# Patient Record
Sex: MALE | Race: WHITE | NOT HISPANIC OR LATINO | Employment: OTHER | URBAN - NONMETROPOLITAN AREA
[De-identification: names, ages, dates, MRNs, and addresses within clinical notes are randomized per-mention and may not be internally consistent; named-entity substitution may affect disease eponyms.]

---

## 2017-01-14 ENCOUNTER — COMMUNICATION - GICH (OUTPATIENT)
Dept: FAMILY MEDICINE | Facility: OTHER | Age: 52
End: 2017-01-14

## 2017-01-14 DIAGNOSIS — G89.29 OTHER CHRONIC PAIN: ICD-10-CM

## 2017-01-14 DIAGNOSIS — F41.1 GENERALIZED ANXIETY DISORDER: ICD-10-CM

## 2017-01-14 DIAGNOSIS — M54.50 LOW BACK PAIN: ICD-10-CM

## 2017-01-17 ENCOUNTER — COMMUNICATION - GICH (OUTPATIENT)
Dept: FAMILY MEDICINE | Facility: OTHER | Age: 52
End: 2017-01-17

## 2017-01-17 DIAGNOSIS — E11.9 TYPE 2 DIABETES MELLITUS WITHOUT COMPLICATIONS (H): ICD-10-CM

## 2017-01-18 ENCOUNTER — OFFICE VISIT - GICH (OUTPATIENT)
Dept: FAMILY MEDICINE | Facility: OTHER | Age: 52
End: 2017-01-18

## 2017-01-18 ENCOUNTER — HISTORY (OUTPATIENT)
Dept: FAMILY MEDICINE | Facility: OTHER | Age: 52
End: 2017-01-18

## 2017-01-18 DIAGNOSIS — S46.911A STRAIN OF UNSPECIFIED MUSCLE, FASCIA AND TENDON AT SHOULDER AND UPPER ARM LEVEL, RIGHT ARM, INITIAL ENCOUNTER: ICD-10-CM

## 2017-01-18 DIAGNOSIS — G89.29 OTHER CHRONIC PAIN: ICD-10-CM

## 2017-01-18 DIAGNOSIS — H10.9 CONJUNCTIVITIS: ICD-10-CM

## 2017-01-18 DIAGNOSIS — M54.50 LOW BACK PAIN: ICD-10-CM

## 2017-01-18 DIAGNOSIS — E11.9 TYPE 2 DIABETES MELLITUS WITHOUT COMPLICATIONS (H): ICD-10-CM

## 2017-01-18 LAB
ANION GAP - HISTORICAL: 11 (ref 5–18)
BUN SERPL-MCNC: 21 MG/DL (ref 7–25)
BUN/CREAT RATIO - HISTORICAL: 17
CALCIUM SERPL-MCNC: 9.5 MG/DL (ref 8.6–10.3)
CHLORIDE SERPLBLD-SCNC: 96 MMOL/L (ref 98–107)
CO2 SERPL-SCNC: 29 MMOL/L (ref 21–31)
CREAT SERPL-MCNC: 1.24 MG/DL (ref 0.7–1.3)
ESTIMATED AVERAGE GLUCOSE: 183 MG/DL
GFR IF NOT AFRICAN AMERICAN - HISTORICAL: >60 ML/MIN/1.73M2
GLUCOSE SERPL-MCNC: 225 MG/DL (ref 70–105)
HEMOGLOBIN A1C MONITORING (POCT) - HISTORICAL: 8 % (ref 4–6.2)
POTASSIUM SERPL-SCNC: 4.5 MMOL/L (ref 3.5–5.1)
SODIUM SERPL-SCNC: 136 MMOL/L (ref 133–143)

## 2017-01-30 ENCOUNTER — COMMUNICATION - GICH (OUTPATIENT)
Dept: FAMILY MEDICINE | Facility: OTHER | Age: 52
End: 2017-01-30

## 2017-01-30 DIAGNOSIS — E11.9 TYPE 2 DIABETES MELLITUS WITHOUT COMPLICATIONS (H): ICD-10-CM

## 2017-01-31 ENCOUNTER — AMBULATORY - GICH (OUTPATIENT)
Dept: SCHEDULING | Facility: OTHER | Age: 52
End: 2017-01-31

## 2017-02-03 ENCOUNTER — AMBULATORY - GICH (OUTPATIENT)
Dept: SCHEDULING | Facility: OTHER | Age: 52
End: 2017-02-03

## 2017-02-06 ENCOUNTER — COMMUNICATION - GICH (OUTPATIENT)
Dept: FAMILY MEDICINE | Facility: OTHER | Age: 52
End: 2017-02-06

## 2017-02-07 ENCOUNTER — AMBULATORY - GICH (OUTPATIENT)
Dept: EDUCATION SERVICES | Facility: OTHER | Age: 52
End: 2017-02-07

## 2017-02-07 ENCOUNTER — COMMUNICATION - GICH (OUTPATIENT)
Dept: FAMILY MEDICINE | Facility: OTHER | Age: 52
End: 2017-02-07

## 2017-02-07 DIAGNOSIS — E11.9 TYPE 2 DIABETES MELLITUS WITHOUT COMPLICATIONS (H): ICD-10-CM

## 2017-02-07 DIAGNOSIS — E11.8 TYPE 2 DIABETES MELLITUS WITH COMPLICATIONS (H): ICD-10-CM

## 2017-02-07 DIAGNOSIS — Z79.4 LONG TERM CURRENT USE OF INSULIN (H): ICD-10-CM

## 2017-02-23 ENCOUNTER — COMMUNICATION - GICH (OUTPATIENT)
Dept: EDUCATION SERVICES | Facility: OTHER | Age: 52
End: 2017-02-23

## 2017-02-23 DIAGNOSIS — E11.9 TYPE 2 DIABETES MELLITUS WITHOUT COMPLICATIONS (H): ICD-10-CM

## 2017-02-28 ENCOUNTER — OFFICE VISIT - GICH (OUTPATIENT)
Dept: FAMILY MEDICINE | Facility: OTHER | Age: 52
End: 2017-02-28

## 2017-02-28 ENCOUNTER — HISTORY (OUTPATIENT)
Dept: FAMILY MEDICINE | Facility: OTHER | Age: 52
End: 2017-02-28

## 2017-02-28 ENCOUNTER — HOSPITAL ENCOUNTER (OUTPATIENT)
Dept: RADIOLOGY | Facility: OTHER | Age: 52
End: 2017-02-28
Attending: FAMILY MEDICINE

## 2017-02-28 DIAGNOSIS — R07.89 OTHER CHEST PAIN: ICD-10-CM

## 2017-03-08 ENCOUNTER — AMBULATORY - GICH (OUTPATIENT)
Dept: SCHEDULING | Facility: OTHER | Age: 52
End: 2017-03-08

## 2017-03-10 ENCOUNTER — AMBULATORY - GICH (OUTPATIENT)
Dept: SCHEDULING | Facility: OTHER | Age: 52
End: 2017-03-10

## 2017-03-20 ENCOUNTER — COMMUNICATION - GICH (OUTPATIENT)
Dept: FAMILY MEDICINE | Facility: OTHER | Age: 52
End: 2017-03-20

## 2017-03-20 DIAGNOSIS — Z86.39 PERSONAL HISTORY OF OTHER ENDOCRINE, NUTRITIONAL AND METABOLIC DISEASE: ICD-10-CM

## 2017-05-08 ENCOUNTER — COMMUNICATION - GICH (OUTPATIENT)
Dept: FAMILY MEDICINE | Facility: OTHER | Age: 52
End: 2017-05-08

## 2017-05-08 DIAGNOSIS — M54.9 DORSALGIA: ICD-10-CM

## 2017-05-08 DIAGNOSIS — E11.65 TYPE 2 DIABETES MELLITUS WITH HYPERGLYCEMIA (H): ICD-10-CM

## 2017-05-08 DIAGNOSIS — E11.51 TYPE 2 DIABETES MELLITUS WITH DIABETIC PERIPHERAL ANGIOPATHY WITHOUT GANGRENE (H): ICD-10-CM

## 2017-05-12 ENCOUNTER — COMMUNICATION - GICH (OUTPATIENT)
Dept: FAMILY MEDICINE | Facility: OTHER | Age: 52
End: 2017-05-12

## 2017-05-12 DIAGNOSIS — M54.9 DORSALGIA: ICD-10-CM

## 2017-06-05 ENCOUNTER — COMMUNICATION - GICH (OUTPATIENT)
Dept: FAMILY MEDICINE | Facility: OTHER | Age: 52
End: 2017-06-05

## 2017-06-05 DIAGNOSIS — M54.9 DORSALGIA: ICD-10-CM

## 2017-06-15 ENCOUNTER — COMMUNICATION - GICH (OUTPATIENT)
Dept: FAMILY MEDICINE | Facility: OTHER | Age: 52
End: 2017-06-15

## 2017-06-21 ENCOUNTER — OFFICE VISIT - GICH (OUTPATIENT)
Dept: FAMILY MEDICINE | Facility: OTHER | Age: 52
End: 2017-06-21

## 2017-06-21 ENCOUNTER — HISTORY (OUTPATIENT)
Dept: FAMILY MEDICINE | Facility: OTHER | Age: 52
End: 2017-06-21

## 2017-06-21 DIAGNOSIS — E11.42 TYPE 2 DIABETES MELLITUS WITH DIABETIC POLYNEUROPATHY (H): ICD-10-CM

## 2017-06-21 DIAGNOSIS — E11.65 TYPE 2 DIABETES MELLITUS WITH HYPERGLYCEMIA (H): ICD-10-CM

## 2017-06-21 DIAGNOSIS — M62.830 MUSCLE SPASM OF BACK: ICD-10-CM

## 2017-06-21 DIAGNOSIS — N18.30 CHRONIC KIDNEY DISEASE, STAGE III (MODERATE) (H): ICD-10-CM

## 2017-06-21 DIAGNOSIS — Z79.4 LONG TERM CURRENT USE OF INSULIN (H): ICD-10-CM

## 2017-06-21 DIAGNOSIS — F51.04 PSYCHOPHYSIOLOGIC INSOMNIA: ICD-10-CM

## 2017-06-21 LAB
A/G RATIO - HISTORICAL: 1.5 (ref 1–2)
ALBUMIN SERPL-MCNC: 4.2 G/DL (ref 3.5–5.7)
ALP SERPL-CCNC: 77 IU/L (ref 34–104)
ALT (SGPT) - HISTORICAL: 50 IU/L (ref 7–52)
ANION GAP - HISTORICAL: 13 (ref 5–18)
AST SERPL-CCNC: 44 IU/L (ref 13–39)
BILIRUB SERPL-MCNC: 0.3 MG/DL (ref 0.3–1)
BUN SERPL-MCNC: 23 MG/DL (ref 7–25)
BUN/CREAT RATIO - HISTORICAL: 18
CALCIUM SERPL-MCNC: 9.6 MG/DL (ref 8.6–10.3)
CHLORIDE SERPLBLD-SCNC: 92 MMOL/L (ref 98–107)
CHOL/HDL RATIO - HISTORICAL: 5.53
CHOLESTEROL TOTAL: 199 MG/DL
CO2 SERPL-SCNC: 29 MMOL/L (ref 21–31)
CREAT SERPL-MCNC: 1.29 MG/DL (ref 0.7–1.3)
ESTIMATED AVERAGE GLUCOSE: 220 MG/DL
GFR IF NOT AFRICAN AMERICAN - HISTORICAL: 58 ML/MIN/1.73M2
GLOBULIN - HISTORICAL: 2.8 G/DL (ref 2–3.7)
GLUCOSE SERPL-MCNC: 298 MG/DL (ref 70–105)
HDLC SERPL-MCNC: 36 MG/DL (ref 23–92)
HEMOGLOBIN A1C MONITORING (POCT) - HISTORICAL: 9.3 % (ref 4–6.2)
LDL COMMENT - HISTORICAL: ABNORMAL
NON-HDL CHOLESTEROL - HISTORICAL: 163 MG/DL
PATIENT STATUS - HISTORICAL: ABNORMAL
POTASSIUM SERPL-SCNC: 5.1 MMOL/L (ref 3.5–5.1)
PROT SERPL-MCNC: 7 G/DL (ref 6.4–8.9)
SODIUM SERPL-SCNC: 134 MMOL/L (ref 133–143)
TRIGL SERPL-MCNC: 610 MG/DL

## 2017-06-21 ASSESSMENT — PATIENT HEALTH QUESTIONNAIRE - PHQ9: SUM OF ALL RESPONSES TO PHQ QUESTIONS 1-9: 10

## 2017-06-30 ENCOUNTER — COMMUNICATION - GICH (OUTPATIENT)
Dept: FAMILY MEDICINE | Facility: OTHER | Age: 52
End: 2017-06-30

## 2017-07-11 ENCOUNTER — AMBULATORY - GICH (OUTPATIENT)
Dept: SCHEDULING | Facility: OTHER | Age: 52
End: 2017-07-11

## 2017-07-23 ENCOUNTER — COMMUNICATION - GICH (OUTPATIENT)
Dept: FAMILY MEDICINE | Facility: OTHER | Age: 52
End: 2017-07-23

## 2017-07-23 DIAGNOSIS — F41.1 GENERALIZED ANXIETY DISORDER: ICD-10-CM

## 2017-08-22 ENCOUNTER — COMMUNICATION - GICH (OUTPATIENT)
Dept: FAMILY MEDICINE | Facility: OTHER | Age: 52
End: 2017-08-22

## 2017-08-22 DIAGNOSIS — M54.50 LOW BACK PAIN: ICD-10-CM

## 2017-08-22 DIAGNOSIS — G89.29 OTHER CHRONIC PAIN: ICD-10-CM

## 2017-09-04 ENCOUNTER — COMMUNICATION - GICH (OUTPATIENT)
Dept: FAMILY MEDICINE | Facility: OTHER | Age: 52
End: 2017-09-04

## 2017-09-04 DIAGNOSIS — F41.1 GENERALIZED ANXIETY DISORDER: ICD-10-CM

## 2017-09-26 ENCOUNTER — COMMUNICATION - GICH (OUTPATIENT)
Dept: FAMILY MEDICINE | Facility: OTHER | Age: 52
End: 2017-09-26

## 2017-09-26 DIAGNOSIS — E11.65 TYPE 2 DIABETES MELLITUS WITH HYPERGLYCEMIA (H): ICD-10-CM

## 2017-09-26 DIAGNOSIS — E11.51 TYPE 2 DIABETES MELLITUS WITH DIABETIC PERIPHERAL ANGIOPATHY WITHOUT GANGRENE (H): ICD-10-CM

## 2017-09-30 ENCOUNTER — COMMUNICATION - GICH (OUTPATIENT)
Dept: FAMILY MEDICINE | Facility: OTHER | Age: 52
End: 2017-09-30

## 2017-09-30 DIAGNOSIS — K21.9 GASTRO-ESOPHAGEAL REFLUX DISEASE WITHOUT ESOPHAGITIS: ICD-10-CM

## 2017-10-02 ENCOUNTER — COMMUNICATION - GICH (OUTPATIENT)
Dept: FAMILY MEDICINE | Facility: OTHER | Age: 52
End: 2017-10-02

## 2017-10-02 DIAGNOSIS — R60.9 EDEMA: ICD-10-CM

## 2017-10-03 ENCOUNTER — COMMUNICATION - GICH (OUTPATIENT)
Dept: FAMILY MEDICINE | Facility: OTHER | Age: 52
End: 2017-10-03

## 2017-10-03 DIAGNOSIS — F32.9 MAJOR DEPRESSIVE DISORDER, SINGLE EPISODE: ICD-10-CM

## 2017-10-12 ENCOUNTER — HISTORY (OUTPATIENT)
Dept: FAMILY MEDICINE | Facility: OTHER | Age: 52
End: 2017-10-12

## 2017-10-12 ENCOUNTER — OFFICE VISIT - GICH (OUTPATIENT)
Dept: FAMILY MEDICINE | Facility: OTHER | Age: 52
End: 2017-10-12

## 2017-10-12 DIAGNOSIS — M51.369 OTHER INTERVERTEBRAL DISC DEGENERATION, LUMBAR REGION: ICD-10-CM

## 2017-10-12 DIAGNOSIS — R80.9 PROTEINURIA: ICD-10-CM

## 2017-10-12 DIAGNOSIS — R81 GLYCOSURIA: ICD-10-CM

## 2017-10-12 DIAGNOSIS — R30.0 DYSURIA: ICD-10-CM

## 2017-10-12 LAB
BACTERIA URINE: NORMAL BACTERIA/HPF
BILIRUB UR QL: NEGATIVE
CLARITY, URINE: CLEAR CLARITY
COLOR UR: YELLOW COLOR
EPITHELIAL CELLS: NORMAL EPI/HPF
GLUCOSE URINE: >=1000 MG/DL
HYALINE CASTS: NORMAL /LPF
KETONES UR QL: ABNORMAL MG/DL
LEUKOCYTE ESTERASE URINE: NEGATIVE
NITRITE UR QL STRIP: NEGATIVE
OCCULT BLOOD,URINE - HISTORICAL: NEGATIVE
OTHER: NORMAL
PH UR: 6 [PH]
PROTEIN QUALITATIVE,URINE - HISTORICAL: 100 MG/DL
RBC - HISTORICAL: NORMAL /HPF
SP GR UR STRIP: 1.02
UROBILINOGEN,QUALITATIVE - HISTORICAL: NORMAL EU/DL
WBC - HISTORICAL: NORMAL /HPF

## 2017-10-13 ENCOUNTER — AMBULATORY - GICH (OUTPATIENT)
Dept: SCHEDULING | Facility: OTHER | Age: 52
End: 2017-10-13

## 2017-10-14 LAB — CULTURE - HISTORICAL: NORMAL

## 2017-10-16 ENCOUNTER — COMMUNICATION - GICH (OUTPATIENT)
Dept: FAMILY MEDICINE | Facility: OTHER | Age: 52
End: 2017-10-16

## 2017-10-18 ENCOUNTER — COMMUNICATION - GICH (OUTPATIENT)
Dept: EDUCATION SERVICES | Facility: OTHER | Age: 52
End: 2017-10-18

## 2017-10-18 DIAGNOSIS — E11.42 TYPE 2 DIABETES MELLITUS WITH DIABETIC POLYNEUROPATHY (H): ICD-10-CM

## 2017-10-18 DIAGNOSIS — Z79.4 LONG TERM CURRENT USE OF INSULIN (H): ICD-10-CM

## 2017-10-18 DIAGNOSIS — E11.65 TYPE 2 DIABETES MELLITUS WITH HYPERGLYCEMIA (H): ICD-10-CM

## 2017-10-19 ENCOUNTER — AMBULATORY - GICH (OUTPATIENT)
Dept: SCHEDULING | Facility: OTHER | Age: 52
End: 2017-10-19

## 2017-11-24 ENCOUNTER — COMMUNICATION - GICH (OUTPATIENT)
Dept: FAMILY MEDICINE | Facility: OTHER | Age: 52
End: 2017-11-24

## 2017-11-24 DIAGNOSIS — I10 ESSENTIAL (PRIMARY) HYPERTENSION: ICD-10-CM

## 2017-11-24 DIAGNOSIS — G62.9 POLYNEUROPATHY: ICD-10-CM

## 2017-11-24 DIAGNOSIS — E11.319 TYPE 2 DIABETES MELLITUS WITH RETINOPATHY WITHOUT MACULAR EDEMA (H): ICD-10-CM

## 2017-11-24 DIAGNOSIS — E78.2 MIXED HYPERLIPIDEMIA: ICD-10-CM

## 2017-11-30 ENCOUNTER — AMBULATORY - GICH (OUTPATIENT)
Dept: SCHEDULING | Facility: OTHER | Age: 52
End: 2017-11-30

## 2017-11-30 ENCOUNTER — HOSPITAL ENCOUNTER (OUTPATIENT)
Dept: RADIOLOGY | Facility: OTHER | Age: 52
End: 2017-11-30
Attending: FAMILY MEDICINE

## 2017-11-30 ENCOUNTER — OFFICE VISIT - GICH (OUTPATIENT)
Dept: FAMILY MEDICINE | Facility: OTHER | Age: 52
End: 2017-11-30

## 2017-11-30 ENCOUNTER — HISTORY (OUTPATIENT)
Dept: FAMILY MEDICINE | Facility: OTHER | Age: 52
End: 2017-11-30

## 2017-11-30 DIAGNOSIS — R09.02 HYPOXEMIA: ICD-10-CM

## 2017-11-30 DIAGNOSIS — R91.1 SOLITARY PULMONARY NODULE: ICD-10-CM

## 2017-11-30 LAB
ABSOLUTE BASOPHILS - HISTORICAL: 0.1 THOU/CU MM
ABSOLUTE EOSINOPHILS - HISTORICAL: 0.2 THOU/CU MM
ABSOLUTE IMMATURE GRANULOCYTES(METAS,MYELOS,PROS) - HISTORICAL: 0.1 THOU/CU MM
ABSOLUTE LYMPHOCYTES - HISTORICAL: 1.2 THOU/CU MM (ref 0.9–2.9)
ABSOLUTE MONOCYTES - HISTORICAL: 0.7 THOU/CU MM
ABSOLUTE NEUTROPHILS - HISTORICAL: 5.9 THOU/CU MM (ref 1.7–7)
ALLEN'S TEST - HISTORICAL: ABNORMAL
ANION GAP - HISTORICAL: 10 (ref 5–18)
BASOPHILS # BLD AUTO: 0.9 %
BUN SERPL-MCNC: 27 MG/DL (ref 7–25)
BUN/CREAT RATIO - HISTORICAL: 18
CALCIUM SERPL-MCNC: 9.2 MG/DL (ref 8.6–10.3)
CHLORIDE SERPLBLD-SCNC: 97 MMOL/L (ref 98–107)
CO2 BLDA-SCNC: 26 MMOL/L (ref 22–28)
CO2 SERPL-SCNC: 27 MMOL/L (ref 21–31)
CREAT SERPL-MCNC: 1.51 MG/DL (ref 0.7–1.3)
D-DIMER, QUANTITATIVE NG/ML - HISTORICAL: <200 NG/ML
EOSINOPHIL NFR BLD AUTO: 2.4 %
ERYTHROCYTE [DISTWIDTH] IN BLOOD BY AUTOMATED COUNT: 12.7 % (ref 11.5–15.5)
GFR IF NOT AFRICAN AMERICAN - HISTORICAL: 49 ML/MIN/1.73M2
GLUCOSE SERPL-MCNC: 318 MG/DL (ref 70–105)
HCT VFR BLD AUTO: 48.6 % (ref 37–53)
HEMOGLOBIN: 16.3 G/DL (ref 13.5–17.5)
IMMATURE GRANULOCYTES(METAS,MYELOS,PROS) - HISTORICAL: 0.6 %
INSPIRED O2 - HISTORICAL: ABNORMAL
LYMPHOCYTES NFR BLD AUTO: 14.9 % (ref 20–44)
MAGNESIUM SERPL-MCNC: 1.9 MG/DL (ref 1.9–2.7)
MCH RBC QN AUTO: 30.3 PG (ref 26–34)
MCHC RBC AUTO-ENTMCNC: 33.5 G/DL (ref 32–36)
MCV RBC AUTO: 90 FL (ref 80–100)
MONOCYTES NFR BLD AUTO: 8.3 %
NEUTROPHILS NFR BLD AUTO: 72.9 % (ref 42–72)
O2 SATURATION - HISTORICAL: 92 %
PCO2,ARTERIAL - HISTORICAL: 46 MMHG (ref 35–45)
PH BLDA: 7.38 [PH] (ref 7.35–7.45)
PLATELET # BLD AUTO: 242 THOU/CU MM (ref 140–440)
PMV BLD: 9.7 FL (ref 6.5–11)
PO2,ARTERIAL - HISTORICAL: 62 MMHG (ref 83–108)
POTASSIUM SERPL-SCNC: 5.1 MMOL/L (ref 3.5–5.1)
PUNCTURE LOCATION - HISTORICAL: ABNORMAL
RED BLOOD COUNT - HISTORICAL: 5.38 MIL/CU MM (ref 4.3–5.9)
SODIUM SERPL-SCNC: 134 MMOL/L (ref 133–143)
TROPONIN I - HISTORICAL: <0.03 NG/ML
WHITE BLOOD COUNT - HISTORICAL: 8.1 THOU/CU MM (ref 4.5–11)

## 2017-12-01 ENCOUNTER — OFFICE VISIT - GICH (OUTPATIENT)
Dept: FAMILY MEDICINE | Facility: OTHER | Age: 52
End: 2017-12-01

## 2017-12-01 ENCOUNTER — HISTORY (OUTPATIENT)
Dept: FAMILY MEDICINE | Facility: OTHER | Age: 52
End: 2017-12-01

## 2017-12-01 DIAGNOSIS — R09.02 HYPOXEMIA: ICD-10-CM

## 2017-12-01 LAB
ALLEN'S TEST - HISTORICAL: ABNORMAL
CO2 BLDA-SCNC: 28 MMOL/L (ref 22–28)
INSPIRED O2 - HISTORICAL: ABNORMAL
O2 SATURATION - HISTORICAL: 91 %
PCO2,ARTERIAL - HISTORICAL: 44 MMHG (ref 35–45)
PH BLDA: 7.42 [PH] (ref 7.35–7.45)
PO2,ARTERIAL - HISTORICAL: 57 MMHG (ref 83–108)
PUNCTURE LOCATION - HISTORICAL: ABNORMAL

## 2017-12-01 ASSESSMENT — PATIENT HEALTH QUESTIONNAIRE - PHQ9: SUM OF ALL RESPONSES TO PHQ QUESTIONS 1-9: 15

## 2017-12-08 ENCOUNTER — COMMUNICATION - GICH (OUTPATIENT)
Dept: FAMILY MEDICINE | Facility: OTHER | Age: 52
End: 2017-12-08

## 2017-12-12 ENCOUNTER — AMBULATORY - GICH (OUTPATIENT)
Dept: SCHEDULING | Facility: OTHER | Age: 52
End: 2017-12-12

## 2017-12-18 ENCOUNTER — HOSPITAL ENCOUNTER (OUTPATIENT)
Dept: RESPIRATORY THERAPY | Facility: OTHER | Age: 52
End: 2017-12-18
Attending: FAMILY MEDICINE

## 2017-12-18 DIAGNOSIS — R09.02 HYPOXEMIA: ICD-10-CM

## 2017-12-20 ENCOUNTER — COMMUNICATION - GICH (OUTPATIENT)
Dept: FAMILY MEDICINE | Facility: OTHER | Age: 52
End: 2017-12-20

## 2017-12-24 ENCOUNTER — COMMUNICATION - GICH (OUTPATIENT)
Dept: FAMILY MEDICINE | Facility: OTHER | Age: 52
End: 2017-12-24

## 2017-12-24 DIAGNOSIS — E11.9 TYPE 2 DIABETES MELLITUS WITHOUT COMPLICATIONS (H): ICD-10-CM

## 2017-12-26 ENCOUNTER — COMMUNICATION - GICH (OUTPATIENT)
Dept: FAMILY MEDICINE | Facility: OTHER | Age: 52
End: 2017-12-26

## 2017-12-26 DIAGNOSIS — K21.9 GASTRO-ESOPHAGEAL REFLUX DISEASE WITHOUT ESOPHAGITIS: ICD-10-CM

## 2017-12-27 NOTE — PROGRESS NOTES
Patient Information     Patient Name MRN Sex Jake Marinelli [id number] Male 1965      Progress Notes by Kira Grider DO at 10/12/2017 11:30 AM     Author:  Kira Grider DO Service:  (none) Author Type:  PHYS- Osteopathic     Filed:  10/14/2017 10:38 AM Encounter Date:  10/12/2017 Status:  Signed     :  Kira Grider DO (PHYS- Osteopathic)            SUBJECTIVE:  Jake Bermudez is a 52 y.o. male who presents for dysuria.    HPI  Jake has had dysuria off and on for a few months.  He states it is a slight burning at the end of urination.  He has never mentioned it to his wife or me previously because he didn't want it to be a big deal.  It usually goes away after a day.  Happened yesterday, and when his wife found out  - she made him this appointment.  He has difficult to control diabetes, and more recently he has had blood sugar readings in the 200s-even up to 300.   He denies hematuria, increased frequency. He has not tried any over-the-counter medications for his symptoms.    Allergies      Allergen   Reactions     Penicillins  Diaphoresis     Vancomycin  Diaphoresis, Fever and Other - Describe In Comment Field     Fever while on zosyn, vanc, no rash or WBC elevation      Zosyn [Piperacillin-Tazobactam]  Diaphoresis, Fever and Other - Describe In Comment Field     Fever while on zosyn, vanc. No rash, no wbc elevation    ,   Current Outpatient Prescriptions on File Prior to Visit       Medication  Sig Dispense Refill     ACCU-CHEK DONAVON PLUS TEST STRP strip CHECK GLUCOSE FOUR TIMES DAILY 400 Strip 1     ACCU-CHEK MULTICLIX LANCET USE STRIP TO CHECK BLOOD GLUCOSE 4 TIMES DAILY. 360 Each 3     acetaminophen (ACETAMINOPHEN EXTRA STRENGTH) 500 mg tablet TAKE ONE TABLET BY MOUTH EVERY 6 HOURS AS NEEDED .  MAX  ACETAMINOPHEN  DOSE  4000MG  IN  24HRS. 270 tablet 3     albuterol HFA (PRO-AIR,VENTOLIN,PROVENTIL) 90 mcg/actuation inhaler Inhale 2 Puffs by mouth every 4 hours if needed for  "Shortness Of Breath, Wheezing or Other (Specify) (coughing). 1 Inhaler 0     aspirin 81 mg tablet Take 81 mg by mouth once daily with a meal.       Cholecalciferol, Vitamin D3, 2,000 unit tablet TAKE ONE TABLET BY MOUTH ONCE DAILY 90 tablet 2     clonazePAM (KLONOPIN) 0.5 mg tablet TAKE ONE TABLET BY MOUTH AT BEDTIME 30 tablet 5     DULoxetine (CYMBALTA) 60 mg Delayed-release capsule Take 1 capsule by mouth once daily. 90 capsule 4     FLUoxetine (PROZAC) 20 mg capsule Take 1 capsule by mouth every morning. 90 capsule 1     gabapentin (NEURONTIN) 300 mg capsule Take 3 capsules by mouth 3 times daily. 900 mg TID, 1200 mg at bedtime  0     hydroCHLOROthiazide (HCTZ) 25 mg tablet TAKE ONE TABLET BY MOUTH ONCE DAILY 90 tablet 2     insulin aspart (NOVOLOG FLEXPEN) 100 unit/mL solution for injection INJECT per ICR 7.5units/8grams carb and ISF 1:8.  MAX DAILY DOSE 245 UNITS. 30 pen 7     insulin glargine (LANTUS SOLOSTAR) 100 unit/mL (3 mL) pen Inject 140 Units subcutaneous before bedtime. 45 mL 10     lisinopril (PRINIVIL; ZESTRIL) 10 mg tablet Take 1 tablet by mouth once daily. 90 tablet 4     LYRICA 100 mg capsule        Melatonin 5 mg tab Take 1-2 tablets by mouth at bedtime. 60 tablet 5     meloxicam 15 mg tablet TAKE ONE TABLET BY MOUTH ONCE DAILY WITH FOOD 90 tablet 2     methocarbamol (ROBAXIN) 750 mg tablet Take 1 tablet by mouth 3 times daily. 90 tablet 5     ANN PEN NEEDLE 32 gauge x 5/32\" USE AS DIRECTED FOR ADMINISTERING INSULIN AT HOME 4 TIMES DAILY 400 Each 2     omeprazole (PRILOSEC OTC) 20 mg tablet TAKE ONE TABLET BY MOUTH EVERY DAY BEFORE  A  MEAL 90 tablet 0     simvastatin (ZOCOR) 20 mg tablet Take 1 tablet by mouth at bedtime. 90 tablet 4     traMADol (ULTRAM) 50 mg tablet Take 1 tablet by mouth 3 times daily if needed. 90 tablet 2     No current facility-administered medications on file prior to visit.     and   Past Medical History:     Diagnosis  Date     Diabetic, retinopathy, proliferative (HC) " 7/1/2014    Mild proliferative changes on left side--sees Dr Ortiz exam 6/2014      Lateral epicondylitis of left elbow 10/3/2013    Seen by Dr Tayla Jung 8/2013        REVIEW OF SYSTEMS:  Review of Systems   Musculoskeletal: Positive for back pain (Worsening).   All other systems reviewed and are negative.      OBJECTIVE:  /92  Pulse 88  Temp 97.9  F (36.6  C) (Tympanic)  Wt 134.9 kg (297 lb 6.4 oz)  BMI 42.58 kg/m2    EXAM:   Physical Exam    ASSESSMENT/PLAN:    ICD-10-CM    1. Glucosuria R81 URINE CULTURE      URINE CULTURE   2. Dysuria R30.0 URINALYSIS W REFLEX MICROSCOPIC IF POSITIVE      URINALYSIS W REFLEX MICROSCOPIC IF POSITIVE      URINALYSIS MICROSCOPIC      URINALYSIS MICROSCOPIC   3. Proteinuria, unspecified type R80.9 URINE CULTURE      URINE CULTURE   4. DDD (degenerative disc disease), lumbar M51.36 AMB CONSULT TO PHYSICAL THERAPY        Plan:   His urine today shows glucosuria, proteinuria. I discussed with him this is the likely cause of his end of urination burning. This also was able to reiterate importance of blood sugar control. I will send his urine for culture to verify there is no acute infection.  I encouraged him to increase his fluid intake by 2 large glasses of water per day.    Back pain, chronic, worsening. We'll refer him to physical therapy at this current time. We had a good conversation about weight loss and the effects of improved back pain, improved diabetic control, improved blood pressure control. It was briefly discussed for surgical intervention, however patient does not feel ready for that at this time.

## 2017-12-27 NOTE — PROGRESS NOTES
Patient Information     Patient Name MRN Sex Jake Marinelli [id number] Male 1965      Progress Notes by Azucena Justin at 2017 11:24 AM     Author:  Azucena Justin  Service:  (none) Author Type:  Other Clinical Staff     Filed:  2017 11:25 AM  Date of Service:  2017 11:24 AM Status:  Addendum     :  Azucena Justin (Other Clinical Staff)        Related Notes: Original Note by Azucena Justin (Other Clinical Staff) filed at 2017 11:24 AM            1.  Has the patient had a previous reaction to IV contrast? No    2.  Does the patient have kidney disease?YES ,CKD STAGE 3 OK FOR VISI    3.  Is the patient on dialysis? No    If YES to any of these questions, exam will be reviewed with a Radiologist before administering contrast.

## 2017-12-28 NOTE — TELEPHONE ENCOUNTER
Patient Information     Patient Name MRN Sex Jake Marinelli [id number] Male 1965      Telephone Encounter by Kira Rodney DO at 2017  3:19 PM     Author:  Kira Rodney DO Service:  (none) Author Type:  PHYS- Osteopathic     Filed:  2017  3:21 PM Encounter Date:  2017 Status:  Signed     :  Kira Rodney DO (PHYS- Osteopathic)            Need improved blood sugars prior to an elective surgery/hernia repair.  KIRA RODNEY DO

## 2017-12-28 NOTE — TELEPHONE ENCOUNTER
Patient Information     Patient Name MRN Sex Jake Marinelli [id number] Male 1965      Telephone Encounter by Sanjay Yeung LPN at 10/3/2017  1:09 PM     Author:  Sanjay Yeung LPN Service:  (none) Author Type:  NURS- Licensed Practical Nurse     Filed:  10/3/2017  1:10 PM Encounter Date:  10/3/2017 Status:  Signed     :  Sanjay Yeung LPN (NURS- Licensed Practical Nurse)            Left message to call back  ...................Sanjay Yeung LPN....10/3/2017 1:09 PM

## 2017-12-28 NOTE — TELEPHONE ENCOUNTER
Patient Information     Patient Name MRN Sex Jake Marinelli [id number] Male 1965      Telephone Encounter by Dilma Butler RN at 2017  2:58 PM     Author:  Dilma Butler RN Service:  (none) Author Type:  NURS- Registered Nurse     Filed:  2017  3:05 PM Encounter Date:  2017 Status:  Signed     :  Dilma Butler RN (NURS- Registered Nurse)            This is a Refill request from: [facility]  Name of Medication:ClonazePAM (KLONOPIN) 0.5 mg tablet  Quantity requested: 30 x 5   Last fill date: 17  Due for refill: 17  Last visit with PCP:  JOSE ELIAS RODNEY DO was on:2017  Controlled Substance Agreement:2013  Diagnosis r/t this medication request: MIRANDA    Unable to complete prescription refill per RN Medication Refill Policy.................... Dilma Butler RN ....................  2017   2:59 PM

## 2017-12-28 NOTE — TELEPHONE ENCOUNTER
"Patient Information     Patient Name MRN Jake Oliveira [id number] Male 1965      Telephone Encounter by Dilma Butler RN at 2017 11:38 AM     Author:  Dilma Butler RN Service:  (none) Author Type:  NURS- Registered Nurse     Filed:  2017 11:50 AM Encounter Date:  2017 Status:  Signed     :  Dilma Butler RN (NURS- Registered Nurse)            Wife calls today inquiring about Clonazepam refill request. Wife reports they,  \"requested this refill for about a week now'. Wife states he has been out a few days now and he is getting meaner everyday he goes without'. PCP is out. Wife on behalf of patient request physician consideration     This is a Refill request from: Wife/[facility]  Name of Medication: ClonazePAM (KLONOPIN) 0.5 mg tablet  Quantity requested: 30  Last fill date: 17  Due for refill: yes  Last visit with JOSE ELIAS RODNEY was on: 2017   Controlled Substance Agreement:13  Diagnosis r/t this medication request: MIRANDA     Unable to complete prescription refill per RN Medication Refill Policy.................... Dilma Butler RN ....................  2017   11:38 AM          "

## 2017-12-28 NOTE — TELEPHONE ENCOUNTER
Patient Information     Patient Name MRN Jake Oliveira [id number] Male 1965      Telephone Encounter by Dorothy Michel at 2017 11:28 AM     Author:  Dorothy Michel Service:  (none) Author Type:  (none)     Filed:  2017 11:28 AM Encounter Date:  2017 Status:  Signed     :  Dorothy Michel            Prescription faxed to [facility] L[facility]............................... 2017 11:28 AM

## 2017-12-28 NOTE — TELEPHONE ENCOUNTER
Patient Information     Patient Name MRN Sex Jake Marinelli [id number] Male 1965      Telephone Encounter by Sanjay Yeung LPN at 2017  3:38 PM     Author:  Sanjay Yeung LPN Service:  (none) Author Type:  NURS- Licensed Practical Nurse     Filed:  2017  3:38 PM Encounter Date:  2017 Status:  Signed     :  Sanjay Yeung LPN (NURS- Licensed Practical Nurse)            Rx faxed to [facility].  Sanjay Yeung LPN ..............2017 3:38 PM

## 2017-12-28 NOTE — TELEPHONE ENCOUNTER
Patient Information     Patient Name MRN Sex Jake Marinelli [id number] Male 1965      Telephone Encounter by Dilma Butler RN at 10/3/2017 12:00 PM     Author:  Dilma Butler RN Service:  (none) Author Type:  NURS- Registered Nurse     Filed:  10/3/2017 12:04 PM Encounter Date:  10/2/2017 Status:  Signed     :  Dilma Butler RN (NURS- Registered Nurse)            Diuretics (may be prescribed for edema)  Office visit in the past 12 months or per provider note.  Last visit with JOSE ELIAS RODNEY was on: 2017 in [facility]  Next visit with JOSE ELIAS RODNEY is on: No future appointment listed with this provider  Next visit with Family Practice is on: No future appointment listed in this department  Lab testing requirements:  Creatinine and Potassium annually, if ordering lab, order BMP.  CREATININE (mg/dL)    Date Value   2017 1.29     POTASSIUM (mmol/L)    Date Value   2017 5.1     BP Readings from Last 4 Encounters:    17 142/82   17 110/80   17 132/78   16 124/78   Review last provider visit note.  If BP reviewed and plan is noted, can refill.  Max refill for 12 months from last office visit or per provider note.  Prescription refilled per RN Medication Refill Policy.................... Dilma Butler RN ....................  10/3/2017   12:01 PM

## 2017-12-28 NOTE — TELEPHONE ENCOUNTER
Patient Information     Patient Name MRN Sex Jake Marinelli [id number] Male 1965      Telephone Encounter by Sanjay Yeung LPN at 10/16/2017  9:03 AM     Author:  Sanjay Yeung LPN Service:  (none) Author Type:  NURS- Licensed Practical Nurse     Filed:  10/16/2017  9:03 AM Encounter Date:  10/16/2017 Status:  Signed     :  Sanjay Yeung LPN (NURS- Licensed Practical Nurse)            Patient called back and was notified of results.  Sanjay Yeung LPN ..............10/16/2017 9:03 AM

## 2017-12-28 NOTE — TELEPHONE ENCOUNTER
Patient Information     Patient Name MRN Sex Jake Marinelli [id number] Male 1965      Telephone Encounter by Sanjay Yeung LPN at 10/3/2017  1:31 PM     Author:  Sanjay Yeung LPN  Service:  (none) Author Type:  NURS- Licensed Practical Nurse     Filed:  10/3/2017  1:33 PM  Encounter Date:  10/3/2017 Status:  Addendum     :  Sanjay Yeung LPN (NURS- Licensed Practical Nurse)        Related Notes: Original Note by Sanjay Yeung LPN (NURS- Licensed Practical Nurse) filed at 10/3/2017  1:32 PM            Talked to patient's wife and she stated patient felt he did not see a difference while taking Fluxotine. Wife states his attitude has been horrible lately and would like another Rx sent for him to start taking it again. Please send to [facility]. Patient states it is ok to leave message.  Sanjay Yeung LPN ..............10/3/2017 1:32 PM

## 2017-12-28 NOTE — TELEPHONE ENCOUNTER
Patient Information     Patient Name MRN Sex Jake Marinelli [id number] Male 1965      Telephone Encounter by Dilma Butler RN at 10/2/2017  3:11 PM     Author:  Dilma Butler RN Service:  (none) Author Type:  NURS- Registered Nurse     Filed:  10/2/2017  3:15 PM Encounter Date:  2017 Status:  Signed     :  Dilma Butler RN (NURS- Registered Nurse)            Proton Pump Inhibitors  Office visit in the past 12 months or per provider note.  Last visit with JOSE ELIAS RODNEY was on: 2017 in [facility]  Next visit with JOSE ELIAS RODNEY is on: No future appointment listed with this provider  Next visit with Family Practice is on: No future appointment listed in this department  Max refill for 12 months from last office visit or per provider note.  Prescription refilled per RN Medication Refill Policy.................... Dilma Butler RN ....................  10/2/2017   3:13 PM

## 2017-12-28 NOTE — TELEPHONE ENCOUNTER
Patient Information     Patient Name MRN Sex Jake Marinelli [id number] Male 1965      Telephone Encounter by Dilma Butler RN at 2017  2:57 PM     Author:  Dilma Butler RN Service:  (none) Author Type:  NURS- Registered Nurse     Filed:  2017  3:02 PM Encounter Date:  2017 Status:  Signed     :  Dilma Butler RN (NURS- Registered Nurse)            Statins    Office visit in the past 12 months.    Last visit with JOSE ELIAS RODNEY was on: 10/12/2017 in [facility]  Next visit with JOSE ELIAS RODNEY is on: No future appointment listed with this provider  Next visit with Family Practice is on: No future appointment listed in this department    Lab testing requirements:  Lipids annually.  Repeat lipids 6-8 weeks after dosage or drug change.    Last Lipids:  Chol: 199    2017  T    2017  HDL:   36    2017  LDL:  54    2015  LDL DIRECT:  No results found in past 5 years    .  Concommitant use of fibrates and statins-If it is an addition to the medication list, review note and/or discuss with provider.  If already on medication list, refill.  Max refills 12 months from last office visit.    Prescription refilled per RN Medication Refill Policy.................... Dilma Butler RN ....................  2017   3:00 PM        Ace Inhibitors  Office visit in the past 12 months or per provider note.  Last visit with JOSE ELIAS RODNEY was on: 10/12/2017 in [facility]  Next visit with JOSE ELIAS RODNEY is on: No future appointment listed with this provider  Next visit with Family Practice is on: No future appointment listed in this department  Lab test requirements:  Creatinine and Potassium annually, if ordering lab, order BMP.  CREATININE (mg/dL)    Date Value   2017 1.29     POTASSIUM (mmol/L)    Date Value   2017 5.1   Max refill for 12 months from last office visit or per provider note  Prescription refilled per RN  Medication Refill Policy.................... Dilma Btuler RN ....................  11/24/2017   2:59 PM  Prescription refilled per RN Medication Refill Policy.................... Dilma Butler RN ....................  11/24/2017   3:00 PM      Vitamin D Over the Counter only  Office visit in the past 12 months or per provider note.  Last visit with JOSE ELIAS RODNEY was on: 10/12/2017 in [facility]  Next visit with JOSE ELIAS RODNEY is on: No future appointment listed with this provider  Next visit with Family Practice is on: No future appointment listed in this department  Max refill for 12 months from last office visit or per provider note.  Prescription refilled per RN Medication Refill Policy.................... Dilma Butler RN ....................  11/24/2017   3:01 PM

## 2017-12-28 NOTE — TELEPHONE ENCOUNTER
Patient Information     Patient Name MRN Sex Jake Marinelli [id number] Male 1965      Telephone Encounter by Kira Rodney DO at 2017  3:13 PM     Author:  Kira Rodney DO Service:  (none) Author Type:  PHYS- Osteopathic     Filed:  2017  3:14 PM Encounter Date:  2017 Status:  Signed     :  Kira Rodney DO (PHYS- Osteopathic)            Rx in outbox  KIRA RODNEY DO

## 2017-12-28 NOTE — TELEPHONE ENCOUNTER
Patient Information     Patient Name MRN Jake Oliveira [id number] Male 1965      Telephone Encounter by Dorothy Michel at 2017  3:33 PM     Author:  Dorothy Michel Service:  (none) Author Type:  (none)     Filed:  2017  3:33 PM Encounter Date:  2017 Status:  Signed     :  Dorothy Michel            Informed wife Summer of DO nolan Warren.  Dorothy Michel LPN............................... 2017 3:33 PM

## 2017-12-28 NOTE — PROGRESS NOTES
Patient Information     Patient Name MRN Sex Jake Marinelli [id number] Male 1965      Progress Notes by Kira Grider DO at 2017  9:15 AM     Author:  Kira Grider DO Service:  (none) Author Type:  PHYS- Osteopathic     Filed:  2017  2:08 AM Encounter Date:  2017 Status:  Signed     :  Kira Grider DO (PHYS- Osteopathic)            SUBJECTIVE:  Jake Bermudez is a 52 y.o. male who presents for troubled breathing.    HPI  Last couple months - noticing more labored breathing, especially with sleep/lying flat.  Has known ARMANDO, but does not utilize CPAP due to severe claustrophobia.  Is also morbidly obese, has T2DM, history of severe pneumonia/empyema/respiratory failure requiring intubation/trach placement in 2011.  Can get dizzy during the day; more frequently in the last few days.  Doesn't feel short of breath for the most part; but will if he walks up and down steps.  However wife describes more shallow, labored breathing.  No coughing.  Does admit to feeling some fevers/chills.    No recent medication changes besides decreasing gabapentin and increasing Lyrica by his Neurologist - which he is taking for severe diabetic neuropathy.  No new herbal or OTC medications. No sick contacts.  Denies chest pain with events.    Allergies      Allergen   Reactions     Penicillins  Diaphoresis     Vancomycin  Diaphoresis, Fever and Other - Describe In Comment Field     Fever while on zosyn, vanc, no rash or WBC elevation      Zosyn [Piperacillin-Tazobactam]  Diaphoresis, Fever and Other - Describe In Comment Field     Fever while on zosyn, vanc. No rash, no wbc elevation    ,   Current Outpatient Prescriptions on File Prior to Visit       Medication  Sig Dispense Refill     ACCU-CHEK DONAVON PLUS TEST STRP strip CHECK GLUCOSE FOUR TIMES DAILY 400 Strip 1     ACCU-CHEK MULTICLIX LANCET USE STRIP TO CHECK BLOOD GLUCOSE 4 TIMES DAILY. 360 Each 3     acetaminophen (ACETAMINOPHEN  "EXTRA STRENGTH) 500 mg tablet TAKE ONE TABLET BY MOUTH EVERY 6 HOURS AS NEEDED .  MAX  ACETAMINOPHEN  DOSE  4000MG  IN  24HRS. 270 tablet 3     aspirin 81 mg tablet Take 81 mg by mouth once daily with a meal.       BASAGLAR KWIKPEN 100 unit/mL (3 mL) pen Inject 142 Units subcutaneous before bedtime. Product desired:BASAGLAR,  IDDM type 2, E11.65 165 mL 3     Cholecalciferol, Vitamin D3, 2,000 unit tablet TAKE ONE TABLET BY MOUTH ONCE DAILY 100 tablet 1     clonazePAM (KLONOPIN) 0.5 mg tablet TAKE ONE TABLET BY MOUTH AT BEDTIME 30 tablet 5     DULoxetine (CYMBALTA) 60 mg Delayed-release capsule Take 1 capsule by mouth once daily. 90 capsule 4     FLUoxetine (PROZAC) 20 mg capsule Take 1 capsule by mouth every morning. 90 capsule 1     gabapentin (NEURONTIN) 300 mg capsule Take 3 capsules by mouth 3 times daily. 900 mg TID, 1200 mg at bedtime  0     hydroCHLOROthiazide (HCTZ) 25 mg tablet TAKE ONE TABLET BY MOUTH ONCE DAILY 90 tablet 2     insulin aspart (NOVOLOG FLEXPEN) 100 unit/mL solution for injection INJECT per ICR 7.5units/8grams carb and ISF 1:8.  MAX DAILY DOSE 245 UNITS. 30 pen 7     lisinopril (PRINIVIL; ZESTRIL) 10 mg tablet TAKE ONE TABLET BY MOUTH ONCE DAILY 90 tablet 1     LYRICA 100 mg capsule        Melatonin 5 mg tab Take 1-2 tablets by mouth at bedtime. 60 tablet 5     meloxicam 15 mg tablet TAKE ONE TABLET BY MOUTH ONCE DAILY WITH FOOD 90 tablet 2     methocarbamol (ROBAXIN) 750 mg tablet Take 1 tablet by mouth 3 times daily. 90 tablet 5     ANN PEN NEEDLE 32 gauge x 5/32\" USE AS DIRECTED FOR ADMINISTERING INSULIN AT HOME 4 TIMES DAILY 400 Each 2     omeprazole (PRILOSEC OTC) 20 mg tablet TAKE ONE TABLET BY MOUTH EVERY DAY BEFORE  A  MEAL 90 tablet 0     simvastatin (ZOCOR) 20 mg tablet TAKE ONE TABLET BY MOUTH AT BEDTIME 90 tablet 1     traMADol (ULTRAM) 50 mg tablet Take 1 tablet by mouth 3 times daily if needed. 90 tablet 2     No current facility-administered medications on file prior to " "visit.     and   Past Medical History:     Diagnosis  Date     Diabetic, retinopathy, proliferative (HC) 7/1/2014    Mild proliferative changes on left side--sees Dr Ortiz exam 6/2014      Lateral epicondylitis of left elbow 10/3/2013    Seen by Dr Bourne [place] 8/2013        REVIEW OF SYSTEMS:  Review of Systems   Constitutional: Positive for malaise/fatigue. Negative for diaphoresis and weight loss.   HENT: Negative for hearing loss.    Eyes: Negative for blurred vision and double vision.   Respiratory: Negative for cough and hemoptysis.    Cardiovascular: Positive for orthopnea. Negative for chest pain, claudication and leg swelling.   Gastrointestinal: Negative for nausea and vomiting.   Skin: Negative for itching and rash.   Neurological: Negative for weakness.       OBJECTIVE:  /88  Pulse (!) 105  Ht 1.803 m (5' 11\")  Wt 134.3 kg (296 lb)  SpO2 (!) 85%  BMI 41.28 kg/m2    EXAM:   Physical Exam   Constitutional: He appears distressed (mild distress).   HENT:   Head: Normocephalic and atraumatic.   Right Ear: External ear normal.   Left Ear: External ear normal.   Cardiovascular: Normal rate and normal heart sounds.    Pulmonary/Chest: Respiratory distress: mild. He has no wheezes. He has no rales.   Psychiatric: Mood and affect normal.   Nursing note and vitals reviewed.    EKG: NSR.  Probably septal infarct, age undetermined.  88bpm.  No old ones to compare to.    Labs:  Results for orders placed or performed in visit on 11/30/17      BASIC METABOLIC PANEL      Result  Value Ref Range    SODIUM 134 133 - 143 mmol/L    POTASSIUM 5.1 3.5 - 5.1 mmol/L    CHLORIDE 97 (L) 98 - 107 mmol/L    CO2,TOTAL 27 21 - 31 mmol/L    ANION GAP 10 5 - 18                    GLUCOSE 318 (H) 70 - 105 mg/dL    CALCIUM 9.2 8.6 - 10.3 mg/dL    BUN 27 (H) 7 - 25 mg/dL    CREATININE 1.51 (H) 0.70 - 1.30 mg/dL    BUN/CREAT RATIO           18                    GFR if African American 59 (L) >60 ml/min/1.73m2    GFR if " not  49 (L) >60 ml/min/1.73m2   MAGNESIUM      Result  Value Ref Range    MAGNESIUM 1.9 1.9 - 2.7 mg/dL   TROPONIN I      Result  Value Ref Range    TROPONIN I <0.030 <0.034 ng/mL   D-DIMER      Result  Value Ref Range    D-DIMER, QUANTITATIVE  <200 >199 - 230 ng/mL   CBC WITH AUTO DIFFERENTIAL      Result  Value Ref Range    WHITE BLOOD COUNT         8.1 4.5 - 11.0 thou/cu mm    RED BLOOD COUNT           5.38 4.30 - 5.90 mil/cu mm    HEMOGLOBIN                16.3 13.5 - 17.5 g/dL    HEMATOCRIT                48.6 37.0 - 53.0 %    MCV                       90 80 - 100 fL    MCH                       30.3 26.0 - 34.0 pg    MCHC                      33.5 32.0 - 36.0 g/dL    RDW                       12.7 11.5 - 15.5 %    PLATELET COUNT            242 140 - 440 thou/cu mm    MPV                       9.7 6.5 - 11.0 fL    NEUTROPHILS               72.9 (H) 42.0 - 72.0 %    LYMPHOCYTES               14.9 (L) 20.0 - 44.0 %    MONOCYTES                 8.3 <12.0 %    EOSINOPHILS               2.4 <8.0 %    BASOPHILS                 0.9 <3.0 %    IMMATURE GRANULOCYTES(METAS,MYELOS,PROS) 0.6 %    ABSOLUTE NEUTROPHILS      5.9 1.7 - 7.0 thou/cu mm    ABSOLUTE LYMPHOCYTES      1.2 0.9 - 2.9 thou/cu mm    ABSOLUTE MONOCYTES        0.7 <0.9 thou/cu mm    ABSOLUTE EOSINOPHILS      0.2 <0.5 thou/cu mm    ABSOLUTE BASOPHILS        0.1 <0.3 thou/cu mm    ABSOLUTE IMMATURE GRANULOCYTES(METAS,MYELOS,PROS) 0.1 <=0.3 thou/cu mm   ARTERIAL BLOOD GAS      Result  Value Ref Range    PH,ARTERIAL               7.38 7.35 - 7.45                    PCO2,ARTERIAL             46 (H) 35 - 45 mmHg    PO2,ARTERIAL 62 (L) 83 - 108 mmHg    HCO3,ARTERIAL             26 22 - 28 mmol/L    O2 SATURATION             92 (L) >95 %    INSPIRED O2               RA                    RENITA'S TEST Not applicable                    PUNCTURE LOCATION Right Radial      CT chest: no acute PE or pneumonia.  Radiology read:  Nothing acute; micronodule  and hepatic steatosis seen.  I personally reviewed images with patient and spouse in the office.    ASSESSMENT/PLAN:    ICD-10-CM    1. Hypoxia R09.02 CT CHEST PE STUDY      CBC WITH DIFFERENTIAL      BASIC METABOLIC PANEL      MAGNESIUM      TROPONIN I      D-DIMER      EKG 12 LEAD UNIT PERFORMED      CBC WITH DIFFERENTIAL      BASIC METABOLIC PANEL      MAGNESIUM      TROPONIN I      D-DIMER      CBC WITH AUTO DIFFERENTIAL      MO ELECTROCARDIOGRAM TRACING      ARTERIAL BLOOD GAS      ARTERIAL BLOOD GAS      Oxygen-Air Delivery Systems (HOME OXYGEN)      albuterol HFA 90 mcg/actuation inhaler      COMPLETE PFT SPIROMETRY LUNG VOL DIFFUSION      beclomethasone, 80 mcg each actuation, (QVAR) 80 mcg/actuation inhaler      ARTERIAL BLOOD GAS   2. Lung nodule < 6cm on CT R91.1         Plan:  Discussed patient's care with Dr. Rangel, hospitalist - recommended addition of ABGs which were obtained and set up for Home O2 - as we continue work up/evaluation.  Multiple differential diagnoses - including: COPD, acute URI or lower respiratory infection, chronic untreated ARMANDO (which is a known diagnosis for him).  He will be started on Home O2 by [facility].  They are unable to get to the clinic to supply his O2 - and I discussed as this was a chronic issue for him, there is little risk to sending him home.  However, he could desaturate further, develop respiratory failure and be complicated by death - he understands his risks and agrees to go home to have them meet with him in ~1 hour.  He will also return tomorrow to repeat his ABG.  I have arranged for this to be done with Audrey Lee in the clinic in my absence.  Would like to see CO2 levels stable and not increase, especially above 75-80.   This would necessitate further work up in the ER and monitoring of respiratory status.    If remains stable - he will continue with O2, and start Qvar, Albuterol.  Plan for PFTs in ~2-3 weeks.  Recheck with me in one month.  If no  improvement: consideration for pulmonology referral to further discuss options for ARMANDO treatment and further work up of his lung status.    Patient aware to return to ER if significant worsening of symptoms.

## 2017-12-28 NOTE — PROGRESS NOTES
Patient Information     Patient Name MRN Sex Jake Marinelli [id number] Male 1965      Progress Notes by Kira Grider DO at 2017  8:45 AM     Author:  Kira Grider DO Service:  (none) Author Type:  PHYS- Osteopathic     Filed:  2017  6:45 AM Encounter Date:  2017 Status:  Signed     :  Kira Grider DO (PHYS- Osteopathic)            SUBJECTIVE:  Jake Bermudez is a 52 y.o. male who presents for diabetes check.    CHRISTOPHER Joseph is here alone today for checkup of his diabetes. His last hemoglobin A1c's have been 7.2, 6.9, 8.0. He states that his blood sugars have been running much higher lately. Many in the over 200 range, however this morning's was 137. He does not bring in his meter or his blood sugar log to review blood sugars today. He uses an Concepcion plus glucometer and checks his blood sugars most of the time around 3 times a day. His medications include Lantus 140 units at bedtime and 2 separate injections, and NovoLog via insulin to carb ratio of 7.5 units per 8 g carb.  He has not made any adjustment to his medication at this time.  He continues to have neuropathy, which is treated with Lyrica, nortriptyline, Ultram, Cymbalta. He states the nortriptyline was not effective therefore he has stopped it on his own.  His eye doctor is Dr. Jacobs and he is up-to-date on yearly eye exams. He does take an aspirin per day and is also prescribed a statin.    Allergies      Allergen   Reactions     Penicillins  Diaphoresis     Vancomycin  Diaphoresis, Fever and Other - Describe In Comment Field     Fever while on zosyn, vanc, no rash or WBC elevation      Zosyn [Piperacillin-Tazobactam]  Diaphoresis, Fever and Other - Describe In Comment Field     Fever while on zosyn, vanc. No rash, no wbc elevation    ,   Current Outpatient Prescriptions on File Prior to Visit       Medication  Sig Dispense Refill     ACCU-CHEK CONCEPCION PLUS TEST STRP strip CHECK GLUCOSE FOUR TIMES DAILY 400  "Strip 0     ACCU-CHEK MULTICLIX LANCET USE STRIP TO CHECK BLOOD GLUCOSE 4 TIMES DAILY. 360 Each 3     acetaminophen (ACETAMINOPHEN EXTRA STRENGTH) 500 mg tablet TAKE ONE TABLET BY MOUTH EVERY 6 HOURS AS NEEDED .  MAX  ACETAMINOPHEN  DOSE  4000MG  IN  24HRS. 270 tablet 3     albuterol HFA (PRO-AIR,VENTOLIN,PROVENTIL) 90 mcg/actuation inhaler Inhale 2 Puffs by mouth every 4 hours if needed for Shortness Of Breath, Wheezing or Other (Specify) (coughing). 1 Inhaler 0     aspirin 81 mg tablet Take 81 mg by mouth once daily with a meal.       Cholecalciferol, Vitamin D3, 2,000 unit tablet TAKE ONE TABLET BY MOUTH ONCE DAILY 90 tablet 2     clonazePAM (KLONOPIN) 0.5 mg tablet TAKE ONE TABLET BY MOUTH AT BEDTIME 30 tablet 5     DULoxetine (CYMBALTA) 60 mg Delayed-release capsule Take 1 capsule by mouth once daily. 90 capsule 4     gabapentin (NEURONTIN) 300 mg capsule Take 3 capsules by mouth 3 times daily. 900 mg TID, 1200 mg at bedtime  0     hydrochlorothiazide (HCTZ) 25 mg tablet Take 1 tablet by mouth once daily. 90 tablet 4     insulin aspart (NOVOLOG FLEXPEN) 100 unit/mL solution for injection INJECT per ICR 7.5units/8grams carb and ISF 1:8.  MAX DAILY DOSE 245 UNITS. 30 pen 7     insulin glargine (LANTUS SOLOSTAR) 100 unit/mL (3 mL) pen Inject 140 Units subcutaneous before bedtime. 45 mL 10     lisinopril (PRINIVIL; ZESTRIL) 10 mg tablet Take 1 tablet by mouth once daily. 90 tablet 4     LYRICA 100 mg capsule        meloxicam 15 mg tablet TAKE ONE TABLET BY MOUTH ONCE DAILY WITH FOOD 90 tablet 2     ANN PEN NEEDLE 32 gauge x 5/32\" USE AS DIRECTED FOR ADMINISTERING INSULIN AT HOME 4 TIMES DAILY 400 Each 2     omeprazole (PRILOSEC OTC) 20 mg tablet TAKE ONE TABLET BY MOUTH EVERY DAY BEFORE  A  MEAL 90 tablet 4     simvastatin (ZOCOR) 20 mg tablet Take 1 tablet by mouth at bedtime. 90 tablet 4     traMADol (ULTRAM) 50 mg tablet TAKE ONE TABLET BY MOUTH THREE TIMES DAILY AS NEEDED 90 tablet 5     No current " facility-administered medications on file prior to visit.     and   Past Medical History:     Diagnosis  Date     Diabetic, retinopathy, proliferative (HC) 7/1/2014    Mild proliferative changes on left side--sees Dr Ortiz exam 6/2014      Lateral epicondylitis of left elbow 10/3/2013    Seen by Dr Tayla Jung 8/2013        REVIEW OF SYSTEMS:  Review of Systems   Respiratory: Negative for shortness of breath.    Cardiovascular: Negative for chest pain.   Musculoskeletal: Positive for back pain (trap muscle spasms; not relieved by tizanidine) and joint pain.   Psychiatric/Behavioral: Positive for depression (worsened after going to graduation and seeing his older daughter graduate (no longer has custody of).).       OBJECTIVE:  /82  Pulse 92  Wt 130.4 kg (287 lb 6.4 oz)  BMI 41.14 kg/m2    EXAM:   Physical Exam   Constitutional: He is well-developed, well-nourished, and in no distress.   obese   HENT:   Head: Normocephalic and atraumatic.   Right Ear: External ear normal.   Left Ear: External ear normal.   Eyes: Pupils are equal, round, and reactive to light. Right eye exhibits no discharge. Left eye exhibits discharge.   L eye ptosis and lid swelling - follows with Dr. Jacobs for.   Neck: Normal range of motion. No thyromegaly present.   Cardiovascular: Normal rate and normal heart sounds.    Pulmonary/Chest: Effort normal and breath sounds normal.   Musculoskeletal:   Decreased sensation (2/10) in b/l feet.   Neurological: He displays normal reflexes. No cranial nerve deficit. Coordination normal.   Skin: No rash noted.   Psychiatric: Affect normal.   Vitals reviewed.    Results for orders placed or performed in visit on 06/21/17      Hgb A1c      Result  Value Ref Range    HEMOGLOBIN A1C MONITORING (POCT) 9.3 (H) 4.0 - 6.2 %    ESTIMATED AVERAGE GLUCOSE  220 mg/dL   COMPLETE METABOLIC PANEL      Result  Value Ref Range    SODIUM 134 133 - 143 mmol/L    POTASSIUM 5.1 3.5 - 5.1 mmol/L    CHLORIDE  92 (L) 98 - 107 mmol/L    CO2,TOTAL 29 21 - 31 mmol/L    ANION GAP 13 5 - 18                    GLUCOSE 298 (H) 70 - 105 mg/dL    CALCIUM 9.6 8.6 - 10.3 mg/dL    BUN 23 7 - 25 mg/dL    CREATININE 1.29 0.70 - 1.30 mg/dL    BUN/CREAT RATIO           18                    GFR if African American >60 >60 ml/min/1.73m2    GFR if not African American 58 (L) >60 ml/min/1.73m2    ALBUMIN 4.2 3.5 - 5.7 g/dL    PROTEIN,TOTAL 7.0 6.4 - 8.9 g/dL    GLOBULIN                  2.8 2.0 - 3.7 g/dL    A/G RATIO 1.5 1.0 - 2.0                    BILIRUBIN,TOTAL 0.3 0.3 - 1.0 mg/dL    ALK PHOSPHATASE 77 34 - 104 IU/L    ALT (SGPT) 50 7 - 52 IU/L    AST (SGOT) 44 (H) 13 - 39 IU/L   LIPID PANEL      Result  Value Ref Range    CHOLESTEROL,TOTAL 199 <200 mg/dL    TRIGLYCERIDES 610 (H) <150 mg/dL    HDL CHOLESTEROL 36 23 - 92 mg/dL    NON-HDL CHOLESTEROL 163 (H) <145 mg/dl    CHOL/HDL RATIO            5.53 (H) <4.50                    LDL CHOLESTEROL      PATIENT STATUS            NOT GIVEN                       ASSESSMENT/PLAN:    ICD-10-CM    1. Uncontrolled type 2 diabetes mellitus with diabetic polyneuropathy, with long-term current use of insulin (HC) E11.42 Hgb A1c     Z79.4 COMPLETE METABOLIC PANEL     E11.65 LIPID PANEL      Hgb A1c      COMPLETE METABOLIC PANEL      LIPID PANEL   2. CKD (chronic kidney disease) stage 3, GFR 30-59 ml/min N18.3 COMPLETE METABOLIC PANEL      COMPLETE METABOLIC PANEL   3. Muscle spasm of back M62.830 methocarbamol (ROBAXIN) 750 mg tablet   4. Psychophysiological insomnia F51.04 Melatonin 5 mg tab        Plan:   1. DM2, chronic, worsened control:  Discussed likely worsening in control related to increased stress, worsened trapezius/left shoulder discomfort and less movement. Monitoring labs obtained as ordered above. We will get him in to see Estrella Maza within the next month.  For now, continue medications as Rx - with more attention to his carb intake.  2. CKD, chronic, stable:  CMP today.  3. Muscle  spasm of back/trapezius: will change tizanidine to methocarbamol for trial.  Rx to pharmacy.  4. Insomnia, chronic: trial of Melatonin 5-10mg nightly.  Likely from years of working 2nd shift and being up late into the night.    Follow up with me in 4-6 months; sooner if he is unable to get in to see Estrella.

## 2017-12-28 NOTE — TELEPHONE ENCOUNTER
Patient Information     Patient Name MRN Sex Jake Marinelli [id number] Male 1965      Telephone Encounter by Dilma Butler RN at 2017 11:36 AM     Author:  Dilma Butler RN Service:  (none) Author Type:  NURS- Registered Nurse     Filed:  2017 11:39 AM Encounter Date:  2017 Status:  Signed     :  Dilma Butler RN (NURS- Registered Nurse)            Office visit in the past 12 months or per provider note.  Last visit with JOSE ELIAS RODNEY was on: 2017 in [facility]  Next visit with JOSE ELIAS RODNEY is on: 2017 in [facility]  Max refill for 12 months from last office visit or per provider note.  Prescription refilled per RN Medication Refill Policy.................... Dilma Butler RN ....................  2017   11:38 AM

## 2017-12-28 NOTE — TELEPHONE ENCOUNTER
Patient Information     Patient Name MRN Sex Jake Marinelli [id number] Male 1965      Telephone Encounter by Kavita Mcclure at 2017  3:54 PM     Author:  Kavita Mcclure Service:  (none) Author Type:  (none)     Filed:  2017  3:56 PM Encounter Date:  2017 Status:  Signed     :  Kavita Mcclure            Last appointment 17. No future appointment with Dr Grider. Has Diabetic Ed appointment on 17.  Kavita Mcclure [facility])  ..................2017   3:55 PM

## 2017-12-28 NOTE — TELEPHONE ENCOUNTER
Patient Information     Patient Name MRN Sex Jake Marinelli [id number] Male 1965      Telephone Encounter by Kira Rodney DO at 10/3/2017  1:43 PM     Author:  Kira Rodney DO Service:  (none) Author Type:  PHYS- Osteopathic     Filed:  10/3/2017  1:44 PM Encounter Date:  10/3/2017 Status:  Signed     :  Kira Rodney DO (PHYS- Osteopathic)            Fluoxetine sent to pharmacy; restart at initial 20mg dose.  KIRA RODNEY DO

## 2017-12-28 NOTE — TELEPHONE ENCOUNTER
Patient Information     Patient Name MRN Sex Jake Marinelli [id number] Male 1965      Telephone Encounter by Kira Rodney DO at 2017 10:33 AM     Author:  Kira Rodney DO Service:  (none) Author Type:  PHYS- Osteopathic     Filed:  2017 10:33 AM Encounter Date:  2017 Status:  Signed     :  Kira Rodney DO (PHYS- Osteopathic)            Rx approved; in outbox  KIRA RODNEY DO

## 2017-12-28 NOTE — TELEPHONE ENCOUNTER
Patient Information     Patient Name MRN Sex Jake Marinelli [id number] Male 1965      Telephone Encounter by Sanjay Yeung LPN at 10/3/2017  1:48 PM     Author:  Sanjay Yeung LPN Service:  (none) Author Type:  NURS- Licensed Practical Nurse     Filed:  10/3/2017  1:48 PM Encounter Date:  10/3/2017 Status:  Signed     :  Sanjay Yeung LPN (NURS- Licensed Practical Nurse)            Called patient's wife with results after giving last name and date of birth.  Sanjay Yeung LPN ..............10/3/2017 1:48 PM

## 2017-12-28 NOTE — TELEPHONE ENCOUNTER
Patient Information     Patient Name MRN Sex Jake Marinelli [id number] Male 1965      Telephone Encounter by Ilan Brooke RN at 2017 10:25 AM     Author:  Ilan Brooke RN Service:  (none) Author Type:  NURS- Registered Nurse     Filed:  2017 10:31 AM Encounter Date:  2017 Status:  Signed     :  Ilan Brooke RN (NURS- Registered Nurse)            This is a [facility]ill request [facility]  Name of Medication: Tramadol  Quantity requested: 90 tabs with 2 refills  Last fill date: 6/15/17 as per rx request  Due for refill: Yes, as per chart review and per rx request  Last visit with JOSE ELIAS RODNEY was on: 2017 in [facility]  PCP:  JOSE ELIAS RODNEY DO  Controlled Substance Agreement: Not on file   Diagnosis r/t this medication request: Chronic low back pain    Chart review shows that patient was most recently seen by PCP on 17. Use of ultram was reviewed in office visit notes on that date. Office visit notes on that date make no changes in relation to patient's ultram. PCP states she would like patient to follow up in 4-6 months. Writer will route rx request to PCP for a 3 month supply at this time.     Unable to complete prescription refill per RN Medication Refill Policy.................... Ilan Brooke RN ....................  2017   10:26 AM

## 2017-12-28 NOTE — TELEPHONE ENCOUNTER
Patient Information     Patient Name MRN Sex Jake Marinelli [id number] Male 1965      Telephone Encounter by Chula Razo at 2017 12:28 PM     Author:  Chula Razo Service:  (none) Author Type:  (none)     Filed:  2017 12:28 PM Encounter Date:  2017 Status:  Signed     :  Chula Razo            prescription faxed  Chula Razo LPN ..........2017 12:28 PM

## 2017-12-28 NOTE — TELEPHONE ENCOUNTER
Patient Information     Patient Name MRN Sex Jake Marinelli [id number] Male 1965      Telephone Encounter by Glenny York MD at 2017 12:17 PM     Author:  Glenny York MD Service:  (none) Author Type:  Physician     Filed:  2017 12:18 PM Encounter Date:  2017 Status:  Signed     :  Glenny York MD (Physician)            Refill in outbox.  Glenny York MD ....................  2017   12:17 PM

## 2017-12-28 NOTE — TELEPHONE ENCOUNTER
Patient Information     Patient Name MRN Sex Jake Marinelli [id number] Male 1965      Telephone Encounter by Sanjay Yeung LPN at 10/16/2017  9:03 AM     Author:  Sanjay Yeung LPN Service:  (none) Author Type:  NURS- Licensed Practical Nurse     Filed:  10/16/2017  9:03 AM Encounter Date:  10/16/2017 Status:  Signed     :  Sanjay Yeung LPN (NURS- Licensed Practical Nurse)            ----- Message from Kira Rodney DO sent at 10/14/2017  5:49 PM CDT -----  Call and notify of normal urine culture.  KIRA RODNEY DO

## 2017-12-28 NOTE — TELEPHONE ENCOUNTER
Patient Information     Patient Name MRN Jkae Oliveira [id number] Male 1965      Telephone Encounter by Dorothy Michel at 2017  4:17 PM     Author:  Dorothy Michel Service:  (none) Author Type:  (none)     Filed:  2017  4:17 PM Encounter Date:  2017 Status:  Signed     :  Dorothy Michel            Tried calling Jake to see if he needed this medication refilled, phone rang and rang.  Dorothy Michel LPN............................... 2017 4:17 PM

## 2017-12-28 NOTE — TELEPHONE ENCOUNTER
Patient Information     Patient Name MRN Sex Jake Marinelli [id number] Male 1965      Telephone Encounter by Fina Hale RN at 10/18/2017  1:35 PM     Author:  Fina Hale RN Service:  (none) Author Type:  NURS- Registered Nurse     Filed:  10/18/2017  1:54 PM Encounter Date:  10/18/2017 Status:  Signed     :  Fina Hale RN (NURS- Registered Nurse)            Patient's wife shares patient is almost out of Lantus and [facility] has switched Lantus to Basaglar.  Answered her questions regarding switching to Basaglar.  Patient currently taking Lantus 142 units qHS.  Patient to continue current dose with Basaglar.    Basaglar rx ready for approval.  If accepted as written, please sign pending order.    Thank you,    Fina Hale RN, BSN, CDE  10/18/2017 1:38 PM

## 2017-12-28 NOTE — TELEPHONE ENCOUNTER
Patient Information     Patient Name MRN Sex Jake Marinelli [id number] Male 1965      Telephone Encounter by Dilma Butler RN at 2017  4:08 PM     Author:  Dilma Butler RN Service:  (none) Author Type:  NURS- Registered Nurse     Filed:  2017  4:11 PM Encounter Date:  2017 Status:  Signed     :  Dilma Butler RN (NURS- Registered Nurse)            Diabetic Supplies  Office visit in the past 12 months.  Last visit with JOSE ELIAS RODNEY was on: 2017 in [facility]-due for a follow in December   Next visit with JOSE ELIAS RODNEY is on: No future appointment listed with this provider  Next visit with Family Practice is on: No future appointment listed in this department  Max refill for 12 months from last office visit.  Always add ICD-9 code.  Needs not be listed on Med List to fill.  Need new RX every 12 months or if exceeds the limitations set by Medicare, then every 6 months.  Also when testing frequency is changed is there a need to obtain a new order.  A refill request does not need to be approved by the ordering physician-a beneficiary or their caregiver may request refills.  Physicians are not required to fill out additional forms such as home testing results for suppliers or provide additional documentation unless the supplier is audited and the  is requesting such documentation.    Prescription refilled per RN Medication Refill Policy.................... Dilma Butler RN ....................  2017   4:09 PM

## 2017-12-28 NOTE — PROGRESS NOTES
Patient Information     Patient Name MRN Sex Jake Marinelli [id number] Male 1965      Progress Notes by Azucena Justin at 2017 11:22 AM     Author:  Azucena Justin Service:  (none) Author Type:  Other Clinical Staff     Filed:  2017 11:22 AM Date of Service:  2017 11:22 AM Status:  Signed     :  Azucena Justin (Other Clinical Staff)            IV Contrast- Discharge Instructions After Your CT Scan      The IV contrast you received today will be filtered from your bloodstream by your kidneys during the next 24 hours and pass from the body in urine.  You will not be aware of this process and your urine will not change in color.  To help this process you should drink at least 4 additional glasses of water or juice today.  This reduces stress on your kidneys.    Most contrast reactions are immediate.  Should you develop symptoms of concern after discharge, contact the department at the number below.  After hours you should contact your personal physician.  If you develop breathing distress or wheezing, call 911.

## 2017-12-30 ENCOUNTER — COMMUNICATION - GICH (OUTPATIENT)
Dept: FAMILY MEDICINE | Facility: OTHER | Age: 52
End: 2017-12-30

## 2017-12-30 DIAGNOSIS — G89.29 OTHER CHRONIC PAIN: ICD-10-CM

## 2017-12-30 DIAGNOSIS — M54.50 LOW BACK PAIN: ICD-10-CM

## 2017-12-30 NOTE — NURSING NOTE
Patient Information     Patient Name MRN Jake Oliveira [id number] Male 1965      Nursing Note by Dorothy Michel at 10/12/2017 11:30 AM     Author:  Dorothy Michel Service:  (none) Author Type:  (none)     Filed:  10/12/2017 11:32 AM Encounter Date:  10/12/2017 Status:  Signed     :  Dorothy Michel            Rajan states that every once in a while he has some burning when he pees.  Dorothy Michel LPN............................... 10/12/2017 11:25 AM

## 2017-12-30 NOTE — NURSING NOTE
Patient Information     Patient Name MRN Jake Oliveira [id number] Male 1965      Nursing Note by Dorothy Michel at 2017  9:15 AM     Author:  Dorothy Michel Service:  (none) Author Type:  (none)     Filed:  2017 10:02 AM Encounter Date:  2017 Status:  Signed     :  Dorothy Michel            Patient's wife states that Rajan has a hard time breathing with excursion and gets very SOB at times.  Dorothy Michel LPN............................... 2017 9:51 AM

## 2017-12-30 NOTE — NURSING NOTE
Patient Information     Patient Name MRN Sex Jake Marinelli [id number] Male 1965      Nursing Note by Dorothy Michel at 2017  9:15 AM     Author:  Dorothy Michel Service:  (none) Author Type:  (none)     Filed:  2017 10:37 AM Encounter Date:  2017 Status:  Signed     :  Dorothy Michel            Previous A1C is not at goal of <8  HEMOGLOBIN A1C MONITORING (POCT)    Date Value   2017 9.3 % (H)   2014 9.9 % Total Hgb (H)     Urine microalbumin:creatine: 31.4  Foot exam unknown  Eye exam unknown    Patient is not a current smoker  Patient is on a daily aspirin  Patient is on a Statin.  Blood pressure today of 150/88 is not at the goal of <139/89 for diabetics.    Dorotyh Michel LPN..............2017 10:35 AM

## 2018-01-03 NOTE — TELEPHONE ENCOUNTER
Patient Information     Patient Name MRN Sex Jake Marinelli [id number] Male 1965      Telephone Encounter by Dorothy Michel at 2017  9:53 AM     Author:  Dorothy Michel Service:  (none) Author Type:  (none)     Filed:  2017  9:58 AM Encounter Date:  2017 Status:  Signed     :  Dorothy Michel            Per wife Summer, worker at the [facility] did not receive the questions that Kira Grider DO completed, re-faxed the forms today.   Dorothy Michel LPN............................... 2017 9:57 AM

## 2018-01-03 NOTE — NURSING NOTE
"Patient Information     Patient Name MRN Sex Jake Marinelli [id number] Male 1965      Nursing Note by Alley Parker at 2017 11:00 AM     Author:  Alley Parker Service:  (none) Author Type:  (none)     Filed:  2017 11:17 AM Encounter Date:  2017 Status:  Signed     :  Alley Parker            Jake Bermudez is a 52 y.o. Male here with right sided rib pain, states he coughed or sneezed over the weekend and something \"popped.\"  Maddy Parker LPN ...... 2017 11:06 AM            "

## 2018-01-03 NOTE — TELEPHONE ENCOUNTER
"Patient Information     Patient Name MRN Jake Oliveira [id number] Male 1965      Telephone Encounter by Kavita Mcclure at 2017  3:02 PM     Author:  Kavita Mcclure Service:  (none) Author Type:  (none)     Filed:  2017  3:03 PM Encounter Date:  2017 Status:  Signed     :  Kavita Mcclure            The forms that were being faxed over to the [facility] building last week to Valorie Garza, weren't sent completely. She only received the first page (signature page) and none of the following pages (apparently there were 4 pages) with the \"questions\" on them.   Would you please refax them? I called our HIM department to see if they knew anything about this. They don't recall anything like that but will look around. I told them I would have Dr Grider' nurse check on it also.  Kavita Mcclure [facility])  ..................2017   2:56 PM         "

## 2018-01-03 NOTE — TELEPHONE ENCOUNTER
Patient Information     Patient Name MRN Sex Jake Marinelli [id number] Male 1965      Telephone Encounter by Tamie Christianson RN at 2017  8:51 AM     Author:  Tamie Christianson RN Service:  (none) Author Type:  NURS- Registered Nurse     Filed:  2017  8:55 AM Encounter Date:  2017 Status:  Signed     :  Tamie Christianson RN (NURS- Registered Nurse)            This is a Refill request from: [facility]  Name of Medication: traMADol (ULTRAM) 50 mg tablet  TAKE ONE TABLET BY MOUTH THREE TIMES DAILY AS NEEDED  Quantity requested: 90  Last fill date: 16  Due for refill: 17  Last visit with JOSE ELIAS RODNEY was on: 2016 in [facility]  PCP:  JOSE ELIAS RODNEY DO  Controlled Substance Agreement:  13   Diagnosis r/t this medication request: low back pain    This is a Refill request from: [facility]  Name of Medication: klonopin  Quantity requested: 30  Last fill date: 11/3/16  Due for refill: 12/3/16  Last visit with JOSE ELIAS RODNEY was on: 2016 in [place] [facility]  PCP:  JOSE ELIAS RODNEY DO  Controlled Substance Agreement:  13   Diagnosis r/t this medication request: MIRANDA    Unable to complete prescription refill per RN Medication Refill Policy.................... TAMIE CHRISTIANSON RN ....................  2017   8:52 AM

## 2018-01-03 NOTE — TELEPHONE ENCOUNTER
Patient Information     Patient Name MRN Sex Jake Marinelli [id number] Male 1965      Telephone Encounter by Kira Rodney DO at 2017  6:44 AM     Author:  Kira Rodney DO Service:  (none) Author Type:  PHYS- Osteopathic     Filed:  2017  6:44 AM Encounter Date:  2017 Status:  Signed     :  Kira Rodney DO (PHYS- Osteopathic)            Rx approved; in outbox.  [facility]

## 2018-01-03 NOTE — PROGRESS NOTES
Patient Information     Patient Name MRN Jake Oliveira [id number] Male 1965      Progress Notes by Estrella Maza NP at 2017 10:30 AM     Author:  Estrella Maza NP Service:  (none) Author Type:  PHYS- Nurse Practitioner     Filed:  2017  1:54 PM Encounter Date:  2017 Status:  Signed     :  Estrella Maza NP (PHYS- Nurse Practitioner)            Subjective:  Pt and wife present to clinic today for diabetes type 2, education and medication management. Currently using Expert Meter with insulin to carb ratio varying thru day, insulin sensitivity factor 1:8, Targets  during the day  at night. He feels he counts his carbs accurately. Last A1c 5 months ago was 6.9 and recently in  was up to 8.0.  Pt concerned and wants to get blood sugars under better control again. Lantus dose 135 units daily.     Checking blood sugars 2-4x/day. Mostly 3 times.   Per download:  61% high (over 180)  39% in target ()  No hypoglycemia    Average blood sugar: 208 (42 blood sugar tests in 2 weeks)  TDD of Novolog bolus: 139 units  Lantus 135 units  Pretty close to 50/50 split.     The only change has been his activity level from summer to winter has decreased significantly. Had a fall recently when he tried to go out for a walk. Does have a stationary bike in the home that he has used inconsistently.     Fastin-295  PreLunch: 159-284, one 122  PreSupper: 108-241  Bedtime: 147-271     A lot of variation in blood sugars but it seems most blood sugars are high 100's to mid 200's.     Past Medical History      Diagnosis   Date     Diabetic, retinopathy, proliferative (HC)  2014     Mild proliferative changes on left side--sees Dr Ortiz exam 2014      Lateral epicondylitis of left elbow  10/3/2013     Seen by Dr Tayla Jung 2013        Past Surgical History       Procedure   Laterality Date     Shoulder surgery  Bilateral      Chest tube insertion         Lung surgery        pneumonia       Hchg trach pr1        history trach with pneumonia         Penicillins; Vancomycin; and Zosyn [piperacillin-tazobactam]    Current Outpatient Prescriptions on File Prior to Visit       Medication  Sig Dispense Refill     ACCU-CHEK DONAVON PLUS TEST STRP strip CHECK GLUCOSE FOUR TIMES DAILY 400 Strip 1     ACCU-CHEK MULTICLIX LANCET USE STRIP TO CHECK BLOOD GLUCOSE 4 TIMES DAILY. 360 Each 3     ACETAMINOPHEN EXTRA STRENGTH 500 mg tablet TAKE ONE TABLET BY MOUTH EVERY 6 HOURS AS NEEDED.  ACETAMINOPHEN SHOULD NOT EXCEED 4000MG PER 24 HOURS. 270 tablet 0     albuterol HFA (PRO-AIR,VENTOLIN,PROVENTIL) 90 mcg/actuation inhaler Inhale 2 Puffs by mouth every 4 hours if needed for Shortness Of Breath, Wheezing or Other (Specify) (coughing). 1 Inhaler 0     aspirin 81 mg tablet Take 81 mg by mouth once daily with a meal.       Blood-Glucose Meter (ACCU-CHEK DONAVON) As directed. Dispense glucose meter, test strips and lancets covered by the patient insurance. Test 3 times per day.       Cholecalciferol, Vitamin D3, 2,000 unit tablet TAKE ONE TABLET BY MOUTH ONCE DAILY 90 tablet 2     clonazePAM (KLONOPIN) 0.5 mg tablet TAKE ONE TABLET BY MOUTH AT BEDTIME 30 tablet 5     DULoxetine (CYMBALTA) 60 mg Delayed-release capsule Take 1 capsule by mouth once daily. 90 capsule 4     FLUoxetine (PROZAC) 40 mg capsule Take 1 capsule by mouth every morning. 90 capsule 4     gabapentin (NEURONTIN) 300 mg capsule Take 3 capsules by mouth 3 times daily. 900 mg TID, 1200 mg at bedtime  0     hydrochlorothiazide (HCTZ) 25 mg tablet Take 1 tablet by mouth once daily. 90 tablet 4     insulin aspart (NOVOLOG FLEXPEN) 100 unit/mL solution for injection INJECT THIRTY-TWO UNITS SUBCUTANEOUSLY THREE TIMES DAILY BEFORE MEALS, PLUS SLIDING SCALE, AS NEEDED.  MAX DAILY DOSE 150 UNITS. 15 pen 6     insulin glargine (LANTUS SOLOSTAR) 100 unit/mL (3 mL) solution for injection Inject 139 Units subcutaneous before bedtime.  "45 mL 10     lisinopril (PRINIVIL; ZESTRIL) 10 mg tablet Take 1 tablet by mouth once daily. 90 tablet 4     LYRICA 100 mg capsule        meloxicam 15 mg tablet TAKE ONE TABLET BY MOUTH ONCE DAILY WITH FOOD 90 tablet 0     ANN PEN NEEDLE 32 gauge x 5/32\" USE AS DIRECTED FOR ADMINISTERING INSULIN AT HOME 4 TIMES DAILY 400 Each 2     nortriptyline 50 mg capsule Take 2 capsules by mouth at bedtime. 180 capsule 4     omeprazole (PRILOSEC OTC) 20 mg tablet TAKE ONE TABLET BY MOUTH EVERY DAY BEFORE  A  MEAL 90 tablet 4     oxyCODONE-acetaminophen, 5-325 mg, (PERCOCET) 5-325 mg per tablet Take 1 tablet by mouth every 6 hours if needed  for Pain Max acetaminophen dose: 4000mg in 24 hrs. 30 tablet 0     simvastatin (ZOCOR) 20 mg tablet Take 1 tablet by mouth at bedtime. 90 tablet 4     tiZANidine (ZANAFLEX) 4 mg tablet TAKE ONE TABLET BY MOUTH UP TO THREE TIMES DAILY. 90 tablet 5     traMADol (ULTRAM) 50 mg tablet TAKE ONE TABLET BY MOUTH THREE TIMES DAILY AS NEEDED 90 tablet 5     No current facility-administered medications on file prior to visit.        Family History       Problem   Relation Age of Onset     Heart Disease  Other      Family History Coronary Heart Disease male        Diabetes  Other      Cancer  Father      stomach ca         Social History     Social History        Marital status:       Spouse name: N/A     Number of children:  N/A     Years of education:  N/A     Social History Main Topics        Smoking status:  Former Smoker     Packs/day: 0.50     Years: 28.00     Types: Cigarettes     Quit date: 12/21/2011     Smokeless tobacco:  Former User     Types: Chew     Quit date: 9/5/1988     Alcohol use  No     Drug use:  No     Sexual activity:  Yes     Partners: Female     Other Topics  Concern     Not on file      Social History Narrative     Alcohol Use - no      2 children  unemployed 10 1/2 % disability for back injury    Patient was a former smoker.  1/2 PPD       Review of " Systems:  General:  Denies weight changes   HEENT: Denies blurred vision  Respiratory: Denies difficulty breathing, SOB, cough  Cardiac: Denies chest pain, chest pressure  Gastrointestinal:  Denies abdominal pain, constipation, diarrhea  Genitourinary:  Denies dysuria  Neurologic: + parasthesias---feet feel numb and sometimes very painful and keep him up at night  Psychiatric: Denies depression and anxiety  Endocrine:  Denies polydipsia, polyphagia, polyuria       Objective:  HEMOGLOBIN A1C MONITORING (POCT)    Date Value   01/18/2017 8.0 % (H)   03/28/2014 9.9 % Total Hgb (H)     HEMOGLOBIN A1C GIH (%)    Date Value   07/16/2012 7.1 (H)     SODIUM      Date Value Ref Range Status   01/18/2017 136 133 - 143 mmol/L Final     POTASSIUM      Date Value Ref Range Status   01/18/2017 4.5 3.5 - 5.1 mmol/L Final     CHLORIDE      Date Value Ref Range Status   01/18/2017 96 (L) 98 - 107 mmol/L Final     CO2,TOTAL      Date Value Ref Range Status   01/18/2017 29 21 - 31 mmol/L Final     ANION GAP      Date Value Ref Range Status   01/18/2017 11 5 - 18                 Final     GLUCOSE      Date Value Ref Range Status   01/18/2017 225 (H) 70 - 105 mg/dL Final     BUN      Date Value Ref Range Status   01/18/2017 21 7 - 25 mg/dL Final     CREATININE      Date Value Ref Range Status   01/18/2017 1.24 0.70 - 1.30 mg/dL Final     BUN/CREAT RATIO                Date Value Ref Range Status   01/18/2017 17                 Final     CALCIUM      Date Value Ref Range Status   01/18/2017 9.5 8.6 - 10.3 mg/dL Final     LDL CHOLESTEROL (mg/dL)    Date Value   01/26/2015 54     TRIGLYCERIDES (mg/dL)    Date Value   01/26/2015 282 (H)        Pleasant and alert without distress. Affect normal.   Skin color pink, intact. No open sores or any potential sores on feet.   Extremities without edema. DP intact 3+, PT faint, and capillary refill intact. Cool toes/feet--almost appear red/blue.   Gait is stable.   Lower extremities with diminished  sensation  Monofilament testing abnormal.        Assessment/Plan:  1. Diabetes type 2, uncontrolled with neuropathy   A. Increase lantus to 140 units daily (70 units x2 injections)   B. Increase Novolog per Expert meter  Current---- 12am-5:30am: Targets ; 7u:10grams carb; 1 unit drop 8 mg/dl sensitivity factor    5:30am-11am: Same target and sensitivity. ICR 7u:8grams carb    11am-5pm:Same target and senstivity. ICR 7u:8grams carb    5pm-9:30pm: Same as midnight to 5:30am    9:30pm-12am: Targets ; ICR 7u:12grams carb, ISF 1:8    New ordered today:  ISF and targets stayed the same    12am-5:30am: Same as above    5:30am-11am: Target ; 7.5u insulin per 8 grams carb; ISF 1 unit drops blood sugar 8mg/dl sensitivity factor    11am-5pm: same as 5:30am-11am    5pm-9:30pm: Changed ICR 7u:9grams carb    9:30pm-12am: Changed ICR 7u:11grams carb    This change will increase Novolog by 3-4 units with each meal depending on his carb intake.       C. Encouraged activity on stationary bike during winter months   D. Follow up in 2-3 weeks if still running high or other concerns.       A total of 35 minutes was spent with this patient, of which more than 50% was spent in counseling and/or coordination of care regarding diabetes type 2, reviewed chart and labs, Expert meter download reviewed, reviewed and encouraged accurate carb counting and activity, treatment plan.

## 2018-01-03 NOTE — TELEPHONE ENCOUNTER
Patient Information     Patient Name MRN Sex Jake Marinelli [id number] Male 1965      Telephone Encounter by Jose Elias Rodney DO at 2017  7:10 AM     Author:  Jose Elias Rodney DO Service:  (none) Author Type:  PHYS- Osteopathic     Filed:  2017  7:10 AM Encounter Date:  2017 Status:  Signed     :  Jose Elias Rodney DO (PHYS- Osteopathic)            Dorothy:   I only received the one page for both Summer and Jake.  JOSE ELIAS RODNEY DO

## 2018-01-03 NOTE — PROGRESS NOTES
"Patient Information     Patient Name MRN Sex Jake Marinelli [id number] Male 1965      Progress Notes by Kira Grider DO at 2017  9:00 AM     Author:  Kira Grider DO Service:  (none) Author Type:  PHYS- Osteopathic     Filed:  2017  9:24 PM Encounter Date:  2017 Status:  Signed     :  Kira Grider DO (PHYS- Osteopathic)            SUBJECTIVE:  Jake Bermudez is a 51 y.o. male who presents for R shoulder pain, increasing chronic pain and due for DM labs.    HPI  Jake had a hard sneeze a week or so ago, and felt an immediate sharp stabbing pull type pain in his anterior shoulder/upper chest.  It has been bothering him off and on (mostly on) since that time.  He has had difficulty sleeping on that side, however denies the pain from waking him up in the morning.  He has continued to cough and sneeze, as he is recovering from a cold, and feels pain with every episode.    He also reports an increase in his chronic pain.  Worse in his low back.  He was working on replacing parts in his van (with a friend doing the majority of the work) however he helped hold the viera open, which caused a significant strain on his back.  Most difficulty bending forward, but twisting motions are also bothersome.  Sedentary with minimal resources for activity possible.    Lastly his DM labs are due.  He states his A1c will be \"a lot worse than last time.\" Last Hgb A1c was in 2016 and was 6.9%.  He states most of his readings have been closer to 200 with the holidays and less working around the house/yard.  Continues to have neuropathy.    Allergies      Allergen   Reactions     Penicillins  Diaphoresis     Vancomycin  Diaphoresis, Fever and Other - Describe In Comment Field     Fever while on zosyn, vanc, no rash or WBC elevation      Zosyn [Piperacillin-Tazobactam]  Diaphoresis, Fever and Other - Describe In Comment Field     Fever while on zosyn, vanc. No rash, no wbc elevation    , "   Current Outpatient Prescriptions on File Prior to Visit       Medication  Sig Dispense Refill     ACCU-CHEK DONAVON PLUS TEST STRP strip CHECK GLUCOSE FOUR TIMES DAILY 400 Strip 1     ACETAMINOPHEN EXTRA STRENGTH 500 mg tablet TAKE ONE TABLET BY MOUTH EVERY 6 HOURS AS NEEDED.  ACETAMINOPHEN SHOULD NOT EXCEED 4000MG PER 24 HOURS. 270 tablet 0     albuterol HFA (PRO-AIR,VENTOLIN,PROVENTIL) 90 mcg/actuation inhaler Inhale 2 Puffs by mouth every 4 hours if needed for Shortness Of Breath, Wheezing or Other (Specify) (coughing). 1 Inhaler 0     aspirin 81 mg tablet Take 81 mg by mouth once daily with a meal.       Blood-Glucose Meter (ACCU-CHEK DONAVON) As directed. Dispense glucose meter, test strips and lancets covered by the patient insurance. Test 3 times per day.       Cholecalciferol, Vitamin D3, 2,000 unit tablet TAKE ONE TABLET BY MOUTH ONCE DAILY 90 tablet 2     clonazePAM (KLONOPIN) 0.5 mg tablet TAKE ONE TABLET BY MOUTH AT BEDTIME 30 tablet 5     FLUoxetine (PROZAC) 40 mg capsule Take 1 capsule by mouth every morning. 90 capsule 4     gabapentin (NEURONTIN) 300 mg capsule Take 3 capsules by mouth 3 times daily. 900 mg TID, 1200 mg at bedtime  0     hydrochlorothiazide (HCTZ) 25 mg tablet Take 1 tablet by mouth once daily. 90 tablet 4     insulin aspart (NOVOLOG FLEXPEN) 100 unit/mL solution for injection INJECT THIRTY-TWO UNITS SUBCUTANEOUSLY THREE TIMES DAILY BEFORE MEALS, PLUS SLIDING SCALE, AS NEEDED.  MAX DAILY DOSE 150 UNITS. 15 pen 6     insulin glargine (LANTUS SOLOSTAR) 100 unit/mL (3 mL) solution for injection Inject 139 Units subcutaneous before bedtime. 45 mL 10     lancets (ACCU-CHEK MULTICLIX LANCET) Use to check glucose 4 times day.  ICD-10 code E11.9 600 Each 0     lisinopril (PRINIVIL; ZESTRIL) 10 mg tablet Take 1 tablet by mouth once daily. 90 tablet 4     LYRICA 100 mg capsule        meloxicam 15 mg tablet TAKE ONE TABLET BY MOUTH ONCE DAILY WITH FOOD 90 tablet 0     ANN PEN NEEDLE 32 gauge x  "5/32\" USE AS DIRECTED FOR ADMINISTERING INSULIN AT HOME 4 TIMES DAILY 400 Each 2     nortriptyline 50 mg capsule Take 2 capsules by mouth at bedtime. 180 capsule 4     omeprazole (PRILOSEC OTC) 20 mg tablet TAKE ONE TABLET BY MOUTH EVERY DAY BEFORE  A  MEAL 90 tablet 4     simvastatin (ZOCOR) 20 mg tablet Take 1 tablet by mouth at bedtime. 90 tablet 4     tiZANidine (ZANAFLEX) 4 mg tablet TAKE ONE TABLET BY MOUTH UP TO THREE TIMES DAILY. 90 tablet 5     traMADol (ULTRAM) 50 mg tablet TAKE ONE TABLET BY MOUTH THREE TIMES DAILY AS NEEDED 90 tablet 5     No current facility-administered medications on file prior to visit.     and   Past Medical History      Diagnosis   Date     Diabetic, retinopathy, proliferative (HC)  7/1/2014     Mild proliferative changes on left side--sees Dr Ortiz exam 6/2014      Lateral epicondylitis of left elbow  10/3/2013     Seen by Dr Tayla Reyes [facility] 8/2013        REVIEW OF SYSTEMS:  Review of Systems   Constitutional: Negative for chills and fever.   HENT: Negative for nosebleeds.    Eyes: Positive for blurred vision, discharge and redness.   Skin: Negative for rash.   Neurological: Negative for headaches.       OBJECTIVE:  /78  Pulse 84  Wt 125.6 kg (276 lb 12.8 oz)  BMI 39.72 kg/m2    EXAM:   Physical Exam   Constitutional: He is well-developed, well-nourished, and in no distress.   HENT:   Head: Normocephalic and atraumatic.   Eyes: Pupils are equal, round, and reactive to light. Left eye exhibits discharge. No scleral icterus.   Neck: Normal range of motion. Neck supple. No thyromegaly present.   Cardiovascular: Normal rate and normal heart sounds.    Pulmonary/Chest: Effort normal.   Musculoskeletal:        Right shoulder: He exhibits tenderness (muscle of upper right chest; and trapezius). He exhibits normal range of motion, no bony tenderness, no swelling, normal pulse and normal strength.   Lymphadenopathy:     He has no cervical adenopathy.   Skin: Skin is warm and " dry.   Psychiatric: Mood and affect normal.   Nursing note and vitals reviewed.      ASSESSMENT/PLAN:    ICD-10-CM    1. Strain of shoulder, right, initial encounter S46.911A oxyCODONE-acetaminophen, 5-325 mg, (PERCOCET) 5-325 mg per tablet   2. Conjunctivitis of left eye, unspecified conjunctivitis type H10.9 trimethoprim-sulfamethoxazole, 160-800 mg, (BACTRIM DS, SEPTRA DS) tablet   3. Other chronic pain G89.29 DULoxetine (CYMBALTA) 60 mg Delayed-release capsule   4. Chronic low back pain without sciatica, unspecified back pain laterality M54.5      G89.29    5. DIABETES MELLITUS, TYPE II E11.9 BASIC METABOLIC PANEL      HEMOGLOBIN A1C MONITORING (POCT)      BASIC METABOLIC PANEL      HEMOGLOBIN A1C MONITORING (POCT)        Plan:   1. Strain of R pec vs trap vs both.  Already on NSAID, although admits to not taking it regularly.  Encouraged daily use of meloxicam.  Will Rx a small amount of Oxycodone to help with sleep, to be used for breakthrough pain only.  2. L eye, conjunctivitis, reported to be chronic: Patient planning to call for complete eye exam with dilation and pressure readings.  For now, an Rx for bactrim will be sent to pharmacy.  He has had two rounds of gtts in the past that did not clear the infection.  3. Low back pain, chronic: increase Cymbalta to 60mg daily from 40mg.  Regular use of meloxicam and encouraged on activity and weight loss; however motivation and resources are not in Jake' favor.  4. See above.  5. DM2, chronic, controlled: will obtain BMP and Hgb A1c.  COntinue current regimen.  Will notify of results when able; and adjust medication if necessary.    Follow up pending above; otherwise every 3-4 months.

## 2018-01-03 NOTE — TELEPHONE ENCOUNTER
Patient Information     Patient Name MRN Sex Jake Marinelli [id number] Male 1965      Telephone Encounter by Dilma Butler RN at 2017 10:34 AM     Author:  Dilma Butler RN Service:  (none) Author Type:  NURS- Registered Nurse     Filed:  2017 10:49 AM Encounter Date:  2017 Status:  Signed     :  Dilma Butler RN (NURS- Registered Nurse)            Diabetic Supplies    Office visit in the past 12 months.    Last visit with JOSE ELIAS RODNEY was on: 2017 in [facility]  Next visit with RONELJOSE ELIAS CONROY is on: 2017 in [facility]  Next visit with Family Practice is on: 2017 in [facility]    Max refill for 12 months from last office visit.  Always add ICD-9 code.    Needs not be listed on Med List to fill.  Need new RX every 12 months or if exceeds the limitations set by Medicare, then every 6 months.  Also when testing frequency is changed is there a need to obtain a new order.  A refill request does not need to be approved by the ordering physician-a beneficiary or their caregiver may request refills.  Physicians are not required to fill out additional forms such as home testing results for suppliers or provide additional documentation unless the supplier is audited and the  is requesting such documentation.    Prescription refilled per RN Medication Refill Policy.................... Dilma Butler RN ....................  2017   10:41 AM

## 2018-01-03 NOTE — TELEPHONE ENCOUNTER
Patient Information     Patient Name MRN Sex Jake Marinelli [id number] Male 1965      Telephone Encounter by Fina Hale RN at 2017 12:52 PM     Author:  Fina Hale RN Service:  (none) Author Type:  NURS- Registered Nurse     Filed:  2017  1:03 PM Encounter Date:  2017 Status:  Signed     :  Fina Hale RN (NURS- Registered Nurse)            [facility] message states new rx needed to reflect current Novolog Max Daily Dose.  Reviewed last T2DM visit.  Patient using 7.5 units per 8 grams of CHO.  Patient using 1 unit per 8 mg/dl for correction.    Patient using 180 - 200 units for CHO daily, plus high dose correction (up to 15 units TID) as needed.  Max daily dose 245 units.    Novolog rx updated to reflect daily Novolog usage.    If accepted as written, please sign pending order.    Thank you,    Fina Hale RN, BSN, CDE  2017 12:58 PM

## 2018-01-03 NOTE — PROGRESS NOTES
"Patient Information     Patient Name MRN Sex Jake Marinelli [id number] Male 1965      Progress Notes by Nitesh Huerta MD at 2017 11:00 AM     Author:  Nitesh Huerta MD Service:  (none) Author Type:  Physician     Filed:  2017 12:19 PM Encounter Date:  2017 Status:  Signed     :  Nitesh Huerta MD (Physician)            Nursing Notes:   Alley Parker  2017 11:17 AM  Signed  Jake Bermudez is a 52 y.o. Male here with right sided rib pain, states he coughed or sneezed over the weekend and something \"popped.\"  Maddy Parker LPN ...... 2017 11:06 AM      Jake Bermudez is a 52 y.o. male who presents for   Chief Complaint     Patient presents with       Pain      ribs     HPI: Mr. Bermudez has R costal margin pain that started 2-3 days ago when he coughed or sneezed; he felt a pop and it has been sore since then. He does feel that he is getting enough air but it hurts in bed with laying. He has had a good appetite and no lightheadedness.  Past Medical History      Diagnosis   Date     Diabetic, retinopathy, proliferative (HC)  2014     Mild proliferative changes on left side--sees Dr Ortiz exam 2014      Lateral epicondylitis of left elbow  10/3/2013     Seen by Dr Tayla Reyes [facility] 2013      Past Surgical History       Procedure   Laterality Date     Shoulder surgery  Bilateral      Chest tube insertion        Lung surgery        pneumonia       [facility]ch[facility] trach pr1        history trach with pneumonia       Family History       Problem   Relation Age of Onset     Heart Disease  Other      Family History Coronary Heart Disease male        Diabetes  Other      Cancer  Father      stomach ca       Current Outpatient Prescriptions       Medication  Sig Dispense Refill     ACCU-CHEK DONAVON PLUS TEST STRP strip CHECK GLUCOSE FOUR TIMES DAILY 400 Strip 1     ACCU-CHEK MULTICLIX LANCET USE STRIP TO CHECK BLOOD GLUCOSE 4 TIMES DAILY. 360 Each 3     " "ACETAMINOPHEN EXTRA STRENGTH 500 mg tablet TAKE ONE TABLET BY MOUTH EVERY 6 HOURS AS NEEDED.  ACETAMINOPHEN SHOULD NOT EXCEED 4000MG PER 24 HOURS. 270 tablet 0     albuterol HFA (PRO-AIR,VENTOLIN,PROVENTIL) 90 mcg/actuation inhaler Inhale 2 Puffs by mouth every 4 hours if needed for Shortness Of Breath, Wheezing or Other (Specify) (coughing). 1 Inhaler 0     aspirin 81 mg tablet Take 81 mg by mouth once daily with a meal.       Blood-Glucose Meter (ACCU-CHEK DONAVON) As directed. Dispense glucose meter, test strips and lancets covered by the patient insurance. Test 3 times per day.       Cholecalciferol, Vitamin D3, 2,000 unit tablet TAKE ONE TABLET BY MOUTH ONCE DAILY 90 tablet 2     clonazePAM (KLONOPIN) 0.5 mg tablet TAKE ONE TABLET BY MOUTH AT BEDTIME 30 tablet 5     DULoxetine (CYMBALTA) 60 mg Delayed-release capsule Take 1 capsule by mouth once daily. 90 capsule 4     FLUoxetine (PROZAC) 40 mg capsule Take 1 capsule by mouth every morning. 90 capsule 4     gabapentin (NEURONTIN) 300 mg capsule Take 3 capsules by mouth 3 times daily. 900 mg TID, 1200 mg at bedtime  0     hydrochlorothiazide (HCTZ) 25 mg tablet Take 1 tablet by mouth once daily. 90 tablet 4     insulin aspart (NOVOLOG FLEXPEN) 100 unit/mL solution for injection INJECT per ICR 7.5units/8grams carb and ISF 1:8.  MAX DAILY DOSE 245 UNITS. 30 pen 7     insulin glargine (LANTUS SOLOSTAR) 100 unit/mL (3 mL) pen Inject 140 Units subcutaneous before bedtime. 45 mL 10     lisinopril (PRINIVIL; ZESTRIL) 10 mg tablet Take 1 tablet by mouth once daily. 90 tablet 4     LYRICA 100 mg capsule        meloxicam 15 mg tablet TAKE ONE TABLET BY MOUTH ONCE DAILY WITH FOOD 90 tablet 0     ANN PEN NEEDLE 32 gauge x 5/32\" USE AS DIRECTED FOR ADMINISTERING INSULIN AT HOME 4 TIMES DAILY 400 Each 2     nortriptyline 50 mg capsule Take 2 capsules by mouth at bedtime. 180 capsule 4     omeprazole (PRILOSEC OTC) 20 mg tablet TAKE ONE TABLET BY MOUTH EVERY DAY BEFORE  A  MEAL " "90 tablet 4     simvastatin (ZOCOR) 20 mg tablet Take 1 tablet by mouth at bedtime. 90 tablet 4     tiZANidine (ZANAFLEX) 4 mg tablet TAKE ONE TABLET BY MOUTH UP TO THREE TIMES DAILY. 90 tablet 5     traMADol (ULTRAM) 50 mg tablet TAKE ONE TABLET BY MOUTH THREE TIMES DAILY AS NEEDED 90 tablet 5     No current facility-administered medications for this visit.      Medications have been reviewed by me and are current to the best of my knowledge and ability.    Allergies      Allergen   Reactions     Penicillins  Diaphoresis     Vancomycin  Diaphoresis, Fever and Other - Describe In Comment Field     Fever while on zosyn, vanc, no rash or WBC elevation      Zosyn [Piperacillin-Tazobactam]  Diaphoresis, Fever and Other - Describe In Comment Field     Fever while on zosyn, vanc. No rash, no wbc elevation      REVIEW OF SYSTEMS:  Respiratory: Negative  Gastrointestinal: Negative     EXAM:   Vitals:     02/28/17 1107   BP: 110/80   Pulse: 88   Weight: 126.6 kg (279 lb)   Height: 1.78 m (5' 10.08\")     General Appearance: Pleasant, alert, appropriate appearance for age. No acute distress  Chest/Respiratory Exam: Normal chest wall and respirations. Clear to auscultation.some tenderness without crepitation anterior costyal margin  Cardiovascular Exam: Regular rate and rhythm. S1, S2, no murmur, click, gallop, or rubs.  Gastrointestinal Exam: Soft, nontender, no abnormal masses or organomegaly.  ASSESSMENT AND PLAN:  1. Chest wall pain  Chest radiograph clear-ice / he has pain medications presently at home- tramadol + tylenol                 "

## 2018-01-03 NOTE — TELEPHONE ENCOUNTER
Patient Information     Patient Name MRN Jake Oliveira [id number] Male 1965      Telephone Encounter by Dorothy Michel at 2017  9:38 AM     Author:  Dorothy Michel Service:  (none) Author Type:  (none)     Filed:  2017  9:38 AM Encounter Date:  2017 Status:  Signed     :  Dorothy Michel            Prescription faxed to pharmacy.  Dorothy Michel LPN............................... 2017 9:38 AM

## 2018-01-03 NOTE — TELEPHONE ENCOUNTER
Patient Information     Patient Name MRN Sex Jake Marinelli [id number] Male 1965      Telephone Encounter by Dilma Butler RN at 3/21/2017  9:08 AM     Author:  Dilma Butler RN Service:  (none) Author Type:  NURS- Registered Nurse     Filed:  3/21/2017  9:13 AM Encounter Date:  3/20/2017 Status:  Signed     :  Dilma Butler RN (NURS- Registered Nurse)            Nsaids  Office visit in the past 12 months or per provider note.  Last visit with JOSE ELIAS RODNEY was on: 2017 in [facility]  Next visit with JOSE ELIAS RODNEY is on: 2017 in [facility]  Next visit with Family Practice is on: 2017 in [facility]  Max refill for 12 months from last office visit or per provider note.  Prescription refilled per RN Medication Refill Policy.................... Dilma Butler RN ....................  3/21/2017   9:10 AM

## 2018-01-03 NOTE — PATIENT INSTRUCTIONS
Patient Information     Patient Name MRN Sex Jake Marinelli [id number] Male 1965      Patient Instructions by Estrella Maza NP at 2017 10:30 AM     Author:  Estrella Maza NP Service:  (none) Author Type:  PHYS- Nurse Practitioner     Filed:  2017 11:32 AM Encounter Date:  2017 Status:  Signed     :  Estrella Maza NP (PHYS- Nurse Practitioner)            Increase Lantus 140 units at bedtime (70 units x 2 injections)  Increase Novolog per Insulin to carb ratio---settings changed in meter.     Will see changes taking effect in the next 3-5 days. Follow up in 2-3 weeks if not getting back to goal blood sugars.     Try to add biking to your day.     Diabetes is a progressive disease and takes a lot of work and dedication on a daily basis to keep in good control. I am here to help support you during this process.       Check blood sugar 4 times a day  Before breakfast, before lunch, before supper, and bedtime    Record in log book and bring glucometer and log book to every clinic visit    Watch carb intake/portions 2-3 carb choices (30-45 gm) at each meal three times a day and 0-1 carb choices (0-15 gm) for snacks between meals. If desire to lose weight, try to stay closer to lower end of carb choices at mealtimes and no snacks.    Activity recommendations: 150 min/week      Recommendations    To best take care of your self with diabetes, I recommend the followin. Diabetes can affect your eyes/vision: please schedule annual dilated eye exams with your eye doctor    2. Diabetes can increase your risk of cavities, gum disease and tooth/bone loss: please schedule routine dental checks and regular flossing/brushing    3. Diabetes can affect the nerves in your body, especially the long nerve down to your feet. Please routinely check feet to ensure skin intact, no persistant reddened areas, and no sores that are not healing or may be infected. If you see a sore or  "reddened area that is concerning to you, please see your provider right away.     4. I also recommend good blood pressure (less than 140/90), \"bad\" cholesterol LDL less than 100, A1c less than 7, take a daily aspirin if your provider recommends, and no tobacco use.     5. On a routine basis, your provider will check labs to make sure kidneys are working properly, check cholesterol levels, and average blood sugar (A1c).    6. Diabetes increases your risk for stroke and heart attack. I recommend trying to keep blood sugar in good control by reaching the goals stated below.     Goals  Fasting and premeal:   2 hours after the start of a meal: less than 180  Bedtime: 100-140  A1c: less than 7          "

## 2018-01-03 NOTE — TELEPHONE ENCOUNTER
Patient Information     Patient Name MRN Jake Oliveira [id number] Male 1965      Telephone Encounter by Tamie Christianson RN at 2017 10:58 AM     Author:  Tamie Christianson RN Service:  (none) Author Type:  NURS- Registered Nurse     Filed:  2017 10:59 AM Encounter Date:  2017 Status:  Signed     :  Tamie Christianson RN (NURS- Registered Nurse)            Diabetic Supplies    Office visit in the past 12 months.    Last visit with JOSE ELIAS RODNEY was on: 2016 in [facility]  Next visit with JOSE ELIAS RODNEY is on: 2017 in [facility]  Next visit with Family Practice is on: 2017 in [facility]    Max refill for 12 months from last office visit.  Always add ICD-9 code.    Needs not be listed on Med List to fill.  Need new RX every 12 months or if exceeds the limitations set by Medicare, then every 6 months.  Also when testing frequency is changed is there a need to obtain a new order.  A refill request does not need to be approved by the ordering physician-a beneficiary or their caregiver may request refills.  Physicians are not required to fill out additional forms such as home testing results for suppliers or provide additional documentation unless the supplier is audited and the  is requesting such documentation.      Prescription refilled per RN Medication Refill Policy.................... TAMIE CHRISTIANSON RN ....................  2017   10:58 AM

## 2018-01-04 NOTE — TELEPHONE ENCOUNTER
Patient Information     Patient Name MRN Sex Jake Marinelli [id number] Male 1965      Telephone Encounter by Dilma Butler RN at 2017 12:03 PM     Author:  Dilma Butler RN Service:  (none) Author Type:  NURS- Registered Nurse     Filed:  2017 12:06 PM Encounter Date:  2017 Status:  Signed     :  Dilma Butler RN (NURS- Registered Nurse)            Diabetic Supplies  Office visit in the past 12 months.  Last visit with JOSE ELIAS RODNEY was on: 2017 in [facility]  Next visit with JOSE ELIAS RODNEY is on: 2017 in [facility]  Max refill for 12 months from last office visit.  Always add ICD-9 code.  Needs not be listed on Med List to fill.  Need new RX every 12 months or if exceeds the limitations set by Medicare, then every 6 months.  Also when testing frequency is changed is there a need to obtain a new order.  A refill request does not need to be approved by the ordering physician-a beneficiary or their caregiver may request refills.  Physicians are not required to fill out additional forms such as home testing results for suppliers or provide additional documentation unless the supplier is audited and the  is requesting such documentation.    Prescription refilled per RN Medication Refill Policy.................... Dilma Butler RN ....................  2017   12:05 PM        Office visit in the past 12 months or per provider note.  Last visit with JOSE ELIAS RODNEY was on: 2017 in [facility]  Next visit with JOSE ELIAS RODNEY is on: 2017 in [facility]  Next visit with Family Practice is on: 2017 in [facility]  Max refill for 12 months from last office visit or per provider note.  Prescription refilled per RN Medication Refill Policy.................... Dilma Butler RN ....................  2017   12:06 PM

## 2018-01-05 NOTE — TELEPHONE ENCOUNTER
Patient Information     Patient Name MRN Sex Jake Marinelli [id number] Male 1965      Telephone Encounter by Dilma Chadwick RN at 2017  2:39 PM     Author:  Dilma Chadwick RN Service:  (none) Author Type:  NURS- Registered Nurse     Filed:  2017  2:45 PM Encounter Date:  2017 Status:  Signed     :  Dilma Chadwick RN (NURS- Registered Nurse)            Refill 17 270 tabs. Pharmacy alerted. Unable to complete prescription refill per RN Medication Refill Policy.................... Dilma Chadwick RN ....................  2017   2:40 PM    Prescribing Provider: Jose Elias Rodney DO                 Order Date: 2017  Ordered by: DILMA CHADWICK  Medication:ACETAMINOPHEN EXTRA STRENGTH 500 mg tablet  Prescription #:[id number]    Qty:270 tablet  Ref:0  Start:2017    End:              Route:Oral                  MIGUELINA:No   Class:eRx    Sig:TAKE ONE TABLET BY MOUTH EVERY 6 HOURS AS NEEDED .  MAX  ACETAMINOPHEN          DOSE  4000MG  IN  24HRS.    Pharmacy:[facility] - [address] accepted by JOSE ELIAS RODNEY[[id number]] on 2017 12:13 PM

## 2018-01-17 ENCOUNTER — OFFICE VISIT - GICH (OUTPATIENT)
Dept: FAMILY MEDICINE | Facility: OTHER | Age: 53
End: 2018-01-17

## 2018-01-17 ENCOUNTER — HISTORY (OUTPATIENT)
Dept: FAMILY MEDICINE | Facility: OTHER | Age: 53
End: 2018-01-17

## 2018-01-17 DIAGNOSIS — E11.42 TYPE 2 DIABETES MELLITUS WITH DIABETIC POLYNEUROPATHY (H): ICD-10-CM

## 2018-01-17 DIAGNOSIS — J44.9 CHRONIC OBSTRUCTIVE PULMONARY DISEASE (H): ICD-10-CM

## 2018-01-17 DIAGNOSIS — Z79.4 LONG TERM CURRENT USE OF INSULIN (H): ICD-10-CM

## 2018-01-17 DIAGNOSIS — E11.65 TYPE 2 DIABETES MELLITUS WITH HYPERGLYCEMIA (H): ICD-10-CM

## 2018-01-17 DIAGNOSIS — G47.33 OBSTRUCTIVE SLEEP APNEA: ICD-10-CM

## 2018-01-17 LAB
ANION GAP - HISTORICAL: 11 (ref 5–18)
BUN SERPL-MCNC: 23 MG/DL (ref 7–25)
BUN/CREAT RATIO - HISTORICAL: 20
CALCIUM SERPL-MCNC: 9 MG/DL (ref 8.6–10.3)
CHLORIDE SERPLBLD-SCNC: 92 MMOL/L (ref 98–107)
CO2 SERPL-SCNC: 31 MMOL/L (ref 21–31)
CREAT SERPL-MCNC: 1.13 MG/DL (ref 0.7–1.3)
ESTIMATED AVERAGE GLUCOSE: 266 MG/DL
GFR IF NOT AFRICAN AMERICAN - HISTORICAL: >60 ML/MIN/1.73M2
GLUCOSE SERPL-MCNC: 328 MG/DL (ref 70–105)
HEMOGLOBIN A1C MONITORING (POCT) - HISTORICAL: 10.9 % (ref 4–6.2)
POTASSIUM SERPL-SCNC: 4.7 MMOL/L (ref 3.5–5.1)
SODIUM SERPL-SCNC: 134 MMOL/L (ref 133–143)

## 2018-01-17 ASSESSMENT — PATIENT HEALTH QUESTIONNAIRE - PHQ9: SUM OF ALL RESPONSES TO PHQ QUESTIONS 1-9: 17

## 2018-01-17 ASSESSMENT — ANXIETY QUESTIONNAIRES
3. WORRYING TOO MUCH ABOUT DIFFERENT THINGS: SEVERAL DAYS
4. TROUBLE RELAXING: SEVERAL DAYS
7. FEELING AFRAID AS IF SOMETHING AWFUL MIGHT HAPPEN: NOT AT ALL
GAD7 TOTAL SCORE: 6
5. BEING SO RESTLESS THAT IT IS HARD TO SIT STILL: SEVERAL DAYS
2. NOT BEING ABLE TO STOP OR CONTROL WORRYING: NOT AT ALL
6. BECOMING EASILY ANNOYED OR IRRITABLE: MORE THAN HALF THE DAYS
1. FEELING NERVOUS, ANXIOUS, OR ON EDGE: SEVERAL DAYS

## 2018-01-18 ENCOUNTER — COMMUNICATION - GICH (OUTPATIENT)
Dept: FAMILY MEDICINE | Facility: OTHER | Age: 53
End: 2018-01-18

## 2018-01-18 DIAGNOSIS — J44.9 CHRONIC OBSTRUCTIVE PULMONARY DISEASE (H): ICD-10-CM

## 2018-01-25 ENCOUNTER — MEDICAL CORRESPONDENCE (OUTPATIENT)
Facility: CLINIC | Age: 53
End: 2018-01-25
Payer: COMMERCIAL

## 2018-01-25 ENCOUNTER — TRANSFERRED RECORDS (OUTPATIENT)
Dept: HEALTH INFORMATION MANAGEMENT | Facility: CLINIC | Age: 53
End: 2018-01-25

## 2018-01-25 ENCOUNTER — HOSPITAL ENCOUNTER (OUTPATIENT)
Dept: RADIOLOGY | Facility: OTHER | Age: 53
End: 2018-01-25
Attending: FAMILY MEDICINE

## 2018-01-25 DIAGNOSIS — J44.9 CHRONIC OBSTRUCTIVE PULMONARY DISEASE (H): ICD-10-CM

## 2018-01-25 PROCEDURE — 93306 TTE W/DOPPLER COMPLETE: CPT | Mod: 26 | Performed by: INTERNAL MEDICINE

## 2018-01-27 VITALS
BODY MASS INDEX: 41.44 KG/M2 | HEIGHT: 71 IN | SYSTOLIC BLOOD PRESSURE: 150 MMHG | DIASTOLIC BLOOD PRESSURE: 88 MMHG | WEIGHT: 296 LBS | OXYGEN SATURATION: 85 % | HEART RATE: 105 BPM

## 2018-01-27 VITALS
DIASTOLIC BLOOD PRESSURE: 92 MMHG | TEMPERATURE: 97.9 F | WEIGHT: 297.4 LBS | BODY MASS INDEX: 42.58 KG/M2 | HEART RATE: 88 BPM | SYSTOLIC BLOOD PRESSURE: 154 MMHG

## 2018-01-27 VITALS
WEIGHT: 279 LBS | DIASTOLIC BLOOD PRESSURE: 80 MMHG | SYSTOLIC BLOOD PRESSURE: 110 MMHG | BODY MASS INDEX: 39.94 KG/M2 | HEART RATE: 88 BPM | HEIGHT: 70 IN

## 2018-01-27 VITALS
WEIGHT: 287.4 LBS | BODY MASS INDEX: 40.08 KG/M2 | DIASTOLIC BLOOD PRESSURE: 82 MMHG | HEART RATE: 92 BPM | SYSTOLIC BLOOD PRESSURE: 142 MMHG

## 2018-01-27 VITALS
HEART RATE: 84 BPM | SYSTOLIC BLOOD PRESSURE: 132 MMHG | DIASTOLIC BLOOD PRESSURE: 78 MMHG | WEIGHT: 276.8 LBS | BODY MASS INDEX: 38.61 KG/M2

## 2018-01-29 ENCOUNTER — COMMUNICATION - GICH (OUTPATIENT)
Dept: EDUCATION SERVICES | Facility: OTHER | Age: 53
End: 2018-01-29

## 2018-01-29 ENCOUNTER — AMBULATORY - GICH (OUTPATIENT)
Dept: EDUCATION SERVICES | Facility: OTHER | Age: 53
End: 2018-01-29

## 2018-01-29 DIAGNOSIS — E11.65 TYPE 2 DIABETES MELLITUS WITH HYPERGLYCEMIA (H): ICD-10-CM

## 2018-01-29 DIAGNOSIS — E11.42 TYPE 2 DIABETES MELLITUS WITH DIABETIC POLYNEUROPATHY (H): ICD-10-CM

## 2018-01-29 DIAGNOSIS — Z79.4 LONG TERM CURRENT USE OF INSULIN (H): ICD-10-CM

## 2018-01-31 ASSESSMENT — PATIENT HEALTH QUESTIONNAIRE - PHQ9: SUM OF ALL RESPONSES TO PHQ QUESTIONS 1-9: 10

## 2018-02-01 ENCOUNTER — DOCUMENTATION ONLY (OUTPATIENT)
Dept: FAMILY MEDICINE | Facility: OTHER | Age: 53
End: 2018-02-01

## 2018-02-01 PROBLEM — M77.50 ENTHESOPATHY OF ANKLE AND TARSUS: Status: ACTIVE | Noted: 2018-02-01

## 2018-02-01 PROBLEM — L85.1 ACQUIRED KERATODERMA: Status: ACTIVE | Noted: 2018-02-01

## 2018-02-01 PROBLEM — Z82.49 FAMILY HISTORY OF ISCHEMIC HEART DISEASE: Status: ACTIVE | Noted: 2018-02-01

## 2018-02-01 PROBLEM — Z83.3 FAMILY HISTORY OF DIABETES MELLITUS: Status: ACTIVE | Noted: 2018-02-01

## 2018-02-01 RX ORDER — TRAMADOL HYDROCHLORIDE 50 MG/1
50 TABLET ORAL 3 TIMES DAILY PRN
COMMUNITY
Start: 2018-01-03 | End: 2018-07-23

## 2018-02-01 RX ORDER — ACETAMINOPHEN 500 MG
TABLET ORAL
COMMUNITY
Start: 2017-06-05 | End: 2018-07-23

## 2018-02-01 RX ORDER — ALBUTEROL SULFATE 90 UG/1
2 AEROSOL, METERED RESPIRATORY (INHALATION) EVERY 4 HOURS PRN
COMMUNITY
Start: 2017-11-30 | End: 2018-09-26

## 2018-02-01 RX ORDER — SIMVASTATIN 20 MG
20 TABLET ORAL AT BEDTIME
COMMUNITY
Start: 2017-11-24 | End: 2018-09-26

## 2018-02-01 RX ORDER — DULOXETIN HYDROCHLORIDE 60 MG/1
60 CAPSULE, DELAYED RELEASE ORAL DAILY
COMMUNITY
Start: 2017-01-18 | End: 2018-02-25

## 2018-02-01 RX ORDER — GABAPENTIN 300 MG/1
900 CAPSULE ORAL 3 TIMES DAILY
COMMUNITY
Start: 2016-04-05 | End: 2021-05-01

## 2018-02-01 RX ORDER — METHOCARBAMOL 750 MG/1
750 TABLET, FILM COATED ORAL 3 TIMES DAILY
COMMUNITY
Start: 2017-06-21 | End: 2018-08-05

## 2018-02-01 RX ORDER — OMEPRAZOLE 20 MG/1
TABLET, DELAYED RELEASE ORAL
COMMUNITY
Start: 2017-12-26 | End: 2018-09-26

## 2018-02-01 RX ORDER — HYDROCHLOROTHIAZIDE 25 MG/1
25 TABLET ORAL DAILY
COMMUNITY
Start: 2017-10-03 | End: 2018-09-26

## 2018-02-01 RX ORDER — LISINOPRIL 10 MG/1
10 TABLET ORAL DAILY
COMMUNITY
Start: 2017-11-24 | End: 2018-06-27

## 2018-02-01 RX ORDER — PREGABALIN 100 MG/1
CAPSULE ORAL
COMMUNITY
Start: 2016-02-22 | End: 2018-06-27

## 2018-02-01 RX ORDER — CLONAZEPAM 0.5 MG/1
0.5 TABLET ORAL AT BEDTIME
COMMUNITY
Start: 2017-09-06 | End: 2018-03-22

## 2018-02-01 RX ORDER — INSULIN GLARGINE 100 [IU]/ML
INJECTION, SOLUTION SUBCUTANEOUS
COMMUNITY
Start: 2017-10-18 | End: 2018-03-01

## 2018-02-01 RX ORDER — CHOLECALCIFEROL (VITAMIN D3) 50 MCG
1 TABLET ORAL DAILY
COMMUNITY
Start: 2017-11-24 | End: 2018-09-13

## 2018-02-01 RX ORDER — MELOXICAM 15 MG/1
15 TABLET ORAL
COMMUNITY
Start: 2017-03-21 | End: 2018-08-24

## 2018-02-09 ENCOUNTER — COMMUNICATION - GICH (OUTPATIENT)
Dept: FAMILY MEDICINE | Facility: OTHER | Age: 53
End: 2018-02-09

## 2018-02-09 VITALS
SYSTOLIC BLOOD PRESSURE: 144 MMHG | WEIGHT: 293 LBS | HEART RATE: 92 BPM | OXYGEN SATURATION: 90 % | BODY MASS INDEX: 40.87 KG/M2 | DIASTOLIC BLOOD PRESSURE: 72 MMHG

## 2018-02-09 VITALS
SYSTOLIC BLOOD PRESSURE: 144 MMHG | WEIGHT: 294.6 LBS | OXYGEN SATURATION: 89 % | DIASTOLIC BLOOD PRESSURE: 76 MMHG | BODY MASS INDEX: 41.09 KG/M2 | HEART RATE: 72 BPM | TEMPERATURE: 98.3 F

## 2018-02-09 DIAGNOSIS — E11.9 TYPE 2 DIABETES MELLITUS WITHOUT COMPLICATIONS (H): ICD-10-CM

## 2018-02-10 ASSESSMENT — PATIENT HEALTH QUESTIONNAIRE - PHQ9
SUM OF ALL RESPONSES TO PHQ QUESTIONS 1-9: 15
SUM OF ALL RESPONSES TO PHQ QUESTIONS 1-9: 17

## 2018-02-10 ASSESSMENT — ANXIETY QUESTIONNAIRES: GAD7 TOTAL SCORE: 6

## 2018-02-12 NOTE — TELEPHONE ENCOUNTER
Patient Information     Patient Name MRN Jake Oliveira [id number] Male 1965      Telephone Encounter by Dorothy Michel at 2017 10:00 AM     Author:  Dorothy Michel Service:  (none) Author Type:  (none)     Filed:  2017 10:01 AM Encounter Date:  2017 Status:  Signed     :  Dorothy Michel is wondering what stage 3 means.. She states this is what makes her nervous is that it is 3 and not 1.  Dorothy Michel LPN............................... 2017 10:01 AM

## 2018-02-12 NOTE — NURSING NOTE
Patient Information     Patient Name MRN Jake Oliveira [id number] Male 1965      Nursing Note by Keesha Duke at 2017  2:15 PM     Author:  Keesha Duke Service:  (none) Author Type:  (none)     Filed:  2017  1:33 PM Encounter Date:  2017 Status:  Signed     :  Keesha Duke            Patient presents to the clinic for follow up hypoxia.  Keesha Duke LPN........................2017  1:14 PM

## 2018-02-12 NOTE — TELEPHONE ENCOUNTER
Patient Information     Patient Name MRN Sex Jake Marinelli [id number] Male 1965      Telephone Encounter by Sara Gore at 2017  3:21 PM     Author:  Sara Gore Service:  (none) Author Type:  (none)     Filed:  2017  3:28 PM Encounter Date:  2017 Status:  Signed     :  Sara Gore            Spoke with pt's wife Summer. She states they noticed he has stage 3 kidney disease, and a lung nodule on his problem list (seen on AVS). She states that he was never told about the kidney disease, and asks what this can mean? She also questions the lung nodule, she states that Jake told her about it but, that he didn't go into detail about what it could be/mean, and if any follow up is needed. Wife is concerned, please advise.     We do have signed consent, stating it's okay to speak with Summer.     Sara Gore ....................  2017   3:23 PM

## 2018-02-12 NOTE — TELEPHONE ENCOUNTER
Patient Information     Patient Name MRN Jake Oliveira [id number] Male 1965      Telephone Encounter by Sara Gore at 2017 10:21 AM     Author:  Sara Gore Service:  (none) Author Type:  (none)     Filed:  2017 10:21 AM Encounter Date:  2017 Status:  Signed     :  Sara Gore            Left message to call back.    Sara Gore LPN.................. 2017 10:21 AM

## 2018-02-12 NOTE — TELEPHONE ENCOUNTER
Patient Information     Patient Name MRN Sex Jake Marinelli [id number] Male 1965      Telephone Encounter by Dilma Butler RN at 1/3/2018  8:20 AM     Author:  Dilma Butler RN Service:  (none) Author Type:  NURS- Registered Nurse     Filed:  1/3/2018  8:25 AM Encounter Date:  2017 Status:  Signed     :  Dilma Butler RN (NURS- Registered Nurse)            This is a Refill request from: [facility]  Name of Medication: TraMADol (ULTRAM) 50 mg tablet  Quantity requested: 90 x 2   Last fill date: 17  Due for refill: yes  Last visit with JOSE ELIAS RODNEY was on: 2017   Controlled Substance Agreement: 2013  Diagnosis r/t this medication request: Chronic low back pain     Unable to complete prescription refill per RN Medication Refill Policy.................... Dilma Butler RN ....................  1/3/2018   8:21 AM

## 2018-02-12 NOTE — TELEPHONE ENCOUNTER
Patient Information     Patient Name MRN Sex Jake Marinelli [id number] Male 1965      Telephone Encounter by Kira Rodney DO at 2017 10:37 AM     Author:  Kira Rodney DO Service:  (none) Author Type:  PHYS- Osteopathic     Filed:  2017 10:39 AM Encounter Date:  2017 Status:  Signed     :  Kira Rodney DO (PHYS- Osteopathic)            There are 5 stages; 1 and 2 are normal or mild and never followed closely.  He is stage 3.    Nothing is needed at this time.  Can discuss further at his next diabetic check.    KIRA RODNEY DO

## 2018-02-12 NOTE — TELEPHONE ENCOUNTER
Patient Information     Patient Name MRN Sex Jake Marinelli [id number] Male 1965      Telephone Encounter by Kira Rodney DO at 2017  9:58 AM     Author:  Kira Rodney DO Service:  (none) Author Type:  PHYS- Osteopathic     Filed:  2017 10:01 AM Encounter Date:  2017 Status:  Signed     :  Kira Rodney DO (PHYS- Osteopathic)            Not uncommon for someone with uncontrolled diabetes to have some effects on the kidney.  There has been no drastic change in the time I have cared for him - nothing further needed at this time.  We will continue routine blood work to monitor and intervene if it becomes necessary.    The lung nodule was seen recently on his CT scan of his chest.  There is recommendation to repeat imaging in one year to monitor for any change.    KIRA RODNEY DO

## 2018-02-12 NOTE — TELEPHONE ENCOUNTER
Patient Information     Patient Name MRN Sex Jake Marinelli [id number] Male 1965      Telephone Encounter by Mercedes Terry RN at 2017 12:16 PM     Author:  Mercedes Terry RN Service:  (none) Author Type:  NURS- Registered Nurse     Filed:  2017 12:23 PM Encounter Date:  2017 Status:  Signed     :  Mercedes Terry RN (NURS- Registered Nurse)            Proton Pump Inhibitors    Office visit in the past 12 months or per provider note.    Last visit with JOSE ELIAS RODNEY was on: 2017 in [facility]  Next visit with JOSE ELIAS RODNEY is on: 2017 in [facility]  Next visit with Family Practice is on: 2017 in [facility]    Max refill for 12 months from last office visit or per provider note.    Mercedes Terry RN  ....................  2017   12:17 PM

## 2018-02-12 NOTE — NURSING NOTE
Patient Information     Patient Name MRN Sex Jake Marinelli [id number] Male 1965      Nursing Note by Keesha Duke at 2017  2:15 PM     Author:  Keesha Duke Service:  (none) Author Type:  (none)     Filed:  2017  1:33 PM Encounter Date:  2017 Status:  Signed     :  Keesha Duke            Last A1C: 17  Last Eye exam:10/13/17

## 2018-02-12 NOTE — PROGRESS NOTES
Patient Information     Patient Name MRN Sex Jake Marinelli [id number] Male 1965      Progress Notes by Valorie Lee PA-C at 2017  2:15 PM     Author:  Valorie Lee PA-C Service:  (none) Author Type:  PHYS- Physician Assistant     Filed:  2017  4:04 PM Encounter Date:  2017 Status:  Signed     :  Valorie Lee PA-C (PHYS- Physician Assistant)            Nursing Notes:   Keesha Duke  2017  1:33 PM  Signed  Patient presents to the clinic for follow up hypoxia.  Keesha Duke LPN........................2017  1:14 PM      Keesha Duke  2017  1:33 PM  Signed  Last A1C: 17  Last Eye exam:10/13/17    HPI:    Jake Bermudez is a 52 y.o. male who presents for follow up hypoxia. Patient is tolerating the oxygen well. He is currently on 1.5 L oxygen. Last night he states he increased it to 2 L of oxygen and was comfortable. He slept well last night. Previously he had a hard time sleeping as he would have a hard time breathing laying down. No recent chest pain, palpitations, fevers, chills, GI symptoms, urinary symptoms. No wheezing or rattling. Eating and drinking normally.    Past Medical History:     Diagnosis  Date     Diabetic, retinopathy, proliferative (HC) 2014    Mild proliferative changes on left side--sees Dr Ortiz exam 2014      Lateral epicondylitis of left elbow 10/3/2013    Seen by Dr Bourne [place] 2013        Past Surgical History:      Procedure  Laterality Date     CHEST TUBE INSERTION       HCHG TRACH PR1      history trach with pneumonia       LUNG SURGERY      pneumonia       SHOULDER SURGERY Bilateral        Social History      Substance Use Topics        Smoking status:  Former Smoker     Packs/day: 0.50     Years: 28.00     Types: Cigarettes     Quit date: 2011     Smokeless tobacco:  Former User     Types: Chew     Quit date: 1988     Alcohol use  No       Current Outpatient Prescriptions        Medication  Sig Dispense Refill     ACCU-CHEK DONAVON PLUS TEST STRP strip CHECK GLUCOSE FOUR TIMES DAILY 400 Strip 1     ACCU-CHEK MULTICLIX LANCET USE STRIP TO CHECK BLOOD GLUCOSE 4 TIMES DAILY. 360 Each 3     acetaminophen (ACETAMINOPHEN EXTRA STRENGTH) 500 mg tablet TAKE ONE TABLET BY MOUTH EVERY 6 HOURS AS NEEDED .  MAX  ACETAMINOPHEN  DOSE  4000MG  IN  24HRS. 270 tablet 3     albuterol HFA 90 mcg/actuation inhaler Inhale 2 Puffs by mouth every 4 hours if needed (coughing). 1 Inhaler 5     aspirin 81 mg tablet Take 81 mg by mouth once daily with a meal.       BASAGLAR KWIKPEN 100 unit/mL (3 mL) pen Inject 142 Units subcutaneous before bedtime. Product desired:BASAGLAR,  IDDM type 2, E11.65 165 mL 3     beclomethasone, 80 mcg each actuation, (QVAR) 80 mcg/actuation inhaler Inhale 1 Puff by mouth 2 times daily. 1 Inhaler 5     Cholecalciferol, Vitamin D3, 2,000 unit tablet TAKE ONE TABLET BY MOUTH ONCE DAILY 100 tablet 1     clonazePAM (KLONOPIN) 0.5 mg tablet TAKE ONE TABLET BY MOUTH AT BEDTIME 30 tablet 5     DULoxetine (CYMBALTA) 60 mg Delayed-release capsule Take 1 capsule by mouth once daily. 90 capsule 4     FLUoxetine (PROZAC) 20 mg capsule Take 1 capsule by mouth every morning. 90 capsule 1     gabapentin (NEURONTIN) 300 mg capsule Take 3 capsules by mouth 3 times daily. 900 mg TID, 1200 mg at bedtime  0     hydroCHLOROthiazide (HCTZ) 25 mg tablet TAKE ONE TABLET BY MOUTH ONCE DAILY 90 tablet 2     insulin aspart (NOVOLOG FLEXPEN) 100 unit/mL solution for injection INJECT per ICR 7.5units/8grams carb and ISF 1:8.  MAX DAILY DOSE 245 UNITS. 30 pen 7     lisinopril (PRINIVIL; ZESTRIL) 10 mg tablet TAKE ONE TABLET BY MOUTH ONCE DAILY 90 tablet 1     LYRICA 100 mg capsule        Melatonin 5 mg tab Take 1-2 tablets by mouth at bedtime. 60 tablet 5     meloxicam 15 mg tablet TAKE ONE TABLET BY MOUTH ONCE DAILY WITH FOOD 90 tablet 2     methocarbamol (ROBAXIN) 750 mg tablet Take 1 tablet by mouth 3 times daily.  "90 tablet 5     ANN PEN NEEDLE 32 gauge x 5/32\" USE AS DIRECTED FOR ADMINISTERING INSULIN AT HOME 4 TIMES DAILY 400 Each 2     omeprazole (PRILOSEC OTC) 20 mg tablet TAKE ONE TABLET BY MOUTH EVERY DAY BEFORE  A  MEAL 90 tablet 0     Oxygen-Air Delivery Systems (HOME OXYGEN) Oxygen for home use. LPM: 1/min via nasal cannula. Frequency of use: Continuous with portability; Length of need: 12 mos. 1 Device 0     simvastatin (ZOCOR) 20 mg tablet TAKE ONE TABLET BY MOUTH AT BEDTIME 90 tablet 1     traMADol (ULTRAM) 50 mg tablet Take 1 tablet by mouth 3 times daily if needed. 90 tablet 2     No current facility-administered medications for this visit.      Medications have been reviewed by me and are current to the best of my knowledge and ability.      Allergies      Allergen   Reactions     Penicillins  Diaphoresis     Vancomycin  Diaphoresis, Fever and Other - Describe In Comment Field     Fever while on zosyn, vanc, no rash or WBC elevation      Zosyn [Piperacillin-Tazobactam]  Diaphoresis, Fever and Other - Describe In Comment Field     Fever while on zosyn, vanc. No rash, no wbc elevation        REVIEW OF SYSTEMS:  Refer to HPI.    EXAM:   Vitals:    /76  Pulse 72  Temp 98.3  F (36.8  C) (Tympanic)  Wt 133.6 kg (294 lb 9.6 oz)  SpO2 (!) 89%  BMI 41.09 kg/m2  General Appearance: Pleasant, alert, appropriate appearance for age. No acute distress  Chest/Respiratory Exam: Normal chest wall and respirations. Clear to auscultation.  Cardiovascular Exam: Regular rate and rhythm. S1, S2, no murmur, click, gallop, or rubs.  Skin: no rash or abnormalities  Psychiatric Exam: Alert and oriented - appropriate affect.    PHQ Depression Screen  Date of PHQ exam: 12/01/17  Over the last 2 weeks, how often have you been bothered by any of the following problems?  1. Little interest or pleasure in doing things: 3 - Nearly every day  2. Feeling down, depressed, or hopeless: 3 - Nearly every day  3. Trouble falling or " staying asleep, or sleeping too much: 3 - Nearly every day  4. Feeling tired or having little energy: 3 - Nearly every day  5. Poor appetite or overeatin - Not at all  6. Feeling bad about yourself - or that you are a failure or have let yourself or your family down: 3 - Nearly every day  7. Trouble concentrating on things, such as reading the newspaper or watching television: 0 - Not at all  8. Moving or speaking so slowly that other people could have noticed. Or the opposite - being so fidgety or restless that you have been moving around a lot more than usual: 0 - Not at all  9. Thoughts that you would be better off dead, or of hurting yourself in some way: 0 - Not at all    PHQ-9 TOTAL SCORE: (!) 15  Depression Severity Level: moderately severe  If any answers were positive, how difficult have these problems made it for you to do your work, take care of things at home, or get along with other people: somewhat difficult      Results for orders placed or performed in visit on 17      ARTERIAL BLOOD GAS      Result  Value Ref Range    PH,ARTERIAL               7.42 7.35 - 7.45                    PCO2,ARTERIAL             44 35 - 45 mmHg    PO2,ARTERIAL 57 (L) 83 - 108 mmHg    HCO3,ARTERIAL             28 22 - 28 mmol/L    O2 SATURATION             91 (L) >95 %    INSPIRED O2               1.5L NC                    RENITA'S TEST Not Given                    PUNCTURE LOCATION Not Given        ASSESSMENT AND PLAN:      ICD-10-CM    1. Hypoxia R09.02 ARTERIAL BLOOD GAS       Patient's arterial blood gas is stable. No acute concerns at this time.  Assisted patient was scheduling pulmonary function testing in approximately 2 weeks along with having a recheck with his primary care provider Dr. Grider in 4 weeks.  Continue to use oxygen 1.5-2 L throughout the daytime and night.  Gave warning signs and symptoms. Return to clinic or emergency room as needed if symptoms are changing or worsening.    Valorie Lee,  PA-KADEN.................12/1/2017 1:37 PM

## 2018-02-12 NOTE — TELEPHONE ENCOUNTER
Patient Information     Patient Name MRN Sex Jake Marinelli [id number] Male 1965      Telephone Encounter by Roberth Ramon at 2017 10:48 AM     Author:  Roberth Ramon Service:  (none) Author Type:  (none)     Filed:  2017 10:49 AM Encounter Date:  2017 Status:  Signed     :  Roberth Ramon            After birth date was verified, spoke with Rajan and let him know the below information from Kira Grider DO. No further questions or concerns.    Roberth Ramon ....................  2017   10:49 AM

## 2018-02-12 NOTE — TELEPHONE ENCOUNTER
Patient Information     Patient Name MRN Jake Oliveira [id number] Male 1965      Telephone Encounter by Dorothy Michel at 1/3/2018  1:38 PM     Author:  Dorothy Michel Service:  (none) Author Type:  (none)     Filed:  1/3/2018  1:38 PM Encounter Date:  2017 Status:  Signed     :  Dorothy Michel            Prescription faxed to Lev Michel LPN............................... 1/3/2018 1:38 PM

## 2018-02-12 NOTE — TELEPHONE ENCOUNTER
Patient Information     Patient Name MRN Sex Jake Marinelli [id number] Male 1965      Telephone Encounter by Dilma Butler RN at 2017 11:36 AM     Author:  Dilma Butler RN Service:  (none) Author Type:  NURS- Registered Nurse     Filed:  2017 11:48 AM Encounter Date:  2017 Status:  Signed     :  Dilma Butler RN (NURS- Registered Nurse)            Diabetic Supplies  Office visit in the past 12 months.  Last visit with JOSE ELIAS RODNEY was on: 2017 in [facility]  Next visit with JOSE ELIAS RODNEY is on: 2017 in [facility]  Max refill for 12 months from last office visit.  Always add ICD-9 code.  Needs not be listed on Med List to fill.  Need new RX every 12 months or if exceeds the limitations set by Medicare, then every 6 months.  Also when testing frequency is changed is there a need to obtain a new order.  A refill request does not need to be approved by the ordering physician-a beneficiary or their caregiver may request refills.  Physicians are not required to fill out additional forms such as home testing results for suppliers or provide additional documentation unless the supplier is audited and the  is requesting such documentation.    Due for follow exam.  Limited refill per protocol. Upcoming OV scheduled for 17. Dilma Butler RN ........   2017    11:44 AM

## 2018-02-13 NOTE — PATIENT INSTRUCTIONS
Patient Information     Patient Name MRN Sex Jake Marinelli [id number] Male 1965      Patient Instructions by Fina Hale RN at 2018  9:00 AM     Author:  Fina Hale RN  Service:  (none) Author Type:  NURS- Registered Nurse     Filed:  2018  1:16 PM  Encounter Date:  2018 Status:  Addendum     :  Fina Hale RN (NURS- Registered Nurse)        Related Notes: Original Note by Fina Hale RN (NURS- Registered Nurse) filed at 2018  1:07 PM            Diabetes Goals and Reminders  Your A1c test should be done every 3 months.  Your goal is less than 7%.   Your last result is:  HEMOGLOBIN A1C MONITORING (POCT)    Date Value   2018 10.9 % (H)   2014 9.9 % Total Hgb (H)       Your LDL cholesterol test should be done at least once a year.    Your last result is:  LDL CHOLESTEROL (mg/dL)    Date Value   2015 54       Have Blood pressure and weight checked every three months.  Your blood pressure goal is 140/90 or less.  Your last blood pressure reading was: 144/72    Test your blood sugar 4 times per day.  Your home blood glucose target ranges are:  Before meals:   2 hours after a meal: Less than 180  Bedtime 100 - 140 mg/dl.       Avoid all tobacco.  Follow your healthy diet and exercise plan.  See the eye doctor every year.  See the dentist every six months.  Have kidney function tested yearly.    Take all medicine as prescribed.  Please let me know if you are having symptoms you don t expect or if you wish to stop any medicine.    Follow Up Plan  Please call or visit Diabetes Education if blood sugars are consistently out of target.  Your future lab plan is:  HgA1c in 2018.    If you need your cholesterol checked at your next appointment, you should fast 8 to 10 hours before your appointment.  Do not eat or drink anything besides water.  Drink plenty of water and take all your usual medicine.    SUMMARY FOR TODAY'S VISIT    1.   Discussed D5 Health Goals.  You have met 3 of 5 goals at this time.  (BP less than 140/90, Statin therapy as recommended, A1c less than 8%, tobacco free, ASA therapy as recommended).    Achieving the five treatment goals that make up the D5 not only reduces your risk for serious cardiovascular problems like heart attack and stroke, it puts you on a path to better health!  See www.theD5.org for more information.     2.  14-day average  mg/dl with fasting , pre-Lunch 305, pre-Supper 253, and bedtime 345 mg/dl.     3.  Discussed options for tighter BG control such as switching to concentrated insulin, U200 Humalog for carbohydrate plus correction of high BG AND U300 Toujeo for background basal insulin need.    4.  Insulin recommendations for tighter BG control:     Novolog Sensitivity Factor adjustment from 1:8 to 1:7 mg/dl.   Basaglar adjustment from 142 to 152 units every evening at bedtime.    5.  Follow up in one month for continued diabetes self-management education.        Fina Hale RN, BSN, CDE  1/29/2018 12:52 PM

## 2018-02-13 NOTE — TELEPHONE ENCOUNTER
Patient Information     Patient Name MRN Sex Jake Marinelli [id number] Male 1965      Telephone Encounter by Dilma Butler RN at 2018 10:02 AM     Author:  Dilma Butler RN  Service:  (none) Author Type:  NURS- Registered Nurse     Filed:  2018 11:40 AM  Encounter Date:  2018 Status:  Addendum     :  Dilma Butler RN (NURS- Registered Nurse)        Related Notes: Original Note by Dilma Butler RN (NURS- Registered Nurse) filed at 2018 11:02 AM            Diabetic Supplies  Office visit in the past 12 months.  Last visit with JOSE ELIAS RODNEY was on: 2018 in [facility]  Next visit with JOSE ELIAS RODNEY is on: No future appointment listed with this provider  Next visit with Family Practice is on: No future appointment listed in this department  Max refill for 12 months from last office visit.  Always add ICD-9 code.  Needs not be listed on Med List to fill.  Need new RX every 12 months or if exceeds the limitations set by Medicare, then every 6 months.  Also when testing frequency is changed is there a need to obtain a new order.  A refill request does not need to be approved by the ordering physician-a beneficiary or their caregiver may request refills.  Physicians are not required to fill out additional forms such as home testing results for suppliers or provide additional documentation unless the supplier is audited and the  is requesting such documentation.    Prescription refilled per RN Medication Refill Policy.................... Dilma Butler RN ....................  2018   11:40 AM

## 2018-02-13 NOTE — NURSING NOTE
Patient Information     Patient Name MRN Sex Jake Marinelli [id number] Male 1965      Nursing Note by Dorothy Michel at 2018 10:15 AM     Author:  Dorothy Michel Service:  (none) Author Type:  (none)     Filed:  2018 10:30 AM Encounter Date:  2018 Status:  Signed     :  Dorothy Michel            Previous A1C is not at goal of <8  HEMOGLOBIN A1C MONITORING (POCT)    Date Value   2017 9.3 % (H)   2014 9.9 % Total Hgb (H)     Urine microalbumin:creatine: 31.4  Foot exam unknown  Eye exam unknown    Tobacco User no  Patient is on a daily aspirin  Patient is on a Statin.  Blood pressure today of 144/72 is not at the goal of <139/89 for diabetics.    Dorothy Michel LPN..............2018 10:29 AM

## 2018-02-13 NOTE — TELEPHONE ENCOUNTER
Patient Information     Patient Name MRN Sex Jake Marinelli [id number] Male 1965      Telephone Encounter by Dilma Butler RN at 2018  7:48 AM     Author:  Dilma Butler RN Service:  (none) Author Type:  NURS- Registered Nurse     Filed:  2018  7:56 AM Encounter Date:  2018 Status:  Signed     :  Dilma Butler RN (NURS- Registered Nurse)            Per pharmacy fax- Handihaler not covered. Insurance want Spiriva Respimat inhaler. Please send new Rx.     Please note did not polo up as requested-unsure of mcg/actuation   Dosage.    Prescribing Provider: Jose Elias Rodney DO                 Order Date: 2018  Ordered by: JOSE ELIAS RODNEY  Medication:tiotropium (SPIRIVA) 18 mcg inhalation capsule    Qty:90 capsule  Ref:4  Start:2018   End:              Route:Inhalation          Class:eRx    Sig:Inhale 18 mcg by mouth once daily.    Pharmacy:[facility] - [address]

## 2018-02-13 NOTE — TELEPHONE ENCOUNTER
Patient Information     Patient Name MRN Jake Oliveira [id number] Male 1965      Telephone Encounter by Charlotte Gutierrez at 2018  1:40 PM     Author:  Charlotte Gutierrez Service:  (none) Author Type:  (none)     Filed:  2018  1:47 PM Encounter Date:  2018 Status:  Signed     :  Charlotte Gutierrez            This patient called wanting a refill on his Lyrica.  The Lyrica doesn't have a prescribing provider but Valorie Lee is listed as the encounter provider from DOS 16. There is no DX, pharmacy, dispense amount, or directions.  If you still want this patient on this medication can you please prescribe it?  Please let me know.  Charlotte Gutierrez ....................  2018   1:45 PM

## 2018-02-13 NOTE — PROGRESS NOTES
"Patient Information     Patient Name MRN Sex Jake Marinelli [id number] Male 1965      Progress Notes by Fina Hale RN at 2018  9:00 AM     Author:  Fina Hale RN Service:  (none) Author Type:  NURS- Registered Nurse     Filed:  2018  2:19 PM Encounter Date:  2018 Status:  Signed     :  Fina Hale RN (NURS- Registered Nurse)            Diabetes Education Progress Note - Individual Education    Referring Medical Provider:  Dr. Grider    SUBJECTIVE: Jake Bermudez is a 52 y.o. male who has type 2 diabetes and is here for Individual Diabetes Education.   Age at diagnosis \"about age 45.\" Family history positive for diabetes in the patient's Mother, Sister(s) and Maternal Grandmother.   Current treatment includes diet and insulin injections.   Current monitoring regimen: home blood tests - multiple times daily    Individual Assessed Needs Include:  Diabetes Disease Process, Overview of DM, Incorporating Nutrition Management into Lifestyle, Incorporating Physical Activity into Lifestyle, Using Diabetes Medications Safely, Glucose Monitoring and Interpreting and Using Results, Prevention, detection, and treatment of acute complications, Prevention, detection, and treatment of Chronic Complications, Developing Strategies to address Psychosocial Issues and Developing Strategies to promote Health/Change Behavior      Education included: Diabetes Disease Process, Nutrition/Carbohydrate counting, Physical Activity, Diabetes Medications, Glucose Monitoring, Target BGs, Chronic Complications, Optimal Diabetes Care, Overview of DM, Psychosocial Strategies and Health/Change Behavior Strategies    Discussed D5 Health Goals and patient has met 3 of 5 goals at this time.  (BP less than 140/90, ASA therapy as recommended, statin therapy as recommended, A1c less than 8%, tobacco free).    Log book was not brought today. Meter was downloaded today. Patient is checking his BG four times " "a day.  (BG target:  FBG/Pre-meal 80 - 130 and 2-hr PP less than 180, Bedtime 100 - 140 mg/dl.)    BG results:  Patient shows 9% BG in target, 91% above target, 0% below target and 0% hypo.     Average BG:  Fasting 247, pre-Lunch 305, pre-Supper 253, and bedtime 345 mg/dl.     Current insulin doses:    Basaglar 142 units qHS  Novolog 65 - 80 units TID AC plus bedtime Novolog 26 - 34  units qHS.  Total daily dose of insulin between 363 units to 416 units.    Recommend:  Novolog Insulin increase, adjustment in Sensitivity Factor from 1:8 to 1:7 mg/dl.  Basaglar insulin increase, adjustment from 142 to 152 units qHS.    Due to high doses of insulin, recommend switching to concentrated insulin:    U200 Humalog and U300 Toujeo.         Carbohydrate counting overview and practice. Introduced patient to physical activity and goal setting. Patient's current physical activity habits decreased at this time due to \"the cold weather and my exercise bike is broken.\"       Current CHO daily, 53 grams for breakfast and lunch and 64 grams with evening meal.    Plan Breakfast Snack Lunch Snack Dinner Snack   CHO grams 60  0 - 15 60 0 - 15 60 0 - 15    Meat  5 - 6 oz/day  Fats  4 - 6 servings/day  Vegetables Unlimited   servings/day          Educational Handouts Given:  Diabetes Success Plan, D5 Health Goals    Patient did verbalize understanding of education as evidenced by stating need to continue testing BG 4 x daily to see if current treatment plan is adequate for DM2 control.     Patient  did demonstrate understanding of education today as evidenced by goal setting.       PLAN:  Recommendations:  Novolog Insulin increase, adjustment in Sensitivity Factor from 1:8 to 1:7 mg/dl.  Basaglar insulin increase, adjustment from 142 to 152 units qHS.    Due to high doses of insulin, recommend switching to concentrated insulin:    U200 Humalog and U300 Toujeo.       Patient will test blood glucose as above or as previously " directed  Patient will follow meal plan, practice carbohydrate counting and label reading.  Patient encouraged to develop physical activity plan or continue with current plan  Patient will follow up with provider as directed by PCP  See patient instructions for complete plan.     Self-Directed Behavior Goal/s set by patient:  (1) Count carbohydrates at most of my meals and snacks.       Time spent for individual education was 60 minutes.    Fina Hale RN, BSN, CDE  1/29/2018 9:13 AM

## 2018-02-13 NOTE — TELEPHONE ENCOUNTER
Patient Information     Patient Name MRN Sex Jake Marinelli [id number] Male 1965      Telephone Encounter by Fina Hale RN at 2018  2:19 PM     Author:  Fina Hale RN Service:  (none) Author Type:  NURS- Registered Nurse     Filed:  2018  3:14 PM Encounter Date:  2018 Status:  Signed     :  Fina Hale RN (NURS- Registered Nurse)            14-d Average  mg/dl:  Fasting 247, pre-Lunch 305, pre-Supper 253, and bedtime 345 mg/dl.   Current insulin doses:    Basaglar 142 units qHS  Novolog 65 - 80 units TID AC plus bedtime Novolog 26 - 34  units qHS.  Total daily dose of insulin between 363 units to 416 units.      Due to high doses of insulin, recommend switching to concentrated insulin:    U200 Humalog and U300 Toujeo.  (Per insurance, U200 Humalog is preferred, but U300 Toujeo would require formulary exception.  I will help with paperwork for possible coverage.)    Recommendations:  U200 Humalog Insulin adjustment in Sensitivity Factor from 1:8 to 1:7 mg/dl.  U300 Toujeo basal insulin adjustment from 142 to 152 units qHS.    If accepted as written, please sign pending orders and I will update med list and contact patient.    Thank you,    Fina Hale RN, BSN, CDE  2018 2:24 PM

## 2018-02-13 NOTE — NURSING NOTE
Patient Information     Patient Name MRN Jake Oliveira [id number] Male 1965      Nursing Note by Dorothy Michel at 2018 10:15 AM     Author:  Dorothy Michel Service:  (none) Author Type:  (none)     Filed:  2018 10:26 AM Encounter Date:  2018 Status:  Signed     :  Dorothy Michel            Patient states he is still wearing his oxygen at night.   Dorothy Michel LPN............................... 2018 10:15 AM

## 2018-02-13 NOTE — PROGRESS NOTES
Patient Information     Patient Name MRN Sex Jake Marinelli [id number] Male 1965      Progress Notes by Kira Grider DO at 2018 10:15 AM     Author:  Kira Grider DO Service:  (none) Author Type:  PHYS- Osteopathic     Filed:  2018 10:53 AM Encounter Date:  2018 Status:  Signed     :  Kira Grider DO (PHYS- Osteopathic)            SUBJECTIVE:  Jake Bermudez is a 52 y.o. male who presents for follow up to O2 use.    HPI   Only using O2 at night or if napping now.  Feeling better; less SOB.  Napping a lot, continues to have increased fatigue - even before O2 use.  Does increase sleep in the winter normally due to chronic depression and PTSD, etc.  But wife concerned that this may be even more than normal.    Had PFTs completed 2017 showing moderately severe obstructive airway disease.  No response to bronchodilator.  Increased diffusion capacity.    Allergies      Allergen   Reactions     Penicillins  Diaphoresis     Vancomycin  Diaphoresis, Fever and Other - Describe In Comment Field     Fever while on zosyn, vanc, no rash or WBC elevation      Zosyn [Piperacillin-Tazobactam]  Diaphoresis, Fever and Other - Describe In Comment Field     Fever while on zosyn, vanc. No rash, no wbc elevation    ,   Current Outpatient Prescriptions on File Prior to Visit       Medication  Sig Dispense Refill     ACCU-CHEK DONAVON PLUS TEST STRP strip CHECK GLUCOSE FOUR TIMES DAILY 400 Strip 1     ACCU-CHEK MULTICLIX LANCET USE STRIP TO CHECK BLOOD GLUCOSE 4 TIMES DAILY. 360 Each 3     acetaminophen (ACETAMINOPHEN EXTRA STRENGTH) 500 mg tablet TAKE ONE TABLET BY MOUTH EVERY 6 HOURS AS NEEDED .  MAX  ACETAMINOPHEN  DOSE  4000MG  IN  24HRS. 270 tablet 3     albuterol HFA 90 mcg/actuation inhaler Inhale 2 Puffs by mouth every 4 hours if needed (coughing). 1 Inhaler 5     aspirin 81 mg tablet Take 81 mg by mouth once daily with a meal.       BASAGLAR KWIKPEN 100 unit/mL (3 mL) pen  "Inject 142 Units subcutaneous before bedtime. Product desired:BASAGLAR,  IDDM type 2, E11.65 165 mL 3     beclomethasone, 80 mcg each actuation, (QVAR) 80 mcg/actuation inhaler Inhale 1 Puff by mouth 2 times daily. 1 Inhaler 5     Cholecalciferol, Vitamin D3, 2,000 unit tablet TAKE ONE TABLET BY MOUTH ONCE DAILY 100 tablet 1     clonazePAM (KLONOPIN) 0.5 mg tablet TAKE ONE TABLET BY MOUTH AT BEDTIME 30 tablet 5     DULoxetine (CYMBALTA) 60 mg Delayed-release capsule Take 1 capsule by mouth once daily. 90 capsule 4     FLUoxetine (PROZAC) 20 mg capsule Take 1 capsule by mouth every morning. 90 capsule 1     gabapentin (NEURONTIN) 300 mg capsule Take 3 capsules by mouth 3 times daily. 900 mg TID, 1200 mg at bedtime  0     hydroCHLOROthiazide (HCTZ) 25 mg tablet TAKE ONE TABLET BY MOUTH ONCE DAILY 90 tablet 2     insulin aspart (NOVOLOG FLEXPEN) 100 unit/mL solution for injection INJECT per ICR 7.5units/8grams carb and ISF 1:8.  MAX DAILY DOSE 245 UNITS. 30 pen 7     lisinopril (PRINIVIL; ZESTRIL) 10 mg tablet TAKE ONE TABLET BY MOUTH ONCE DAILY 90 tablet 1     LYRICA 100 mg capsule        Melatonin 5 mg tab Take 1-2 tablets by mouth at bedtime. 60 tablet 5     meloxicam 15 mg tablet TAKE ONE TABLET BY MOUTH ONCE DAILY WITH FOOD 90 tablet 2     methocarbamol (ROBAXIN) 750 mg tablet Take 1 tablet by mouth 3 times daily. 90 tablet 5     ANN PEN NEEDLE 32 gauge x 5/32\" USE AS DIRECTED FOR  ADMINISTERING  INSULIN  AT  HOME  4  TIMES  DAILY 400 Each 2     omeprazole (PRILOSEC OTC) 20 mg tablet TAKE ONE TABLET BY MOUTH EVERY DAY BEFORE  A  MEAL 90 tablet 0     Oxygen-Air Delivery Systems (HOME OXYGEN) Oxygen for home use. LPM: 1/min via nasal cannula. Frequency of use: Continuous with portability; Length of need: 12 mos. 1 Device 0     simvastatin (ZOCOR) 20 mg tablet TAKE ONE TABLET BY MOUTH AT BEDTIME 90 tablet 1     traMADol (ULTRAM) 50 mg tablet TAKE ONE TABLET BY MOUTH THREE TIMES DAILY AS NEEDED 90 tablet 5     No " current facility-administered medications on file prior to visit.     and   Past Medical History:     Diagnosis  Date     Diabetic, retinopathy, proliferative (HC) 7/1/2014    Mild proliferative changes on left side--sees Dr Ortiz exam 6/2014      Lateral epicondylitis of left elbow 10/3/2013    Seen by Dr Tayla Reyes [facility] 8/2013        REVIEW OF SYSTEMS:  Review of Systems   All other systems reviewed and are negative.      OBJECTIVE:  /72 (Cuff Site: Right Arm, Position: Sitting, Cuff Size: Adult Large)  Pulse 92  Wt 132.9 kg (293 lb)  SpO2 90%  BMI 40.87 kg/m2    EXAM:   Physical Exam   Constitutional: He is well-developed, well-nourished, and in no distress.   HENT:   Head: Normocephalic and atraumatic.   Right Ear: External ear normal.   Left Ear: External ear normal.   Cardiovascular: Normal rate and normal heart sounds.    Pulmonary/Chest: Effort normal and breath sounds normal.   Psychiatric: Mood and affect normal.   Nursing note and vitals reviewed.    Results for orders placed or performed in visit on 01/17/18      Hgb A1c      Result  Value Ref Range    HEMOGLOBIN A1C MONITORING (POCT) 10.9 (H) 4.0 - 6.2 %    ESTIMATED AVERAGE GLUCOSE  266 mg/dL   BASIC METABOLIC PANEL      Result  Value Ref Range    SODIUM 134 133 - 143 mmol/L    POTASSIUM 4.7 3.5 - 5.1 mmol/L    CHLORIDE 92 (L) 98 - 107 mmol/L    CO2,TOTAL 31 21 - 31 mmol/L    ANION GAP 11 5 - 18                    GLUCOSE 328 (H) 70 - 105 mg/dL    CALCIUM 9.0 8.6 - 10.3 mg/dL    BUN 23 7 - 25 mg/dL    CREATININE 1.13 0.70 - 1.30 mg/dL    BUN/CREAT RATIO           20                    GFR if African American >60 >60 ml/min/1.73m2    GFR if not African American >60 >60 ml/min/1.73m2     ASSESSMENT/PLAN:    ICD-10-CM    1. Uncontrolled type 2 diabetes mellitus with diabetic polyneuropathy, with long-term current use of insulin (HC) E11.42 Hgb A1c     Z79.4 BASIC METABOLIC PANEL     E11.65 Hgb A1c      BASIC METABOLIC PANEL      AMB  CONSULT TO DIABETES EDUCATION   2. ARMANDO (obstructive sleep apnea) G47.33 BASIC METABOLIC PANEL      BASIC METABOLIC PANEL   3. Moderate COPD (chronic obstructive pulmonary disease) (HC) J44.9 ECHO COMPLETE WO CONTRAST      umeclidinium-vilanterol (ANORO ELLIPTA) 62.5-25 mcg/actuation inhaler      DISCONTINUED: tiotropium (SPIRIVA) 18 mcg inhalation capsule        Plan:   1. Dm2, uncontrolled.  Due for monitoring labs.    Discussed if significantly worsening, will return to [facility] as he has had difficult to control DM for years.    2. ARMANDO, chronic.  Continues to use O2 at night for symptomatic relief.  Will plan to obtain an Echo to evaluate cardiac function.    3. COPD.  Start Anoro Elipta.  Continue with O2 at times of rest.  Schedule Echo.  Consider addition of LABA if no improvement in symptoms in 1-3 months.

## 2018-02-13 NOTE — TELEPHONE ENCOUNTER
Patient Information     Patient Name MRN Sex Jake Marinelli [id number] Male 1965      Telephone Encounter by Kira Rodney DO at 2018  6:23 PM     Author:  Kira Rodney DO Service:  (none) Author Type:  PHYS- Osteopathic     Filed:  2018  6:24 PM Encounter Date:  2018 Status:  Signed     :  Kira Rodney DO (PHYS- Osteopathic)            Sounds great Fina!  Thank you for your help.  KIRA RODNEY DO

## 2018-02-25 DIAGNOSIS — G89.29 OTHER CHRONIC PAIN: Primary | ICD-10-CM

## 2018-02-28 RX ORDER — DULOXETIN HYDROCHLORIDE 60 MG/1
CAPSULE, DELAYED RELEASE ORAL
Qty: 90 CAPSULE | Refills: 2 | Status: SHIPPED | OUTPATIENT
Start: 2018-02-28 | End: 2018-09-26

## 2018-02-28 NOTE — TELEPHONE ENCOUNTER
Deborah  LOV-01/17/2018  Prescription refilled per RN Medication RefillPolireney.................... Dilma Butler ....................  2/28/2018   2:07 PM

## 2018-03-01 ENCOUNTER — TELEPHONE (OUTPATIENT)
Dept: EDUCATION SERVICES | Facility: OTHER | Age: 53
End: 2018-03-01

## 2018-03-01 ENCOUNTER — ALLIED HEALTH/NURSE VISIT (OUTPATIENT)
Dept: EDUCATION SERVICES | Facility: OTHER | Age: 53
End: 2018-03-01
Attending: FAMILY MEDICINE
Payer: COMMERCIAL

## 2018-03-01 DIAGNOSIS — E11.9 DIABETES MELLITUS, TYPE II (H): Primary | ICD-10-CM

## 2018-03-01 DIAGNOSIS — E11.65 UNCONTROLLED TYPE 2 DIABETES MELLITUS WITH HYPERGLYCEMIA, WITH LONG-TERM CURRENT USE OF INSULIN (H): Primary | ICD-10-CM

## 2018-03-01 DIAGNOSIS — Z79.4 UNCONTROLLED TYPE 2 DIABETES MELLITUS WITH HYPERGLYCEMIA, WITH LONG-TERM CURRENT USE OF INSULIN (H): Primary | ICD-10-CM

## 2018-03-01 PROCEDURE — G0108 DIAB MANAGE TRN  PER INDIV: HCPCS | Performed by: REGISTERED NURSE

## 2018-03-01 PROCEDURE — G0108 DIAB MANAGE TRN  PER INDIV: HCPCS

## 2018-03-01 NOTE — MR AVS SNAPSHOT
After Visit Summary   3/1/2018    Jake Bermudez    MRN: [id number]           Patient Information     Date Of Birth          1965        Visit Information        Provider Department      3/1/2018 10:30 AM  DIABETES EDUCATION [facility]        Today's Diagnoses     Diabetes mellitus, type II (H)    -  1      Care Instructions    Diabetes Goals and Reminders  Your A1c test should be done every 3 months.  Your goal is less than 7%.   Your last result is:  No components found for: HGBA1C    Your LDL cholesterol test should be done at least once a year.    Your last result is:  No components found for: LDLCHOL    Have Blood pressure and weight checked every three months.  Yourblood pressure goal is 140/90 or less.  Your last blood pressure reading was: 144/72    Test your blood sugar 4 times per day.  Your home blood glucose target rangesare:  Before meals:   2 hours after a meal: Less than 180  Bedtime 100 - 140 mg/dl.       Avoid all tobacco.  Follow your healthy diet and exercise plan.  See the eye doctor every year.  See the dentist every sixmonths.  Have kidney function tested yearly.    Take all medicine as prescribed.  Please let me know if you are having symptoms you don t expect or if you wish to stop anymedicine.    Follow Up Plan  Please call or visit Diabetes Education if blood sugars are consistently out of target.  Your future lab plan is:  HgA1c in April 2018.    If you need your cholesterol checked at your nextappointment, you should fast 8 to 10 hours before your appointment.  Do not eat or drink anything besides water.  Drink plenty of water and take all your usual medicine.    SUMMARY FOR TODAY'S VISIT    1.  Discussed BG control, average  mg/dl.      2.  Discussed trying U200 Tresiba for your background basal insulin, instead of Basaglar.    3.  Recommend background basal insulin increase from 152 to 160 units every evening at bedtime.     4.   "Continue trying to consume 45 - 60 grams of carbohydrate at each meal for tighter BG control and possible weight loss.    5.  I will follow up with you after we hear from insurance regarding the Tresiba trial.  Please follow up for continued diabetes education, as needed.      Fina Hale RN, BSN, CDE  3/1/2018 11:42 AM                  Follow-ups after your visit        Your next 10 appointments already scheduled     Mar 07, 2018 10:00 AM CST   Office Visit with Kira Grider,    [facility]    [address]  [place] [address] [phone]           Bring a current list of meds and any records pertaining to this visit. For Physicals, please bring immunization records and any forms needing to be filled out. Please arrive 10 minutes early to complete paperwork.              Who to contact     If you have questions or need follow up information about today's clinic visit or your schedule please contact [facility] [place] [facility] directly at [phone].  Normal or non-critical lab and imaging results will be communicated to you by G[employer], letter or phone within 4 business days after the clinic has received the results. If you do not hear from us within 7 days, please contact the clinic through [employer] or phone. If you have a critical or abnormal lab result, we will notify you by phone as soon as possible.  Submit refill requests through [employer] or call your pharmacy and they will forward the refill request to us. Please allow 3 business days for your refill to be completed.          Additional Information About Your Visit        [employer] Information     [employer] lets you send messages to your doctor, view your test results, renew your prescriptions, schedule appointments and more. To sign up, go to [email or web address] . Click on \"Log in\" on the left side of the screen, which will take you to the Welcome page. Then click on " "\"Sign up Now\" on the right side of the page.     You will be asked to enter the access code listed below, as well as some personal information. Please follow the directions to create your username and password.     Your access code is: [email or web address]  Expires: 2018 11:54 AM     Your access code will  in 90 days. If you need help or a new code, please call your [facility] or [phone].        Care EveryWhere ID     This is your Care EveryWhere ID. This could be used by other organizations to access your [place] medical records  [id number]         Blood Pressure from Last 3 Encounters:   18 144/72   17 144/76   17 150/88    Weight from Last 3 Encounters:   18 132.9 kg (293 lb)   17 133.6 kg (294 lb 9.6 oz)   17 134.3 kg (296 lb)              Today, you had the following     No orders found for display       Primary Care Provider Office Phone # Fax #    Kira LAMBERT Marni,  [phone]       [address] [facility] [address]        Equal Access to Services     FIDENCIO ESPINOZA : Hadii christie el hadasho Soomaali, waaxda luqadaha, qaybta kaalmada adeegyada, patti kee . So [facility] [phone].    ATENCIÓN: Si habla español, tiene a ott disposición servicios gratuitos de asistencia lingüística. Llame al [phone].    We comply with applicable federal civil rights laws and [place] laws. We do not discriminate on the basis of race, color, national origin, age, disability, sex, sexual orientation, or gender identity.            Thank you!     Thank you for choosing [facility]  for your care. Our goal is always to provide you with excellent care. Hearing back from our patients is one way we can continue to improve our services. Please take a few minutes to complete the written survey that you may receive in the mail after your visit with us. Thank you!             Your Updated Medication List - Protect " others around you: Learn how to safely use, store and throw away your medicines at www.disposemymeds.org.          This list is accurate as of 3/1/18 11:54 AM.  Always use your most recent med list.                   Brand Name Dispense Instructions for use Diagnosis    ACCU-CHEK DONAVON PLUS test strip   Generic drug:  blood glucose monitoring      CHECK GLUCOSE FOUR TIMES DAILY        acetaminophen 500 MG tablet    TYLENOL     TAKE ONE TABLET BY MOUTH EVERY 6 HOURS AS NEEDED .  MAX  ACETAMINOPHEN  DOSE  4000MG  IN  24HRS.        albuterol 108 (90 BASE) MCG/ACT Inhaler    PROAIR HFA/PROVENTIL HFA/VENTOLIN HFA     Inhale 2 puffs into the lungs every 4 hours as needed for cough        ASPIR-81 PO      Take 81 mg by mouth daily with food        BASAGLAR 100 UNIT/ML injection           BD ANN U/F 32G X 4 MM   Generic drug:  insulin pen needle      USE AS DIRECTED FOR  ADMINISTERING  INSULIN  AT  HOME  4  TIMES  DAILY        blood glucose monitoring lancets      USE STRIP TO CHECK BLOOD GLUCOSE 4 TIMES DAILY.        clonazePAM 0.5 MG tablet    klonoPIN     Take 0.5 mg by mouth At Bedtime        DULoxetine 60 MG EC capsule    CYMBALTA    90 capsule    TAKE ONE CAPSULE BY MOUTH ONCE DAILY    Other chronic pain       FLUoxetine 20 MG capsule    PROzac     Take 20 mg by mouth every morning        gabapentin 300 MG capsule    NEURONTIN     Take 900 mg by mouth 3 times daily 900 mg TID, 1200 mg at bedtime        hydrochlorothiazide 25 MG tablet    HYDRODIURIL     Take 25 mg by mouth daily        insulin aspart 100 UNIT/ML injection    NovoLOG PEN     INJECT per ICR 7.5units/8grams carb and ISF 1:8.  MAX DAILY DOSE 245 UNITS.        lisinopril 10 MG tablet    PRINIVIL/ZESTRIL     Take 10 mg by mouth daily        LYRICA 100 MG capsule   Generic drug:  pregabalin           melatonin 5 MG tablet      Take 5-10 mg by mouth At Bedtime        meloxicam 15 MG tablet    MOBIC     Take 15 mg by mouth daily with food        methocarbamol  750 MG tablet    ROBAXIN     Take 750 mg by mouth 3 times daily        order for DME      Oxygen for home use. LPM: 1/min via nasal cannula. Frequency of use: Continuous with portability; Length of need: 12 mos.        priLOSEC OTC 20 MG tablet   Generic drug:  omeprazole      TAKE ONE TABLET BY MOUTH EVERY DAY BEFORE  A  MEAL        QVAR 80 MCG/ACT Inhaler   Generic drug:  beclomethasone      Inhale 1 puff into the lungs 2 times daily        simvastatin 20 MG tablet    ZOCOR     Take 20 mg by mouth At Bedtime        traMADol 50 MG tablet    ULTRAM     Take 50 mg by mouth 3 times daily as needed        vitamin D 2000 UNITS tablet      Take 1 tablet by mouth daily

## 2018-03-01 NOTE — PROGRESS NOTES
Follow-up Diabetes Education with BG Assessment    SUBJECTIVE:  Jake Bermudez is an 53 year old malewho presents for evaluation and treatment   of Type 2 diabetes mellitus.   Current treatment includes diet and intensive insulin injection program.   Current monitoring regimen: home blood tests - multiple times daily  Home blood sugar records: Average  mg/dL      No results found for: GLUCOSE  HDL Cholesterol   Date/Time Value Ref Range Status   06/21/2017 10:05 AM 36 23 - 92 mg/dL      LDL Cholesterol Calculated   Date/Time Value Ref Range Status   01/26/2015 10:28 AM 54 <100 mg/dL      No components found for: MICROALBCRU, CXOXYLCG28MU, MICROALBRAND    Social History   Substance Use Topics     Smoking status: Former Smoker     Packs/day: 0.50     Years: 28.00     Types: Cigarettes     Quit date: 12/21/2011     Smokeless tobacco: Former User     Types: Chew     Quit date: 9/5/1988     Alcohol use No       Current Outpatient Rx   Medication Sig Dispense Refill     DULoxetine (CYMBALTA) 60 MG EC capsule TAKE ONE CAPSULE BY MOUTH ONCE DAILY 90 capsule 2     blood glucose monitoring (ACCU-CHEK DONAVON PLUS) test strip CHECK GLUCOSE FOUR TIMES DAILY       blood glucose monitoring (ACCU-CHEK MULTICLIX) lancets USE STRIP TO CHECK BLOOD GLUCOSE 4 TIMES DAILY.       acetaminophen (TYLENOL) 500 MG tablet TAKE ONE TABLET BY MOUTH EVERY 6 HOURS AS NEEDED .  MAX  ACETAMINOPHEN  DOSE  4000MG  IN  24HRS.       albuterol (PROAIR HFA/PROVENTIL HFA/VENTOLIN HFA) 108 (90 BASE) MCG/ACT Inhaler Inhale 2 puffs into the lungs every 4 hours as needed for cough       Aspirin (ASPIR-81 PO) Take 81 mg by mouth daily with food       BASAGLAR 100 UNIT/ML injection        beclomethasone (QVAR) 80 MCG/ACT Inhaler Inhale 1 puff into the lungs 2 times daily       Cholecalciferol (VITAMIN D) 2000 UNITS tablet Take 1 tablet by mouth daily       clonazePAM (KLONOPIN) 0.5 MG tablet Take 0.5 mg by mouth At Bedtime       FLUoxetine (PROZAC) 20  MG capsule Take 20 mg by mouth every morning       gabapentin (NEURONTIN) 300 MG capsule Take 900 mg by mouth 3 times daily 900 mg TID, 1200 mg at bedtime       hydrochlorothiazide (HYDRODIURIL) 25 MG tablet Take 25 mg by mouth daily       insulin aspart (NOVOLOG PEN) 100 UNIT/ML injection INJECT per ICR 7.5units/8grams carb and ISF 1:8.  MAX DAILY DOSE 245 UNITS.       lisinopril (PRINIVIL/ZESTRIL) 10 MG tablet Take 10 mg by mouth daily       pregabalin (LYRICA) 100 MG capsule        melatonin 5 MG tablet Take 5-10 mg by mouth At Bedtime       meloxicam (MOBIC) 15 MG tablet Take 15 mg by mouth daily with food       methocarbamol (ROBAXIN) 750 MG tablet Take 750 mg by mouth 3 times daily       insulin pen needle (BD ANN U/F) 32G X 4 MM USE AS DIRECTED FOR  ADMINISTERING  INSULIN  AT  HOME  4  TIMES  DAILY       omeprazole (PRILOSEC OTC) 20 MG tablet TAKE ONE TABLET BY MOUTH EVERY DAY BEFORE  A  MEAL       order for DME Oxygen for home use. LPM: 1/min via nasal cannula. Frequency of use: Continuous with portability; Length of need: 12 mos.       simvastatin (ZOCOR) 20 MG tablet Take 20 mg by mouth At Bedtime       traMADol (ULTRAM) 50 MG tablet Take 50 mg by mouth 3 times daily as needed          Allergies:Penicillins; Piperacillin-tazobactam in d5w; and Vancomycin     OBJECTIVE:  There were no vitals taken for this visit.      ASSESSMENT:  Hyperglycemia continues and patient was unable to use the U300 Toujeo.  Currently taking U200 Humalog 223 units daily average and U100 Basaglar 152 units qHS.  Current basal/bolus percentage split 40/60.  Recommend moving closer to 50/50 percentage split for TDD insulin for less highs and less low BG.    BG results:  Patient shows 9% BG in target, 91% above target, 0% below target and 0% hypo.     Average BG:  Fasting 198, pre-Lunch 258, pre-Supper 259, and bedtime 334 mg/dl.     Recommended trying U200 Tresiba 160 units qHS, but insurance will not cover this.  Spoke with PCP and  patient to continue Basaglar and adjust, as needed.      Recommend Basaglar increase from 152 to 162 units qHS.    Educational Handouts Given:  Diabetes Success Plan    Patient did verbalize understanding of education as evidenced by stating need to continue testing BG 4x daily to see if current treatment plan is adequate for DM2 control.     Patient did demonstrate understanding of education today as evidenced by goal setting.      PLAN:        Medication recommendations:dosage change: Basaglar increase from 152 to 162 units qHS.  Patient will test blood glucose as above or as previously directed  Patient willfollow meal plan, practice carbohydrate counting and label reading.  Patient encouraged to develop physical activity plan or continue with current plan  Patient will follow up with provider as directed by PCP    Self-Directed Behavior Goal/s set by patient:  (1) Count carbohydrates at most of my meals and snacks.       Diabetes recommendations: low cholesterol diet, weight control and daily exercise discussed, use and side effects of insulin is taught, glycohemoglobin monitoring discussed and long term diabetic complications discussed    Time spent withpatient was 75 minutes.     Fina Hale RN, BSN, CDE  3/1/2018 10:51 AM

## 2018-03-01 NOTE — PATIENT INSTRUCTIONS
Diabetes Goals and Reminders  Your A1c test should be done every 3 months.  Your goal is less than 7%.   Your last result is:  No components found for: HGBA1C    Your LDL cholesterol test should be done at least once a year.    Your last result is:  No components found for: LDLCHOL    Have Blood pressure and weight checked every three months.  Yourblood pressure goal is 140/90 or less.  Your last blood pressure reading was: 144/72    Test your blood sugar 4 times per day.  Your home blood glucose target rangesare:  Before meals:   2 hours after a meal: Less than 180  Bedtime 100 - 140 mg/dl.       Avoid all tobacco.  Follow your healthy diet and exercise plan.  See the eye doctor every year.  See the dentist every sixmonths.  Have kidney function tested yearly.    Take all medicine as prescribed.  Please let me know if you are having symptoms you don t expect or if you wish to stop anymedicine.    Follow Up Plan  Please call or visit Diabetes Education if blood sugars are consistently out of target.  Your future lab plan is:  HgA1c in April 2018.    If you need your cholesterol checked at your nextappointment, you should fast 8 to 10 hours before your appointment.  Do not eat or drink anything besides water.  Drink plenty of water and take all your usual medicine.    SUMMARY FOR TODAY'S VISIT    1.  Discussed BG control, average  mg/dl.      2.  Discussed trying U200 Tresiba for your background basal insulin, instead of Basaglar.    3.  Recommend background basal insulin increase from 152 to 162 units every evening at bedtime.     4.  Continue trying to consume 45 - 60 grams of carbohydrate at each meal for tighter BG control and possible weight loss.    5.  I will follow up with you after we hear from insurance regarding the Tresiba trial.  Please follow up for continued diabetes education, as needed.    Spoke with Dr. Grider, insurance will not cover the Tresiba.  Will continue with Basaglar and adjust  as needed.      Fina Hale RN, BSN, CDE  3/1/2018 11:42 AM

## 2018-03-02 RX ORDER — INSULIN GLARGINE 100 [IU]/ML
162 INJECTION, SOLUTION SUBCUTANEOUS AT BEDTIME
Qty: 150 ML | Refills: 3 | Status: SHIPPED | OUTPATIENT
Start: 2018-03-02 | End: 2018-05-09

## 2018-03-02 NOTE — TELEPHONE ENCOUNTER
Hyperglycemia continues.  Currently taking U200 Humalog 223 units daily average and U100 Basaglar 152 units qHS.  Current basal/bolus percentage split 40/60.  Recommend moving closer to 50/50 percentage split for tighter BG control.    BG results:  14d average Fasting 198, pre-Lunch 258, pre-Supper 259, and bedtime 334 mg/dl.     Recommend Basaglar increase from 152 to 162 units qHS.    If accepted as written, please sign pending order.    Thank you,    Fina Hale RN, BSN, CDE  3/1/2018 6:34 PM

## 2018-03-05 NOTE — TELEPHONE ENCOUNTER
Patient Information     Patient Name MRN Jake Oliveira [id number] Male 1965      Telephone Encounter by Yuly Jean at 2018  9:16 AM     Author:  Yuly Jean Service:  (none) Author Type:  NURS- Student Practical Nurse     Filed:  2018  9:16 AM Encounter Date:  2018 Status:  Signed     :  Yuly Jean (NURS- Student Practical Nurse)            Left message to call back.  Yuly Jean LPN ....................  2018   9:16 AM

## 2018-03-05 NOTE — TELEPHONE ENCOUNTER
Patient Information     Patient Name MRN Jake Oliveira [id number] Male 1965      Telephone Encounter by Keesha Duke at 2018  9:03 AM     Author:  Keesha Duke Service:  (none) Author Type:  (none)     Filed:  2018  9:03 AM Encounter Date:  2018 Status:  Signed     :  Keesha Duke            Left message to call back.  Keesha Duke LPN ....................  2018   9:03 AM

## 2018-03-05 NOTE — TELEPHONE ENCOUNTER
Patient Information     Patient Name MRN Sex Jake Marinelli [id number] Male 1965      Telephone Encounter by Kira Rodney DO at 2018  5:31 PM     Author:  Kira Rodney DO Service:  (none) Author Type:  PHYS- Osteopathic     Filed:  2018  5:32 PM Encounter Date:  2018 Status:  Signed     :  Kira Rodney DO (PHYS- Osteopathic)            Rx is prescribed by Neurologist (sees for severe peripheral neuropathy).  Request from that provider or otherwise clarify information from pharmacy (dosing, frequency, etc) if I need to still prescribe.    KIRA RODNEY DO

## 2018-03-05 NOTE — TELEPHONE ENCOUNTER
Patient Information     Patient Name MRN Jake Oliveira [id number] Male 1965      Telephone Encounter by Keesha Duke at 2018  9:47 AM     Author:  Keesha Duke Service:  (none) Author Type:  (none)     Filed:  2018  9:47 AM Encounter Date:  2018 Status:  Signed     :  Keesha Duke            Neurologist office is taking care of this.  Keesha Duke LPN........................2018  9:47 AM

## 2018-03-07 ENCOUNTER — OFFICE VISIT (OUTPATIENT)
Dept: FAMILY MEDICINE | Facility: OTHER | Age: 53
End: 2018-03-07
Attending: FAMILY MEDICINE
Payer: COMMERCIAL

## 2018-03-07 VITALS
HEART RATE: 92 BPM | BODY MASS INDEX: 41.62 KG/M2 | SYSTOLIC BLOOD PRESSURE: 128 MMHG | WEIGHT: 298.4 LBS | OXYGEN SATURATION: 89 % | DIASTOLIC BLOOD PRESSURE: 80 MMHG

## 2018-03-07 DIAGNOSIS — E11.65 UNCONTROLLED TYPE 2 DIABETES MELLITUS WITH HYPERGLYCEMIA, WITH LONG-TERM CURRENT USE OF INSULIN (H): ICD-10-CM

## 2018-03-07 DIAGNOSIS — J98.4 RESTRICTIVE LUNG DISEASE: ICD-10-CM

## 2018-03-07 DIAGNOSIS — G47.34 NOCTURNAL HYPOXIA: ICD-10-CM

## 2018-03-07 DIAGNOSIS — Z79.4 UNCONTROLLED TYPE 2 DIABETES MELLITUS WITH HYPERGLYCEMIA, WITH LONG-TERM CURRENT USE OF INSULIN (H): ICD-10-CM

## 2018-03-07 PROCEDURE — G0463 HOSPITAL OUTPT CLINIC VISIT: HCPCS

## 2018-03-07 PROCEDURE — 99213 OFFICE O/P EST LOW 20 MIN: CPT | Performed by: FAMILY MEDICINE

## 2018-03-07 RX ORDER — NICOTINE POLACRILEX 4 MG/1
20 GUM, CHEWING ORAL DAILY
COMMUNITY
Start: 2018-02-17 | End: 2018-03-19

## 2018-03-07 RX ORDER — INSULIN LISPRO 200 [IU]/ML
INJECTION, SOLUTION SUBCUTANEOUS
COMMUNITY
Start: 2018-03-05 | End: 2018-09-26

## 2018-03-07 RX ORDER — PREGABALIN 100 MG/1
200 CAPSULE ORAL 3 TIMES DAILY
Status: ON HOLD | COMMUNITY
Start: 2017-10-19 | End: 2018-11-27

## 2018-03-07 NOTE — MR AVS SNAPSHOT
After Visit Summary   3/7/2018    Jake Bermudez    MRN: [id number]           Patient Information     Date Of Birth          1965        Visit Information        Provider Department      3/7/2018 10:00 AM Kira Grider,  [facility]        Today's Diagnoses     Lung nodule < 6cm on CT    -  1    Restrictive lung disease        Nocturnal hypoxia           Follow-ups after your visit        Additional Services     Pulmonary General Adult IP Consult                 Your next 10 appointments already scheduled     Mar 14, 2018  8:30 AM CDT   Ech Complete with [address] ([facility])    [address] [address]   [phone]           1. Please bring or wear a comfortable two-piece outfit. 2. You may eat, drink and take your normal medicines. 3. For any questions that cannot be answered, please contact the ordering physician            Mar 14, 2018 10:00 AM CDT   Diabetic Education with  DIABETES EDUCATION   [facility] ([facility])    [address] [address]   [phone]            Jun 07, 2018 10:00 AM CDT   Office Visit with Kira Grider DO   [facility] ([facility])    [address] [address]   [phone]           Bring a current list of meds and any records pertaining to this visit. For Physicals, please bring immunization records and any forms needing to be filled out. Please arrive 10 minutes early to complete paperwork.              Future tests that were ordered for you today     Open Future Orders        Priority Expected Expires Ordered    Echocardiogram Complete Routine  3/7/2019 3/7/2018            Who to contact     If you have questions or need follow up information about today's clinic visit or your schedule please contact [facility] directly at [phone].  Normal or non-critical lab and imaging results will be communicated to you by [employer], letter or phone within 4 business days after the clinic has received the results. If you do not hear from us within 7 days, please contact the clinic through [employer] or phone. If you have a critical or abnormal lab result, we will notify you by phone as soon as possible.  Submit refill requests through [facility] or call your pharmacy and they will forward the refill request to us. Please allow 3 business days for your refill to be completed.          Additional Information About Your Visit        [facility] Information     [facility] lets you send messages to your doctor, view your test results, renew your prescriptions, schedule appointments and more. To sign up, go to [email or web address] . Click on \"Log in\" on the left side of the screen, which will take you to the Welcome page. Then click on \"Sign up Now\" on the right side of the page.     You will be asked to enter the access code listed below, as well as some personal information. Please follow the directions to create your username and password.     Your access code is: [email or web address]  Expires: 2018 11:54 AM     Your access code will  in 90 days. If you need help or a new code, please call your [facility] clinic or [phone].        Care EveryWhere ID     This is your Care EveryWhere ID. This could be used by other organizations to access your [facility] medical records  [id number]        Your Vitals Were     Pulse Pulse Oximetry BMI (Body Mass Index)             92 89% 41.62 kg/m2          Blood Pressure from Last 3 Encounters:   18 128/80   18 144/72   17 144/76    Weight from Last 3 Encounters:   18 135.4 kg (298 lb 6.4 oz)   18 132.9 kg (293 lb)   17 133.6 kg (294 lb 9.6 oz)              We Performed the Following     Pulmonary General Adult IP Consult        Primary Care Provider Office " Phone # Fax #    Kira Grider [phone] [address]  [facility] [address]        Equal Access to Services     DARNELL ESPINOZA : Ant el curt Sononiali, wasammida luqadaha, qaybta kaalmada sal, patti khandeysi hollinslyn dill laartisdeysi herrera. So [facility] [phone].    ATENCIÓN: Si habla español, tiene a ott disposición servicios gratuitos de asistencia lingüística. Llame al [phone].    We comply with applicable federal civil rights laws and [place] laws. We do not discriminate on the basis of race, color, national origin, age, disability, sex, sexual orientation, or gender identity.            Thank you!     Thank you for choosing [facility]  for your care. Our goal is always to provide you with excellent care. Hearing back from our patients is one way we can continue to improve our services. Please take a few minutes to complete the written survey that you may receive in the mail after your visit with us. Thank you!             Your Updated Medication List - Protect others around you: Learn how to safely use, store and throw away your medicines at www.disposemymeds.org.          This list is accurate as of 3/7/18 11:07 AM.  Always use your most recent med list.                   Brand Name Dispense Instructions for use Diagnosis    ACCU-CHEK DONAVON PLUS test strip   Generic drug:  blood glucose monitoring      CHECK GLUCOSE FOUR TIMES DAILY        acetaminophen 500 MG tablet    TYLENOL     TAKE ONE TABLET BY MOUTH EVERY 6 HOURS AS NEEDED .  MAX  ACETAMINOPHEN  DOSE  4000MG  IN  24HRS.        albuterol 108 (90 BASE) MCG/ACT Inhaler    PROAIR HFA/PROVENTIL HFA/VENTOLIN HFA     Inhale 2 puffs into the lungs every 4 hours as needed for cough        ASPIR-81 PO      Take 81 mg by mouth daily with food        BASAGLAR 100 UNIT/ML injection     150 mL    Inject 162 Units Subcutaneous At Bedtime    Uncontrolled type 2 diabetes mellitus with hyperglycemia,  with long-term current use of insulin (H)       BD ANN U/F 32G X 4 MM   Generic drug:  insulin pen needle      USE AS DIRECTED FOR  ADMINISTERING  INSULIN  AT  HOME  4  TIMES  DAILY        blood glucose monitoring lancets      USE STRIP TO CHECK BLOOD GLUCOSE 4 TIMES DAILY.        clonazePAM 0.5 MG tablet    klonoPIN     Take 0.5 mg by mouth At Bedtime        DULoxetine 60 MG EC capsule    CYMBALTA    90 capsule    TAKE ONE CAPSULE BY MOUTH ONCE DAILY    Other chronic pain       FLUoxetine 20 MG capsule    PROzac     Take 20 mg by mouth every morning        gabapentin 300 MG capsule    NEURONTIN     Take 900 mg by mouth 3 times daily 900 mg TID, 1200 mg at bedtime        HUMALOG KWIKPEN 200 UNIT/ML soln   Generic drug:  Insulin Lispro           hydrochlorothiazide 25 MG tablet    HYDRODIURIL     Take 25 mg by mouth daily        insulin aspart 100 UNIT/ML injection    NovoLOG PEN     INJECT per ICR 7.5units/8grams carb and ISF 1:8.  MAX DAILY DOSE 245 UNITS.        lisinopril 10 MG tablet    PRINIVIL/ZESTRIL     Take 10 mg by mouth daily        * LYRICA 100 MG capsule   Generic drug:  pregabalin           * pregabalin 100 MG capsule    LYRICA     Take 200 mg by mouth 3 times daily        melatonin 5 MG tablet      Take 5-10 mg by mouth At Bedtime        meloxicam 15 MG tablet    MOBIC     Take 15 mg by mouth daily with food        methocarbamol 750 MG tablet    ROBAXIN     Take 750 mg by mouth 3 times daily        omeprazole 20 MG tablet      Take 20 mg by mouth daily        order for DME      Oxygen for home use. LPM: 1/min via nasal cannula. Frequency of use: Continuous with portability; Length of need: 12 mos.        priLOSEC OTC 20 MG tablet   Generic drug:  omeprazole      TAKE ONE TABLET BY MOUTH EVERY DAY BEFORE  A  MEAL        QVAR 80 MCG/ACT Inhaler   Generic drug:  beclomethasone      Inhale 1 puff into the lungs 2 times daily        simvastatin 20 MG tablet    ZOCOR     Take 20 mg by mouth At Bedtime         traMADol 50 MG tablet    ULTRAM     Take 50 mg by mouth 3 times daily as needed        umeclidinium-vilanterol 62.5-25 MCG/INH oral inhaler    ANORO ELLIPTA     Inhale 1 puff into the lungs        vitamin D 2000 UNITS tablet      Take 1 tablet by mouth daily        * Notice:  This list has 2 medication(s) that are the same as other medications prescribed for you. Read the directions carefully, and ask your doctor or other care provider to review them with you.

## 2018-03-07 NOTE — NURSING NOTE
Patient here for medication follow up.  Dorothy Diaz............................... 3/7/2018 10:07 AM

## 2018-03-07 NOTE — PROGRESS NOTES
"  SUBJECTIVE:   Jake Bermudez is a 53 year old male who presents to clinic today for the following health issues:    HPI  Diabetes Follow-up      Patient is checking blood sugars: intermittently; checking the best in the morning.  Results range from 150 to 300.    Diabetic concerns: blood sugar frequently over 200     Symptoms of hypoglycemia (low blood sugar): shaky, dizzy, weak     Paresthesias (numbness or burning in feet) or sores: Yes; chronically.  On gabapentin and sees Neurology.     Date of last diabetic eye exam: Not scanned; but has been within the last one year.    Is working with Fina and recently has insulin changed.  Up to: 162 units on Basaglar.  However he tells me that he is taking 160u because the \"pen goes up to 80 and what is 2 units going to do\".  Also admits today that he misses his medications intermittently as well.    BP Readings from Last 2 Encounters:   03/07/18 128/80   01/17/18 144/72     LDL Cholesterol Calculated (mg/dL)   Date Value   01/26/2015 54   10/15/2014 97     COPD Follow-Up    Symptoms are currently: stable/unchanged    Current fatigue or dyspnea with ambulation: worsened from baseline.  Uncertain if that is some of his depression/anhedonia and winter as well.    Shortness of breath: stable    Increased or change in Cough/Sputum: No    Fever(s): No    Baseline ambulation without stopping to rest:  100-200 feet. Able to walk up 1-2 to 1 flight of stairs without stopping to rest.    Any ER/UC or hospital admissions since your last visit? No     Continues to use O2 at night.  Has known CPAP, but does not tolerate the mask due to movement, claustrophobia, etc.    PFTs in December - results in [facility]/Care Everywhere.    On Anoro Ellipta (most recent addition), and Qvar - not seeing any significant improvement.    History   Smoking Status     Former Smoker     Packs/day: 0.50     Years: 28.00     Types: Cigarettes     Quit date: 12/21/2011   Smokeless Tobacco     Former " User     Types: Chew     Quit date: 9/5/1988     No results found for: FEV1, OYK8YDI  Problem list and histories reviewed & adjusted, as indicated.  Additional history: none.    Patient Active Problem List   Diagnosis     Personal history of other diseases of circulatory system     Backache     Corns and callosities     CKD (chronic kidney disease) stage 3, GFR 30-59 ml/min     Diabetic polyneuropathy associated with type 2 diabetes mellitus (H)     Uncontrolled type 2 diabetes mellitus with hyperglycemia, with long-term current use of insulin (H)     Diabetic, retinopathy, proliferative (H)     Dyslipidemia     Empyema (H)     Impotence of organic origin     Family history of ischemic heart disease     Family history of diabetes mellitus     Esophageal reflux     Other, multiple and ill-defined closed fractures of lower limb     Lung nodule < 6cm on CT     Lateral epicondylitis of left elbow     Acquired keratoderma     Enthesopathy of ankle and tarsus     Obesity (BMI 30-39.9)     Other symptoms referable to upper arm joint     Neuropathy     PTSD (post-traumatic stress disorder)     Shoulder pain     Personal history of tobacco use, presenting hazards to health     Restrictive lung disease     Nocturnal hypoxia     Past Surgical History:   Procedure Laterality Date     OTHER SURGICAL HISTORY      [id number],SHOULDER SURGERY,Bilateral     OTHER SURGICAL HISTORY      741507,CHEST TUBE INSERTION     OTHER SURGICAL HISTORY      [id number],LUNG SURGERY,pneumonia     OTHER SURGICAL HISTORY      506440,HCHG TRACH PR1,history trach with pneumonia       Social History   Substance Use Topics     Smoking status: Former Smoker     Packs/day: 0.50     Years: 28.00     Types: Cigarettes     Quit date: 12/21/2011     Smokeless tobacco: Former User     Types: Chew     Quit date: 9/5/1988     Alcohol use No     Family History   Problem Relation Age of Onset     HEART DISEASE Other      Heart Disease,Family History Coronary Heart Disease  male     DIABETES Other      Diabetes     CANCER Father      Cancer,stomach ca           ROS:  CONSTITUTIONAL: NEGATIVE for fever, chills, change in weight  ENT/MOUTH: NEGATIVE for ear, mouth and throat problems  RESP: NEGATIVE for significant cough or SOB  CV: NEGATIVE for chest pain, palpitations or peripheral edema    OBJECTIVE:     /80 (BP Location: Right arm, Patient Position: Sitting, Cuff Size: Adult Large)  Pulse 92  Wt 298 lb 6.4 oz (135.4 kg)  SpO2 (!) 89%  BMI 41.62 kg/m2  Body mass index is 41.62 kg/(m^2).  GENERAL: healthy, alert and no distress  NECK: no adenopathy, no asymmetry, masses, or scars and thyroid normal to palpation  RESP: lungs clear to auscultation - no rales, rhonchi or wheezes  CV: regular rate and rhythm, normal S1 S2, no S3 or S4, no murmur, click or rub, no peripheral edema and peripheral pulses strong  MS: no gross musculoskeletal defects noted, no edema    Diagnostic Test Results:  none     ASSESSMENT/PLAN:     1. Lung nodule < 6cm on CT  - Pulmonary General Adult IP Consult    2. Restrictive lung disease  Due to recent PFTs and not seeing significant improvement with medication treatment, will plan to refer to Pulmonology.  Discussed a lot of this could be due to ARMANDO/heart strain; however he is unwilling to discuss CPAP therapy.  An Echo will be scheduled prior to his appointment with Pulmonology.  Recent PFTs will be sent as well.  Continue current inhalers; and opinion on further medical treatment will be appreciated.  - Pulmonary General Adult IP Consult  - Echocardiogram Complete; Future    3. Nocturnal hypoxia  As above.  - Pulmonary General Adult IP Consult    4. Uncontrolled type 2 diabetes mellitus with hyperglycemia, with long-term current use of insulin (H)  Continues to work with ZOË Carl.  Insurance has denied Toujeo due to no dermatitis symptoms - therefore we continue to use Basaglar and Humalog U-200.  Discussed continuing to increase/titrate  his own insulin; however he desires to follow up with Fina.    Follow up in 3-4 months; sooner prn.    Kira Grider, [facility]

## 2018-03-08 ASSESSMENT — PATIENT HEALTH QUESTIONNAIRE - PHQ9: SUM OF ALL RESPONSES TO PHQ QUESTIONS 1-9: 21

## 2018-03-19 DIAGNOSIS — K21.9 GASTROESOPHAGEAL REFLUX DISEASE, ESOPHAGITIS PRESENCE NOT SPECIFIED: Primary | ICD-10-CM

## 2018-03-19 NOTE — TELEPHONE ENCOUNTER
Omeprazole 20 mg  LOV-02/17/2018    Routing refill request to provider for review/approval because: Medication is reported/historical    Unable to complete prescription refill per RNMedication Refill Policy.................... Dilma Butler ....................  3/19/2018   10:45 AM

## 2018-03-20 RX ORDER — NICOTINE POLACRILEX 4 MG/1
GUM, CHEWING ORAL
Qty: 90 TABLET | Refills: 3 | Status: SHIPPED | OUTPATIENT
Start: 2018-03-20 | End: 2019-03-26

## 2018-03-22 DIAGNOSIS — F41.1 GAD (GENERALIZED ANXIETY DISORDER): Primary | ICD-10-CM

## 2018-03-27 RX ORDER — CLONAZEPAM 0.5 MG/1
TABLET ORAL
Qty: 30 TABLET | Refills: 5 | Status: SHIPPED | OUTPATIENT
Start: 2018-03-27 | End: 2018-11-09

## 2018-03-27 NOTE — TELEPHONE ENCOUNTER
Clonazepam  Last Written Prescription Date:  09/06/2017  Last Fill Quantity: 30,   # refills: 5  Last Office Visit: 03/07/2018  Future Office visit:    Next 5 appointments (look out 90 days)     Jun 07, 2018 10:00 AM CDT   Office Visit with Kira Grider,    [facility] ([facility])    [address]  [place] [address]   [phone]                   Routing refill request to provider for review/approval because: Drug not on the [facility], [facility] or [facility] refill protocol or controlled substance    Unable to complete prescription refill per RNMedication Refill Policy.................... Dilma Butler ....................  3/27/2018   9:53 AM

## 2018-04-12 ENCOUNTER — TRANSFERRED RECORDS (OUTPATIENT)
Dept: HEALTH INFORMATION MANAGEMENT | Facility: OTHER | Age: 53
End: 2018-04-12

## 2018-04-18 DIAGNOSIS — E11.65 UNCONTROLLED TYPE 2 DIABETES MELLITUS WITH HYPERGLYCEMIA, WITH LONG-TERM CURRENT USE OF INSULIN (H): Primary | ICD-10-CM

## 2018-04-18 DIAGNOSIS — Z79.4 UNCONTROLLED TYPE 2 DIABETES MELLITUS WITH HYPERGLYCEMIA, WITH LONG-TERM CURRENT USE OF INSULIN (H): Primary | ICD-10-CM

## 2018-04-23 RX ORDER — BLOOD SUGAR DIAGNOSTIC
STRIP MISCELLANEOUS
Qty: 400 STRIP | Refills: 1 | Status: SHIPPED | OUTPATIENT
Start: 2018-04-23 | End: 2018-09-24

## 2018-04-23 NOTE — TELEPHONE ENCOUNTER
Test Strips  LOV-03/07/2018  Prescription refilled per RN Medication RefillPolicy.................... Dilma Butler ....................  4/23/2018   12:23 PM

## 2018-05-09 ENCOUNTER — ALLIED HEALTH/NURSE VISIT (OUTPATIENT)
Dept: EDUCATION SERVICES | Facility: OTHER | Age: 53
End: 2018-05-09
Attending: FAMILY MEDICINE
Payer: COMMERCIAL

## 2018-05-09 ENCOUNTER — TELEPHONE (OUTPATIENT)
Dept: EDUCATION SERVICES | Facility: OTHER | Age: 53
End: 2018-05-09

## 2018-05-09 ENCOUNTER — TELEPHONE (OUTPATIENT)
Dept: FAMILY MEDICINE | Facility: OTHER | Age: 53
End: 2018-05-09

## 2018-05-09 DIAGNOSIS — F32.A DEPRESSION, UNSPECIFIED DEPRESSION TYPE: Primary | ICD-10-CM

## 2018-05-09 DIAGNOSIS — Z79.4 UNCONTROLLED TYPE 2 DIABETES MELLITUS WITH HYPERGLYCEMIA, WITH LONG-TERM CURRENT USE OF INSULIN (H): Primary | ICD-10-CM

## 2018-05-09 DIAGNOSIS — E11.65 UNCONTROLLED TYPE 2 DIABETES MELLITUS WITH HYPERGLYCEMIA, WITH LONG-TERM CURRENT USE OF INSULIN (H): Primary | ICD-10-CM

## 2018-05-09 PROCEDURE — G0108 DIAB MANAGE TRN  PER INDIV: HCPCS

## 2018-05-09 RX ORDER — INSULIN GLARGINE 100 [IU]/ML
177 INJECTION, SOLUTION SUBCUTANEOUS AT BEDTIME
Qty: 180 ML | Refills: 3 | Status: SHIPPED | OUTPATIENT
Start: 2018-05-09 | End: 2018-10-30

## 2018-05-09 RX ORDER — BLOOD GLUCOSE CONTROL HIGH,LOW
EACH MISCELLANEOUS
Qty: 1 BOTTLE | Refills: 3 | Status: SHIPPED | OUTPATIENT
Start: 2018-05-09 | End: 2018-10-26

## 2018-05-09 NOTE — MR AVS SNAPSHOT
After Visit Summary   5/9/2018    Jake Bermudez    MRN: [id number]           Patient Information     Date Of Birth          1965        Visit Information        Provider Department      5/9/2018 9:30 AM  DIABETES EDUCATION [facility]        Today's Diagnoses     Uncontrolled type 2 diabetes mellitus with hyperglycemia, with long-term current use of insulin (H)    -  1      Care Instructions    Diabetes Goals and Reminders  Your A1c test should be done every 3 months.  Your goal is less than 7%.   Your last result is:  No components found for: HGBA1C    Your LDL cholesterol test should be done at least once a year.    Your last result is:  No components found for: LDLCHOL    Have Blood pressure and weight checked every three months.  Yourblood pressure goal is 140/90 or less.  Your last blood pressure reading was: 144/72    Test your blood sugar 4 times per day.  Your home blood glucose target ranges are:  Before meals:   2 hours after a meal: Less than 180  Bedtime 100 - 140 mg/dl.       Avoid all tobacco.  Follow your healthy diet and exercise plan.  See the eye doctor every year.  See the dentist every sixmonths.  Have kidney function tested yearly.    Take all medicine as prescribed.  Please let me know if you are having symptoms you don t expect or if you wish to stop anymedicine.    Follow Up Plan  Please call or visit Diabetes Education if blood sugars are consistently out of target.  Your future lab plan is:  HgA1c at anytime.    If you need your cholesterol checked at your nextappointment, you should fast 8 to 10 hours before your appointment.  Do not eat or drink anything besides water.  Drink plenty of water and take all your usual medicine.    SUMMARY FOR TODAY'S VISIT    1.  Discussed BG control, average  mg/dl.      2.  Recommend Basaglar increase from 175 to 177 units every evening at bedtime.     3.  Continue current U200 Humalog doses, but try  to count carb as accurately as possible at lunch and supper.     4.  Continue trying to consume 45 - 60 grams of carbohydrate at each meal for tighter BG control and possible weight loss.    5.  Continue your physical activity, working up to 30 minutes most days, as tolerated.    6.  I will send control solution to [employer].  Check out your Concepcion for accuracy.    7.  I have added you to my CGM list and will contact you when device is available.    Fina Hale RN, BSN, CDE  5/9/2018 10:37 AM                  Follow-ups after your visit        Your next 10 appointments already scheduled     Jun 07, 2018 10:00 AM CDT   Office Visit with Kira Grider,    [facility]    [address]  [place] [address] [phone]           Bring a current list of meds and any records pertaining to this visit. For Physicals, please bring immunization records and any forms needing to be filled out. Please arrive 10 minutes early to complete paperwork.              Who to contact     If you have questions or need follow up information about today's clinic visit or your schedule please contact [facility] directly at [phone].  Normal or non-critical lab and imaging results will be communicated to you by Spockhart, letter or phone within 4 business days after the clinic has received the results. If you do not hear from us within 7 days, please contact the clinic through Platizat or phone. If you have a critical or abnormal lab result, we will notify you by phone as soon as possible.  Submit refill requests through [employer] or call your pharmacy and they will forward the refill request to us. Please allow 3 business days for your refill to be completed.          Additional Information About Your Visit        AwesomeHighlighter Information     [employer]High[employer]ighter lets you send messages to your doctor, view your test results, renew your prescriptions, schedule  "appointments and more. To sign up, go to [email or web address] . Click on \"Log in\" on the left side of the screen, which will take you to the Welcome page. Then click on \"Sign up Now\" on the right side of the page.     You will be asked to enter the access code listed below, as well as some personal information. Please follow the directions to create your username and password.     Your access code is: [email or web address]  Expires: 2018 12:54 PM     Your access code will  in 90 days. If you need help or a new code, please call your [place] clinic or [phone].        Care EveryWhere ID     This is your Care EveryWhere ID. This could be used by other organizations to access your [place] medical records  [id number]         Blood Pressure from Last 3 Encounters:   18 128/80   18 144/72   17 144/76    Weight from Last 3 Encounters:   18 135.4 kg (298 lb 6.4 oz)   18 132.9 kg (293 lb)   17 133.6 kg (294 lb 9.6 oz)              Today, you had the following     No orders found for display       Primary Care Provider Office Phone # Fax #    Kira LAMBERT DO M[facility] [phone]       [address] [facility] [address]        Equal Access to Services     [facility] CampusRA[facility] AH: Hadii aad ku hadasho Soomaali, waaxda luqadaha, qaybta kaalmada adeegyada, patti kee . So [facility] [phone].    ATENCIÓN: Si habla español, tiene a ott disposición servicios gratuitos de asistencia lingüística. Llame al [phone].    We comply with applicable federal civil rights laws and [place] laws. We do not discriminate on the basis of race, color, national origin, age, disability, sex, sexual orientation, or gender identity.            Thank you!     Thank you for choosing [facility]  for your care. Our goal is always to provide you with excellent care. Hearing back from our patients is one way we can continue to improve our " services. Please take a few minutes to complete the written survey that you may receive in the mail after your visit with us. Thank you!             Your Updated Medication List - Protect others around you: Learn how to safely use, store and throw away your medicines at www.disposemymeds.org.          This list is accurate as of 5/9/18 10:37 AM.  Always use your most recent med list.                   Brand Name Dispense Instructions for use Diagnosis    ACCU-CHEK DONAVON PLUS test strip   Generic drug:  blood glucose monitoring     400 strip    CHECK GLUCOSE FOUR TIMES DAILY    Uncontrolled type 2 diabetes mellitus with hyperglycemia, with long-term current use of insulin (H)       acetaminophen 500 MG tablet    TYLENOL     TAKE ONE TABLET BY MOUTH EVERY 6 HOURS AS NEEDED .  MAX  ACETAMINOPHEN  DOSE  4000MG  IN  24HRS.        albuterol 108 (90 Base) MCG/ACT Inhaler    PROAIR HFA/PROVENTIL HFA/VENTOLIN HFA     Inhale 2 puffs into the lungs every 4 hours as needed for cough        ASPIR-81 PO      Take 81 mg by mouth daily with food        BASAGLAR 100 UNIT/ML injection     150 mL    Inject 162 Units Subcutaneous At Bedtime    Uncontrolled type 2 diabetes mellitus with hyperglycemia, with long-term current use of insulin (H)       BD ANN U/F 32G X 4 MM   Generic drug:  insulin pen needle      USE AS DIRECTED FOR  ADMINISTERING  INSULIN  AT  HOME  4  TIMES  DAILY        blood glucose monitoring lancets      USE STRIP TO CHECK BLOOD GLUCOSE 4 TIMES DAILY.        clonazePAM 0.5 MG tablet    klonoPIN    30 tablet    TAKE ONE TABLET BY MOUTH AT BEDTIME    MIRANDA (generalized anxiety disorder)       DULoxetine 60 MG EC capsule    CYMBALTA    90 capsule    TAKE ONE CAPSULE BY MOUTH ONCE DAILY    Other chronic pain       FLUoxetine 20 MG capsule    PROzac     Take 20 mg by mouth every morning        gabapentin 300 MG capsule    NEURONTIN     Take 900 mg by mouth 3 times daily 900 mg TID, 1200 mg at bedtime        HUMALOG KWIKPEN  200 UNIT/ML soln   Generic drug:  Insulin Lispro           hydrochlorothiazide 25 MG tablet    HYDRODIURIL     Take 25 mg by mouth daily        insulin aspart 100 UNIT/ML injection    NovoLOG PEN     INJECT per ICR 7.5units/8grams carb and ISF 1:8.  MAX DAILY DOSE 245 UNITS.        lisinopril 10 MG tablet    PRINIVIL/ZESTRIL     Take 10 mg by mouth daily        * LYRICA 100 MG capsule   Generic drug:  pregabalin           * pregabalin 100 MG capsule    LYRICA     Take 200 mg by mouth 3 times daily        melatonin 5 MG tablet      Take 5-10 mg by mouth At Bedtime        meloxicam 15 MG tablet    MOBIC     Take 15 mg by mouth daily with food        methocarbamol 750 MG tablet    ROBAXIN     Take 750 mg by mouth 3 times daily        omeprazole 20 MG tablet     90 tablet    Take 20 mg by mouth once daily before a meal    Gastroesophageal reflux disease, esophagitis presence not specified       order for DME      Oxygen for home use. LPM: 1/min via nasal cannula. Frequency of use: Continuous with portability; Length of need: 12 mos.        priLOSEC OTC 20 MG tablet   Generic drug:  omeprazole      TAKE ONE TABLET BY MOUTH EVERY DAY BEFORE  A  MEAL        QVAR 80 MCG/ACT Inhaler   Generic drug:  beclomethasone      Inhale 1 puff into the lungs 2 times daily        simvastatin 20 MG tablet    ZOCOR     Take 20 mg by mouth At Bedtime        traMADol 50 MG tablet    ULTRAM     Take 50 mg by mouth 3 times daily as needed        umeclidinium-vilanterol 62.5-25 MCG/INH oral inhaler    ANORO ELLIPTA     Inhale 1 puff into the lungs        vitamin D 2000 units tablet      Take 1 tablet by mouth daily        * Notice:  This list has 2 medication(s) that are the same as other medications prescribed for you. Read the directions carefully, and ask your doctor or other care provider to review them with you.

## 2018-05-09 NOTE — PROGRESS NOTES
Follow-up Diabetes Education with BG Assessment    SUBJECTIVE:  Jake Bermudez is an 53 year old malewho presents for evaluation and treatment   of Type 2 diabetes mellitus.   Current treatment includes diet, SMBG and insulin injections.   Current monitoring regimen:  home blood tests - multiple times daily  Home blood sugar records: 14d average 232 mg/dl.      No results found for: GLUCOSE  HDL Cholesterol   Date/Time Value Ref Range Status   06/21/2017 10:05 AM 36 23 - 92 mg/dL      LDL Cholesterol Calculated   Date/Time Value Ref Range Status   01/26/2015 10:28 AM 54 <100 mg/dL      No components found for: MICROALBCRU, [id number], MICROALBRAND    Social History   Substance Use Topics     Smoking status: Former Smoker     Packs/day: 0.50     Years: 28.00     Types: Cigarettes     Quit date: 12/21/2011     Smokeless tobacco: Former User     Types: Chew     Quit date: 9/5/1988     Alcohol use No       Current Outpatient Rx   Medication Sig Dispense Refill     ACCU-CHEK DONAVON PLUS test strip CHECK GLUCOSE FOUR TIMES DAILY 400 strip 1     acetaminophen (TYLENOL) 500 MG tablet TAKE ONE TABLET BY MOUTH EVERY 6 HOURS AS NEEDED .  MAX  ACETAMINOPHEN  DOSE  4000MG  IN  24HRS.       albuterol (PROAIR HFA/PROVENTIL HFA/VENTOLIN HFA) 108 (90 BASE) MCG/ACT Inhaler Inhale 2 puffs into the lungs every 4 hours as needed for cough       Aspirin (ASPIR-81 PO) Take 81 mg by mouth daily with food       BASAGLAR 100 UNIT/ML injection Inject 162 Units Subcutaneous At Bedtime 150 mL 3     beclomethasone (QVAR) 80 MCG/ACT Inhaler Inhale 1 puff into the lungs 2 times daily       blood glucose monitoring (ACCU-CHEK MULTICLIX) lancets USE STRIP TO CHECK BLOOD GLUCOSE 4 TIMES DAILY.       Cholecalciferol (VITAMIN D) 2000 UNITS tablet Take 1 tablet by mouth daily       clonazePAM (KLONOPIN) 0.5 MG tablet TAKE ONE TABLET BY MOUTH AT BEDTIME 30 tablet 5     DULoxetine (CYMBALTA) 60 MG EC capsule TAKE ONE CAPSULE BY MOUTH ONCE DAILY 90  capsule 2     FLUoxetine (PROZAC) 20 MG capsule Take 20 mg by mouth every morning       gabapentin (NEURONTIN) 300 MG capsule Take 900 mg by mouth 3 times daily 900 mg TID, 1200 mg at bedtime       HUMALOG KWIKPEN 200 UNIT/ML soln        hydrochlorothiazide (HYDRODIURIL) 25 MG tablet Take 25 mg by mouth daily       insulin aspart (NOVOLOG PEN) 100 UNIT/ML injection INJECT per ICR 7.5units/8grams carb and ISF 1:8.  MAX DAILY DOSE 245 UNITS.       insulin pen needle (BD ANN U/F) 32G X 4 MM USE AS DIRECTED FOR  ADMINISTERING  INSULIN  AT  HOME  4  TIMES  DAILY       lisinopril (PRINIVIL/ZESTRIL) 10 MG tablet Take 10 mg by mouth daily       melatonin 5 MG tablet Take 5-10 mg by mouth At Bedtime       meloxicam (MOBIC) 15 MG tablet Take 15 mg by mouth daily with food       methocarbamol (ROBAXIN) 750 MG tablet Take 750 mg by mouth 3 times daily       omeprazole (PRILOSEC OTC) 20 MG tablet TAKE ONE TABLET BY MOUTH EVERY DAY BEFORE  A  MEAL       omeprazole 20 MG tablet Take 20 mg by mouth once daily before a meal 90 tablet 3     order for DME Oxygen for home use. LPM: 1/min via nasal cannula. Frequency of use: Continuous with portability; Length of need: 12 mos.       pregabalin (LYRICA) 100 MG capsule        pregabalin (LYRICA) 100 MG capsule Take 200 mg by mouth 3 times daily       simvastatin (ZOCOR) 20 MG tablet Take 20 mg by mouth At Bedtime       traMADol (ULTRAM) 50 MG tablet Take 50 mg by mouth 3 times daily as needed       umeclidinium-vilanterol (ANORO ELLIPTA) 62.5-25 MCG/INH oral inhaler Inhale 1 puff into the lungs          Allergies:Penicillins; Piperacillin-tazobactam in d5w; Vancomycin; and Zosyn     OBJECTIVE:  There were no vitals taken for this visit.      ASSESSMENT:  Patient visits with his wife, sharing he has been more active as the weather is warming up.  He has noticed his BG are better over the day.      14-day BG results:  Patient shows 15% BG in target, 83% above target, 2% below target and 0%  hypo.     Average BG:  Fasting 207, pre-Lunch 225, pre-Supper 229, and bedtime 271 mg/dl.     Last few days , 166, today 158 mg/dl, prelunch 185, preSupper 155, bedtime 232 mg/dl.    Current insulin doses:  Basaglar 175 units qHS, Novolog 50 - 55 units TID AC, plus evening snack and/or BG correction 20 - 23 units.  Total Novolog daily average 180 units.    Reviewed CHO counting, patient counting well at breakfast and lunch, but supper is usually 15 grams short per food recall.  Patient would like to continue current Novolog doses and try to CHO count as accurately as possible to help improve BG.    Also recommend slight adjustment in basal insulin from 175 to 177 units qHS.    Patient interested to learn more about CGM.  He would like to wear the Professional Dexcom G4 CGM and he will be contacted when device is available.    Patient shares his BG meter, at times, shows different readings when tested twice during the same time.  The readings are greater than 30 mg/dl apart.  Example FBG today first test 217, retested  mg/dl.      Patient meter is greater than 2 years old and [employer] does not make the Expert bolus calculator meter any longer.  Control solution sent to [facility] pharmacy to test accuracy of meter.       Educational Handouts Given:  Diabetes Success Plan    Patient did verbalize understanding of education as evidenced by stating need to continue testing BG 4 x daily to see if current treatment plan is adequate for DM2 control.     Patient did demonstrate understanding of education today as evidenced by goal setting.       PLAN:        Medication recommendations:dosage change:   Recommend slight adjustment in basal insulin from 175 to 177 units qHS.  Patient will test blood glucose as above or as previously directed  Patient willfollow meal plan, practice carbohydrate counting and label reading.  Patient encouraged to develop physical activity plan or continue with current plan  Patient will  follow up with provider as directed by PCP  See patient instructions for complete plan.     Self-Directed Behavior Goal/s set by patient:  (1) Be physically active 30 minutes or more 4 times a week.       Diabetes recommendations: low cholesterol diet, weight control and daily exercise discussed, use and side effects of insulin is taught, glycohemoglobin monitoring discussed and long term diabetic complications discussed    Time spent with patient was 60 minutes.     Fina Hale RN, BSN, CDE  5/9/2018 9:36 AM

## 2018-05-09 NOTE — PATIENT INSTRUCTIONS
Diabetes Goals and Reminders  Your A1c test should be done every 3 months.  Your goal is less than 7%.   Your last result is:  No components found for: HGBA1C    Your LDL cholesterol test should be done at least once a year.    Your last result is:  No components found for: LDLCHOL    Have Blood pressure and weight checked every three months.  Yourblood pressure goal is 140/90 or less.  Your last blood pressure reading was: 144/72    Test your blood sugar 4 times per day.  Your home blood glucose target ranges are:  Before meals:   2 hours after a meal: Less than 180  Bedtime 100 - 140 mg/dl.       Avoid all tobacco.  Follow your healthy diet and exercise plan.  See the eye doctor every year.  See the dentist every sixmonths.  Have kidney function tested yearly.    Take all medicine as prescribed.  Please let me know if you are having symptoms you don t expect or if you wish to stop anymedicine.    Follow Up Plan  Please call or visit Diabetes Education if blood sugars are consistently out of target.  Your future lab plan is:  HgA1c at anytime.    If you need your cholesterol checked at your nextappointment, you should fast 8 to 10 hours before your appointment.  Do not eat or drink anything besides water.  Drink plenty of water and take all your usual medicine.    SUMMARY FOR TODAY'S VISIT    1.  Discussed BG control, average  mg/dl.      2.  Recommend Basaglar increase from 175 to 177 units every evening at bedtime.     3.  Continue current U200 Humalog doses, but try to count carb as accurately as possible at lunch and supper.     4.  Continue trying to consume 45 - 60 grams of carbohydrate at each meal for tighter BG control and possible weight loss.    5.  Continue your physical activity, working up to 30 minutes most days, as tolerated.    6.  I will send control solution to [employer].  Check out your Con[facility] for accuracy.    7.  I have added you to my CGM list and will contact you when device is  available.    Fina Hale RN, BSN, CDE  5/9/2018 10:37 AM

## 2018-05-09 NOTE — TELEPHONE ENCOUNTER
"Wife Summer called because Rajan lost his current \"problem list\" and he wanted a new one. New list printed and mailed to patient.  Dorothy Diaz............................... 5/9/2018 3:42 PM    "

## 2018-05-09 NOTE — TELEPHONE ENCOUNTER
BG average 232 mg/dl (range 65, 98 - 414) with past 3 days even tighter control with range 127 - 257 mg/dl.      Patient shares he has been more active lately due to better weather.  He has noticed improvement in BG readings.    Current insulin doses:  Basaglar 175 units qHS, Novolog 50 - 55 units TID AC, plus evening snack and/or BG correction 20 - 23 units.  Total Novolog daily average 180 units.    Reviewed CHO counting, patient counting well at breakfast and lunch, but supper is usually 15 grams short per food recall.  Patient would like to continue current Novolog doses and try to CHO count as accurately as possible to help improve BG.    Recommend slight adjustment in basal insulin from 175 to 177 units qHS.    Also discussed use of CGM device for monitoring glucose.  Patient interested and will be trying the Professional CGM as device becomes available.    Patient shares his BG meter, at times, shows different readings when tested twice during the same time.  Meter is greater than 2 years old.  The readings are greater than 30 mg/dl apart.  Example FBG today first test 217, retested  mg/dl.    Recommend patient use control solution and test meter for accuracy.      If accepted as written, please sign pending orders.    Thank you,    Fina Hale RN, BSN, CDE  5/9/2018 2:04 PM

## 2018-05-10 NOTE — TELEPHONE ENCOUNTER
Prozac  LOV-03/07/2018    Future Office visit:    Next 5 appointments (look out 90 days)     Jun 07, 2018 10:00 AM CDT   Office Visit with Kira Grider,    [facility] ([facility])    [address]  [place] [address]   [phone]              Prescription refilled per RN Medication RefillPolireney.................... Dilma Butler ....................  5/10/2018   3:11 PM

## 2018-05-17 ENCOUNTER — TELEPHONE (OUTPATIENT)
Dept: FAMILY MEDICINE | Facility: OTHER | Age: 53
End: 2018-05-17

## 2018-05-18 NOTE — TELEPHONE ENCOUNTER
Left message for Mansi stating I printed another problem list and mailed it today.  Dorothy Diaz............................... 5/18/2018 10:26 AM

## 2018-06-04 ENCOUNTER — TRANSFERRED RECORDS (OUTPATIENT)
Dept: HEALTH INFORMATION MANAGEMENT | Facility: OTHER | Age: 53
End: 2018-06-04

## 2018-06-26 PROBLEM — M47.816 SPONDYLOSIS OF LUMBAR REGION WITHOUT MYELOPATHY OR RADICULOPATHY: Status: ACTIVE | Noted: 2017-10-19

## 2018-06-26 PROBLEM — G47.33 OSA (OBSTRUCTIVE SLEEP APNEA): Status: ACTIVE | Noted: 2018-01-17

## 2018-06-27 ENCOUNTER — OFFICE VISIT (OUTPATIENT)
Dept: FAMILY MEDICINE | Facility: OTHER | Age: 53
End: 2018-06-27
Attending: FAMILY MEDICINE
Payer: COMMERCIAL

## 2018-06-27 VITALS
DIASTOLIC BLOOD PRESSURE: 82 MMHG | BODY MASS INDEX: 41.26 KG/M2 | WEIGHT: 295.8 LBS | SYSTOLIC BLOOD PRESSURE: 156 MMHG | HEART RATE: 84 BPM

## 2018-06-27 DIAGNOSIS — I10 BENIGN ESSENTIAL HYPERTENSION: ICD-10-CM

## 2018-06-27 DIAGNOSIS — E66.01 MORBID OBESITY (H): ICD-10-CM

## 2018-06-27 DIAGNOSIS — M51.369 DDD (DEGENERATIVE DISC DISEASE), LUMBAR: ICD-10-CM

## 2018-06-27 DIAGNOSIS — N18.30 CKD (CHRONIC KIDNEY DISEASE) STAGE 3, GFR 30-59 ML/MIN (H): ICD-10-CM

## 2018-06-27 DIAGNOSIS — E78.5 DYSLIPIDEMIA: ICD-10-CM

## 2018-06-27 DIAGNOSIS — R82.90 ABNORMAL URINALYSIS: ICD-10-CM

## 2018-06-27 DIAGNOSIS — G47.33 OSA (OBSTRUCTIVE SLEEP APNEA): ICD-10-CM

## 2018-06-27 DIAGNOSIS — Z79.4 UNCONTROLLED TYPE 2 DIABETES MELLITUS WITH HYPERGLYCEMIA, WITH LONG-TERM CURRENT USE OF INSULIN (H): Primary | ICD-10-CM

## 2018-06-27 DIAGNOSIS — E11.65 UNCONTROLLED TYPE 2 DIABETES MELLITUS WITH HYPERGLYCEMIA, WITH LONG-TERM CURRENT USE OF INSULIN (H): Primary | ICD-10-CM

## 2018-06-27 DIAGNOSIS — K21.9 GASTROESOPHAGEAL REFLUX DISEASE WITHOUT ESOPHAGITIS: ICD-10-CM

## 2018-06-27 DIAGNOSIS — J98.4 RESTRICTIVE LUNG DISEASE: ICD-10-CM

## 2018-06-27 PROBLEM — Z83.3 FAMILY HISTORY OF DIABETES MELLITUS: Status: RESOLVED | Noted: 2018-02-01 | Resolved: 2018-06-27

## 2018-06-27 LAB
ALBUMIN UR-MCNC: 100 MG/DL
ANION GAP SERPL CALCULATED.3IONS-SCNC: 6 MMOL/L (ref 3–14)
APPEARANCE UR: CLEAR
BACTERIA #/AREA URNS HPF: ABNORMAL /HPF
BILIRUB UR QL STRIP: NEGATIVE
BUN SERPL-MCNC: 21 MG/DL (ref 7–25)
CALCIUM SERPL-MCNC: 9.7 MG/DL (ref 8.6–10.3)
CHLORIDE SERPL-SCNC: 95 MMOL/L (ref 98–107)
CHOLEST SERPL-MCNC: 178 MG/DL
CO2 SERPL-SCNC: 36 MMOL/L (ref 21–31)
COLOR UR AUTO: YELLOW
CREAT SERPL-MCNC: 1.26 MG/DL (ref 0.7–1.3)
CREAT UR-MCNC: 275 MG/DL
GFR SERPL CREATININE-BSD FRML MDRD: 60 ML/MIN/1.7M2
GLUCOSE SERPL-MCNC: 211 MG/DL (ref 70–105)
GLUCOSE UR STRIP-MCNC: NEGATIVE MG/DL
HBA1C MFR BLD: 9.1 % (ref 4–6)
HDLC SERPL-MCNC: 33 MG/DL (ref 23–92)
HGB UR QL STRIP: NEGATIVE
HYALINE CASTS #/AREA URNS LPF: ABNORMAL /LPF
KETONES UR STRIP-MCNC: ABNORMAL MG/DL
LDLC SERPL CALC-MCNC: 87 MG/DL
LEUKOCYTE ESTERASE UR QL STRIP: NEGATIVE
MICROALBUMIN UR-MCNC: 445 MG/L
MICROALBUMIN/CREAT UR: 161.82 MG/G CR (ref 0–17)
MUCOUS THREADS #/AREA URNS LPF: PRESENT /LPF
NITRATE UR QL: NEGATIVE
NONHDLC SERPL-MCNC: 145 MG/DL
PH UR STRIP: 7 PH (ref 5–7)
POTASSIUM SERPL-SCNC: 4.5 MMOL/L (ref 3.5–5.1)
RBC #/AREA URNS AUTO: ABNORMAL /HPF
SODIUM SERPL-SCNC: 137 MMOL/L (ref 134–144)
SOURCE: ABNORMAL
SP GR UR STRIP: 1.02 (ref 1–1.03)
TRIGL SERPL-MCNC: 290 MG/DL
UROBILINOGEN UR STRIP-ACNC: 2 EU/DL (ref 0.2–1)
WBC #/AREA URNS AUTO: ABNORMAL /HPF

## 2018-06-27 PROCEDURE — 80061 LIPID PANEL: CPT | Performed by: FAMILY MEDICINE

## 2018-06-27 PROCEDURE — 83036 HEMOGLOBIN GLYCOSYLATED A1C: CPT | Performed by: FAMILY MEDICINE

## 2018-06-27 PROCEDURE — G0463 HOSPITAL OUTPT CLINIC VISIT: HCPCS

## 2018-06-27 PROCEDURE — 99214 OFFICE O/P EST MOD 30 MIN: CPT | Performed by: FAMILY MEDICINE

## 2018-06-27 PROCEDURE — 87086 URINE CULTURE/COLONY COUNT: CPT | Performed by: FAMILY MEDICINE

## 2018-06-27 PROCEDURE — 82043 UR ALBUMIN QUANTITATIVE: CPT | Performed by: FAMILY MEDICINE

## 2018-06-27 PROCEDURE — 80048 BASIC METABOLIC PNL TOTAL CA: CPT | Performed by: FAMILY MEDICINE

## 2018-06-27 PROCEDURE — 36415 COLL VENOUS BLD VENIPUNCTURE: CPT | Performed by: FAMILY MEDICINE

## 2018-06-27 PROCEDURE — 81001 URINALYSIS AUTO W/SCOPE: CPT | Performed by: FAMILY MEDICINE

## 2018-06-27 RX ORDER — LISINOPRIL 20 MG/1
20 TABLET ORAL DAILY
Qty: 90 TABLET | Refills: 4 | Status: SHIPPED | OUTPATIENT
Start: 2018-06-27 | End: 2019-09-05

## 2018-06-27 NOTE — LETTER
Jake SEBLE Bermudez  [id number] [facility] [id number]    6/28/2018      Dear Mr. Bermudez,      I wanted to let you know about your recent labs.  Your A1c is better than the previous 10.9%; but still have some work to do!  Keep it up!  Your electrolytes are a little off and this could be related to your lung function and retaining some carbon dioxide, especially at night without use of a CPAP.  This could improve with weight loss.  You continue to leak protein into your urine from the kidneys; this is likely from years of having diabetes.  We will continue to monitor.    Please contact us at [phone] with any questions or concerns that you have.    I have attached your lab results for your records.        Sincerely,     Kira Grider     Resulted Orders   Hemoglobin A1c   Result Value Ref Range    Hemoglobin A1C 9.1 (H) 4.0 - 6.0 %   Basic metabolic panel   Result Value Ref Range    Sodium 137 134 - 144 mmol/L    Potassium 4.5 3.5 - 5.1 mmol/L    Chloride 95 (L) 98 - 107 mmol/L    Carbon Dioxide 36 (H) 21 - 31 mmol/L    Anion Gap 6 3 - 14 mmol/L    Glucose 211 (H) 70 - 105 mg/dL    Urea Nitrogen 21 7 - 25 mg/dL    Creatinine 1.26 0.70 - 1.30 mg/dL    GFR Estimate 60 (L) >60 mL/min/1.7m2    GFR Estimate If Black 72 >60 mL/min/1.7m2    Calcium 9.7 8.6 - 10.3 mg/dL   Lipid Panel   Result Value Ref Range    Cholesterol 178 <200 mg/dL    Triglycerides 290 (H) <150 mg/dL      Comment:      Borderline high:  150-199 mg/dl  High:             200-499 mg/dl  Very high:       >499 mg/dl      HDL Cholesterol 33 23 - 92 mg/dL    LDL Cholesterol Calculated 87 <100 mg/dL      Comment:      Desirable:       <100 mg/dl    Non HDL Cholesterol 145 (H) <130 mg/dL      Comment:      Above Desirable:  130-159 mg/dl  Borderline high:  160-189 mg/dl  High:             190-219 mg/dl  Very high:       >219 mg/dl     Albumin Random Urine Quantitative with Creat Ratio   Result Value Ref Range    Creatinine Urine 275 mg/dL    Albumin  Urine mg/L 445 mg/L    Albumin Urine mg/g Cr 161.82 (H) 0 - 17 mg/g Cr   Urinalysis w Reflex Microscopic If Positive   Result Value Ref Range    Color Urine Yellow     Appearance Urine Clear     Glucose Urine Negative NEG^Negative mg/dL    Bilirubin Urine Negative NEG^Negative    Ketones Urine Trace (A) NEG^Negative mg/dL    Specific Gravity Urine 1.020 1.003 - 1.035    Blood Urine Negative NEG^Negative    pH Urine 7.0 5.0 - 7.0 pH    Protein Albumin Urine 100 (A) NEG^Negative mg/dL    Urobilinogen Urine 2.0 (H) 0.2 - 1.0 EU/dL    Nitrite Urine Negative NEG^Negative    Leukocyte Esterase Urine Negative NEG^Negative    Source Midstream Urine    Urine Microscopic   Result Value Ref Range    WBC Urine 0 - 5 OTO5^0 - 5 /HPF    RBC Urine O - 2 OTO2^O - 2 /HPF    Hyaline Casts 2-5 (A) OTO2^O - 2 /LPF    Bacteria Urine Moderate (A) NEG^Negative /HPF    Mucous Urine Present (A) NEG^Negative /LPF

## 2018-06-27 NOTE — NURSING NOTE
Patient here for medication check.  Dorothy Diaz............................... 6/27/2018 10:01 AM    Previous A1C is not at goal of <8  No results found for: A1C  Urine microalbumin:creatine: unknown  Foot exam about 3 weeks ago   Eye exam January     Tobacco User no  Patient is on a daily aspirin  Patient is on a Statin.  Blood pressure today of: 156/82     BP Readings from Last 1 Encounters:   06/27/18 156/82      is not at the goal of <139/89 for diabetics.    Dorothy Michel LPN on 6/27/2018 at 10:05 AM

## 2018-06-27 NOTE — PROGRESS NOTES
SUBJECTIVE:   Jake Bermudez is a 53 year old male who presents to clinic today for the following health issues:    HPI  Jake has a history of:  1. Restrictive Lung disease and ARMANDO (not using CPAP) s/p significant lung insult ~5 years ago where he was ventilator dependent with empyema.  He has episodes of SOB; and uses O2 at night, but has refused CPAP in the past.  Overall improved from the last 6 months.  Has recent PFT, Echo, CT chest evaluating his breathing - without other significant findings.  2. Uncontrolled DM with labile numbers.  Last Hgb A1c was 1/2018 and was 10.9%.  He is currently on Basaglar: 177u at bedtime and Humalog by ICR, but approximately 60-70u with each meal.  He states he estimates 2 low blood sugars since our last visit which he might get a little bit of symptoms, but for the most part is asymptomatic.  3.     Patient Active Problem List    Diagnosis Date Noted     Restrictive lung disease 03/07/2018     Priority: Medium     Nocturnal hypoxia 03/07/2018     Priority: Medium     Family history of ischemic heart disease 02/01/2018     Priority: Medium     Acquired keratoderma 02/01/2018     Priority: Medium     Enthesopathy of ankle and tarsus 02/01/2018     Priority: Medium     ARMANDO (obstructive sleep apnea) 01/17/2018     Priority: Medium     Overview:   Treated with NOC O2       Lung nodule < 6cm on CT 12/01/2017     Priority: Medium     Overview:   Seen on 11/30/2017; repeat in one year for follow up.       Spondylosis of lumbar region without myelopathy or radiculopathy 10/19/2017     Priority: Medium     CKD (chronic kidney disease) stage 3, GFR 30-59 ml/min 09/11/2016     Priority: Medium     Overview:   Secondary to DM2       PTSD (post-traumatic stress disorder) 09/08/2016     Priority: Medium     Diabetic polyneuropathy associated with type 2 diabetes mellitus (H) 01/20/2016     Priority: Medium     Diabetic, retinopathy, proliferative (H) 07/01/2014     Priority: Medium      Overview:   Mild proliferative changes on left side (6/2014)--Dr Ortiz  No evidence of retinopathy, but early cataract formation b/l (1/2015) - Dr. Rishi Jacobs       Obesity (BMI 30-39.9) 06/29/2014     Priority: Medium     Lateral epicondylitis of left elbow 10/03/2013     Priority: Medium     Overview:   Seen by Dr Tayla Jung 8/2013       Neuropathy 04/09/2013     Priority: Medium     Overview:   Follows with Dr. Gallagher, Neurology at [facility] - on gabapentin.       Impotence of organic origin 07/16/2012     Priority: Medium     Overview:   D/C cymbalta       Other symptoms referable to upper arm joint 07/16/2012     Priority: Medium     Overview:   mass below elbow Lt       Personal history of other diseases of circulatory system 07/02/2012     Priority: Medium     Backache 04/03/2012     Priority: Medium     Corns and callosities 03/02/2012     Priority: Medium     Personal history of tobacco use, presenting hazards to health 01/31/2012     Priority: Medium     Empyema (H) 01/26/2012     Priority: Medium     Overview:   Rt lung 12/24/11       Atelectasis of right lung 12/24/2011     Priority: Medium     Community acquired bacterial pneumonia 12/24/2011     Priority: Medium     Empyema of right pleural space (H) 12/24/2011     Priority: Medium     GERD (gastroesophageal reflux disease) 12/24/2011     Priority: Medium     Hyperlipidemia 12/24/2011     Priority: Medium     Moderate cigarette smoker (10-19 per day) 12/24/2011     Priority: Medium     Obesity 12/24/2011     Priority: Medium     Overview:   BMI is 34.3.       Rotator cuff syndrome of left shoulder 12/24/2011     Priority: Medium     Overview:   S/P Left rotator cuff repair 12/1/2011.  S/P Right rotator cuff repair 8/2010.       Dyslipidemia 09/13/2011     Priority: Medium     Uncontrolled type 2 diabetes mellitus with hyperglycemia, with long-term current use of insulin (H) 09/07/2011     Priority: Medium     Esophageal reflux 09/07/2011      Priority: Medium     Other, multiple and ill-defined closed fractures of lower limb 08/05/2010     Priority: Medium     Shoulder pain 08/05/2010     Priority: Medium     Past Medical History:   Diagnosis Date     Lateral epicondylitis of left elbow     10/3/2013,Seen by Dr Bourne [place] 8/2013     Type 2 diabetes mellitus with proliferative retinopathy without macular edema (H)     7/1/2014,Mild proliferative changes on left side--sees Dr Ortiz exam 6/2014      Past Surgical History:   Procedure Laterality Date     OTHER SURGICAL HISTORY      [id number],SHOULDER SURGERY,Bilateral     OTHER SURGICAL HISTORY      [id number],CHEST TUBE INSERTION     OTHER SURGICAL HISTORY      [id number],LUNG SURGERY,pneumonia     OTHER SURGICAL HISTORY      [id number],HCHG TRACH PR1,history trach with pneumonia     Family History   Problem Relation Age of Onset     HEART DISEASE Other      Heart Disease,Family History Coronary Heart Disease male     Diabetes Other      Diabetes     Cancer Father      Cancer,stomach ca     Social History   Substance Use Topics     Smoking status: Former Smoker     Packs/day: 0.50     Years: 28.00     Types: Cigarettes     Quit date: 12/21/2011     Smokeless tobacco: Former User     Types: Chew     Quit date: 9/5/1988     Alcohol use No     Social History     Social History Narrative    Alcohol Use - no    2 children  unemployed 10 1/2 % disability for back injury  Patient was a former smoker.  1/2 PPD     Current Outpatient Prescriptions   Medication Sig Dispense Refill     lisinopril (PRINIVIL/ZESTRIL) 20 MG tablet Take 1 tablet (20 mg) by mouth daily 90 tablet 4     ACCU-CHEK DOANVON PLUS test strip CHECK GLUCOSE FOUR TIMES DAILY 400 strip 1     acetaminophen (TYLENOL) 500 MG tablet TAKE ONE TABLET BY MOUTH EVERY 6 HOURS AS NEEDED .  MAX  ACETAMINOPHEN  DOSE  4000MG  IN  24HRS.       albuterol (PROAIR HFA/PROVENTIL HFA/VENTOLIN HFA) 108 (90 BASE) MCG/ACT Inhaler Inhale 2 puffs into the lungs every 4  hours as needed for cough       Aspirin (ASPIR-81 PO) Take 81 mg by mouth daily with food       BASAGLAR 100 UNIT/ML injection Inject 177 Units Subcutaneous At Bedtime 180 mL 3     beclomethasone (QVAR) 80 MCG/ACT Inhaler Inhale 1 puff into the lungs 2 times daily       blood glucose calibration (ACCU-CHEK DONAVON) solution Use to calibrate blood glucose monitor as needed as directed. 1 Bottle 3     blood glucose monitoring (ACCU-CHEK MULTICLIX) lancets USE STRIP TO CHECK BLOOD GLUCOSE 4 TIMES DAILY.       Cholecalciferol (VITAMIN D) 2000 UNITS tablet Take 1 tablet by mouth daily       clonazePAM (KLONOPIN) 0.5 MG tablet TAKE ONE TABLET BY MOUTH AT BEDTIME 30 tablet 5     DULoxetine (CYMBALTA) 60 MG EC capsule TAKE ONE CAPSULE BY MOUTH ONCE DAILY 90 capsule 2     FLUoxetine (PROZAC) 20 MG capsule TAKE ONE CAPSULE BY MOUTH IN THE MORNING 90 capsule 0     gabapentin (NEURONTIN) 300 MG capsule Take 900 mg by mouth 3 times daily 900 mg TID, 1200 mg at bedtime       HUMALOG KWIKPEN 200 UNIT/ML soln        hydrochlorothiazide (HYDRODIURIL) 25 MG tablet Take 25 mg by mouth daily       insulin aspart (NOVOLOG PEN) 100 UNIT/ML injection INJECT per ICR 7.5units/8grams carb and ISF 1:8.  MAX DAILY DOSE 245 UNITS.       insulin pen needle (BD ANN U/F) 32G X 4 MM USE AS DIRECTED FOR  ADMINISTERING  INSULIN  AT  HOME  4  TIMES  DAILY       melatonin 5 MG tablet Take 5-10 mg by mouth At Bedtime       meloxicam (MOBIC) 15 MG tablet Take 15 mg by mouth daily with food       methocarbamol (ROBAXIN) 750 MG tablet Take 750 mg by mouth 3 times daily       omeprazole (PRILOSEC OTC) 20 MG tablet TAKE ONE TABLET BY MOUTH EVERY DAY BEFORE  A  MEAL       omeprazole 20 MG tablet Take 20 mg by mouth once daily before a meal 90 tablet 3     order for DME Oxygen for home use. LPM: 1/min via nasal cannula. Frequency of use: Continuous with portability; Length of need: 12 mos.       pregabalin (LYRICA) 100 MG capsule Take 200 mg by mouth 3 times  daily       simvastatin (ZOCOR) 20 MG tablet Take 20 mg by mouth At Bedtime       traMADol (ULTRAM) 50 MG tablet Take 50 mg by mouth 3 times daily as needed       umeclidinium-vilanterol (ANORO ELLIPTA) 62.5-25 MCG/INH oral inhaler Inhale 1 puff into the lungs       [DISCONTINUED] lisinopril (PRINIVIL/ZESTRIL) 10 MG tablet Take 10 mg by mouth daily       [DISCONTINUED] pregabalin (LYRICA) 100 MG capsule        Allergies   Allergen Reactions     Penicillins      Other reaction(s): Diaphoresis     Piperacillin-Tazobactam In D5w Other (See Comments)     Other reaction(s): Diaphoresis, Fever  Fever while on zosyn, vanc. No rash, no wbc elevation     Vancomycin Other (See Comments)     Other reaction(s): Diaphoresis, Fever  Fever while on zosyn, vanc, no rash or WBC elevation     Zosyn Other (See Comments)     Fever while on zosyn, vanc. No rash, no wbc elevation         Review of Systems     OBJECTIVE:     /82 (BP Location: Right arm, Patient Position: Sitting, Cuff Size: Adult Large)  Pulse 84  Wt 295 lb 12.8 oz (134.2 kg)  BMI 41.26 kg/m2  Body mass index is 41.26 kg/(m^2).  Physical Exam    Diagnostic Test Results:  Results for orders placed or performed in visit on 06/27/18 (from the past 24 hour(s))   Hemoglobin A1c   Result Value Ref Range    Hemoglobin A1C 9.1 (H) 4.0 - 6.0 %   Basic metabolic panel   Result Value Ref Range    Sodium 137 134 - 144 mmol/L    Potassium 4.5 3.5 - 5.1 mmol/L    Chloride 95 (L) 98 - 107 mmol/L    Carbon Dioxide 36 (H) 21 - 31 mmol/L    Anion Gap 6 3 - 14 mmol/L    Glucose 211 (H) 70 - 105 mg/dL    Urea Nitrogen 21 7 - 25 mg/dL    Creatinine 1.26 0.70 - 1.30 mg/dL    GFR Estimate 60 (L) >60 mL/min/1.7m2    GFR Estimate If Black 72 >60 mL/min/1.7m2    Calcium 9.7 8.6 - 10.3 mg/dL   Lipid Panel   Result Value Ref Range    Cholesterol 178 <200 mg/dL    Triglycerides 290 (H) <150 mg/dL    HDL Cholesterol 33 23 - 92 mg/dL    LDL Cholesterol Calculated 87 <100 mg/dL    Non HDL  Cholesterol 145 (H) <130 mg/dL   Urinalysis w Reflex Microscopic If Positive   Result Value Ref Range    Color Urine Yellow     Appearance Urine Clear     Glucose Urine Negative NEG^Negative mg/dL    Bilirubin Urine Negative NEG^Negative    Ketones Urine Trace (A) NEG^Negative mg/dL    Specific Gravity Urine 1.020 1.003 - 1.035    Blood Urine Negative NEG^Negative    pH Urine 7.0 5.0 - 7.0 pH    Protein Albumin Urine 100 (A) NEG^Negative mg/dL    Urobilinogen Urine 2.0 (H) 0.2 - 1.0 EU/dL    Nitrite Urine Negative NEG^Negative    Leukocyte Esterase Urine Negative NEG^Negative    Source Midstream Urine    Urine Microscopic   Result Value Ref Range    WBC Urine 0 - 5 OTO5^0 - 5 /HPF    RBC Urine O - 2 OTO2^O - 2 /HPF    Hyaline Casts 2-5 (A) OTO2^O - 2 /LPF    Bacteria Urine Moderate (A) NEG^Negative /HPF    Mucous Urine Present (A) NEG^Negative /LPF       ASSESSMENT/PLAN:     1. Lung nodule < 6cm on CT  - PULMONARY MEDICINE REFERRAL    2. Restrictive lung disease  Will refer to Pulmonology for further evaluation; however discussed with Rajan today re: weight loss and use of CPAP will likely improve his respiratory function and help with endurance/energy.  - PULMONARY MEDICINE REFERRAL    3. ARMANDO (obstructive sleep apnea)  See above.  - PULMONARY MEDICINE REFERRAL    4. CKD (chronic kidney disease) stage 3, GFR 30-59 ml/min  Waxes and wanes between stage 2 and stage 3a; continue to monitor.  BMP ordered today.  No medication adjustments needed at this time.    5. Uncontrolled type 2 diabetes mellitus with hyperglycemia, with long-term current use of insulin (H)  Continues to work with Fina re: dosing of insulin as he is on significantly high amounts.  Reviewed weight loss and dietary changes that will make changes to his BS readings.  Bariatric surgery is brought up today to review effects on DM, decrease cardiac/stroke risk, improve back and foot pain, etc.  He is interested in discussing this and thinking about it  further.  Referral was placed to [facility] bariatric program - importance of whole program reviewed.  - Hemoglobin A1c  - Basic metabolic panel  - Albumin Random Urine Quantitative with Creat Ratio  - Urinalysis w Reflex Microscopic If Positive  - BARIATRIC ADULT REFERRAL  - Urine Microscopic    6. Dyslipidemia  Unknown status: obtain lipid panel today.  - Lipid Panel    7. Gastroesophageal reflux disease without esophagitis  Chronic, stable.  Continue with current PPI dosing at this time.    8. Benign essential hypertension  Chronic, worsened at his last two office visits - increase Lisinopril to 20mg daily.   - lisinopril (PRINIVIL/ZESTRIL) 20 MG tablet; Take 1 tablet (20 mg) by mouth daily  Dispense: 90 tablet; Refill: 4  - Basic metabolic panel    9. Morbid obesity (H)  See #5.  - BARIATRIC ADULT REFERRAL    10. DDD (degenerative disc disease), lumbar  Chronic, discussed no further medications that will be helpful - encouraged weight loss and continued activity.  Consideration for PT referral vs injections if does not go through with bariatric surgery.  - BARIATRIC ADULT REFERRAL      Kira Grider,   [facility]

## 2018-06-27 NOTE — MR AVS SNAPSHOT
After Visit Summary   6/27/2018    Jake Bermudez    MRN: [id number]           Patient Information     Date Of Birth          1965        Visit Information        Provider Department      6/27/2018 10:00 AM Kira Grider DO [facility]        Today's Diagnoses     Uncontrolled type 2 diabetes mellitus with hyperglycemia, with long-term current use of insulin (H)    -  1    Lung nodule < 6cm on CT        Restrictive lung disease        ARMANDO (obstructive sleep apnea)        CKD (chronic kidney disease) stage 3, GFR 30-59 ml/min        Dyslipidemia        Gastroesophageal reflux disease without esophagitis        Benign essential hypertension        Morbid obesity (H)        DDD (degenerative disc disease), lumbar           Follow-ups after your visit        Additional Services     BARIATRIC ADULT REFERRAL       [facility] [place]            PULMONARY MEDICINE REFERRAL       [facility] - Pulmonology.                  Who to contact     If you have questions or need follow up information about today's clinic visit or your schedule please contact [facility] directly at [phone].  Normal or non-critical lab and imaging results will be communicated to you by [employer], letter or phone within 4 business days after the clinic has received the results. If you do not hear from us within 7 days, please contact the clinic through [employer] or phone. If you have a critical or abnormal lab result, we will notify you by phone as soon as possible.  Submit refill requests through [employer] or call your pharmacy and they will forward the refill request to us. Please allow 3 business days for your refill to be completed.          Additional Information About Your Visit        [employer] Information     [employer] Agent lets you send messages to your doctor, view your test results, renew your prescriptions, schedule appointments and more. To sign up, go to  [email or web address] . Click on \"Log in\" on the left side of the screen, which will take you to the Welcome page. Then click on \"Sign up Now\" on the right side of the page.     You will be asked to enter the access code listed below, as well as some personal information. Please follow the directions to create your username and password.     Your access code is: [email or web address]  Expires: 2018 11:09 AM     Your access code will  in 90 days. If you need help or a new code, please call your [facility] clinic or [phone].        Care EveryWhere ID     This is your Care EveryWhere ID. This could be used by other organizations to access your [facility] medical records  [id number]        Your Vitals Were     Pulse BMI (Body Mass Index)                84 41.26 kg/m2           Blood Pressure from Last 3 Encounters:   18 156/82   18 128/80   18 144/72    Weight from Last 3 Encounters:   18 134.2 kg (295 lb 12.8 oz)   18 135.4 kg (298 lb 6.4 oz)   18 132.9 kg (293 lb)              We Performed the Following     Albumin Random Urine Quantitative with Creat Ratio     BARIATRIC ADULT REFERRAL     Basic metabolic panel     Hemoglobin A1c     Lipid Panel     PULMONARY MEDICINE REFERRAL     Urinalysis w Reflex Microscopic If Positive          Today's Medication Changes          These changes are accurate as of 18 11:09 AM.  If you have any questions, ask your nurse or doctor.               These medicines have changed or have updated prescriptions.        Dose/Directions    lisinopril 20 MG tablet   Commonly known as:  PRINIVIL/ZESTRIL   This may have changed:    - medication strength  - how much to take   Used for:  Benign essential hypertension   Changed by:  Kira Grider, DO        Dose:  20 mg   Take 1 tablet (20 mg) by mouth daily   Quantity:  90 tablet   Refills:  4            Where to get your medicines      These medications were sent to [id number] - [place], [place] - [address]  [address] [facility] [address]     Phone:  [phone]     lisinopril 20 MG tablet                Primary Care Provider Office Phone # Fax #    Kira Grider [phone]       [address]  [place] [address]        Equal Access to Services     [facility]: Hadii aad ku hadasho Soomaali, waaxda luqadaha, qaybta kaalmada adeegyada, waxay idiin hayaan adeeg kharash la'aan . So [facility] [phone].    ATENCIÓN: Si habla español, tiene a ott disposición servicios gratuitos de asistencia lingüística. Llame al [phone].    We comply with applicable federal civil rights laws and [place] laws. We do not discriminate on the basis of race, color, national origin, age, disability, sex, sexual orientation, or gender identity.            Thank you!     Thank you for choosing [facility] AND [facility]  for your care. Our goal is always to provide you with excellent care. Hearing back from our patients is one way we can continue to improve our services. Please take a few minutes to complete the written survey that you may receive in the mail after your visit with us. Thank you!             Your Updated Medication List - Protect others around you: Learn how to safely use, store and throw away your medicines at www.disposemymeds.org.          This list is accurate as of 6/27/18 11:09 AM.  Always use your most recent med list.                   Brand Name Dispense Instructions for use Diagnosis    ACCU-CHEK DONAVON PLUS test strip   Generic drug:  blood glucose monitoring     400 strip    CHECK GLUCOSE FOUR TIMES DAILY    Uncontrolled type 2 diabetes mellitus with hyperglycemia, with long-term current use of insulin (H)       acetaminophen 500 MG tablet    TYLENOL     TAKE ONE TABLET BY MOUTH EVERY 6 HOURS AS NEEDED .  MAX  ACETAMINOPHEN  DOSE  4000MG  IN  24HRS.        albuterol 108 (90 Base) MCG/ACT Inhaler    PROAIR HFA/PROVENTIL  HFA/VENTOLIN HFA     Inhale 2 puffs into the lungs every 4 hours as needed for cough        ASPIR-81 PO      Take 81 mg by mouth daily with food        BASAGLAR 100 UNIT/ML injection     180 mL    Inject 177 Units Subcutaneous At Bedtime    Uncontrolled type 2 diabetes mellitus with hyperglycemia, with long-term current use of insulin (H)       BD ANN U/F 32G X 4 MM   Generic drug:  insulin pen needle      USE AS DIRECTED FOR  ADMINISTERING  INSULIN  AT  HOME  4  TIMES  DAILY        blood glucose calibration solution     1 Bottle    Use to calibrate blood glucose monitor as needed as directed.    Uncontrolled type 2 diabetes mellitus with hyperglycemia, with long-term current use of insulin (H)       blood glucose monitoring lancets      USE STRIP TO CHECK BLOOD GLUCOSE 4 TIMES DAILY.        clonazePAM 0.5 MG tablet    klonoPIN    30 tablet    TAKE ONE TABLET BY MOUTH AT BEDTIME    MIRANDA (generalized anxiety disorder)       DULoxetine 60 MG EC capsule    CYMBALTA    90 capsule    TAKE ONE CAPSULE BY MOUTH ONCE DAILY    Other chronic pain       FLUoxetine 20 MG capsule    PROzac    90 capsule    TAKE ONE CAPSULE BY MOUTH IN THE MORNING    Depression, unspecified depression type       gabapentin 300 MG capsule    NEURONTIN     Take 900 mg by mouth 3 times daily 900 mg TID, 1200 mg at bedtime        HUMALOG KWIKPEN 200 UNIT/ML soln   Generic drug:  Insulin Lispro           hydrochlorothiazide 25 MG tablet    HYDRODIURIL     Take 25 mg by mouth daily        insulin aspart 100 UNIT/ML injection    NovoLOG PEN     INJECT per ICR 7.5units/8grams carb and ISF 1:8.  MAX DAILY DOSE 245 UNITS.        lisinopril 20 MG tablet    PRINIVIL/ZESTRIL    90 tablet    Take 1 tablet (20 mg) by mouth daily    Benign essential hypertension       melatonin 5 MG tablet      Take 5-10 mg by mouth At Bedtime        meloxicam 15 MG tablet    MOBIC     Take 15 mg by mouth daily with food        methocarbamol 750 MG tablet    ROBAXIN     Take 750  mg by mouth 3 times daily        omeprazole 20 MG tablet     90 tablet    Take 20 mg by mouth once daily before a meal    Gastroesophageal reflux disease, esophagitis presence not specified       order for DME      Oxygen for home use. LPM: 1/min via nasal cannula. Frequency of use: Continuous with portability; Length of need: 12 mos.        pregabalin 100 MG capsule    LYRICA     Take 200 mg by mouth 3 times daily        priLOSEC OTC 20 MG tablet   Generic drug:  omeprazole      TAKE ONE TABLET BY MOUTH EVERY DAY BEFORE  A  MEAL        QVAR 80 MCG/ACT Inhaler   Generic drug:  beclomethasone      Inhale 1 puff into the lungs 2 times daily        simvastatin 20 MG tablet    ZOCOR     Take 20 mg by mouth At Bedtime        traMADol 50 MG tablet    ULTRAM     Take 50 mg by mouth 3 times daily as needed        umeclidinium-vilanterol 62.5-25 MCG/INH oral inhaler    ANORO ELLIPTA     Inhale 1 puff into the lungs        vitamin D 2000 units tablet      Take 1 tablet by mouth daily

## 2018-06-28 PROBLEM — M22.2X9 PFS (PATELLOFEMORAL SYNDROME): Status: ACTIVE | Noted: 2018-06-28

## 2018-06-29 LAB
BACTERIA SPEC CULT: NORMAL
SPECIMEN SOURCE: NORMAL

## 2018-07-23 ENCOUNTER — TRANSFERRED RECORDS (OUTPATIENT)
Dept: HEALTH INFORMATION MANAGEMENT | Facility: OTHER | Age: 53
End: 2018-07-23

## 2018-07-23 DIAGNOSIS — M54.5 CHRONIC LOW BACK PAIN, UNSPECIFIED BACK PAIN LATERALITY, WITH SCIATICA PRESENCE UNSPECIFIED: Primary | ICD-10-CM

## 2018-07-23 DIAGNOSIS — J98.4 RESTRICTIVE LUNG DISEASE: Primary | ICD-10-CM

## 2018-07-23 DIAGNOSIS — G89.29 CHRONIC LOW BACK PAIN, UNSPECIFIED BACK PAIN LATERALITY, WITH SCIATICA PRESENCE UNSPECIFIED: Primary | ICD-10-CM

## 2018-07-23 NOTE — PROGRESS NOTES
Patient Information     Patient Name  Jake Bermudez MRN  [id number] Sex  Male   1965      Letter by Kira Grider DO at      Author:  Kira Grider DO Service:  (none) Author Type:  (none)    Filed:   Encounter Date:  2017 Status:  (Other)           Jake Bermudez  [address] [facility] [address]          2017    Dear Mr. Bermudez:    Following are the tests completed during your last clinic visit.  The results of these tests are normal and require no further attention unless otherwise noted.    As expected, your Hemoglobin A1c is increased to 9.3% again.  I would plan to make an appointment with Estrella Maza in the next 1-2 months if that wasn't done at check out at our recent appointment.    Also your triglycerides are significantly elevated, which indicates poor control of diabetes.  Goal is going to be an A1c of close to 7.0%.      Results for orders placed or performed in visit on 17      Hgb A1c      Result  Value Ref [place]    HEMOGLOBIN A1C MONITORING (POCT) 9.3 (H) 4.0 - 6.2 %    ESTIMATED AVERAGE GLUCOSE  220 mg/dL   COMPLETE METABOLIC PANEL      Result  Value Ref [place]    SODIUM 134 133 - 143 mmol/L    POTASSIUM 5.1 3.5 - 5.1 mmol/L    CHLORIDE 92 (L) 98 - 107 mmol/L    CO2,TOTAL 29 21 - 31 mmol/L    ANION GAP 13 5 - 18                    GLUCOSE 298 (H) 70 - 105 mg/dL    CALCIUM 9.6 8.6 - 10.3 mg/dL    BUN 23 7 - 25 mg/dL    CREATININE 1.29 0.70 - 1.30 mg/dL    BUN/CREAT RATIO           18                    GFR if African American >60 >60 ml/min/1.73m2    GFR if not African American 58 (L) >60 ml/min/1.73m2    ALBUMIN 4.2 3.5 - 5.7 g/dL    PROTEIN,TOTAL 7.0 6.4 - 8.9 g/dL    GLOBULIN                  2.8 2.0 - 3.7 g/dL    A/G RATIO 1.5 1.0 - 2.0                    BILIRUBIN,TOTAL 0.3 0.3 - 1.0 mg/dL    ALK PHOSPHATASE 77 34 - 104 IU/L    ALT (SGPT) 50 7 - 52 IU/L    AST (SGOT) 44 (H) 13 - 39 IU/L   LIPID PANEL      Result  Value Ref [place]     CHOLESTEROL,TOTAL 199 <200 mg/dL    TRIGLYCERIDES 610 (H) <150 mg/dL    HDL CHOLESTEROL 36 23 - 92 mg/dL    NON-HDL CHOLESTEROL 163 (H) <145 mg/dl    CHOL/HDL RATIO            5.53 (H) <4.50                    LDL CHOLESTEROL      PATIENT STATUS            NOT GIVEN                       If you have any further questions or problems contact my office at  [phone].    Thank you,    JOSE ELIAS RODNEY, DO

## 2018-07-23 NOTE — PROGRESS NOTES
Patient Information     Patient Name  Jake Bermudez MRN  [id number] Sex  Male   1965      Letter by Kira Rodney DO at      Author:  Kira Rodney DO Service:  (none) Author Type:  (none)    Filed:   Encounter Date:  2017 Status:  (Other)           Jake Bermudez  [address] [facility] [address]          2017    Dear Mr. Bermudez:    Following are the tests completed during your last clinic visit.      Your blood sugar at the time of your appointment was 225.  Your Hgb A1c had increased from 6.9% to 8.0%.  Continue prior efforts (eating habits and activity), but no changes to medications at this time will be made at this time.  If Hgb A1c remains elevated, we will increase medications.     Results for orders placed or performed in visit on 17      BASIC METABOLIC PANEL      Result  Value Ref [place]    SODIUM 136 133 - 143 mmol/L    POTASSIUM 4.5 3.5 - 5.1 mmol/L    CHLORIDE 96 (L) 98 - 107 mmol/L    CO2,TOTAL 29 21 - 31 mmol/L    ANION GAP 11 5 - 18                    GLUCOSE 225 (H) 70 - 105 mg/dL    CALCIUM 9.5 8.6 - 10.3 mg/dL    BUN 21 7 - 25 mg/dL    CREATININE 1.24 0.70 - 1.30 mg/dL    BUN/CREAT RATIO           17                    GFR if African American >60 >60 ml/min/1.73m2    GFR if not African American >60 >60 ml/min/1.73m2   HEMOGLOBIN A1C MONITORING (POCT)      Result  Value Ref [place]    HEMOGLOBIN A1C MONITORING (POCT) 8.0 (H) 4.0 - 6.2 %    ESTIMATED AVERAGE GLUCOSE  183 mg/dL       If you have any further questions or problems contact my office at  [phone].    Thank you,    KIRA RODNEY DO

## 2018-07-23 NOTE — TELEPHONE ENCOUNTER
Per Pharmacy-  QVAR is no longer made; need new Rx for QVAR Redihaler.    Norma J. Gosselin LPN....................  7/23/2018   1:37 PM

## 2018-07-24 NOTE — PROGRESS NOTES
Patient Information     Patient Name  Jake Bermudez MRN  [id number] Sex  Male   1965      Letter by Estrella Maza NP at      Author:  Estrella Maza NP Service:  (none) Author Type:  (none)    Filed:   Encounter Date:  6/15/2017 Status:  (Other)           Jake Bermudez  [address] [facility] [address]          Michelle 15, 2017    Dear Mr. Bermudez:    This letter is to inform you that you are due for diabetes labs to be rechecked. Please follow up with Estrella Maza NP at your earliest convenience.     Sincerely,   The Diabetes Team

## 2018-07-26 RX ORDER — TRAMADOL HYDROCHLORIDE 50 MG/1
TABLET ORAL
Qty: 90 TABLET | Refills: 5 | Status: SHIPPED | OUTPATIENT
Start: 2018-07-26 | End: 2019-03-02

## 2018-07-26 NOTE — TELEPHONE ENCOUNTER
MAPAP and Ultram  LOV-06/27/2018    Routing refill request to provider for review/approval. Unable to complete prescription refill per RNMedication Refill Policy.................... Dilma Butler ....................  7/26/2018   11:16 AM

## 2018-08-05 DIAGNOSIS — M62.830 SPASM OF BACK MUSCLES: Primary | ICD-10-CM

## 2018-08-08 RX ORDER — METHOCARBAMOL 750 MG/1
TABLET, FILM COATED ORAL
Qty: 270 TABLET | Refills: 3 | Status: SHIPPED | OUTPATIENT
Start: 2018-08-08 | End: 2019-10-03

## 2018-08-08 NOTE — TELEPHONE ENCOUNTER
Methocarbamol (ROBAXIN) 750 mg tablet    LOV-06/27/2018    Routing refill request to provider for review/approval because: Drug not on the [facility] refill protocol     Unable to complete prescription refill per RNMedication Refill Policy.................... Dilma Butler ....................  8/8/2018   8:01 AM

## 2018-08-16 DIAGNOSIS — F32.A DEPRESSION, UNSPECIFIED DEPRESSION TYPE: ICD-10-CM

## 2018-08-20 NOTE — TELEPHONE ENCOUNTER
Prozac  LOV-06/27/2018    Routing refill request to provider for review/approval because: No PHQ-9 score less than 5 in past 6 months.     Unable to complete prescription refill per RNMedication Refill Policy.................... Dilma Butler ....................  8/20/2018   8:39 AM

## 2018-08-24 DIAGNOSIS — Z86.39 H/O HYPERKALEMIA: Primary | ICD-10-CM

## 2018-08-24 DIAGNOSIS — M54.9 BACK PAIN, UNSPECIFIED BACK LOCATION, UNSPECIFIED BACK PAIN LATERALITY, UNSPECIFIED CHRONICITY: ICD-10-CM

## 2018-08-28 NOTE — TELEPHONE ENCOUNTER
PCP is out this week. Will route to teamlet for consideration.   Meloxicam  LOV-06/27/2018    Routing refill request to provider for review/approval because:  Yearly labs not current:  Last CMP-06/21/2017  Last CBC-11/30/2017    B/P out of range:    Blood pressure under 140/90 in past 12 months           BP Readings from Last 3 Encounters:   06/27/18 156/82   03/07/18 128/80   01/17/18 144/72          Unable to complete prescription refill per RNMedication Refill Policy.................... Dilma Butler ....................  8/28/2018   7:47 AM

## 2018-08-29 RX ORDER — MELOXICAM 15 MG/1
TABLET ORAL
Qty: 90 TABLET | Refills: 0 | Status: SHIPPED | OUTPATIENT
Start: 2018-08-29 | End: 2019-04-16

## 2018-09-05 ENCOUNTER — TRANSFERRED RECORDS (OUTPATIENT)
Dept: HEALTH INFORMATION MANAGEMENT | Facility: OTHER | Age: 53
End: 2018-09-05

## 2018-09-13 DIAGNOSIS — G62.9 NEUROPATHY: Primary | ICD-10-CM

## 2018-09-14 RX ORDER — CHOLECALCIFEROL (VITAMIN D3) 50 MCG
TABLET ORAL
Qty: 100 TABLET | Refills: 2 | Status: SHIPPED | OUTPATIENT
Start: 2018-09-14 | End: 2022-05-20

## 2018-09-14 NOTE — TELEPHONE ENCOUNTER
Cholecalciferol  LOV-06/27/2018  Prescription refilled per RN Medication RefillPolireney.................... Dilma Butler ....................  9/14/2018   2:57 PM

## 2018-09-21 ENCOUNTER — TELEPHONE (OUTPATIENT)
Dept: FAMILY MEDICINE | Facility: OTHER | Age: 53
End: 2018-09-21

## 2018-09-21 NOTE — TELEPHONE ENCOUNTER
"Returned call to Jack and spoke with Mone who reports the following:    \"We are not sure what is going on. We have messages out to other doctors about this interaction. I don't know all the details but Ysabel, (Pharmacist ) will call you back when she knows more\". Dilma Butler, RN on 9/21/2018 at 2:10 PM    "

## 2018-09-21 NOTE — TELEPHONE ENCOUNTER
"Updated Ysabel Pharmacist to physician's response. Ysabel verbalized understanding, \"OK Thank you\". Dilma Butler RN on 9/21/2018 at 3:50 PM    "

## 2018-09-21 NOTE — TELEPHONE ENCOUNTER
"Valorie, Pharmacist calls and reports the following:    \"I just wanted Dr. Grider to know that patient is being prescribed both Lyrica and Gabapentin by Dr. Gallagher a Neurologist. Two different antidepressants from [facility]-Fluoxetine and Remeron. And he is also on Tramadol and Klonopin and Robaxin which we are filling today but it comes up as sedative high risk. I just want Dr. Grider to know that he is on all this medications that flag us as sedative\". Is it OK to refill the Klonopin and Robaxin?'    Pharmacy requests physician consideration and a callback today please    Dilma ZACHARY Butler RN on 9/21/2018 at 3:18 PM        "

## 2018-09-24 DIAGNOSIS — Z79.4 UNCONTROLLED TYPE 2 DIABETES MELLITUS WITH HYPERGLYCEMIA, WITH LONG-TERM CURRENT USE OF INSULIN (H): ICD-10-CM

## 2018-09-24 DIAGNOSIS — E11.65 UNCONTROLLED TYPE 2 DIABETES MELLITUS WITH HYPERGLYCEMIA, WITH LONG-TERM CURRENT USE OF INSULIN (H): ICD-10-CM

## 2018-09-24 RX ORDER — PREGABALIN 200 MG/1
CAPSULE ORAL
COMMUNITY
Start: 2018-08-20 | End: 2018-09-26

## 2018-09-24 RX ORDER — LISINOPRIL 10 MG/1
TABLET ORAL
COMMUNITY
Start: 2017-11-24 | End: 2018-09-26

## 2018-09-24 RX ORDER — MIRTAZAPINE 15 MG/1
TABLET, FILM COATED ORAL
COMMUNITY
Start: 2018-09-13 | End: 2018-09-26

## 2018-09-25 NOTE — TELEPHONE ENCOUNTER
No pharmacy was listed; I sent to mail order.  Please call into [facility] if incorrect.   Thanks,  Kira Grider

## 2018-09-26 ENCOUNTER — TELEPHONE (OUTPATIENT)
Dept: FAMILY MEDICINE | Facility: OTHER | Age: 53
End: 2018-09-26

## 2018-09-26 ENCOUNTER — OFFICE VISIT (OUTPATIENT)
Dept: FAMILY MEDICINE | Facility: OTHER | Age: 53
End: 2018-09-26
Attending: PHYSICIAN ASSISTANT
Payer: COMMERCIAL

## 2018-09-26 VITALS
DIASTOLIC BLOOD PRESSURE: 88 MMHG | OXYGEN SATURATION: 92 % | HEIGHT: 71 IN | HEART RATE: 89 BPM | BODY MASS INDEX: 41.86 KG/M2 | WEIGHT: 299 LBS | SYSTOLIC BLOOD PRESSURE: 140 MMHG | TEMPERATURE: 99 F

## 2018-09-26 DIAGNOSIS — Z79.4 UNCONTROLLED TYPE 2 DIABETES MELLITUS WITH HYPERGLYCEMIA, WITH LONG-TERM CURRENT USE OF INSULIN (H): ICD-10-CM

## 2018-09-26 DIAGNOSIS — E11.65 UNCONTROLLED TYPE 2 DIABETES MELLITUS WITH HYPERGLYCEMIA, WITH LONG-TERM CURRENT USE OF INSULIN (H): ICD-10-CM

## 2018-09-26 DIAGNOSIS — I10 BENIGN ESSENTIAL HYPERTENSION: ICD-10-CM

## 2018-09-26 DIAGNOSIS — F51.01 PRIMARY INSOMNIA: ICD-10-CM

## 2018-09-26 DIAGNOSIS — J98.4 RESTRICTIVE LUNG DISEASE: Primary | ICD-10-CM

## 2018-09-26 DIAGNOSIS — E78.5 HYPERLIPIDEMIA LDL GOAL <100: ICD-10-CM

## 2018-09-26 DIAGNOSIS — R79.89 ELEVATED SERUM CREATININE: ICD-10-CM

## 2018-09-26 LAB
ANION GAP SERPL CALCULATED.3IONS-SCNC: 7 MMOL/L (ref 3–14)
BASOPHILS # BLD AUTO: 0.1 10E9/L (ref 0–0.2)
BASOPHILS NFR BLD AUTO: 1 %
BUN SERPL-MCNC: 19 MG/DL (ref 7–25)
CALCIUM SERPL-MCNC: 9.4 MG/DL (ref 8.6–10.3)
CHLORIDE SERPL-SCNC: 93 MMOL/L (ref 98–107)
CO2 SERPL-SCNC: 33 MMOL/L (ref 21–31)
CREAT SERPL-MCNC: 1.48 MG/DL (ref 0.7–1.3)
DIFFERENTIAL METHOD BLD: NORMAL
EOSINOPHIL # BLD AUTO: 0.2 10E9/L (ref 0–0.7)
EOSINOPHIL NFR BLD AUTO: 2.7 %
ERYTHROCYTE [DISTWIDTH] IN BLOOD BY AUTOMATED COUNT: 13 % (ref 10–15)
GFR SERPL CREATININE-BSD FRML MDRD: 50 ML/MIN/1.7M2
GLUCOSE SERPL-MCNC: 445 MG/DL (ref 70–105)
HBA1C MFR BLD: 8 % (ref 4–6)
HCT VFR BLD AUTO: 42.3 % (ref 40–53)
HGB BLD-MCNC: 13.9 G/DL (ref 13.3–17.7)
IMM GRANULOCYTES # BLD: 0 10E9/L (ref 0–0.4)
IMM GRANULOCYTES NFR BLD: 0.5 %
LYMPHOCYTES # BLD AUTO: 1.4 10E9/L (ref 0.8–5.3)
LYMPHOCYTES NFR BLD AUTO: 19.6 %
MCH RBC QN AUTO: 30 PG (ref 26.5–33)
MCHC RBC AUTO-ENTMCNC: 32.9 G/DL (ref 31.5–36.5)
MCV RBC AUTO: 91 FL (ref 78–100)
MONOCYTES # BLD AUTO: 0.7 10E9/L (ref 0–1.3)
MONOCYTES NFR BLD AUTO: 9.5 %
NEUTROPHILS # BLD AUTO: 4.9 10E9/L (ref 1.6–8.3)
NEUTROPHILS NFR BLD AUTO: 66.7 %
PLATELET # BLD AUTO: 231 10E9/L (ref 150–450)
POTASSIUM SERPL-SCNC: 4.9 MMOL/L (ref 3.5–5.1)
RBC # BLD AUTO: 4.64 10E12/L (ref 4.4–5.9)
SODIUM SERPL-SCNC: 133 MMOL/L (ref 134–144)
WBC # BLD AUTO: 7.3 10E9/L (ref 4–11)

## 2018-09-26 PROCEDURE — 83036 HEMOGLOBIN GLYCOSYLATED A1C: CPT | Performed by: PHYSICIAN ASSISTANT

## 2018-09-26 PROCEDURE — 80048 BASIC METABOLIC PNL TOTAL CA: CPT | Performed by: PHYSICIAN ASSISTANT

## 2018-09-26 PROCEDURE — 99214 OFFICE O/P EST MOD 30 MIN: CPT | Performed by: PHYSICIAN ASSISTANT

## 2018-09-26 PROCEDURE — 36415 COLL VENOUS BLD VENIPUNCTURE: CPT | Performed by: PHYSICIAN ASSISTANT

## 2018-09-26 PROCEDURE — G0463 HOSPITAL OUTPT CLINIC VISIT: HCPCS

## 2018-09-26 PROCEDURE — 85025 COMPLETE CBC W/AUTO DIFF WBC: CPT | Performed by: PHYSICIAN ASSISTANT

## 2018-09-26 RX ORDER — MIRTAZAPINE 15 MG/1
15 TABLET, FILM COATED ORAL AT BEDTIME
COMMUNITY
Start: 2018-09-26 | End: 2022-05-20

## 2018-09-26 RX ORDER — ALBUTEROL SULFATE 90 UG/1
2 AEROSOL, METERED RESPIRATORY (INHALATION) EVERY 4 HOURS PRN
Qty: 1 INHALER | Refills: 11 | Status: SHIPPED | OUTPATIENT
Start: 2018-09-26 | End: 2019-09-05

## 2018-09-26 RX ORDER — INSULIN LISPRO 200 [IU]/ML
INJECTION, SOLUTION SUBCUTANEOUS
COMMUNITY
Start: 2018-09-26 | End: 2018-10-16

## 2018-09-26 RX ORDER — HYDROCHLOROTHIAZIDE 25 MG/1
25 TABLET ORAL DAILY
Qty: 90 TABLET | Refills: 3 | Status: SHIPPED | OUTPATIENT
Start: 2018-09-26 | End: 2019-09-05

## 2018-09-26 RX ORDER — SIMVASTATIN 20 MG
20 TABLET ORAL AT BEDTIME
Qty: 90 TABLET | Refills: 3 | Status: SHIPPED | OUTPATIENT
Start: 2018-09-26 | End: 2019-09-05

## 2018-09-26 RX ORDER — PREGABALIN 200 MG/1
200 CAPSULE ORAL 3 TIMES DAILY
COMMUNITY
Start: 2018-09-26 | End: 2018-12-14

## 2018-09-26 NOTE — LETTER
Jake Bermudez  [address]  [facility] [address]    9/27/2018      Dear Mr. Bermudez,      We've received the results back from the laboratory for the samples you gave in clinic.      Your white blood cell count and hemoglobin are normal.    Your hemoglobin A1c has improved to 8.0.  Your blood sugar was highly elevated however this is likely due to the fact that you forgot your medication and had take the evening prior.  Please continue to take your medications daily.  Please schedule a diabetic education appointment to further discuss your labs and medications in order to keep your diabetes under good control.    Your kidney function was mildly increased.  I would recommend increasing fluid intake.  Please schedule a lab only appointment in 2-4 weeks to have this rechecked to ensure that it has returned to the normal range.  I have placed a lab only order.    Please contact us at [phone] with any questions or concerns that you have.    I attached your lab results for your records.        Take Care,         Valorie Lee PA-C    Resulted Orders   Hemoglobin A1c   Result Value Ref Range    Hemoglobin A1C 8.0 (H) 4.0 - 6.0 %   CBC and Differential   Result Value Ref Range    WBC 7.3 4.0 - 11.0 10e9/L    RBC Count 4.64 4.4 - 5.9 10e12/L    Hemoglobin 13.9 13.3 - 17.7 g/dL    Hematocrit 42.3 40.0 - 53.0 %    MCV 91 78 - 100 fl    MCH 30.0 26.5 - 33.0 pg    MCHC 32.9 31.5 - 36.5 g/dL    RDW 13.0 10.0 - 15.0 %    Platelet Count 231 150 - 450 10e9/L    Diff Method Automated Method     % Neutrophils 66.7 %    % Lymphocytes 19.6 %    % Monocytes 9.5 %    % Eosinophils 2.7 %    % Basophils 1.0 %    % Immature Granulocytes 0.5 %    Absolute Neutrophil 4.9 1.6 - 8.3 10e9/L    Absolute Lymphocytes 1.4 0.8 - 5.3 10e9/L    Absolute Monocytes 0.7 0.0 - 1.3 10e9/L    Absolute Eosinophils 0.2 0.0 - 0.7 10e9/L    Absolute Basophils 0.1 0.0 - 0.2 10e9/L    Abs Immature Granulocytes 0.0 0 - 0.4 10e9/L   Basic Metabolic  Panel   Result Value Ref [place]    Sodium 133 (L) 134 - 144 mmol/L    Potassium 4.9 3.5 - 5.1 mmol/L    Chloride 93 (L) 98 - 107 mmol/L    Carbon Dioxide 33 (H) 21 - 31 mmol/L    Anion Gap 7 3 - 14 mmol/L    Glucose 445 (H) 70 - 105 mg/dL    Urea Nitrogen 19 7 - 25 mg/dL    Creatinine 1.48 (H) 0.70 - 1.30 mg/dL    GFR Estimate 50 (L) >60 mL/min/1.7m2    GFR Estimate If Black 60 (L) >60 mL/min/1.7m2    Calcium 9.4 8.6 - 10.3 mg/dL

## 2018-09-26 NOTE — TELEPHONE ENCOUNTER
Called prescriptions into [facility] pharmacy per Rajan's request.  Dorothy Diaz............................... 9/26/2018 3:57 PM

## 2018-09-26 NOTE — PROGRESS NOTES
"Nursing Notes:   Alley Parker LPN  9/26/2018  9:54 AM  Signed  Patient presents to clinic for diabetic follow up.  Previous A1C is not at goal of <8  No components found for: HGBA1C  Urine microalbumin:creatine: 6/27/18  Foot exam   Eye exam January 2018    Patient is not a current smoker  Patient is on a daily aspirin  Patient is on a Statin.  Blood pressure today of   is at the goal of <139/89 for diabetics.    Alley Parker LPN..............9/26/2018 9:39 AM    Chief Complaint   Patient presents with     Follow Up For     diabetes       Initial /88 (BP Location: Right arm, Patient Position: Sitting, Cuff Size: Adult Large)  Pulse 89  Temp 99  F (37.2  C)  Ht 5' 11\" (1.803 m)  Wt 299 lb (135.6 kg)  SpO2 92%  BMI 41.7 kg/m2 Estimated body mass index is 41.7 kg/(m^2) as calculated from the following:    Height as of this encounter: 5' 11\" (1.803 m).    Weight as of this encounter: 299 lb (135.6 kg).  Medication Reconciliation: complete    Alley Parker LPN      HPI:    Jake Bermudez is a 53 year old male who presents for diabetes recheck.   338 this morning. Forgot to take basaglar last night and had cake last night for anniversary. Normally his blood sugars are under 200s in the morning. Around 150 in the afternoon.  Taking less insulin lately (before 280 units daily) and now around 200.  Afternoon 150-170  Evenings 200s  Bedtime 200-250  No foot sores.  No chest pain, palpitations, problems breathing.  Full sensation of his feet.  Gets his eyes checked regularly.  Needs to have it rechecked by diabetic educator.    Patient uses albuterol inhaler as needed for breathing.  History of restrictive lung disease.  Stable at this time.  No acute flares.  No wheezing or rattling.    Patient has history of hypertension.  Well-controlled with medication.  No side effects noted with the medication.  Needs a refill of his medication.  Needs labs rechecked.    Hyperlipidemia: " Well controlled with simvastatin 20 mg.  No acute concerns with the medication.  No side effects noted.  Patient is tolerating the medication well.  No GI symptoms or muscle aches with medication.      Past Medical History:   Diagnosis Date     Lateral epicondylitis of left elbow     10/3/2013,Seen by Dr Tayla Reyes [facility] 8/2013     Type 2 diabetes mellitus with proliferative retinopathy without macular edema (H)     7/1/2014,Mild proliferative changes on left side--sees Dr Ortiz exam 6/2014       Past Surgical History:   Procedure Laterality Date     OTHER SURGICAL HISTORY      [id number],SHOULDER SURGERY,Bilateral     OTHER SURGICAL HISTORY      [id number],CHEST TUBE INSERTION     OTHER SURGICAL HISTORY      [id number],LUNG SURGERY,pneumonia     OTHER SURGICAL HISTORY      [id number],HCHG TRACH PR1,history trach with pneumonia       Family History   Problem Relation Age of Onset     HEART DISEASE Other      Heart Disease,Family History Coronary Heart Disease male     Diabetes Other      Diabetes     Cancer Father      Cancer,stomach ca       Social History     Social History     Marital status:      Spouse name: N/A     Number of children: N/A     Years of education: N/A     Occupational History     Not on file.     Social History Main Topics     Smoking status: Former Smoker     Packs/day: 0.50     Years: 28.00     Types: Cigarettes     Quit date: 12/21/2011     Smokeless tobacco: Former User     Types: Chew     Quit date: 9/5/1988     Alcohol use No     Drug use: No      Comment: Drug use: No     Sexual activity: Yes     Partners: Female     Other Topics Concern     Not on file     Social History Narrative    Alcohol Use - no    2 children  unemployed 10 1/2 % disability for back injury  Patient was a former smoker.  1/2 PPD       Current Outpatient Prescriptions   Medication Sig Dispense Refill     albuterol (PROAIR HFA/PROVENTIL HFA/VENTOLIN HFA) 108 (90 Base) MCG/ACT inhaler Inhale 2 puffs into the lungs  every 4 hours as needed for shortness of breath / dyspnea or wheezing 1 Inhaler 11     Aspirin (ASPIR-81 PO) Take 81 mg by mouth daily with food       BASAGLAR 100 UNIT/ML injection Inject 177 Units Subcutaneous At Bedtime 180 mL 3     beclomethasone HFA (QVAR REDIHALER) 80 MCG/ACT AERB inhaler Inhale 1 puff into the lungs 2 times daily 1 Inhaler 11     blood glucose calibration (ACCU-CHEK DONAVON) solution Use to calibrate blood glucose monitor as needed as directed. 1 Bottle 3     blood glucose monitoring (ACCU-CHEK DONAVON PLUS) test strip CHECK GLUCOSE FOUR TIMES DAILY 400 strip 4     blood glucose monitoring (ACCU-CHEK MULTICLIX) lancets USE STRIP TO CHECK BLOOD GLUCOSE 4 TIMES DAILY. 408 each 4     Cholecalciferol (VITAMIN D3) 2000 units TABS TAKE ONE TABLET BY MOUTH ONCE DAILY 100 tablet 2     clonazePAM (KLONOPIN) 0.5 MG tablet TAKE ONE TABLET BY MOUTH AT BEDTIME 30 tablet 5     FLUoxetine (PROZAC) 20 MG capsule TAKE 1 CAPSULE BY MOUTH IN THE MORNING 90 capsule 3     gabapentin (NEURONTIN) 300 MG capsule Take 900 mg by mouth 3 times daily 900 mg TID, 1200 mg at bedtime       HUMALOG KWIKPEN 200 UNIT/ML soln Inject Subcutaneous 4 times daily (with meals and nightly) Sliding scale       hydrochlorothiazide (HYDRODIURIL) 25 MG tablet Take 1 tablet (25 mg) by mouth daily 90 tablet 3     insulin pen needle (BD ANN U/F) 32G X 4 MM USE AS DIRECTED FOR  ADMINISTERING  INSULIN  AT  HOME  4  TIMES  DAILY       lisinopril (PRINIVIL/ZESTRIL) 20 MG tablet Take 1 tablet (20 mg) by mouth daily 90 tablet 4     LYRICA 200 MG capsule Take 1 capsule (200 mg) by mouth 3 times daily       MAPAP 500 MG tablet TAKE ONE TABLET BY MOUTH EVERY 6 HOURS AS NEEDED .  MAX  ACETAMINOPHEN  DOSE  4000  MG  IN  24  HOURS. 200 tablet 5     melatonin 5 MG tablet Take 1-2 tablets (5-10 mg) by mouth At Bedtime 180 tablet 3     meloxicam (MOBIC) 15 MG tablet TAKE ONE TABLET BY MOUTH ONCE DAILY WITH FOOD 90 tablet 0     methocarbamol (ROBAXIN) 750 MG  "tablet TAKE ONE TABLET BY MOUTH THREE TIMES DAILY 270 tablet 3     mirtazapine (REMERON) 15 MG tablet Take 1 tablet (15 mg) by mouth At Bedtime       omeprazole 20 MG tablet Take 20 mg by mouth once daily before a meal 90 tablet 3     order for DME Oxygen for home use. LPM: 1/min via nasal cannula. Frequency of use: Continuous with portability; Length of need: 12 mos.       pregabalin (LYRICA) 100 MG capsule Take 200 mg by mouth 3 times daily       QVAR 80 MCG/ACT Inhaler        simvastatin (ZOCOR) 20 MG tablet Take 1 tablet (20 mg) by mouth At Bedtime 90 tablet 3     traMADol (ULTRAM) 50 MG tablet TAKE ONE TABLET BY MOUTH THREE TIMES DAILY AS NEEDED 90 tablet 5     umeclidinium-vilanterol (ANORO ELLIPTA) 62.5-25 MCG/INH oral inhaler Inhale 1 puff into the lungs       alcohol swab prep pads Use to swab area of injection/france as directed. 100 each 6     blood glucose (ACCU-CHEK FASTCLIX) lancing device Lancing device to be used with lancets. 1 each 0     blood glucose monitoring (ACCU-CHEK FASTCLIX) lancets Use to test blood sugar 4 times daily. 204 each 6       Allergies   Allergen Reactions     Penicillins      Other reaction(s): Diaphoresis     Piperacillin-Tazobactam In D5w Other (See Comments)     Other reaction(s): Diaphoresis, Fever  Fever while on zosyn, vanc. No rash, no wbc elevation     Vancomycin Other (See Comments)     Other reaction(s): Diaphoresis, Fever  Fever while on zosyn, vanc, no rash or WBC elevation     Zosyn Other (See Comments)     Fever while on zosyn, vanc. No rash, no wbc elevation       REVIEW OFSYSTEMS:  Refer to HPI.    EXAM:   Vitals:    /88 (BP Location: Right arm, Patient Position: Sitting, Cuff Size: Adult Large)  Pulse 89  Temp 99  F (37.2  C)  Ht 5' 11\" (1.803 m)  Wt 299 lb (135.6 kg)  SpO2 92%  BMI 41.7 kg/m2  General Appearance: Pleasant, alert, appropriate appearance for age. No acute distress  Ear Exam: Normal TM's bilaterally, normal grey, and translucent. " Normal auditory canals and external ears. Non-tender.   OroPharynx Exam:  Dental hygiene adequate. Normal buccal mucosa. Normal pharynx.  Neck Exam:  Supple, no masses ornodes.  Chest/Respiratory Exam: Normal chest wall and respirations. Clear to auscultation.  Cardiovascular Exam: Regular rate and rhythm. S1, S2, no murmur, click, gallop, or rubs.  Gastrointestinal Exam: Soft, non-tender, no masses or organomegaly. Normal BS x 4.  Musculoskeletal Exam: Back is straight and non-tender, full ROM of upper and lower extremities.  Foot Exam: Left and right foot: Declines exam at this time.  Skin: no rash or abnormalities  Neurologic Exam: Nonfocal, normal gross motor, tone coordination and no tremor.  Psychiatric Exam: Alert and oriented - appropriate affect.    PHQ Depression Screen  PHQ-9 SCORE 12/1/2017 1/17/2018 3/7/2018   Total Score 15 17 21       Results for orders placed or performed in visit on 09/26/18   Hemoglobin A1c   Result Value Ref Range    Hemoglobin A1C 8.0 (H) 4.0 - 6.0 %   CBC and Differential   Result Value Ref Range    WBC 7.3 4.0 - 11.0 10e9/L    RBC Count 4.64 4.4 - 5.9 10e12/L    Hemoglobin 13.9 13.3 - 17.7 g/dL    Hematocrit 42.3 40.0 - 53.0 %    MCV 91 78 - 100 fl    MCH 30.0 26.5 - 33.0 pg    MCHC 32.9 31.5 - 36.5 g/dL    RDW 13.0 10.0 - 15.0 %    Platelet Count 231 150 - 450 10e9/L    Diff Method Automated Method     % Neutrophils 66.7 %    % Lymphocytes 19.6 %    % Monocytes 9.5 %    % Eosinophils 2.7 %    % Basophils 1.0 %    % Immature Granulocytes 0.5 %    Absolute Neutrophil 4.9 1.6 - 8.3 10e9/L    Absolute Lymphocytes 1.4 0.8 - 5.3 10e9/L    Absolute Monocytes 0.7 0.0 - 1.3 10e9/L    Absolute Eosinophils 0.2 0.0 - 0.7 10e9/L    Absolute Basophils 0.1 0.0 - 0.2 10e9/L    Abs Immature Granulocytes 0.0 0 - 0.4 10e9/L   Basic Metabolic Panel   Result Value Ref Range    Sodium 133 (L) 134 - 144 mmol/L    Potassium 4.9 3.5 - 5.1 mmol/L    Chloride 93 (L) 98 - 107 mmol/L    Carbon Dioxide 33  (H) 21 - 31 mmol/L    Anion Gap 7 3 - 14 mmol/L    Glucose 445 (H) 70 - 105 mg/dL    Urea Nitrogen 19 7 - 25 mg/dL    Creatinine 1.48 (H) 0.70 - 1.30 mg/dL    GFR Estimate 50 (L) >60 mL/min/1.7m2    GFR Estimate If Black 60 (L) >60 mL/min/1.7m2    Calcium 9.4 8.6 - 10.3 mg/dL         ASSESSMENT AND PLAN:    1. Restrictive lung disease    2. Uncontrolled type 2 diabetes mellitus with hyperglycemia, with long-term current use of insulin (H)    3. Benign essential hypertension    4. Primary insomnia    5. Hyperlipidemia LDL goal <100    6. Elevated serum creatinine        Completed labs for monitoring including CBC, BMP, hemoglobin A1c.  Results are pending.    Restrictive lung disease: Refilled albuterol inhaler.  No acute concerns at this time.  Return as needed for recheck.    Diabetes mellitus type II: Referred for diabetic educator consult.  Completed labs for monitoring including CBC, BMP, hemoglobin A1c.  Blood glucose and hemoglobin A1c are highly elevated.  Call patient with results.  Encouraged to closely monitor his blood sugar over the next few hours.  Encouraged to take half of his dose of basic lower this afternoon and then take a full dose this evening since he forgot his dose last night.  Return to emergency room if he becomes symptomatic.  Encourage good diet, exercise and weight loss.  Recheck in 3 months.      Insomnia - Refilled melatonin. Encouraged to take benadryl 25-50mg, melatonin, or unisom to treat insomnia.  Encouraged routine bedtime, getting up in the morning at the same time, minimal naps during the day, and no caffeine after 3pm. Return in 1-2 months if you are having persistent or worsening symptoms.       ?Sleep as long as necessary to feel rested (usually seven to eight hours for adults) and then get out of bed  ?Maintain a regular sleep schedule, particularly a regular wake-up time in the morning  ?Try not to force sleep  ?Avoid caffeinated beverages after lunch  ?Avoid alcohol near  bedtime (eg, late afternoon and evening)  ?Avoid smoking or other nicotine intake, particularly during the evening  ?Adjust the bedroom environment as needed to decrease stimuli (eg, reduce ambient light, turn off the television or radio)  ?Avoid prolonged use of light-emitting screens (laptops, tablets, smartphones, ebooks) before bedtime  ?Resolve concerns or worries before bedtime  ?Exercise regularly for at least 20 minutes, preferably more than four to five hours prior to bedtime  ?Avoid daytime naps, especially if they are longer than 20 to 30 minutes or occur late in the day      Creatinine was mildly elevated. Needs rechecked in 2-4 weeks.     Greater than 25 minutes were spent in counseling and coordination of care.       Valorie Lee PA-C..................9/26/2018 9:48 AM

## 2018-09-26 NOTE — MR AVS SNAPSHOT
After Visit Summary   9/26/2018    Jake Bermudez    MRN: [id number]           Patient Information     Date Of Birth          1965        Visit Information        Provider Department      9/26/2018 9:30 AM Valorie Lee PA-C [facility]        Today's Diagnoses     Restrictive lung disease    -  1    Uncontrolled type 2 diabetes mellitus with hyperglycemia, with long-term current use of insulin (H)        Benign essential hypertension        Primary insomnia        Hyperlipidemia LDL goal <100           Follow-ups after your visit        Additional Services     DIABETES EDUCATOR REFERRAL       DIABETES SELF MANAGEMENT TRAINING (DSMT)      Your provider has referred you to Diabetes Education    A  will call you to make your appointment. If it has been more than 3 business days since your referral was placed, please call the above phone number to schedule.    Type of training and number of hours: Previous Diagnosis: Follow-up DSMT - 2 hours.    Diabetes Type: Type 2 - On Insulin   Medicare covers: 10 hours of initial DSMT in 12 month period from the time of first visit, plus 2 hours of follow-up DSMT annually, and additional hours as requested for insulin training.         Diabetes Co-Morbidities: dyslipidemia and hypertension               A1C Goal:  <8.0       A1C is: Lab Results       Component                Value               Date                       A1C                      9.1                 06/27/2018              MEDICAL NUTRITION THERAPY (MNT) for Diabetes    Medical Nutrition Therapy with a Registered Dietitian can be provided in coordination with Diabetes Self-Management Training to assist in achieving optimal diabetes management.    MNT Type and Hours: Previous diagnosis: Annual follow-up MNT - 2 hours                       Medicare will cover: 3 hours initial MNT in 12 month period after first visit, plus 2 hours of follow-up MNT annually         Diabetes Education Topics: Comprehensive Knowledge Assessment and Instruction, Knowledge: Healthy Eating, Being Active, Monitoring Blood Sugar, Taking Medication, Problem Solving/Goal Setting, Reducing Risks (Preventing Acute and Chronic Diabetes Complications) and Healthy Coping and Blood glucose meter instruction     Special Educational Needs Requiring Individual DSMT: Additional Insulin Training      Please be aware that coverage of these services is subject to the terms and limitations of your health insurance plan.  Call member services at your health plan to determine Diabetes Self-Management Training (Codes  and ) and Medical Nutrition Therapy (Codes 18958 and 50366) benefits and ask which blood glucose monitor brands are covered by your plan.  Please bring the following with you to your appointment:    (1)  List of current medications   (2)  List of Blood Glucose Monitor brands that are covered by your insurance plan  (3)  Blood Glucose Monitor and log book  (4)   Food records for the 3 days prior to your visit    The Certified Diabetes Educator may make diabetes medication adjustments per the CDE Protocol and Collaborative Practice Agreement.                  Follow-up notes from your care team     Return if symptoms worsen or fail to improve.      Future tests that were ordered for you today     Open Future Orders        Priority Expected Expires Ordered    Hemoglobin A1c Routine  9/26/2019 9/26/2018    CBC and Differential Routine  9/26/2019 9/26/2018    Basic Metabolic Panel Routine  9/26/2019 9/26/2018            Who to contact     If you have questions or need follow up information about today's clinic visit or your schedule please contact [facility] directly at [phone].  Normal or non-critical lab and imaging results will be communicated to you by MyChart, letter or phone within 4 business days after the clinic has received the results. If you do not hear from  "us within 7 days, please contact the clinic through [employer] or phone. If you have a critical or abnormal lab result, we will notify you by phone as soon as possible.  Submit refill requests through [employer] or call your pharmacy and they will forward the refill request to us. Please allow 3 business days for your refill to be completed.          Additional Information About Your Visit        [employer] Information     [employer] lets you send messages to your doctor, view your test results, renew your prescriptions, schedule appointments and more. To sign up, go to [email or web address] . Click on \"Log in\" on the left side of the screen, which will take you to the Welcome page. Then click on \"Sign up Now\" on the right side of the page.     You will be asked to enter the access code listed below, as well as some personal information. Please follow the directions to create your username and password.     Your access code is: [email or web address]  Expires: 2018 10:11 AM     Your access code will  in 90 days. If you need help or a new code, please call your [place] clinic or [phone].        Care EveryWhere ID     This is your Care EveryWhere ID. This could be used by other organizations to access your [place] medical records  [id number]        Your Vitals Were     Pulse Temperature Height Pulse Oximetry BMI (Body Mass Index)       89 99  F (37.2  C) 5' 11\" (1.803 m) 92% 41.7 kg/m2        Blood Pressure from Last 3 Encounters:   18 140/88   18 156/82   18 128/80    Weight from Last 3 Encounters:   18 299 lb (135.6 kg)   18 295 lb 12.8 oz (134.2 kg)   18 298 lb 6.4 oz (135.4 kg)              We Performed the Following     DIABETES EDUCATOR REFERRAL          Today's Medication Changes          These changes are accurate as of 18 10:11 AM.  If you have any questions, ask your nurse or doctor.               These medicines have changed or have updated prescriptions.        " Dose/Directions    albuterol 108 (90 Base) MCG/ACT inhaler   Commonly known as:  PROAIR HFA/PROVENTIL HFA/VENTOLIN HFA   This may have changed:  reasons to take this   Used for:  Restrictive lung disease   Changed by:  Valorie Lee PA-C        Dose:  2 puff   Inhale 2 puffs into the lungs every 4 hours as needed for shortness of breath / dyspnea or wheezing   Quantity:  1 Inhaler   Refills:  11       HUMALOG KWIKPEN 200 UNIT/ML soln   This may have changed:    - how to take this  - when to take this  - additional instructions   Generic drug:  Insulin Lispro   Changed by:  Valorie Lee PA-C        Inject Subcutaneous 4 times daily (with meals and nightly) Sliding scale   Refills:  0       lisinopril 20 MG tablet   Commonly known as:  PRINIVIL/ZESTRIL   This may have changed:  Another medication with the same name was removed. Continue taking this medication, and follow the directions you see here.   Used for:  Benign essential hypertension   Changed by:  Valorie Lee PA-C        Dose:  20 mg   Take 1 tablet (20 mg) by mouth daily   Quantity:  90 tablet   Refills:  4       mirtazapine 15 MG tablet   Commonly known as:  REMERON   This may have changed:    - how much to take  - how to take this  - when to take this   Changed by:  Valorie Lee PA-C        Dose:  15 mg   Take 1 tablet (15 mg) by mouth At Bedtime   Refills:  0       * pregabalin 100 MG capsule   Commonly known as:  LYRICA   This may have changed:  Another medication with the same name was changed. Make sure you understand how and when to take each.   Changed by:  Valorie Lee PA-C        Dose:  200 mg   Take 200 mg by mouth 3 times daily   Refills:  0       * LYRICA 200 MG capsule   This may have changed:    - how much to take  - how to take this  - when to take this   Generic drug:  Pregabalin   Changed by:  Valorie Lee PA-C        Dose:  200 mg   Take 1 capsule (200 mg) by mouth 3 times daily   Refills:  0        * Notice:  This list has 2 medication(s) that are the same as other medications prescribed for you. Read the directions carefully, and ask your doctor or other care provider to review them with you.      Stop taking these medicines if you haven't already. Please contact your care team if you have questions.     DULoxetine 60 MG EC capsule   Commonly known as:  CYMBALTA   Stopped by:  Valorie Lee PA-C           insulin aspart 100 UNIT/ML injection   Commonly known as:  NovoLOG PEN   Stopped by:  Valorie Lee PA-C           priLOSEC OTC 20 MG tablet   Generic drug:  omeprazole   Stopped by:  Valorie Lee PA-C                Where to get your medicines      These medications were sent to [facility] [address] [address]     Phone:  [phone]     albuterol 108 (90 Base) MCG/ACT inhaler    hydrochlorothiazide 25 MG tablet    melatonin 5 MG tablet    simvastatin 20 MG tablet                Primary Care Provider Office Phone # Fax #    Kira Grider [phone]       [address] [facility] [address]        Equal Access to Services     [facility]: Hadii aad ku hadasho Soomaali, waaxda luqadaha, qaybta kaalmada adeegyada, patti lester haydarcyn toby herrera. So [facility] [phone].    ATENCIÓN: Si habla español, tiene a ott disposición servicios gratuitos de asistencia lingüística. Samantha al [phone].    We comply with applicable federal civil rights laws and [place] laws. We do not discriminate on the basis of race, color, national origin, age, disability, sex, sexual orientation, or gender identity.            Thank you!     Thank you for choosing [facility]  for your care. Our goal is always to provide you with excellent care. Hearing back from our patients is one way we can continue to improve our services. Please take a few minutes to complete the  written survey that you may receive in the mail after your visit with us. Thank you!             Your Updated Medication List - Protect others around you: Learn how to safely use, store and throw away your medicines at www.disposemymeds.org.          This list is accurate as of 9/26/18 10:11 AM.  Always use your most recent med list.                   Brand Name Dispense Instructions for use Diagnosis    albuterol 108 (90 Base) MCG/ACT inhaler    PROAIR HFA/PROVENTIL HFA/VENTOLIN HFA    1 Inhaler    Inhale 2 puffs into the lungs every 4 hours as needed for shortness of breath / dyspnea or wheezing    Restrictive lung disease       ASPIR-81 PO      Take 81 mg by mouth daily with food        BASAGLAR 100 UNIT/ML injection     180 mL    Inject 177 Units Subcutaneous At Bedtime    Uncontrolled type 2 diabetes mellitus with hyperglycemia, with long-term current use of insulin (H)       BD ANN U/F 32G X 4 MM   Generic drug:  insulin pen needle      USE AS DIRECTED FOR  ADMINISTERING  INSULIN  AT  HOME  4  TIMES  DAILY        beclomethasone HFA 80 MCG/ACT inhaler    QVAR REDIHALER    1 Inhaler    Inhale 1 puff into the lungs 2 times daily    Restrictive lung disease       blood glucose calibration solution     1 Bottle    Use to calibrate blood glucose monitor as needed as directed.    Uncontrolled type 2 diabetes mellitus with hyperglycemia, with long-term current use of insulin (H)       blood glucose monitoring lancets     408 each    USE STRIP TO CHECK BLOOD GLUCOSE 4 TIMES DAILY.    Uncontrolled type 2 diabetes mellitus with hyperglycemia, with long-term current use of insulin (H)       blood glucose monitoring test strip    ACCU-CHEK DONAVON PLUS    400 strip    CHECK GLUCOSE FOUR TIMES DAILY    Uncontrolled type 2 diabetes mellitus with hyperglycemia, with long-term current use of insulin (H)       clonazePAM 0.5 MG tablet    klonoPIN    30 tablet    TAKE ONE TABLET BY MOUTH AT BEDTIME    MIRANDA (generalized anxiety  disorder)       FLUoxetine 20 MG capsule    PROzac    90 capsule    TAKE 1 CAPSULE BY MOUTH IN THE MORNING    Depression, unspecified depression type       gabapentin 300 MG capsule    NEURONTIN     Take 900 mg by mouth 3 times daily 900 mg TID, 1200 mg at bedtime        HUMALOG KWIKPEN 200 UNIT/ML soln   Generic drug:  Insulin Lispro      Inject Subcutaneous 4 times daily (with meals and nightly) Sliding scale        hydrochlorothiazide 25 MG tablet    HYDRODIURIL    90 tablet    Take 1 tablet (25 mg) by mouth daily    Benign essential hypertension       lisinopril 20 MG tablet    PRINIVIL/ZESTRIL    90 tablet    Take 1 tablet (20 mg) by mouth daily    Benign essential hypertension       MAPAP 500 MG tablet   Generic drug:  acetaminophen     200 tablet    TAKE ONE TABLET BY MOUTH EVERY 6 HOURS AS NEEDED .  MAX  ACETAMINOPHEN  DOSE  4000  MG  IN  24  HOURS.    Chronic low back pain, unspecified back pain laterality, with sciatica presence unspecified       melatonin 5 MG tablet     180 tablet    Take 1-2 tablets (5-10 mg) by mouth At Bedtime    Primary insomnia       meloxicam 15 MG tablet    MOBIC    90 tablet    TAKE ONE TABLET BY MOUTH ONCE DAILY WITH FOOD    H/O hyperkalemia, Back pain, unspecified back location, unspecified back pain laterality, unspecified chronicity       methocarbamol 750 MG tablet    ROBAXIN    270 tablet    TAKE ONE TABLET BY MOUTH THREE TIMES DAILY    Spasm of back muscles       mirtazapine 15 MG tablet    REMERON     Take 1 tablet (15 mg) by mouth At Bedtime        omeprazole 20 MG tablet     90 tablet    Take 20 mg by mouth once daily before a meal    Gastroesophageal reflux disease, esophagitis presence not specified       order for DME      Oxygen for home use. LPM: 1/min via nasal cannula. Frequency of use: Continuous with portability; Length of need: 12 mos.        * pregabalin 100 MG capsule    LYRICA     Take 200 mg by mouth 3 times daily        * LYRICA 200 MG capsule   Generic  drug:  Pregabalin      Take 1 capsule (200 mg) by mouth 3 times daily        QVAR 80 MCG/ACT Inhaler   Generic drug:  beclomethasone           simvastatin 20 MG tablet    ZOCOR    90 tablet    Take 1 tablet (20 mg) by mouth At Bedtime    Hyperlipidemia LDL goal <100       traMADol 50 MG tablet    ULTRAM    90 tablet    TAKE ONE TABLET BY MOUTH THREE TIMES DAILY AS NEEDED    Chronic low back pain, unspecified back pain laterality, with sciatica presence unspecified       umeclidinium-vilanterol 62.5-25 MCG/INH oral inhaler    ANORO ELLIPTA     Inhale 1 puff into the lungs        Vitamin D3 2000 units Tabs     100 tablet    TAKE ONE TABLET BY MOUTH ONCE DAILY    Neuropathy       * Notice:  This list has 2 medication(s) that are the same as other medications prescribed for you. Read the directions carefully, and ask your doctor or other care provider to review them with you.

## 2018-09-26 NOTE — NURSING NOTE
"Patient presents to clinic for diabetic follow up.  Previous A1C is not at goal of <8  No components found for: HGBA1C  Urine microalbumin:creatine: 6/27/18  Foot exam   Eye exam January 2018    Patient is not a current smoker  Patient is on a daily aspirin  Patient is on a Statin.  Blood pressure today of   is at the goal of <139/89 for diabetics.    Alley Parker LPN..............9/26/2018 9:39 AM    Chief Complaint   Patient presents with     Follow Up For     diabetes       Initial /88 (BP Location: Right arm, Patient Position: Sitting, Cuff Size: Adult Large)  Pulse 89  Temp 99  F (37.2  C)  Ht 5' 11\" (1.803 m)  Wt 299 lb (135.6 kg)  SpO2 92%  BMI 41.7 kg/m2 Estimated body mass index is 41.7 kg/(m^2) as calculated from the following:    Height as of this encounter: 5' 11\" (1.803 m).    Weight as of this encounter: 299 lb (135.6 kg).  Medication Reconciliation: complete    Alley Parker LPN    "

## 2018-09-26 NOTE — TELEPHONE ENCOUNTER
Patient's glucose today was at 445.  Since you forgot your basaglar insulin last night I would highly recommend taking half of your normal dose immediately.  You can then take a full dose tonight as you regularly would.    Your kidney function has mildly elevated.  I would highly recommend having this rechecked in 1 month for monitoring.    Your hemoglobin A1c has decreased down to 8.0.  Please schedule an appointment with Fina for further diabetic recheck.  Valorie Lee PA-C.......... 9/26/2018 11:50 AM

## 2018-09-26 NOTE — TELEPHONE ENCOUNTER
Tried calling Rajan to see if these prescriptions should be called into [facility].  Dorothy Diaz............................... 9/26/2018 1:23 PM

## 2018-09-27 ENCOUNTER — TELEPHONE (OUTPATIENT)
Dept: FAMILY MEDICINE | Facility: OTHER | Age: 53
End: 2018-09-27

## 2018-09-27 DIAGNOSIS — E11.65 UNCONTROLLED TYPE 2 DIABETES MELLITUS WITH HYPERGLYCEMIA, WITH LONG-TERM CURRENT USE OF INSULIN (H): Primary | ICD-10-CM

## 2018-09-27 DIAGNOSIS — Z79.4 UNCONTROLLED TYPE 2 DIABETES MELLITUS WITH HYPERGLYCEMIA, WITH LONG-TERM CURRENT USE OF INSULIN (H): Primary | ICD-10-CM

## 2018-09-27 RX ORDER — LANCETS
EACH MISCELLANEOUS
Qty: 204 EACH | Refills: 6 | Status: SHIPPED | OUTPATIENT
Start: 2018-09-27 | End: 2018-10-26

## 2018-09-27 RX ORDER — LANCING DEVICE/LANCETS
KIT MISCELLANEOUS
Qty: 1 EACH | Refills: 0 | Status: SHIPPED | OUTPATIENT
Start: 2018-09-27 | End: 2018-10-26

## 2018-09-27 RX ORDER — GLUCOSAMINE HCL/CHONDROITIN SU 500-400 MG
CAPSULE ORAL
Qty: 100 EACH | Refills: 6 | Status: SHIPPED | OUTPATIENT
Start: 2018-09-27 | End: 2018-10-26

## 2018-09-27 NOTE — TELEPHONE ENCOUNTER
Prescription for Fastclix lancets MIS.  Multiclix is no longer made.  Please send prescription for Fastclix device.

## 2018-10-15 ENCOUNTER — TELEPHONE (OUTPATIENT)
Dept: FAMILY MEDICINE | Facility: OTHER | Age: 53
End: 2018-10-15

## 2018-10-15 DIAGNOSIS — Z79.4 UNCONTROLLED TYPE 2 DIABETES MELLITUS WITH HYPERGLYCEMIA, WITH LONG-TERM CURRENT USE OF INSULIN (H): Primary | ICD-10-CM

## 2018-10-15 DIAGNOSIS — E11.65 UNCONTROLLED TYPE 2 DIABETES MELLITUS WITH HYPERGLYCEMIA, WITH LONG-TERM CURRENT USE OF INSULIN (H): Primary | ICD-10-CM

## 2018-10-15 NOTE — TELEPHONE ENCOUNTER
Prescription for Humalog Kwikpen 200 unit/ML Inj.  Quantity: 324.  This is not covered by new insurance, please consider switching to Novolog.

## 2018-10-16 NOTE — TELEPHONE ENCOUNTER
New Rx sent; advise patient to double check insulin prior to next dose to make sure all information is correct!  Kira Grider

## 2018-10-17 ENCOUNTER — TELEPHONE (OUTPATIENT)
Dept: FAMILY MEDICINE | Facility: OTHER | Age: 53
End: 2018-10-17

## 2018-10-17 NOTE — TELEPHONE ENCOUNTER
Calling to advise pt A1C level is at 8, which is what pt was told is required in order for him to have surgery.  Want to get surgery ok/schedule while A1C level is where required.

## 2018-10-19 NOTE — TELEPHONE ENCOUNTER
Left message for Rajan to return call.  Dorothy Diaz............................... 10/19/2018 1:23 PM

## 2018-10-23 NOTE — TELEPHONE ENCOUNTER
Left message for Rajan to return call.  Dorothy Diaz............................... 10/23/2018 3:39 PM

## 2018-10-24 ENCOUNTER — TELEPHONE (OUTPATIENT)
Dept: FAMILY MEDICINE | Facility: OTHER | Age: 53
End: 2018-10-24

## 2018-10-24 DIAGNOSIS — K43.9 VENTRAL HERNIA WITHOUT OBSTRUCTION OR GANGRENE: Primary | ICD-10-CM

## 2018-10-24 NOTE — TELEPHONE ENCOUNTER
Referral placed for [facility] General surgery @ [facility].  Kira Grider DO

## 2018-10-24 NOTE — TELEPHONE ENCOUNTER
Wife Summer is calling to see if they can get a referral to gen surg for hernia repair for Rajan now that his A1C is 8. Please address.  Dorothy Diaz............................... 10/24/2018 3:29 PM

## 2018-10-26 DIAGNOSIS — E11.65 UNCONTROLLED TYPE 2 DIABETES MELLITUS WITH HYPERGLYCEMIA, WITH LONG-TERM CURRENT USE OF INSULIN (H): ICD-10-CM

## 2018-10-26 DIAGNOSIS — Z79.4 UNCONTROLLED TYPE 2 DIABETES MELLITUS WITH HYPERGLYCEMIA, WITH LONG-TERM CURRENT USE OF INSULIN (H): ICD-10-CM

## 2018-10-29 NOTE — TELEPHONE ENCOUNTER
Basaglar 100unit inj.  Pt is now on state insurance.  Lantus is covered, not basaglar. Please send new rx.

## 2018-10-30 RX ORDER — BLOOD GLUCOSE CONTROL HIGH,LOW
EACH MISCELLANEOUS
Qty: 1 BOTTLE | Refills: 3 | Status: SHIPPED | OUTPATIENT
Start: 2018-10-30 | End: 2019-02-06

## 2018-10-30 RX ORDER — GLUCOSAMINE HCL/CHONDROITIN SU 500-400 MG
CAPSULE ORAL
Qty: 100 EACH | Refills: 6 | Status: SHIPPED | OUTPATIENT
Start: 2018-10-30 | End: 2019-09-05

## 2018-10-30 RX ORDER — LANCING DEVICE/LANCETS
KIT MISCELLANEOUS
Qty: 1 EACH | Refills: 0 | Status: SHIPPED | OUTPATIENT
Start: 2018-10-30 | End: 2019-01-29

## 2018-10-30 RX ORDER — INSULIN GLARGINE 100 [IU]/ML
175 INJECTION, SOLUTION SUBCUTANEOUS AT BEDTIME
Qty: 120 ML | Refills: 4 | Status: SHIPPED | OUTPATIENT
Start: 2018-10-30 | End: 2018-10-31 | Stop reason: ALTCHOICE

## 2018-10-30 RX ORDER — LANCETS
EACH MISCELLANEOUS
Qty: 204 EACH | Refills: 6 | Status: SHIPPED | OUTPATIENT
Start: 2018-10-30 | End: 2019-01-25

## 2018-10-31 ENCOUNTER — TELEPHONE (OUTPATIENT)
Dept: FAMILY MEDICINE | Facility: OTHER | Age: 53
End: 2018-10-31

## 2018-10-31 ENCOUNTER — OFFICE VISIT (OUTPATIENT)
Dept: SURGERY | Facility: OTHER | Age: 53
End: 2018-10-31
Attending: FAMILY MEDICINE
Payer: MEDICAID

## 2018-10-31 VITALS
DIASTOLIC BLOOD PRESSURE: 60 MMHG | WEIGHT: 298 LBS | SYSTOLIC BLOOD PRESSURE: 150 MMHG | HEIGHT: 70 IN | BODY MASS INDEX: 42.66 KG/M2

## 2018-10-31 DIAGNOSIS — K42.0 UMBILICAL HERNIA WITH OBSTRUCTION, WITHOUT GANGRENE: Primary | ICD-10-CM

## 2018-10-31 DIAGNOSIS — Z79.4 UNCONTROLLED TYPE 2 DIABETES MELLITUS WITH HYPERGLYCEMIA, WITH LONG-TERM CURRENT USE OF INSULIN (H): ICD-10-CM

## 2018-10-31 DIAGNOSIS — E11.65 UNCONTROLLED TYPE 2 DIABETES MELLITUS WITH HYPERGLYCEMIA, WITH LONG-TERM CURRENT USE OF INSULIN (H): ICD-10-CM

## 2018-10-31 PROCEDURE — G0463 HOSPITAL OUTPT CLINIC VISIT: HCPCS

## 2018-10-31 PROCEDURE — 99203 OFFICE O/P NEW LOW 30 MIN: CPT | Performed by: SURGERY

## 2018-10-31 ASSESSMENT — PAIN SCALES - GENERAL: PAINLEVEL: NO PAIN (0)

## 2018-10-31 NOTE — TELEPHONE ENCOUNTER
Pharmacy still waiting for answer regarding basaglar, pt no longer able to get per insurance.  Needs to switch to lantis flex pen.

## 2018-10-31 NOTE — TELEPHONE ENCOUNTER
Chart review shows that patient with active Rx on file for Lantus as noted below:    Medication Detail      Disp Refills Start End MIGUELINA   insulin glargine (LANTUS VIAL) 100 UNIT/ML injection 120 mL 4 10/30/2018  --   Sig - Route: Inject 175 Units Subcutaneous At Bedtime - Subcutaneous   Class: E-Prescribe   Order: [id number]   E-Prescribing Status: Receipt confirmed by pharmacy (10/30/2018 10:42 AM CDT)   Printout Tracking   External Result Report   Pharmacy   [facility] - [address] [place] [address]     However, Rx as noted above was sent to pharmacy other than [facility] and is for vials and not flex pens. Call placed to patient to discuss. Patient was not available at this time. Call placed to [facility] in [place]. Spoke to eduardo Pollack. Pharmacy tech confirms that patient is indeed looking for a refill of Rx as noted above in their store. States that [place] been [place] so she is unaware of why Rx was sent as noted above. Patient also uses flex pens and not vials for all of his insulin and has been doing so for a long time. Pharmacy needs new Rx as noted above however lantus needs to be in pens and not vials. Patient is also in the store looking for his refills.    Writer will adjust Rx as noted above to reflect flex pens and not vials and will resend Rx as written as noted above to [facility] in [place].    Pharmacy tech happy with plan of care.    Prescription refilled per RN Medication Refill Policy..................Ilan Brooke 10/31/2018 1:19 PM

## 2018-10-31 NOTE — NURSING NOTE
"Chief Complaint   Patient presents with     Consult     ventrel hernia       Initial /60 (BP Location: Right arm, Patient Position: Sitting, Cuff Size: Adult Large)  Ht 5' 10\" (1.778 m)  Wt 298 lb (135.2 kg)  BMI 42.76 kg/m2 Estimated body mass index is 42.76 kg/(m^2) as calculated from the following:    Height as of this encounter: 5' 10\" (1.778 m).    Weight as of this encounter: 298 lb (135.2 kg).  Medication Reconciliation: complete    Mone Tineo LPN  "

## 2018-10-31 NOTE — MR AVS SNAPSHOT
"              After Visit Summary   10/31/2018    Jake Bermudez    MRN: [id number]           Patient Information     Date Of Birth          1965        Visit Information        Provider Department      10/31/2018 9:50 AM Mason Rhodes MD [facility]        Today's Diagnoses     Umbilical hernia with obstruction, without gangrene    -  1       Follow-ups after your visit        Your next 10 appointments already scheduled     Nov 19, 2018  9:30 AM CST   Diabetes Education with  DIABETES EDUCATION   [facility]    [address]  [place] [address]   [phone]            Dec 05, 2018  9:30 AM CST   [facility] Visit with Mason Rhodes MD   [facility]    [address]  [place] [address] [phone]              Who to contact     If you have questions or need follow up information about today's clinic visit or your schedule please contact [facility] directly at [phone].  Normal or non-critical lab and imaging results will be communicated to you by Book[employer], letter or phone within 4 business days after the clinic has received the results. If you do not hear from us within 7 days, please contact the clinic through [employer] or phone. If you have a critical or abnormal lab result, we will notify you by phone as soon as possible.  Submit refill requests through [employer] or call your pharmacy and they will forward the refill request to us. Please allow 3 business days for your refill to be completed.          Additional Information About Your Visit        [email or web address] Information     [employer] lets you send messages to your doctor, view your test results, renew your prescriptions, schedule appointments and more. To sign up, go to [email or web address] . Click on \"Log in\" on the left side of the screen, which will take " "you to the Welcome page. Then click on \"Sign up Now\" on the right side of the page.     You will be asked to enter the access code listed below, as well as some personal information. Please follow the directions to create your username and password.     Your access code is: [email or web address]  Expires: 2018 10:11 AM     Your access code will  in 90 days. If you need help or a new code, please call your [place] clinic or [phone].        Care EveryWhere ID     This is your Care EveryWhere ID. This could be used by other organizations to access your [place] medical records  [id number]        Your Vitals Were     Height BMI (Body Mass Index)                5' 10\" (1.778 m) 42.76 kg/m2           Blood Pressure from Last 3 Encounters:   10/31/18 150/60   18 140/88   18 156/82    Weight from Last 3 Encounters:   10/31/18 298 lb (135.2 kg)   18 299 lb (135.6 kg)   18 295 lb 12.8 oz (134.2 kg)              Today, you had the following     No orders found for display       Primary Care Provider Office Phone # Fax #    Kira LAMBERT DO M[place] [phone] [address] [facility] [address]        Equal Access to Services     [facility] AH: Hadii aad ku hadasho Soomaali, waaxda luqadaha, qaybta kaalmada sal, patti kee . So [facility] [phone].    ATENCIÓN: Si habla español, tiene a ott disposición servicios gratuitos de asistencia lingüística. Llame al [phone].    We comply with applicable federal civil rights laws and [place] laws. We do not discriminate on the basis of race, color, national origin, age, disability, sex, sexual orientation, or gender identity.            Thank you!     Thank you for choosing [facility] AND [facility]  for your care. Our goal is always to provide you with excellent care. Hearing back from our patients is one way we can continue to improve our services. Please take a few minutes to " complete the written survey that you may receive in the mail after your visit with us. Thank you!             Your Updated Medication List - Protect others around you: Learn how to safely use, store and throw away your medicines at www.disposemymeds.org.          This list is accurate as of 10/31/18 10:38 AM.  Always use your most recent med list.                   Brand Name Dispense Instructions for use Diagnosis    albuterol 108 (90 Base) MCG/ACT inhaler    PROAIR HFA/PROVENTIL HFA/VENTOLIN HFA    1 Inhaler    Inhale 2 puffs into the lungs every 4 hours as needed for shortness of breath / dyspnea or wheezing    Restrictive lung disease       alcohol swab prep pads     100 each    Use to swab area of injection/france as directed.    Uncontrolled type 2 diabetes mellitus with hyperglycemia, with long-term current use of insulin (H)       ASPIR-81 PO      Take 81 mg by mouth daily with food        beclomethasone HFA 80 MCG/ACT inhaler    QVAR REDIHALER    1 Inhaler    Inhale 1 puff into the lungs 2 times daily    Restrictive lung disease       blood glucose calibration solution     1 Bottle    Use to calibrate blood glucose monitor as needed as directed.    Uncontrolled type 2 diabetes mellitus with hyperglycemia, with long-term current use of insulin (H)       blood glucose lancing device     1 each    Lancing device to be used with lancets.    Uncontrolled type 2 diabetes mellitus with hyperglycemia, with long-term current use of insulin (H)       * blood glucose monitoring lancets     204 each    Use to test blood sugar 4 times daily.    Uncontrolled type 2 diabetes mellitus with hyperglycemia, with long-term current use of insulin (H)       * blood glucose monitoring lancets     408 each    USE STRIP TO CHECK BLOOD GLUCOSE 4 TIMES DAILY.    Uncontrolled type 2 diabetes mellitus with hyperglycemia, with long-term current use of insulin (H)       blood glucose monitoring test strip    ACCU-CHEK DONAVON PLUS    400  strip    CHECK GLUCOSE FOUR TIMES DAILY    Uncontrolled type 2 diabetes mellitus with hyperglycemia, with long-term current use of insulin (H)       clonazePAM 0.5 MG tablet    klonoPIN    30 tablet    TAKE ONE TABLET BY MOUTH AT BEDTIME    MIRANDA (generalized anxiety disorder)       FLUoxetine 20 MG capsule    PROzac    90 capsule    TAKE 1 CAPSULE BY MOUTH IN THE MORNING    Depression, unspecified depression type       gabapentin 300 MG capsule    NEURONTIN     Take 900 mg by mouth 3 times daily 900 mg TID, 1200 mg at bedtime        hydrochlorothiazide 25 MG tablet    HYDRODIURIL    90 tablet    Take 1 tablet (25 mg) by mouth daily    Benign essential hypertension       insulin aspart 100 UNIT/ML injection    NovoLOG FLEXPEN    90 mL    Use 60-70units subcutaneous with each meal based on insulin:carb ratio.  Maximum: 240 units per day.    Uncontrolled type 2 diabetes mellitus with hyperglycemia, with long-term current use of insulin (H)       insulin glargine 100 UNIT/ML injection    LANTUS VIAL    120 mL    Inject 175 Units Subcutaneous At Bedtime    Uncontrolled type 2 diabetes mellitus with hyperglycemia, with long-term current use of insulin (H)       insulin pen needle 32G X 4 MM    BD ANN U/F    100 each    USE AS DIRECTED FOR  ADMINISTERING  INSULIN  AT  HOME  4  TIMES  DAILY    Uncontrolled type 2 diabetes mellitus with hyperglycemia, with long-term current use of insulin (H)       lisinopril 20 MG tablet    PRINIVIL/ZESTRIL    90 tablet    Take 1 tablet (20 mg) by mouth daily    Benign essential hypertension       MAPAP 500 MG tablet   Generic drug:  acetaminophen     200 tablet    TAKE ONE TABLET BY MOUTH EVERY 6 HOURS AS NEEDED .  MAX  ACETAMINOPHEN  DOSE  4000  MG  IN  24  HOURS.    Chronic low back pain, unspecified back pain laterality, with sciatica presence unspecified       melatonin 5 MG tablet     180 tablet    Take 1-2 tablets (5-10 mg) by mouth At Bedtime    Primary insomnia       meloxicam 15  MG tablet    MOBIC    90 tablet    TAKE ONE TABLET BY MOUTH ONCE DAILY WITH FOOD    H/O hyperkalemia, Back pain, unspecified back location, unspecified back pain laterality, unspecified chronicity       methocarbamol 750 MG tablet    ROBAXIN    270 tablet    TAKE ONE TABLET BY MOUTH THREE TIMES DAILY    Spasm of back muscles       mirtazapine 15 MG tablet    REMERON     Take 1 tablet (15 mg) by mouth At Bedtime        omeprazole 20 MG tablet     90 tablet    Take 20 mg by mouth once daily before a meal    Gastroesophageal reflux disease, esophagitis presence not specified       order for DME      Oxygen for home use. LPM: 1/min via nasal cannula. Frequency of use: Continuous with portability; Length of need: 12 mos.        * pregabalin 100 MG capsule    LYRICA     Take 200 mg by mouth 3 times daily        * LYRICA 200 MG capsule   Generic drug:  Pregabalin      Take 1 capsule (200 mg) by mouth 3 times daily        QVAR 80 MCG/ACT Aers IS A DISCONTINUED MEDICATION   Generic drug:  beclomethasone           simvastatin 20 MG tablet    ZOCOR    90 tablet    Take 1 tablet (20 mg) by mouth At Bedtime    Hyperlipidemia LDL goal <100       traMADol 50 MG tablet    ULTRAM    90 tablet    TAKE ONE TABLET BY MOUTH THREE TIMES DAILY AS NEEDED    Chronic low back pain, unspecified back pain laterality, with sciatica presence unspecified       umeclidinium-vilanterol 62.5-25 MCG/INH oral inhaler    ANORO ELLIPTA     Inhale 1 puff into the lungs        Vitamin D3 2000 units Tabs     100 tablet    TAKE ONE TABLET BY MOUTH ONCE DAILY    Neuropathy       * Notice:  This list has 4 medication(s) that are the same as other medications prescribed for you. Read the directions carefully, and ask your doctor or other care provider to review them with you.

## 2018-10-31 NOTE — PROGRESS NOTES
GENERAL SURGERY CONSULTATION NOTE    Jake Bermudez   [address]  [place] [address]  53 year old  male  Admission Date/Time: No admission date for patient encounter.  Primary Care Provider:  Kira Grider    I was asked to see this patient by Dr. Grider for evaluation of umbilical hernia.     HPI: Patient presents to clinic with bulge at the level of the umbilicus.  Patient was this bulge for the past 3 years.  He says that he first noticed that when he was lifting a particularly heav trash bag and felt a pop in his abdomen. since that time he is noticed a bulge in the bulge appears to be getting bigger.  About once every month or so the bulge will become painful.  Patient is always been able to push it back in.  Patient denies obstructive symptoms.  Bowel function at home.  Patient is not a smoker, but he is a former smoker.  He quit 7 years ago after he came down with a particularly bad case of pneumonia for which he was transferred to the ICU in [place] and was on a ventilator for 3 weeks.  During this time he had a chest tube to drain a parapneumonic effusion and a tracheostomy to assist with vent weaning.  Since that time his health is been relatively stable.  He says his activity is limited due to chronic low back pain.  Patient says that he would be unable to walk 4 blocks or up 2 flights of stairs without taking a break due to back pain.  Was previously very active man.   Patient is poorly controlled diabetes last A1c was 8.  It has been as high as 10.  Patient takes subcutaneous insulin at home to control his blood sugars and per the patient they are quite variable, ranging from low 100s-300s.  Patient just received disability status for chronic low back pain.  He does not walk or exercise regularly due to low back pain, he is unsure if he has been gaining and losing weight.  Patient has a long surgical history of joint surgery, shoulders and knees.  Patient is never had surgery on his  abdomen.  Patient denies history of myocardial infarction or stroke.  Patient not take blood thinners.  Patient denies personal family history of bleeding tendency or adverse reaction to anesthesia.      REVIEW OF SYSTEMS:    GENERAL: No fevers or chills. Denies fatigue, recent weight loss.  HEENT: No sinus drainage. No changes with vision or hearing. No difficulty swallowing.   LYMPHATICS:  No swollen nodes in axilla, neck or groin.  CARDIOVASCULAR: Denies chest pain, palpitations.  Positive shortness of breath with exertion  PULMONARY: Shortness of breath with exertion no chronic cough no increase in sputum production.  GI: Denies melena, bright red blood in stools. No hematemesis. No constipation or diarrhea.  : No dysuria or hematuria.  SKIN: No recent rashes or ulcers.   HEMATOLOGY:  No history of easy bruising or bleeding.  ENDOCRINE: Positive history of poorly controlled diabetes, no history of thyroid disease  NEUROLOGY:  No history of seizures or headaches. No motor or sensory changes.        Patient Active Problem List   Diagnosis     Personal history of other diseases of circulatory system     Backache     Corns and callosities     CKD (chronic kidney disease) stage 3, GFR 30-59 ml/min (H)     Diabetic polyneuropathy associated with type 2 diabetes mellitus (H)     Uncontrolled type 2 diabetes mellitus with hyperglycemia, with long-term current use of insulin (H)     Diabetic, retinopathy, proliferative (H)     Dyslipidemia     Empyema (H)     Impotence of organic origin     Family history of ischemic heart disease     Esophageal reflux     Other, multiple and ill-defined closed fractures of lower limb     Lung nodule < 6cm on CT     Lateral epicondylitis of left elbow     Acquired keratoderma     Enthesopathy of ankle and tarsus     Obesity (BMI 30-39.9)     Other symptoms referable to upper arm joint     Neuropathy     PTSD (post-traumatic stress disorder)     Shoulder pain     Personal history of  tobacco use, presenting hazards to health     Restrictive lung disease     Nocturnal hypoxia     Atelectasis of right lung     Community acquired bacterial pneumonia     Empyema of right pleural space (H)     GERD (gastroesophageal reflux disease)     Hyperlipidemia     Moderate cigarette smoker (10-19 per day)     Obesity     ARMANDO (obstructive sleep apnea)     Rotator cuff syndrome of left shoulder     Spondylosis of lumbar region without myelopathy or radiculopathy     PFS (patellofemoral syndrome)     Umbilical hernia with obstruction, without gangrene       Past Medical History:   Diagnosis Date     Lateral epicondylitis of left elbow     10/3/2013,Seen by Dr Bourne [place] 8/2013     Type 2 diabetes mellitus with proliferative retinopathy without macular edema (H)     7/1/2014,Mild proliferative changes on left side--sees Dr Ortiz exam 6/2014       Past Surgical History:   Procedure Laterality Date     OTHER SURGICAL HISTORY      GHV005,SHOULDER SURGERY,Bilateral     OTHER SURGICAL HISTORY      915666,CHEST TUBE INSERTION     OTHER SURGICAL HISTORY      KWK087,LUNG SURGERY,pneumonia     OTHER SURGICAL HISTORY      100050,HCHG TRACH PR1,history trach with pneumonia       Family History   Problem Relation Age of Onset     HEART DISEASE Other      Heart Disease,Family History Coronary Heart Disease male     Diabetes Other      Diabetes     Cancer Father      Cancer,stomach ca       Social History     Social History Narrative    Alcohol Use - no    2 children  unemployed 10 1/2 % disability for back injury  Patient was a former smoker.  1/2 PPD       Social History     Social History     Marital status:      Spouse name: N/A     Number of children: N/A     Years of education: N/A     Occupational History     Not on file.     Social History Main Topics     Smoking status: Former Smoker     Packs/day: 0.50     Years: 28.00     Types: Cigarettes     Quit date: 12/21/2011     Smokeless tobacco:  Former User     Types: Chew     Quit date: 9/5/1988     Alcohol use No     Drug use: No      Comment: Drug use: No     Sexual activity: Yes     Partners: Female     Other Topics Concern     Not on file     Social History Narrative    Alcohol Use - no    2 children  unemployed 10 1/2 % disability for back injury  Patient was a former smoker.  1/2 PPD         Current Outpatient Prescriptions on File Prior to Visit:  albuterol (PROAIR HFA/PROVENTIL HFA/VENTOLIN HFA) 108 (90 Base) MCG/ACT inhaler Inhale 2 puffs into the lungs every 4 hours as needed for shortness of breath / dyspnea or wheezing   alcohol swab prep pads Use to swab area of injection/france as directed.   Aspirin (ASPIR-81 PO) Take 81 mg by mouth daily with food   beclomethasone HFA (QVAR REDIHALER) 80 MCG/ACT AERB inhaler Inhale 1 puff into the lungs 2 times daily   blood glucose (ACCU-CHEK FASTCLIX) lancing device Lancing device to be used with lancets.   blood glucose calibration (ACCU-CHEK DONAVON) solution Use to calibrate blood glucose monitor as needed as directed.   blood glucose monitoring (ACCU-CHEK DONAVON PLUS) test strip CHECK GLUCOSE FOUR TIMES DAILY   blood glucose monitoring (ACCU-CHEK FASTCLIX) lancets Use to test blood sugar 4 times daily.   blood glucose monitoring (ACCU-CHEK MULTICLIX) lancets USE STRIP TO CHECK BLOOD GLUCOSE 4 TIMES DAILY.   Cholecalciferol (VITAMIN D3) 2000 units TABS TAKE ONE TABLET BY MOUTH ONCE DAILY   clonazePAM (KLONOPIN) 0.5 MG tablet TAKE ONE TABLET BY MOUTH AT BEDTIME   FLUoxetine (PROZAC) 20 MG capsule TAKE 1 CAPSULE BY MOUTH IN THE MORNING   gabapentin (NEURONTIN) 300 MG capsule Take 900 mg by mouth 3 times daily 900 mg TID, 1200 mg at bedtime   hydrochlorothiazide (HYDRODIURIL) 25 MG tablet Take 1 tablet (25 mg) by mouth daily   insulin aspart (NOVOLOG FLEXPEN) 100 UNIT/ML injection Use 60-70units subcutaneous with each meal based on insulin:carb ratio.  Maximum: 240 units per day.   insulin glargine  (LANTUS VIAL) 100 UNIT/ML injection Inject 175 Units Subcutaneous At Bedtime   insulin pen needle (BD ANN U/F) 32G X 4 MM USE AS DIRECTED FOR  ADMINISTERING  INSULIN  AT  HOME  4  TIMES  DAILY   lisinopril (PRINIVIL/ZESTRIL) 20 MG tablet Take 1 tablet (20 mg) by mouth daily   LYRICA 200 MG capsule Take 1 capsule (200 mg) by mouth 3 times daily   MAPAP 500 MG tablet TAKE ONE TABLET BY MOUTH EVERY 6 HOURS AS NEEDED .  MAX  ACETAMINOPHEN  DOSE  4000  MG  IN  24  HOURS.   melatonin 5 MG tablet Take 1-2 tablets (5-10 mg) by mouth At Bedtime   meloxicam (MOBIC) 15 MG tablet TAKE ONE TABLET BY MOUTH ONCE DAILY WITH FOOD   methocarbamol (ROBAXIN) 750 MG tablet TAKE ONE TABLET BY MOUTH THREE TIMES DAILY   mirtazapine (REMERON) 15 MG tablet Take 1 tablet (15 mg) by mouth At Bedtime   omeprazole 20 MG tablet Take 20 mg by mouth once daily before a meal   order for DME Oxygen for home use. LPM: 1/min via nasal cannula. Frequency of use: Continuous with portability; Length of need: 12 mos.   pregabalin (LYRICA) 100 MG capsule Take 200 mg by mouth 3 times daily   QVAR 80 MCG/ACT Inhaler    simvastatin (ZOCOR) 20 MG tablet Take 1 tablet (20 mg) by mouth At Bedtime   traMADol (ULTRAM) 50 MG tablet TAKE ONE TABLET BY MOUTH THREE TIMES DAILY AS NEEDED   umeclidinium-vilanterol (ANORO ELLIPTA) 62.5-25 MCG/INH oral inhaler Inhale 1 puff into the lungs     No current facility-administered medications on file prior to visit.       ALLERGIES/SENSITIVITIES:   Allergies   Allergen Reactions     Penicillins      Other reaction(s): Diaphoresis     Piperacillin-Tazobactam In D5w Other (See Comments)     Other reaction(s): Diaphoresis, Fever  Fever while on zosyn, vanc. No rash, no wbc elevation     Vancomycin Other (See Comments)     Fever while on zosyn, vanc, no rash or WBC elevation  Other reaction(s): Diaphoresis, Fever  Fever while on zosyn, vanc, no rash or WBC elevation     Zosyn Other (See Comments)     Fever while on zosyn, vanc. No  "rash, no wbc elevation  Fever while on zosyn, vanc. No rash, no wbc elevation       PHYSICAL EXAM:     /60 (BP Location: Right arm, Patient Position: Sitting, Cuff Size: Adult Large)  Ht 5' 10\" (1.778 m)  Wt 298 lb (135.2 kg)  BMI 42.76 kg/m2    General Appearance:   Patient is sitting up in a chair, no apparent distress  Heart & CV:  RRR no murmur.  Intact distal pulses, good cap refill.  LUNGS: No increased work of breathing.  Good air entry bilaterally with moderate wheeze on the right.  Abd: Obese abdomen, nontender, nondistended.  Umbilical hernia apparent with Valsalva, this is easily reducible.  No inguinal hernia on the right, possible small inguinal hernia on the left side.    Ext: Normal bulk and tone  Neuro: Alert and oriented, normal speech and mentation    Last echocardiogram from 1/25/2018 shows normal left ventricular function with ejection fraction of 60-65%.  No valvular abnormalities, no evidence of right heart strain or pulmonary hypertension.    PFTs were done on 12/18/2017 and show moderately severe restrictive airway disease.  FVC and FEV1 are reduced and consistent with a restrictive process.      CONSULTATION ASSESSMENT AND PLAN:    53 year old male with moderately symptomatic reducible umbilical hernia.  Patient is a suboptimal surgical candidate given multiple chronic health issues; poorly controlled diabetes, restrictive lung disease, and morbid obesity.  That being said, he would probably tolerate an umbilical hernia repair.  We will ask anesthesia to evaluate the patient for fitness for surgery.  I discussed the risk factors for complications with the surgery with the patient and he seemed understanding, but he would like to have his hernia fixed.  Discussed weight loss with the patient and the importance of controlling blood sugars, with regard to risk for infection and recurrence.    -Preoperative evaluation by the anesthesia team  Tentatively scheduled for umbilical hernia " repair with mesh for 11/27/2018.  The risks and benefits of umbilical hernia repair with mesh were discussed with the patient including bleeding, infection, recurrence, and increased likelihood of complications not related directly to the hernia repair including DVT and MI.  The patient demonstrated understanding and is willing to proceed.        Mason Rhodes MD on 10/31/2018 at 10:17 AM

## 2018-11-01 ENCOUNTER — ANESTHESIA EVENT (OUTPATIENT)
Dept: SURGERY | Facility: OTHER | Age: 53
End: 2018-11-01
Payer: MEDICAID

## 2018-11-01 NOTE — CONSULTS
Asked by Dr. Rhodes to see patient for high BMI.  Patient has a full adam, Mallampati IV, large neck, poor dentition and a thyro-mental distance greater than 6 cm.  He has been diagnosed with Sleep Apnea however due to claustrophobia he has been unable to tolerate CPAP.    Seven years ago he had a tracheostomy placed during an extended hospital stay for pneumonia.  He was ventilated at the time for nearly three weeks.  He has no history of difficult intubations in the past.  He has had no anesthetics since he had his trach.      It is reasonable to proceed with anesthesia here at [facility].  Asked patient to continue taking his omeprazole and inhalers as needed before his scheduled surgery.

## 2018-11-08 ENCOUNTER — TELEPHONE (OUTPATIENT)
Dept: FAMILY MEDICINE | Facility: OTHER | Age: 53
End: 2018-11-08

## 2018-11-08 DIAGNOSIS — J98.4 RESTRICTIVE LUNG DISEASE: Primary | ICD-10-CM

## 2018-11-08 NOTE — TELEPHONE ENCOUNTER
For prescription for Qvar rediha 80mcg aer. Prior auth not going thru. Would you like to pursue or switch med?

## 2018-11-09 DIAGNOSIS — F41.1 GAD (GENERALIZED ANXIETY DISORDER): ICD-10-CM

## 2018-11-09 NOTE — TELEPHONE ENCOUNTER
Attempted to send in Advair.  If not covered, can we call and ask pharmacy which is covered?    Kira Grider, DO

## 2018-11-12 NOTE — TELEPHONE ENCOUNTER
ClonazePAM (KLONOPIN) 0.5 MG tablet  Last Written Prescription Date:  03/27/2018  Last Fill Quantity: 30,   # refills: 5  Last Office Visit: 09/26/2018 with JRO  LOV with PCP was on 06/27/2018    Future Office visit:    Next 5 appointments (look out 90 days)     Dec 05, 2018  9:30 AM CST   Return Visit with Mason Rhodes MD   [facility] ([facility])    [address]  [place] [address]   [phone]                   Routing refill request to provider for review/approval because: Drug not on the [facility], [facility] or [facility] refill protocol or controlled substance    Unable to complete prescription refill per RNMedication Refill Policy.................... Dilma Butler ....................  11/12/2018   11:47 AM

## 2018-11-13 RX ORDER — CLONAZEPAM 0.5 MG/1
TABLET ORAL
Qty: 30 TABLET | Refills: 5 | Status: SHIPPED | OUTPATIENT
Start: 2018-11-13 | End: 2019-05-17

## 2018-11-19 DIAGNOSIS — E11.65 UNCONTROLLED TYPE 2 DIABETES MELLITUS WITH HYPERGLYCEMIA, WITH LONG-TERM CURRENT USE OF INSULIN (H): ICD-10-CM

## 2018-11-19 DIAGNOSIS — Z79.4 UNCONTROLLED TYPE 2 DIABETES MELLITUS WITH HYPERGLYCEMIA, WITH LONG-TERM CURRENT USE OF INSULIN (H): ICD-10-CM

## 2018-11-20 RX ORDER — PEN NEEDLE, DIABETIC 32GX 5/32"
NEEDLE, DISPOSABLE MISCELLANEOUS
Qty: 400 EACH | Refills: 2 | OUTPATIENT
Start: 2018-11-20

## 2018-11-20 NOTE — TELEPHONE ENCOUNTER
Filled 10/30/18 #100 x 4 to [facility]. [facility] notified. Unable to complete prescription refill per RNMedication Refill Policy.................... Dilma Butler ....................  11/20/2018   2:41 PM            insulin pen needle (BD ANN U/F) 32G X 4  each 4 10/30/2018  No   Sig: USE AS DIRECTED FOR  ADMINISTERING  INSULIN  AT  HOME  4  TIMES  DAILY   Class: E-Prescribe   Order: [id number]   E-Prescribing Status: Receipt confirmed by pharmacy (10/30/2018 10:42 AM CDT)   Printout Tracking   External Result Report   Medication Administration Instructions   USE AS DIRECTED FOR  ADMINISTERING  INSULIN  AT  HOME  4  TIMES  DAILY   Pharmacy   [facility] - [place] - [id number]

## 2018-11-27 ENCOUNTER — ANESTHESIA (OUTPATIENT)
Dept: SURGERY | Facility: OTHER | Age: 53
End: 2018-11-27
Payer: MEDICAID

## 2018-11-27 ENCOUNTER — SURGERY (OUTPATIENT)
Age: 53
End: 2018-11-27

## 2018-11-27 ENCOUNTER — HOSPITAL ENCOUNTER (OUTPATIENT)
Facility: OTHER | Age: 53
Discharge: HOME | End: 2018-11-27
Attending: SURGERY | Admitting: SURGERY
Payer: MEDICAID

## 2018-11-27 ENCOUNTER — TELEPHONE (OUTPATIENT)
Dept: SURGERY | Facility: OTHER | Age: 53
End: 2018-11-27

## 2018-11-27 VITALS
WEIGHT: 291.3 LBS | RESPIRATION RATE: 18 BRPM | SYSTOLIC BLOOD PRESSURE: 122 MMHG | DIASTOLIC BLOOD PRESSURE: 69 MMHG | OXYGEN SATURATION: 90 % | TEMPERATURE: 96.9 F | BODY MASS INDEX: 41.8 KG/M2 | HEART RATE: 99 BPM

## 2018-11-27 DIAGNOSIS — K42.0 UMBILICAL HERNIA WITH OBSTRUCTION, WITHOUT GANGRENE: ICD-10-CM

## 2018-11-27 DIAGNOSIS — Z98.890 POST-OPERATIVE STATE: Primary | ICD-10-CM

## 2018-11-27 PROCEDURE — C1781 MESH (IMPLANTABLE): HCPCS | Performed by: SURGERY

## 2018-11-27 PROCEDURE — 71000014 ZZH RECOVERY PHASE 1 LEVEL 2 FIRST HR: Performed by: SURGERY

## 2018-11-27 PROCEDURE — 25000128 H RX IP 250 OP 636: Performed by: SURGERY

## 2018-11-27 PROCEDURE — 25000128 H RX IP 250 OP 636: Performed by: NURSE ANESTHETIST, CERTIFIED REGISTERED

## 2018-11-27 PROCEDURE — 49585 ZZHC REPAIR UMBILICAL HERN,5+Y/O,REDUC: CPT | Performed by: SURGERY

## 2018-11-27 PROCEDURE — 40000305 ZZH STATISTIC PRE PROC ASSESS I: Performed by: SURGERY

## 2018-11-27 PROCEDURE — 25000132 ZZH RX MED GY IP 250 OP 250 PS 637: Performed by: SURGERY

## 2018-11-27 PROCEDURE — 25000125 ZZHC RX 250: Performed by: NURSE ANESTHETIST, CERTIFIED REGISTERED

## 2018-11-27 PROCEDURE — 37000008 ZZH ANESTHESIA TECHNICAL FEE, 1ST 30 MIN: Performed by: SURGERY

## 2018-11-27 PROCEDURE — 71000027 ZZH RECOVERY PHASE 2 EACH 15 MINS: Performed by: SURGERY

## 2018-11-27 PROCEDURE — 36000050 ZZH SURGERY LEVEL 2 1ST 30 MIN: Performed by: SURGERY

## 2018-11-27 PROCEDURE — 49585 AS REPAIR UMBILICAL HERN,5+Y/O,REDUC: CPT | Performed by: NURSE ANESTHETIST, CERTIFIED REGISTERED

## 2018-11-27 PROCEDURE — 25000125 ZZHC RX 250: Performed by: SURGERY

## 2018-11-27 PROCEDURE — 37000009 ZZH ANESTHESIA TECHNICAL FEE, EACH ADDTL 15 MIN: Performed by: SURGERY

## 2018-11-27 PROCEDURE — 27210794 ZZH OR GENERAL SUPPLY STERILE: Performed by: SURGERY

## 2018-11-27 PROCEDURE — 36000052 ZZH SURGERY LEVEL 2 EA 15 ADDTL MIN: Performed by: SURGERY

## 2018-11-27 DEVICE — IMPLANTABLE DEVICE: Type: IMPLANTABLE DEVICE | Site: UMBILICAL | Status: FUNCTIONAL

## 2018-11-27 RX ORDER — HYDROCODONE BITARTRATE AND ACETAMINOPHEN 5; 325 MG/1; MG/1
2 TABLET ORAL
Status: DISCONTINUED | OUTPATIENT
Start: 2018-11-27 | End: 2018-11-27 | Stop reason: HOSPADM

## 2018-11-27 RX ORDER — FENTANYL CITRATE 50 UG/ML
25-50 INJECTION, SOLUTION INTRAMUSCULAR; INTRAVENOUS
Status: DISCONTINUED | OUTPATIENT
Start: 2018-11-27 | End: 2018-11-27 | Stop reason: HOSPADM

## 2018-11-27 RX ORDER — ACETAMINOPHEN 325 MG/1
650 TABLET ORAL
Status: DISCONTINUED | OUTPATIENT
Start: 2018-11-27 | End: 2018-11-27 | Stop reason: HOSPADM

## 2018-11-27 RX ORDER — SODIUM CHLORIDE, SODIUM LACTATE, POTASSIUM CHLORIDE, CALCIUM CHLORIDE 600; 310; 30; 20 MG/100ML; MG/100ML; MG/100ML; MG/100ML
INJECTION, SOLUTION INTRAVENOUS CONTINUOUS
Status: DISCONTINUED | OUTPATIENT
Start: 2018-11-27 | End: 2018-11-27 | Stop reason: HOSPADM

## 2018-11-27 RX ORDER — LIDOCAINE 40 MG/G
CREAM TOPICAL
Status: DISCONTINUED | OUTPATIENT
Start: 2018-11-27 | End: 2018-11-27 | Stop reason: HOSPADM

## 2018-11-27 RX ORDER — HYDROCODONE BITARTRATE AND ACETAMINOPHEN 5; 325 MG/1; MG/1
1-2 TABLET ORAL EVERY 4 HOURS PRN
Qty: 20 TABLET | Refills: 0 | Status: SHIPPED | OUTPATIENT
Start: 2018-11-27 | End: 2019-09-05

## 2018-11-27 RX ORDER — CLINDAMYCIN PHOSPHATE 900 MG/50ML
900 INJECTION, SOLUTION INTRAVENOUS
Status: DISCONTINUED | OUTPATIENT
Start: 2018-11-27 | End: 2018-11-27 | Stop reason: HOSPADM

## 2018-11-27 RX ORDER — CLINDAMYCIN PHOSPHATE 900 MG/50ML
900 INJECTION, SOLUTION INTRAVENOUS SEE ADMIN INSTRUCTIONS
Status: DISCONTINUED | OUTPATIENT
Start: 2018-11-27 | End: 2018-11-27 | Stop reason: HOSPADM

## 2018-11-27 RX ORDER — BUPIVACAINE HYDROCHLORIDE 7.5 MG/ML
INJECTION, SOLUTION INTRASPINAL PRN
Status: DISCONTINUED | OUTPATIENT
Start: 2018-11-27 | End: 2018-11-27

## 2018-11-27 RX ORDER — PROPOFOL 10 MG/ML
INJECTION, EMULSION INTRAVENOUS CONTINUOUS PRN
Status: DISCONTINUED | OUTPATIENT
Start: 2018-11-27 | End: 2018-11-27

## 2018-11-27 RX ORDER — ONDANSETRON 2 MG/ML
4 INJECTION INTRAMUSCULAR; INTRAVENOUS EVERY 30 MIN PRN
Status: DISCONTINUED | OUTPATIENT
Start: 2018-11-27 | End: 2018-11-27 | Stop reason: HOSPADM

## 2018-11-27 RX ORDER — NALOXONE HYDROCHLORIDE 0.4 MG/ML
.1-.4 INJECTION, SOLUTION INTRAMUSCULAR; INTRAVENOUS; SUBCUTANEOUS
Status: DISCONTINUED | OUTPATIENT
Start: 2018-11-27 | End: 2018-11-27 | Stop reason: HOSPADM

## 2018-11-27 RX ORDER — ACETAMINOPHEN 325 MG/1
975 TABLET ORAL ONCE
Status: COMPLETED | OUTPATIENT
Start: 2018-11-27 | End: 2018-11-27

## 2018-11-27 RX ORDER — KETOROLAC TROMETHAMINE 30 MG/ML
30 INJECTION, SOLUTION INTRAMUSCULAR; INTRAVENOUS ONCE
Status: COMPLETED | OUTPATIENT
Start: 2018-11-27 | End: 2018-11-27

## 2018-11-27 RX ORDER — CLINDAMYCIN PHOSPHATE 900 MG/50ML
INJECTION, SOLUTION INTRAVENOUS PRN
Status: DISCONTINUED | OUTPATIENT
Start: 2018-11-27 | End: 2018-11-27

## 2018-11-27 RX ORDER — ONDANSETRON 4 MG/1
4 TABLET, ORALLY DISINTEGRATING ORAL EVERY 30 MIN PRN
Status: DISCONTINUED | OUTPATIENT
Start: 2018-11-27 | End: 2018-11-27 | Stop reason: HOSPADM

## 2018-11-27 RX ORDER — BUPIVACAINE HYDROCHLORIDE AND EPINEPHRINE 5; 5 MG/ML; UG/ML
INJECTION, SOLUTION EPIDURAL; INTRACAUDAL; PERINEURAL PRN
Status: DISCONTINUED | OUTPATIENT
Start: 2018-11-27 | End: 2018-11-27 | Stop reason: HOSPADM

## 2018-11-27 RX ADMIN — FENTANYL CITRATE 25 MCG: 50 INJECTION, SOLUTION INTRAMUSCULAR; INTRAVENOUS at 10:15

## 2018-11-27 RX ADMIN — SODIUM CHLORIDE, SODIUM LACTATE, POTASSIUM CHLORIDE, AND CALCIUM CHLORIDE: 600; 310; 30; 20 INJECTION, SOLUTION INTRAVENOUS at 09:20

## 2018-11-27 RX ADMIN — KETOROLAC TROMETHAMINE 30 MG: 30 INJECTION, SOLUTION INTRAMUSCULAR at 08:27

## 2018-11-27 RX ADMIN — MIDAZOLAM 2 MG: 1 INJECTION INTRAMUSCULAR; INTRAVENOUS at 09:22

## 2018-11-27 RX ADMIN — PROPOFOL 25 MCG/KG/MIN: 10 INJECTION, EMULSION INTRAVENOUS at 09:42

## 2018-11-27 RX ADMIN — BUPIVACAINE HYDROCHLORIDE AND EPINEPHRINE BITARTRATE 20 ML: 5; .005 INJECTION, SOLUTION EPIDURAL; INTRACAUDAL; PERINEURAL at 10:28

## 2018-11-27 RX ADMIN — CLINDAMYCIN PHOSPHATE 900 MG: 18 INJECTION, SOLUTION INTRAVENOUS at 09:19

## 2018-11-27 RX ADMIN — ACETAMINOPHEN 975 MG: 325 TABLET, FILM COATED ORAL at 08:18

## 2018-11-27 RX ADMIN — FENTANYL CITRATE 50 MCG: 50 INJECTION, SOLUTION INTRAMUSCULAR; INTRAVENOUS at 09:52

## 2018-11-27 RX ADMIN — BUPIVACAINE HYDROCHLORIDE IN DEXTROSE 1.6 ML: 7.5 INJECTION, SOLUTION SUBARACHNOID at 09:24

## 2018-11-27 RX ADMIN — FENTANYL CITRATE 25 MCG: 50 INJECTION, SOLUTION INTRAMUSCULAR; INTRAVENOUS at 10:00

## 2018-11-27 RX ADMIN — SODIUM CHLORIDE, SODIUM LACTATE, POTASSIUM CHLORIDE, AND CALCIUM CHLORIDE: 600; 310; 30; 20 INJECTION, SOLUTION INTRAVENOUS at 08:28

## 2018-11-27 ASSESSMENT — LIFESTYLE VARIABLES: TOBACCO_USE: 1

## 2018-11-27 NOTE — PROGRESS NOTES
PACU Transfer Note    Jake Bermudez was transferred to [facility] via cart.  Equipment used for transport:  O2.  Accompanied by:  FLORENCE Russell RN    PACU Respiratory Event Documentation     1) Episodes of Apnea greater than or equal to 10 seconds: no    2) Bradypnea - less than 8 breaths per minute: no    3) Pain score on 0 to 10 scale: 0/10    4) Pain-sedation mismatch (yes or no): no    5) Repeated 02 desaturation less than 90% (yes or no): needs O2 at 2L per n/c    Anesthesia notified? (yes or no): no    Any of the above events occuring repeatedly in separate 30 minute intervals may be considered recurrent PACU respiratory events.    Patient stable and meets phase 1 discharge criteria for transport from PACU.

## 2018-11-27 NOTE — OP NOTE
PREOPERATIVE  DIAGNOSIS: Umbilical hernia, without strangulation or incarceration      POSTOPERATIVE DIAGNOSIS: Umbilical hernia and epigastric hernia     PROCEDURE PERFORMED:  Umbilical and epigastric hernia repair with mesh     SURGEON:  Mason Rhodes MD    ASSISTANT: Circulator: Arielle Ferguson RN  Relief Circulator: Kimberly Sosa RN  Scrub Person: Yaquelin Frankel  2nd Scrub: Yanci Ramon RN  Pre-Op Nurse: Venancio Sorensen RN    ANESTHESIA: SpinalCRNA Independent: Iftikhar Barlow APRN CRNA    REFERRING PROVIDER:  LIA Grider MD     INDICATION FOR THE PROCEDURE:  The patient is a 53 year old y.o. male with a  history of painful bulge just superior to the umbilicus.  Bulge is reducible patient denies any obstructive symptoms.  He would like the hernia repaired.     PROCEDURE: Patient brought to the operating room placed in a sitting up position.  Patient received a spinal injection for anesthesia was placed in the supine position with his arms out.  The abdomen was prepped and draped in usual sterile fashion.  Timeout was performed and everyone was in agreement to proceed.  The skin superior to the umbilicus was anesthetized with 0.5% bupivacaine with epinephrine and a curvilinear incision was made superior to the umbilicus.  Dissection was carried down to the level of fascia where the hernia sac was encountered.  During palpation of the anterior abdominal wall it was noted the patient has an epigastric hernia as well as an umbilical hernia.  The umbilicus was lifted off the anterior abdominal wall. Both hernia sacs were dissected free from the surrounding tissues and easily returned the abdomen.  The epigastric hernia defect measured 1.2 cm in greatest dimension.  An umbilical hernia measured just 1 cm in greatest dimension.  However there is only a 1.5 cm fascial bridge between the two defects and this was opened to facilitate placement of the mesh.  Ultimately the size of this new defect that  included both of the hernia defects is 5 cm x 3 cm transversely.  The preperitoneal fat was dissected off the posterior side of the anterior abdominal wall to create a space for the mesh.  An 8 cm c-qur v-patch was placed within the hernia defect and sewn into place with ten 0-Vicryl trans-fascial stitches.  The sutures were tied down and the mesh seem to lie flat against the anterior abdominal wall.  There were no spaces between the anterior abdominal wall and the mesh.  Tails were cut from the mesh and the midline fascia was reapproximated with a #1 looped PDS suture.  The fascia was then anesthetized with the same local anesthetic.  Subcutaneous tissues were reapproximated with 3-0 Vicryl sutures.  Skin was closed with 4-0 Monocryl in running fashion.  Mastisol, Steri-Strips, cotton ball, and Tegaderm were then placed over the incision and the air was removed from underneath the Tegaderm to create a negative pressure dressing.  At the end of the case the sponge and instrument counts were correct.  Patient seemed to tolerate the procedure well.  He was taken to recovery room in good condition.      MARINA Rhodes MD

## 2018-11-27 NOTE — H&P (VIEW-ONLY)
GENERAL SURGERY CONSULTATION NOTE    Jake Bermudez   [id number]  [place] [address]  53 year old  male  Admission Date/Time: No admission date for patient encounter.  Primary Care Provider:  Kira Grider    I was asked to see this patient by Dr. Grider for evaluation of umbilical hernia.     HPI: Patient presents to clinic with bulge at the level of the umbilicus.  Patient was this bulge for the past 3 years.  He says that he first noticed that when he was lifting a particularly heav trash bag and felt a pop in his abdomen. since that time he is noticed a bulge in the bulge appears to be getting bigger.  About once every month or so the bulge will become painful.  Patient is always been able to push it back in.  Patient denies obstructive symptoms.  Bowel function at home.  Patient is not a smoker, but he is a former smoker.  He quit 7 years ago after he came down with a particularly bad case of pneumonia for which he was transferred to the ICU in [place] and was on a ventilator for 3 weeks.  During this time he had a chest tube to drain a parapneumonic effusion and a tracheostomy to assist with vent weaning.  Since that time his health is been relatively stable.  He says his activity is limited due to chronic low back pain.  Patient says that he would be unable to walk 4 blocks or up 2 flights of stairs without taking a break due to back pain.  Was previously very active man.   Patient is poorly controlled diabetes last A1c was 8.  It has been as high as 10.  Patient takes subcutaneous insulin at home to control his blood sugars and per the patient they are quite variable, ranging from low 100s-300s.  Patient just received disability status for chronic low back pain.  He does not walk or exercise regularly due to low back pain, he is unsure if he has been gaining and losing weight.  Patient has a long surgical history of joint surgery, shoulders and knees.  Patient is never had surgery on his  abdomen.  Patient denies history of myocardial infarction or stroke.  Patient not take blood thinners.  Patient denies personal family history of bleeding tendency or adverse reaction to anesthesia.      REVIEW OF SYSTEMS:    GENERAL: No fevers or chills. Denies fatigue, recent weight loss.  HEENT: No sinus drainage. No changes with vision or hearing. No difficulty swallowing.   LYMPHATICS:  No swollen nodes in axilla, neck or groin.  CARDIOVASCULAR: Denies chest pain, palpitations.  Positive shortness of breath with exertion  PULMONARY: Shortness of breath with exertion no chronic cough no increase in sputum production.  GI: Denies melena, bright red blood in stools. No hematemesis. No constipation or diarrhea.  : No dysuria or hematuria.  SKIN: No recent rashes or ulcers.   HEMATOLOGY:  No history of easy bruising or bleeding.  ENDOCRINE: Positive history of poorly controlled diabetes, no history of thyroid disease  NEUROLOGY:  No history of seizures or headaches. No motor or sensory changes.        Patient Active Problem List   Diagnosis     Personal history of other diseases of circulatory system     Backache     Corns and callosities     CKD (chronic kidney disease) stage 3, GFR 30-59 ml/min (H)     Diabetic polyneuropathy associated with type 2 diabetes mellitus (H)     Uncontrolled type 2 diabetes mellitus with hyperglycemia, with long-term current use of insulin (H)     Diabetic, retinopathy, proliferative (H)     Dyslipidemia     Empyema (H)     Impotence of organic origin     Family history of ischemic heart disease     Esophageal reflux     Other, multiple and ill-defined closed fractures of lower limb     Lung nodule < 6cm on CT     Lateral epicondylitis of left elbow     Acquired keratoderma     Enthesopathy of ankle and tarsus     Obesity (BMI 30-39.9)     Other symptoms referable to upper arm joint     Neuropathy     PTSD (post-traumatic stress disorder)     Shoulder pain     Personal history of  tobacco use, presenting hazards to health     Restrictive lung disease     Nocturnal hypoxia     Atelectasis of right lung     Community acquired bacterial pneumonia     Empyema of right pleural space (H)     GERD (gastroesophageal reflux disease)     Hyperlipidemia     Moderate cigarette smoker (10-19 per day)     Obesity     ARMANDO (obstructive sleep apnea)     Rotator cuff syndrome of left shoulder     Spondylosis of lumbar region without myelopathy or radiculopathy     PFS (patellofemoral syndrome)     Umbilical hernia with obstruction, without gangrene       Past Medical History:   Diagnosis Date     Lateral epicondylitis of left elbow     10/3/2013,Seen by Dr Bourne [place] 8/2013     Type 2 diabetes mellitus with proliferative retinopathy without macular edema (H)     7/1/2014,Mild proliferative changes on left side--sees Dr Ortiz exam 6/2014       Past Surgical History:   Procedure Laterality Date     OTHER SURGICAL HISTORY      DTD651,SHOULDER SURGERY,Bilateral     OTHER SURGICAL HISTORY      568364,CHEST TUBE INSERTION     OTHER SURGICAL HISTORY      [id number],LUNG SURGERY,pneumonia     OTHER SURGICAL HISTORY      222307,HCHG TRACH PR1,history trach with pneumonia       Family History   Problem Relation Age of Onset     HEART DISEASE Other      Heart Disease,Family History Coronary Heart Disease male     Diabetes Other      Diabetes     Cancer Father      Cancer,stomach ca       Social History     Social History Narrative    Alcohol Use - no    2 children  unemployed 10 1/2 % disability for back injury  Patient was a former smoker.  1/2 PPD       Social History     Social History     Marital status:      Spouse name: N/A     Number of children: N/A     Years of education: N/A     Occupational History     Not on file.     Social History Main Topics     Smoking status: Former Smoker     Packs/day: 0.50     Years: 28.00     Types: Cigarettes     Quit date: 12/21/2011     Smokeless tobacco:  Former User     Types: Chew     Quit date: 9/5/1988     Alcohol use No     Drug use: No      Comment: Drug use: No     Sexual activity: Yes     Partners: Female     Other Topics Concern     Not on file     Social History Narrative    Alcohol Use - no    2 children  unemployed 10 1/2 % disability for back injury  Patient was a former smoker.  1/2 PPD         Current Outpatient Prescriptions on File Prior to Visit:  albuterol (PROAIR HFA/PROVENTIL HFA/VENTOLIN HFA) 108 (90 Base) MCG/ACT inhaler Inhale 2 puffs into the lungs every 4 hours as needed for shortness of breath / dyspnea or wheezing   alcohol swab prep pads Use to swab area of injection/france as directed.   Aspirin (ASPIR-81 PO) Take 81 mg by mouth daily with food   beclomethasone HFA (QVAR REDIHALER) 80 MCG/ACT AERB inhaler Inhale 1 puff into the lungs 2 times daily   blood glucose (ACCU-CHEK FASTCLIX) lancing device Lancing device to be used with lancets.   blood glucose calibration (ACCU-CHEK DONAVON) solution Use to calibrate blood glucose monitor as needed as directed.   blood glucose monitoring (ACCU-CHEK DONAVON PLUS) test strip CHECK GLUCOSE FOUR TIMES DAILY   blood glucose monitoring (ACCU-CHEK FASTCLIX) lancets Use to test blood sugar 4 times daily.   blood glucose monitoring (ACCU-CHEK MULTICLIX) lancets USE STRIP TO CHECK BLOOD GLUCOSE 4 TIMES DAILY.   Cholecalciferol (VITAMIN D3) 2000 units TABS TAKE ONE TABLET BY MOUTH ONCE DAILY   clonazePAM (KLONOPIN) 0.5 MG tablet TAKE ONE TABLET BY MOUTH AT BEDTIME   FLUoxetine (PROZAC) 20 MG capsule TAKE 1 CAPSULE BY MOUTH IN THE MORNING   gabapentin (NEURONTIN) 300 MG capsule Take 900 mg by mouth 3 times daily 900 mg TID, 1200 mg at bedtime   hydrochlorothiazide (HYDRODIURIL) 25 MG tablet Take 1 tablet (25 mg) by mouth daily   insulin aspart (NOVOLOG FLEXPEN) 100 UNIT/ML injection Use 60-70units subcutaneous with each meal based on insulin:carb ratio.  Maximum: 240 units per day.   insulin glargine  (LANTUS VIAL) 100 UNIT/ML injection Inject 175 Units Subcutaneous At Bedtime   insulin pen needle (BD ANN U/F) 32G X 4 MM USE AS DIRECTED FOR  ADMINISTERING  INSULIN  AT  HOME  4  TIMES  DAILY   lisinopril (PRINIVIL/ZESTRIL) 20 MG tablet Take 1 tablet (20 mg) by mouth daily   LYRICA 200 MG capsule Take 1 capsule (200 mg) by mouth 3 times daily   MAPAP 500 MG tablet TAKE ONE TABLET BY MOUTH EVERY 6 HOURS AS NEEDED .  MAX  ACETAMINOPHEN  DOSE  4000  MG  IN  24  HOURS.   melatonin 5 MG tablet Take 1-2 tablets (5-10 mg) by mouth At Bedtime   meloxicam (MOBIC) 15 MG tablet TAKE ONE TABLET BY MOUTH ONCE DAILY WITH FOOD   methocarbamol (ROBAXIN) 750 MG tablet TAKE ONE TABLET BY MOUTH THREE TIMES DAILY   mirtazapine (REMERON) 15 MG tablet Take 1 tablet (15 mg) by mouth At Bedtime   omeprazole 20 MG tablet Take 20 mg by mouth once daily before a meal   order for DME Oxygen for home use. LPM: 1/min via nasal cannula. Frequency of use: Continuous with portability; Length of need: 12 mos.   pregabalin (LYRICA) 100 MG capsule Take 200 mg by mouth 3 times daily   QVAR 80 MCG/ACT Inhaler    simvastatin (ZOCOR) 20 MG tablet Take 1 tablet (20 mg) by mouth At Bedtime   traMADol (ULTRAM) 50 MG tablet TAKE ONE TABLET BY MOUTH THREE TIMES DAILY AS NEEDED   umeclidinium-vilanterol (ANORO ELLIPTA) 62.5-25 MCG/INH oral inhaler Inhale 1 puff into the lungs     No current facility-administered medications on file prior to visit.       ALLERGIES/SENSITIVITIES:   Allergies   Allergen Reactions     Penicillins      Other reaction(s): Diaphoresis     Piperacillin-Tazobactam In D5w Other (See Comments)     Other reaction(s): Diaphoresis, Fever  Fever while on zosyn, vanc. No rash, no wbc elevation     Vancomycin Other (See Comments)     Fever while on zosyn, vanc, no rash or WBC elevation  Other reaction(s): Diaphoresis, Fever  Fever while on zosyn, vanc, no rash or WBC elevation     Zosyn Other (See Comments)     Fever while on zosyn, vanc. No  "rash, no wbc elevation  Fever while on zosyn, vanc. No rash, no wbc elevation       PHYSICAL EXAM:     /60 (BP Location: Right arm, Patient Position: Sitting, Cuff Size: Adult Large)  Ht 5' 10\" (1.778 m)  Wt 298 lb (135.2 kg)  BMI 42.76 kg/m2    General Appearance:   Patient is sitting up in a chair, no apparent distress  Heart & CV:  RRR no murmur.  Intact distal pulses, good cap refill.  LUNGS: No increased work of breathing.  Good air entry bilaterally with moderate wheeze on the right.  Abd: Obese abdomen, nontender, nondistended.  Umbilical hernia apparent with Valsalva, this is easily reducible.  No inguinal hernia on the right, possible small inguinal hernia on the left side.    Ext: Normal bulk and tone  Neuro: Alert and oriented, normal speech and mentation    Last echocardiogram from 1/25/2018 shows normal left ventricular function with ejection fraction of 60-65%.  No valvular abnormalities, no evidence of right heart strain or pulmonary hypertension.    PFTs were done on 12/18/2017 and show moderately severe restrictive airway disease.  FVC and FEV1 are reduced and consistent with a restrictive process.      CONSULTATION ASSESSMENT AND PLAN:    53 year old male with moderately symptomatic reducible umbilical hernia.  Patient is a suboptimal surgical candidate given multiple chronic health issues; poorly controlled diabetes, restrictive lung disease, and morbid obesity.  That being said, he would probably tolerate an umbilical hernia repair.  We will ask anesthesia to evaluate the patient for fitness for surgery.  I discussed the risk factors for complications with the surgery with the patient and he seemed understanding, but he would like to have his hernia fixed.  Discussed weight loss with the patient and the importance of controlling blood sugars, with regard to risk for infection and recurrence.    -Preoperative evaluation by the anesthesia team  Tentatively scheduled for umbilical hernia " repair with mesh for 11/27/2018.  The risks and benefits of umbilical hernia repair with mesh were discussed with the patient including bleeding, infection, recurrence, and increased likelihood of complications not related directly to the hernia repair including DVT and MI.  The patient demonstrated understanding and is willing to proceed.        Mason Rhodes MD on 10/31/2018 at 10:17 AM

## 2018-11-27 NOTE — INTERVAL H&P NOTE
I saw and examined Jake SEBLE Aidan.  I have reviewed the history and physical and find no changes to the patient's medical status or condition with the exceptions noted below.     Mason Rhodes   8:03 AM 11/27/2018

## 2018-11-27 NOTE — IP AVS SNAPSHOT
[facility]    [address]    [place] [address]    Phone:  [phone]    Fax:  [phone]                                       After Visit Summary   11/27/2018    Jake Bermudez    MRN: [id number]           After Visit Summary Signature Page     I have received my discharge instructions, and my questions have been answered. I have discussed any challenges I see with this plan with the nurse or doctor.    ..........................................................................................................................................  Patient/Patient Representative Signature      ..........................................................................................................................................  Patient Representative Print Name and Relationship to Patient    ..................................................               ................................................  Date                                   Time    ..........................................................................................................................................  Reviewed by Signature/Title    ...................................................              ..............................................  Date                                               Time          22EPIC Rev 08/18

## 2018-11-27 NOTE — ANESTHESIA PREPROCEDURE EVALUATION
Anesthesia Evaluation     . Pt has had prior anesthetic. Type: General    History of anesthetic complications    wife says he has had delayed awakening in the past after GA      ROS/MED HX    ENT/Pulmonary:     (+)sleep apnea, ARMANDO risk factors snores loudly, obese, tobacco use, Past use doesn't use CPAP , . .    Neurologic:  - neg neurologic ROS     Cardiovascular:     (+) ----. : . . ROSARIO, . :. .       METS/Exercise Tolerance:  >4 METS   Hematologic:  - neg hematologic  ROS       Musculoskeletal:  - neg musculoskeletal ROS       GI/Hepatic:     (+) GERD Asymptomatic on medication,       Renal/Genitourinary:     (+) chronic renal disease, type: CRI,       Endo:     (+) type II DM Using insulin Obesity, .      Psychiatric:  - neg psychiatric ROS       Infectious Disease:  - neg infectious disease ROS       Malignancy:      - no malignancy   Other:    (+) No chance of pregnancy C-spine cleared: N/A, H/O Chronic Pain,no other significant disability   - neg other ROS                 Physical Exam  Normal systems: cardiovascular, pulmonary and dental    Airway   Mallampati: III  TM distance: >3 FB  Neck ROM: full    Dental     Cardiovascular   Rhythm and rate: regular and normal      Pulmonary    breath sounds clear to auscultation                    Anesthesia Plan      History & Physical Review      ASA Status:  3 .    NPO Status:  > 6 hours    Plan for Spinal   PONV prophylaxis:  Ondansetron (or other 5HT-3)       Postoperative Care      Consents  Anesthetic plan, risks, benefits and alternatives discussed with:  Patient.  Use of blood products discussed: No .   .                          .

## 2018-11-27 NOTE — ANESTHESIA PROCEDURE NOTES
Peripheral nerve/Neuraxial procedure note : intrathecal  Pre-Procedure  Performed by HAKAN JETT  Location: OR    Procedure Times:11/27/2018 9:23 AM  Pre-Anesthestic Checklist: patient identified, IV checked, site marked, risks and benefits discussed, informed consent, monitors and equipment checked, pre-op evaluation, at physician/surgeon's request and post-op pain management    Timeout  Correct Patient: Yes   Correct Procedure: Yes   Correct Site: Yes   Correct Laterality: Yes   Correct Position: Yes   Site Marked: Yes   .   Procedure Documentation  ASA 3  .    Procedure:    Intrathecal.  Insertion Site:L3-4      Patient Prep;chlorhexidine gluconate and isopropyl alcohol.  .  Needle: Bettina tip Spinal Needle (gauge): 25  Spinal/LP Needle Length (inches): 3 # of attempts: 1 and # of redirects:  Introducer used .       Assessment/Narrative  Paresthesias: No.  .  .  clear CSF fluid removed . Time Injected: 09:24

## 2018-11-27 NOTE — TELEPHONE ENCOUNTER
Dr. Rhodes called pharmacy and answered their questions.  Arielle Michel LPN  11/27/2018  1:42 PM

## 2018-11-27 NOTE — BRIEF OP NOTE
[facility] Brief Operative Note    Pre-operative diagnosis: umbilical hernia not strangulated/incarcerated    Post-operative diagnosis  Umbilical hernia and epigastric hernia    Procedure: Procedure(s):  Umbilical hernia Repair with Mesh   Surgeon(s): Surgeon(s) and Role:     * Mason Rhodes MD - Primary   Estimated blood loss: 10 mL     Specimens: * No specimens in log *   Findings:  Umbilical hernia and epigastric hernia, not strangulated or incarcerated

## 2018-11-27 NOTE — ANESTHESIA POSTPROCEDURE EVALUATION
Patient: Jake Bermudez    Procedure(s):  Umbilical hernia Repair with Mesh    Diagnosis:umbilical hernia not strangulated/incascaled  Diagnosis Additional Information: No value filed.    Anesthesia Type:  Spinal    Note:  Anesthesia Post Evaluation    Patient location during evaluation: Phase 2  Patient participation: Able to fully participate in evaluation  Level of consciousness: awake and alert  Pain management: adequate  Airway patency: patent  Cardiovascular status: acceptable  Respiratory status: acceptable  Hydration status: acceptable  PONV: none     Anesthetic complications: None          Last vitals:  Vitals:    11/27/18 1125 11/27/18 1130 11/27/18 1135   BP: (!) 139/96 140/90    Pulse:      Resp:      Temp:      SpO2: 91% 91% 90%         Electronically Signed By: BRANDYN DECKER CRNA  November 27, 2018  11:46 AM

## 2018-11-27 NOTE — IP AVS SNAPSHOT
MRN:[id number]                      After Visit Summary   11/27/2018    Jake Bermudez    MRN: [id number]           Thank you!     Thank you for choosing [facility] for your care. Our goal is always to provide you with excellent care. Hearing back from our patients is one way we can continue to improve our services. Please take a few minutes to complete the written survey that you may receive in the mail after you visit with us. Thank you!        Patient Information     Date Of Birth          1965        About your hospital stay     You were admitted on:  November 27, 2018 You last received care in the:  [facility]    You were discharged on:  November 27, 2018       Who to Call     For medical emergencies, please call 911.  For non-urgent questions about your medical care, please call your primary care provider or clinic, [phone]  For questions related to your surgery, please call your surgery clinic        Attending Provider     Provider Mason Head MD General Surgery       Primary Care Provider Office Phone # Fax #    Kira LAMBERT DO [facility] [phone]      After Care Instructions     Diet Instructions       Resume pre-procedure diet            Do not - immerse incision in water until sutures removed       Do not immerse incision in water for 2 weeks            Dressing       OK to remove dressing in 48 hours. Leave steri-strips, paper tapes, on skin for 5-7 days.            No Alcohol       For 24 hours post procedure            No driving or operating machinery        until the day after procedure            No lifting        No lifting over 15 lbs and no strenuous physical activity for 2 weeks            Shower       No shower for 48 hours post procedure. May shower Postoperative Day (POD)  2                  Your next 10 appointments already scheduled     Dec 05, 2018  9:30 AM CST   Return Visit with Mason Rhodes,  "MD   [facility] [place] [facility] ([facility])    [address]  [place] [id number] [phone]              Pending Results     No orders found from 2018 to 2018.            Admission Information     Date & Time Provider Department Dept. Phone    2018 Mason Rhodes MD [facility] [phone]      Your Vitals Were     Blood Pressure Pulse Temperature Respirations Weight Pulse Oximetry    146/80 99 96.9  F (36.1  C) (Temporal) 18 132.1 kg (291 lb 4.8 oz) 90%    BMI (Body Mass Index)                   41.8 kg/m2           MyChart Information     [facility] lets you send messages to your doctor, view your test results, renew your prescriptions, schedule appointments and more. To sign up, go to [email or web address] . Click on \"Log in\" on the left side of the screen, which will take you to the Welcome page. Then click on \"Sign up Now\" on the right side of the page.     You will be asked to enter the access code listed below, as well as some personal information. Please follow the directions to create your username and password.     Your access code is: [email or web address]  Expires: 2018  9:11 AM     Your access code will  in 90 days. If you need help or a new code, please call your [facility] clinic or [phone].        Care EveryWhere ID     This is your Care EveryWhere ID. This could be used by other organizations to access your [facility] medical records  [id number]        Equal Access to Services     : Hadii aad ku hadasho Sogeorgiana, waaxda luqadaha, qaybta kaalmapatti carrasco. So [facility] [phone].    ATENCIÓN: Si habla español, tiene a ott disposición servicios gratuitos de asistencia lingüística. Llame al [phone].    We comply with applicable federal civil rights laws and [place] laws. We do not discriminate on the basis of race, color, national origin, age, " disability, sex, sexual orientation, or gender identity.               Review of your medicines      START taking        Dose / Directions    HYDROcodone-acetaminophen 5-325 MG tablet   Commonly known as:  NORCO   Used for:  Post-operative state, Umbilical hernia with obstruction, without gangrene        Dose:  1-2 tablet   Take 1-2 tablets by mouth every 4 hours as needed for moderate to severe pain (Moderate to Severe Pain)   Quantity:  20 tablet   Refills:  0         CONTINUE these medicines which have NOT CHANGED        Dose / Directions    albuterol 108 (90 Base) MCG/ACT inhaler   Commonly known as:  PROAIR HFA/PROVENTIL HFA/VENTOLIN HFA   Used for:  Restrictive lung disease        Dose:  2 puff   Inhale 2 puffs into the lungs every 4 hours as needed for shortness of breath / dyspnea or wheezing   Quantity:  1 Inhaler   Refills:  11       alcohol swab prep pads   Used for:  Uncontrolled type 2 diabetes mellitus with hyperglycemia, with long-term current use of insulin (H)        Use to swab area of injection/france as directed.   Quantity:  100 each   Refills:  6       ASPIR-81 PO        Dose:  81 mg   Take 81 mg by mouth daily with food   Refills:  0       blood glucose calibration solution   Used for:  Uncontrolled type 2 diabetes mellitus with hyperglycemia, with long-term current use of insulin (H)        Use to calibrate blood glucose monitor as needed as directed.   Quantity:  1 Bottle   Refills:  3       blood glucose lancing device   Used for:  Uncontrolled type 2 diabetes mellitus with hyperglycemia, with long-term current use of insulin (H)        Lancing device to be used with lancets.   Quantity:  1 each   Refills:  0       * blood glucose monitoring lancets   Used for:  Uncontrolled type 2 diabetes mellitus with hyperglycemia, with long-term current use of insulin (H)        Use to test blood sugar 4 times daily.   Quantity:  204 each   Refills:  6       * blood glucose monitoring lancets   Used  for:  Uncontrolled type 2 diabetes mellitus with hyperglycemia, with long-term current use of insulin (H)        USE STRIP TO CHECK BLOOD GLUCOSE 4 TIMES DAILY.   Quantity:  408 each   Refills:  4       blood glucose monitoring test strip   Commonly known as:  ACCU-CHEK DONAVON PLUS   Used for:  Uncontrolled type 2 diabetes mellitus with hyperglycemia, with long-term current use of insulin (H)        CHECK GLUCOSE FOUR TIMES DAILY   Quantity:  400 strip   Refills:  4       clonazePAM 0.5 MG tablet   Commonly known as:  klonoPIN   Used for:  MIRANDA (generalized anxiety disorder)        TAKE 1 TABLET BY MOUTH AT BEDTIME   Quantity:  30 tablet   Refills:  5       FLUoxetine 20 MG capsule   Commonly known as:  PROzac   Used for:  Depression, unspecified depression type        TAKE 1 CAPSULE BY MOUTH IN THE MORNING   Quantity:  90 capsule   Refills:  3       fluticasone-salmeterol 250-50 MCG/DOSE inhaler   Commonly known as:  ADVAIR   Used for:  Restrictive lung disease        Dose:  1 puff   Inhale 1 puff into the lungs 2 times daily   Quantity:  3 Inhaler   Refills:  3       gabapentin 300 MG capsule   Commonly known as:  NEURONTIN        Dose:  900 mg   Take 900 mg by mouth 3 times daily 900 mg TID, 1200 mg at bedtime   Refills:  0       hydrochlorothiazide 25 MG tablet   Commonly known as:  HYDRODIURIL   Used for:  Benign essential hypertension        Dose:  25 mg   Take 1 tablet (25 mg) by mouth daily   Quantity:  90 tablet   Refills:  3       insulin aspart 100 UNIT/ML pen   Commonly known as:  NovoLOG FLEXPEN   Used for:  Uncontrolled type 2 diabetes mellitus with hyperglycemia, with long-term current use of insulin (H)        Use 60-70units subcutaneous with each meal based on insulin:carb ratio.  Maximum: 240 units per day.   Quantity:  90 mL   Refills:  4       insulin glargine 100 UNIT/ML pen   Commonly known as:  LANTUS SOLOSTAR   Used for:  Uncontrolled type 2 diabetes mellitus with hyperglycemia, with long-term  current use of insulin (H)        Dose:  175 Units   Inject 175 Units Subcutaneous At Bedtime   Quantity:  120 mL   Refills:  4       insulin pen needle 32G X 4 MM miscellaneous   Commonly known as:  BD ANN U/F   Used for:  Uncontrolled type 2 diabetes mellitus with hyperglycemia, with long-term current use of insulin (H)        USE AS DIRECTED FOR  ADMINISTERING  INSULIN  AT  HOME  4  TIMES  DAILY   Quantity:  100 each   Refills:  4       lisinopril 20 MG tablet   Commonly known as:  PRINIVIL/ZESTRIL   Used for:  Benign essential hypertension        Dose:  20 mg   Take 1 tablet (20 mg) by mouth daily   Quantity:  90 tablet   Refills:  4       LYRICA 200 MG capsule   Generic drug:  Pregabalin        Dose:  200 mg   Take 1 capsule (200 mg) by mouth 3 times daily   Refills:  0       MAPAP 500 MG tablet   Used for:  Chronic low back pain, unspecified back pain laterality, with sciatica presence unspecified   Generic drug:  acetaminophen        TAKE ONE TABLET BY MOUTH EVERY 6 HOURS AS NEEDED .  MAX  ACETAMINOPHEN  DOSE  4000  MG  IN  24  HOURS.   Quantity:  200 tablet   Refills:  5       melatonin 5 MG tablet   Used for:  Primary insomnia        Dose:  5-10 mg   Take 1-2 tablets (5-10 mg) by mouth At Bedtime   Quantity:  180 tablet   Refills:  3       meloxicam 15 MG tablet   Commonly known as:  MOBIC   Used for:  H/O hyperkalemia, Back pain, unspecified back location, unspecified back pain laterality, unspecified chronicity        TAKE ONE TABLET BY MOUTH ONCE DAILY WITH FOOD   Quantity:  90 tablet   Refills:  0       methocarbamol 750 MG tablet   Commonly known as:  ROBAXIN   Used for:  Spasm of back muscles        TAKE ONE TABLET BY MOUTH THREE TIMES DAILY   Quantity:  270 tablet   Refills:  3       mirtazapine 15 MG tablet   Commonly known as:  REMERON        Dose:  15 mg   Take 1 tablet (15 mg) by mouth At Bedtime   Refills:  0       omeprazole 20 MG tablet   Used for:  Gastroesophageal reflux disease,  esophagitis presence not specified        Take 20 mg by mouth once daily before a meal   Quantity:  90 tablet   Refills:  3       order for DME        Oxygen for home use. LPM: 1/min via nasal cannula. Frequency of use: Continuous with portability; Length of need: 12 mos.   Refills:  0       simvastatin 20 MG tablet   Commonly known as:  ZOCOR   Used for:  Hyperlipidemia LDL goal <100        Dose:  20 mg   Take 1 tablet (20 mg) by mouth At Bedtime   Quantity:  90 tablet   Refills:  3       traMADol 50 MG tablet   Commonly known as:  ULTRAM   Used for:  Chronic low back pain, unspecified back pain laterality, with sciatica presence unspecified        TAKE ONE TABLET BY MOUTH THREE TIMES DAILY AS NEEDED   Quantity:  90 tablet   Refills:  5       umeclidinium-vilanterol 62.5-25 MCG/INH oral inhaler   Commonly known as:  ANORO ELLIPTA        Dose:  1 puff   Inhale 1 puff into the lungs   Refills:  0       Vitamin D3 2000 units Tabs   Used for:  Neuropathy        TAKE ONE TABLET BY MOUTH ONCE DAILY   Quantity:  100 tablet   Refills:  2       * Notice:  This list has 2 medication(s) that are the same as other medications prescribed for you. Read the directions carefully, and ask your doctor or other care provider to review them with you.         Where to get your medicines      Some of these will need a paper prescription and others can be bought over the counter. Ask your nurse if you have questions.     Bring a paper prescription for each of these medications     HYDROcodone-acetaminophen 5-325 MG tablet                Protect others around you: Learn how to safely use, store and throw away your medicines at www.disposemymeds.org.        Information about OPIOIDS     PRESCRIPTION OPIOIDS: WHAT YOU NEED TO KNOW   We gave you an opioid (narcotic) pain medicine. It is important to manage your pain, but opioids are not always the best choice. You should first try all the other options your care team gave you. Take this  medicine for as short a time (and as few doses) as possible.    Some activities can increase your pain, such as bandage changes or therapy sessions. It may help to take your pain medicine 30 to 60 minutes before these activities. Reduce your stress by getting enough sleep, working on hobbies you enjoy and practicing relaxation or meditation. Talk to your care team about ways to manage your pain beyond prescription opioids.    These medicines have risks:    DO NOT drive when on new or higher doses of pain medicine. These medicines can affect your alertness and reaction times, and you could be arrested for driving under the influence (DUI). If you need to use opioids long-term, talk to your care team about driving.    DO NOT operate heavy machinery    DO NOT do any other dangerous activities while taking these medicines.    DO NOT drink any alcohol while taking these medicines.     If the opioid prescribed includes acetaminophen, DO NOT take with any other medicines that contain acetaminophen. Read all labels carefully. Look for the word  acetaminophen  or  Tylenol.  Ask your pharmacist if you have questions or are unsure.    You can get addicted to pain medicines, especially if you have a history of addiction (chemical, alcohol or substance dependence). Talk to your care team about ways to reduce this risk.    All opioids tend to cause constipation. Drink plenty of water and eat foods that have a lot of fiber, such as fruits, vegetables, prune juice, apple juice and high-fiber cereal. Take a laxative (Miralax, milk of magnesia, Colace, Senna) if you don t move your bowels at least every other day. Other side effects include upset stomach, sleepiness, dizziness, throwing up, tolerance (needing more of the medicine to have the same effect), physical dependence and slowed breathing.    Store your pills in a secure place, locked if possible. We will not replace any lost or stolen medicine. If you don t finish your  medicine, please throw away (dispose) as directed by your pharmacist. The [place] [employer] has more information about safe disposal: https://www.pca.[facility].[place].us/living-green/managing-unwanted-medications             Medication List: This is a list of all your medications and when to take them. Check marks below indicate your daily home schedule. Keep this list as a reference.      Medications           Morning Afternoon Evening Bedtime As Needed    albuterol 108 (90 Base) MCG/ACT inhaler   Commonly known as:  PROAIR HFA/PROVENTIL HFA/VENTOLIN HFA   Inhale 2 puffs into the lungs every 4 hours as needed for shortness of breath / dyspnea or wheezing                                alcohol swab prep pads   Use to swab area of injection/france as directed.                                ASPIR-81 PO   Take 81 mg by mouth daily with food                                blood glucose calibration solution   Use to calibrate blood glucose monitor as needed as directed.                                blood glucose lancing device   Lancing device to be used with lancets.                                * blood glucose monitoring lancets   Use to test blood sugar 4 times daily.                                * blood glucose monitoring lancets   USE STRIP TO CHECK BLOOD GLUCOSE 4 TIMES DAILY.                                blood glucose monitoring test strip   Commonly known as:  ACCU-CHEK DONAVON PLUS   CHECK GLUCOSE FOUR TIMES DAILY                                clonazePAM 0.5 MG tablet   Commonly known as:  klonoPIN   TAKE 1 TABLET BY MOUTH AT BEDTIME                                FLUoxetine 20 MG capsule   Commonly known as:  PROzac   TAKE 1 CAPSULE BY MOUTH IN THE MORNING                                fluticasone-salmeterol 250-50 MCG/DOSE inhaler   Commonly known as:  ADVAIR   Inhale 1 puff into the lungs 2 times daily                                gabapentin 300 MG capsule   Commonly known as:  NEURONTIN    Take 900 mg by mouth 3 times daily 900 mg TID, 1200 mg at bedtime                                hydrochlorothiazide 25 MG tablet   Commonly known as:  HYDRODIURIL   Take 1 tablet (25 mg) by mouth daily                                HYDROcodone-acetaminophen 5-325 MG tablet   Commonly known as:  NORCO   Take 1-2 tablets by mouth every 4 hours as needed for moderate to severe pain (Moderate to Severe Pain)                                insulin aspart 100 UNIT/ML pen   Commonly known as:  NovoLOG FLEXPEN   Use 60-70units subcutaneous with each meal based on insulin:carb ratio.  Maximum: 240 units per day.                                insulin glargine 100 UNIT/ML pen   Commonly known as:  LANTUS SOLOSTAR   Inject 175 Units Subcutaneous At Bedtime                                insulin pen needle 32G X 4 MM miscellaneous   Commonly known as:  BD ANN U/F   USE AS DIRECTED FOR  ADMINISTERING  INSULIN  AT  HOME  4  TIMES  DAILY                                lisinopril 20 MG tablet   Commonly known as:  PRINIVIL/ZESTRIL   Take 1 tablet (20 mg) by mouth daily                                LYRICA 200 MG capsule   Take 1 capsule (200 mg) by mouth 3 times daily   Generic drug:  Pregabalin                                MAPAP 500 MG tablet   TAKE ONE TABLET BY MOUTH EVERY 6 HOURS AS NEEDED .  MAX  ACETAMINOPHEN  DOSE  4000  MG  IN  24  HOURS.   Last time this was given:  975 mg on 11/27/2018  8:18 AM   Generic drug:  acetaminophen                                melatonin 5 MG tablet   Take 1-2 tablets (5-10 mg) by mouth At Bedtime                                meloxicam 15 MG tablet   Commonly known as:  MOBIC   TAKE ONE TABLET BY MOUTH ONCE DAILY WITH FOOD                                methocarbamol 750 MG tablet   Commonly known as:  ROBAXIN   TAKE ONE TABLET BY MOUTH THREE TIMES DAILY                                mirtazapine 15 MG tablet   Commonly known as:  REMERON   Take 1 tablet (15 mg) by mouth At Bedtime                                 omeprazole 20 MG tablet   Take 20 mg by mouth once daily before a meal                                order for DME   Oxygen for home use. LPM: 1/min via nasal cannula. Frequency of use: Continuous with portability; Length of need: 12 mos.                                simvastatin 20 MG tablet   Commonly known as:  ZOCOR   Take 1 tablet (20 mg) by mouth At Bedtime                                traMADol 50 MG tablet   Commonly known as:  ULTRAM   TAKE ONE TABLET BY MOUTH THREE TIMES DAILY AS NEEDED                                umeclidinium-vilanterol 62.5-25 MCG/INH oral inhaler   Commonly known as:  ANORO ELLIPTA   Inhale 1 puff into the lungs                                Vitamin D3 2000 units Tabs   TAKE ONE TABLET BY MOUTH ONCE DAILY                                * Notice:  This list has 2 medication(s) that are the same as other medications prescribed for you. Read the directions carefully, and ask your doctor or other care provider to review them with you.              More Information        Hernia (Adult)    A hernia can happen when there is a weakness or defect in the wall of the abdomen or groin. Intestines or nearby tissues may move from their usual location and push through the weakness in the wall. This can cause a hernia (bulge) you may see or feel.  Causes and risk factors   A hernia may be present at birth. Or it may be caused by the wear and tear of daily living. Certain factors can make a hernia more likely. These can include:    Heavy lifting    Straining, whether from lifting, movement, or constipation    Chronic cough    Injury to the abdominal wall    Excess weight    Pregnancy    Prior surgery    Older age    Family history of hernia  Symptoms  Symptoms of a hernia may come on suddenly. Or they may appear slowly over time. Some common symptoms include:    Bulge in the groin area, around the navel, or in the scrotum (the bulge may get bigger when you stand and  go away when you lie down)    Pain or pressure around the bulge    Pain during activities such as lifting, coughing, or sneezing    A feeling of weakness or pressure in the groin    Pain or swelling in the scrotum  Types of hernias  There are different types of hernia. The type you have depends on its location:    Inguinal. This type is in the groin or scrotum. It is more common in men. But, women can get this hernia, too.    Femoral. This type is in the groin, upper thigh (where the leg bends), or labia. It is more common in women.    Ventral. This type is in the abdominal wall.    Umbilical. This type occurs around the navel (belly button).    Incisional. This type occurs at the site of a previous surgery.  The condition of the hernia can help determine how urgently it needs to be treated.    Reducible. It goes back in by itself, or it can be pushed back in.    Irreducible. It can t be pushed back in.    Incarcerated/strangulated. The intestine is trapped (incarcerated). If this happens, you won t be able to push the bulge back in. If the incarcerated hernia isn t treated, it may become strangulated. This means the area loses blood supply and the tissue may die. This requires emergency surgery. You need treatment right away.  In most cases, a hernia will not heal on its own.You may need surgery to repair the defect in the abdominal wall or groin. You ll be told more about surgery, if needed.  If your symptoms are not severe, treatment may sometimes be delayed. In such cases, you will need regular follow-up visits with the provider. You ll be asked to keep track of your symptoms and to watch for signs of more serious problems. You may also be given guidelines similar to the home care instructions below.  Home care  To help keep a hernia from getting worse, you may be advised to:    Avoid heavy lifting and straining as directed.    Take steps to prevent constipation, such as eating more fiber and drinking more  water. This may help reduce straining that can occur when having a bowel movement. Reducing straining may help keep your symptoms from getting worse.    Maintain a healthy weight or lose excess weight. This can help reduce strain on abdominal muscles and tissues.    Stop smoking. This can help prevent coughing that may also strain abdominal muscles and tissues.  Follow-up care  Follow up with your healthcare provider, or as directed. If imaging tests were done, they will be reviewed a doctor. You will be told the results and any new findings that may affect your care.  When to seek medical advice  Call your healthcare provider right away if any of these occur:    Hernia hardens, swells, or grows larger    Hernia can no longer be pushed back in    Pain moves to the lower right abdomen (just below the waistline), or spreads to the back  Call 911  Call 911 if any of these occur:    Severe pain, redness, or tenderness in the area near the hernia    Pain worsens quickly and doesn t get better    Inability to have a bowel movement or pass gas    Fever of 100.4 F (38 C) or higher, or as directed by your healthcare provider  Date Last Reviewed: 3/1/2018    7696-2874 [employer]. [address] [place] [address], [place] [address]. All rights reserved. This information is not intended as a substitute for professional medical care. Always follow your healthcare professional's instructions.

## 2018-11-28 NOTE — TELEPHONE ENCOUNTER
Per pharmacy fax from [facility]- Please send phone number for the pharmacy Rx was sent to. Writer contacted [facility] and spoke with jermaine and phone number listed for [facility] ( [phone]) given as requested. Dilma Butler RN on 11/28/2018 at 10:54 AM    insulin pen needle (BD ANN U/F) 32G X 4  each 4 10/30/2018  No   Sig: USE AS DIRECTED FOR  ADMINISTERING  INSULIN  AT  HOME  4  TIMES  DAILY   Class: E-Prescribe   Order: [id number]   E-Prescribing Status: Receipt confirmed by pharmacy (10/30/2018 10:42 AM CDT)   Printout Tracking   External Result Report   Medication Administration Instructions   USE AS DIRECTED FOR  ADMINISTERING  INSULIN  AT  HOME  4  TIMES  DAILY   Pharmacy   [facility] - [id number]

## 2018-12-05 ENCOUNTER — TELEPHONE (OUTPATIENT)
Dept: FAMILY MEDICINE | Facility: OTHER | Age: 53
End: 2018-12-05

## 2018-12-05 ENCOUNTER — OFFICE VISIT (OUTPATIENT)
Dept: SURGERY | Facility: OTHER | Age: 53
End: 2018-12-05
Attending: SURGERY
Payer: MEDICAID

## 2018-12-05 VITALS
BODY MASS INDEX: 41.96 KG/M2 | TEMPERATURE: 97.1 F | DIASTOLIC BLOOD PRESSURE: 62 MMHG | SYSTOLIC BLOOD PRESSURE: 126 MMHG | WEIGHT: 292.4 LBS

## 2018-12-05 DIAGNOSIS — Z98.890 POST-OPERATIVE STATE: Primary | ICD-10-CM

## 2018-12-05 DIAGNOSIS — Z79.4 UNCONTROLLED TYPE 2 DIABETES MELLITUS WITH HYPERGLYCEMIA, WITH LONG-TERM CURRENT USE OF INSULIN (H): ICD-10-CM

## 2018-12-05 DIAGNOSIS — E11.65 UNCONTROLLED TYPE 2 DIABETES MELLITUS WITH HYPERGLYCEMIA, WITH LONG-TERM CURRENT USE OF INSULIN (H): ICD-10-CM

## 2018-12-05 PROCEDURE — 99024 POSTOP FOLLOW-UP VISIT: CPT | Performed by: SURGERY

## 2018-12-05 PROCEDURE — G0463 HOSPITAL OUTPT CLINIC VISIT: HCPCS

## 2018-12-05 RX ORDER — PEN NEEDLE, DIABETIC 32GX 5/32"
NEEDLE, DISPOSABLE MISCELLANEOUS
Qty: 400 EACH | Refills: 2 | Status: SHIPPED | OUTPATIENT
Start: 2018-12-05 | End: 2019-08-27

## 2018-12-05 ASSESSMENT — PAIN SCALES - GENERAL: PAINLEVEL: NO PAIN (0)

## 2018-12-05 NOTE — TELEPHONE ENCOUNTER
"Requested Prescriptions   Pending Prescriptions Disp Refills     BD ANN U/F 32G X 4 MM insulin pen needle [Pharmacy Med Name: BD PEN NEEDLE/ANN [id number] MIS] 400 each 2     Sig: USE AS DIRECTED 4 TIMES DAILY FOR  ADMINISTERING  INSULIN  AT  HOME.    Diabetic Supplies Protocol Passed    12/5/2018 10:32 AM       Passed - Patient is 18 years of age or older       Passed - Recent (6 mo) or future (30 days) visit within the authorizing provider's specialty    Patient had office visit in the last 6 months or has a visit in the next 30 days with authorizing provider.  See \"Patient Info\" tab in inbasket, or \"Choose Columns\" in Meds & Orders section of the refill encounter.              [facility] calls and states patient is in the store.     Prescription refilled per RN Medication RefillPolicy.................... Dilma Butler ....................  12/5/2018   4:16 PM          "

## 2018-12-05 NOTE — PROGRESS NOTES
Subjective:  This is a follow up after umbilical and epigastric hernia repair with mesh on 11/27/2018. The patient thought he may have had a fever yesterday but did not check it, today in clinic he is normothermic.  Energy level is fine no significant troubles with the wound.  However, he is having some clear drainage from the wound.    Objective:  /62 (BP Location: Right arm, Patient Position: Sitting, Cuff Size: Adult Large)  Temp 97.1  F (36.2  C) (Tympanic)  Wt 292 lb 6.4 oz (132.6 kg)  BMI 41.96 kg/m2    Incision is clean and intact.  Some serous fluid draining from the left lateral aspect of the incision.  This area is probed with the back of a sterile applicator and a 2-3 cm seroma is found this is partially drained by manual expression.    Assessment:   Doing well after umbilical and epigastric hernia repair with mesh.  Patient has a postoperative seroma that is not infected.    Plan:  Continue clean dry dressing on the incision to manage drainage.  I expect this seroma to resolve on its own.  The patient was instructed on the signs and symptoms of wound infection to include redness purulent drainage, fevers, chills.  If he sees these things he is aware he should call the clinic and set up a follow-up appointment.  Otherwise no further follow-up is needed.    MARINA Rhodes MD

## 2018-12-05 NOTE — MR AVS SNAPSHOT
"              After Visit Summary   2018    Jake Bermudez    MRN: [id number]           Patient Information     Date Of Birth          1965        Visit Information        Provider Department      2018 9:30 AM Mason Rhodes MD [facility]        Today's Diagnoses     Post-operative state    -  1       Follow-ups after your visit        Who to contact     If you have questions or need follow up information about today's clinic visit or your schedule please contact [facility] directly at [phone].  Normal or non-critical lab and imaging results will be communicated to you by Tybahart, letter or phone within 4 business days after the clinic has received the results. If you do not hear from us within 7 days, please contact the clinic through Neteriont or phone. If you have a critical or abnormal lab result, we will notify you by phone as soon as possible.  Submit refill requests through RotaBan or call your pharmacy and they will forward the refill request to us. Please allow 3 business days for your refill to be completed.          Additional Information About Your Visit        MyChart Information     RotaBan lets you send messages to your doctor, view your test results, renew your prescriptions, schedule appointments and more. To sign up, go to [email or web address] . Click on \"Log in\" on the left side of the screen, which will take you to the Welcome page. Then click on \"Sign up Now\" on the right side of the page.     You will be asked to enter the access code listed below, as well as some personal information. Please follow the directions to create your username and password.     Your access code is: [email or web address]  Expires: 2018  9:11 AM     Your access code will  in 90 days. If you need help or a new code, please call your [place] clinic or [phone].        Care EveryWhere ID     This is your Care EveryWhere ID. This could be " used by other organizations to access your [facility] medical records  [id number]        Your Vitals Were     Temperature BMI (Body Mass Index)                97.1  F (36.2  C) (Tympanic) 41.96 kg/m2           Blood Pressure from Last 3 Encounters:   12/05/18 126/62   11/27/18 122/69   10/31/18 150/60    Weight from Last 3 Encounters:   12/05/18 292 lb 6.4 oz (132.6 kg)   11/27/18 291 lb 4.8 oz (132.1 kg)   10/31/18 298 lb (135.2 kg)              Today, you had the following     No orders found for display       Primary Care Provider Office Phone # Fax #    Kira LAMBERT DO [facility] [phone] [address] [facility] [address]        Equal Access to Services     DARNELL ESPINOZA : Hadii aad ku hadasho Soomaali, waaxda luqadaha, qaybta kaalmada sal, patti kee . [facility] [phone].    ATENCIÓN: Si habla español, tiene a ott disposición servicios gratuitos de asistencia lingüística. Samantha al [phone].    We comply with applicable federal civil rights laws and [place] laws. We do not discriminate on the basis of race, color, national origin, age, disability, sex, sexual orientation, or gender identity.            Thank you!     Thank you for choosing [facility] AND \[facility] [place] [facility]\""  for your care. Our goal is always to provide you with excellent care. Hearing back from our patients is one way we can continue to improve our services. Please take a few minutes to complete the written survey that you may receive in the mail after your visit with us. Thank you!             Your Updated Medication List - Protect others around you: Learn how to safely use, store and throw away your medicines at www.disposemymeds.org.          This list is accurate as of 12/5/18 11:45 AM.  Always use your most recent med list.                   Brand Name Dispense Instructions for use Diagnosis    albuterol 108 (90 Base) MCG/ACT inhaler    PROAIR HFA/PROVENTIL HFA/VENTOLIN HFA     1 Inhaler    Inhale 2 puffs into the lungs every 4 hours as needed for shortness of breath / dyspnea or wheezing    Restrictive lung disease       alcohol swab prep pads     100 each    Use to swab area of injection/france as directed.    Uncontrolled type 2 diabetes mellitus with hyperglycemia, with long-term current use of insulin (H)       ASPIR-81 PO      Take 81 mg by mouth daily with food        blood glucose calibration solution     1 Bottle    Use to calibrate blood glucose monitor as needed as directed.    Uncontrolled type 2 diabetes mellitus with hyperglycemia, with long-term current use of insulin (H)       blood glucose lancing device     1 each    Lancing device to be used with lancets.    Uncontrolled type 2 diabetes mellitus with hyperglycemia, with long-term current use of insulin (H)       * blood glucose monitoring lancets     204 each    Use to test blood sugar 4 times daily.    Uncontrolled type 2 diabetes mellitus with hyperglycemia, with long-term current use of insulin (H)       * blood glucose monitoring lancets     408 each    USE STRIP TO CHECK BLOOD GLUCOSE 4 TIMES DAILY.    Uncontrolled type 2 diabetes mellitus with hyperglycemia, with long-term current use of insulin (H)       blood glucose monitoring test strip    ACCU-CHEK DONAVON PLUS    400 strip    CHECK GLUCOSE FOUR TIMES DAILY    Uncontrolled type 2 diabetes mellitus with hyperglycemia, with long-term current use of insulin (H)       clonazePAM 0.5 MG tablet    klonoPIN    30 tablet    TAKE 1 TABLET BY MOUTH AT BEDTIME    MIRANDA (generalized anxiety disorder)       FLUoxetine 20 MG capsule    PROzac    90 capsule    TAKE 1 CAPSULE BY MOUTH IN THE MORNING    Depression, unspecified depression type       fluticasone-salmeterol 250-50 MCG/DOSE inhaler    ADVAIR    3 Inhaler    Inhale 1 puff into the lungs 2 times daily    Restrictive lung disease       gabapentin 300 MG capsule    NEURONTIN     Take 900 mg by mouth 3 times daily 900 mg  TID, 1200 mg at bedtime        hydrochlorothiazide 25 MG tablet    HYDRODIURIL    90 tablet    Take 1 tablet (25 mg) by mouth daily    Benign essential hypertension       HYDROcodone-acetaminophen 5-325 MG tablet    NORCO    20 tablet    Take 1-2 tablets by mouth every 4 hours as needed for moderate to severe pain (Moderate to Severe Pain)    Post-operative state, Umbilical hernia with obstruction, without gangrene       insulin aspart 100 UNIT/ML pen    NovoLOG FLEXPEN    90 mL    Use 60-70units subcutaneous with each meal based on insulin:carb ratio.  Maximum: 240 units per day.    Uncontrolled type 2 diabetes mellitus with hyperglycemia, with long-term current use of insulin (H)       insulin glargine 100 UNIT/ML pen    LANTUS SOLOSTAR    120 mL    Inject 175 Units Subcutaneous At Bedtime    Uncontrolled type 2 diabetes mellitus with hyperglycemia, with long-term current use of insulin (H)       insulin pen needle 32G X 4 MM miscellaneous    BD ANN U/F    100 each    USE AS DIRECTED FOR  ADMINISTERING  INSULIN  AT  HOME  4  TIMES  DAILY    Uncontrolled type 2 diabetes mellitus with hyperglycemia, with long-term current use of insulin (H)       lisinopril 20 MG tablet    PRINIVIL/ZESTRIL    90 tablet    Take 1 tablet (20 mg) by mouth daily    Benign essential hypertension       LYRICA 200 MG capsule   Generic drug:  Pregabalin      Take 1 capsule (200 mg) by mouth 3 times daily        MAPAP 500 MG tablet   Generic drug:  acetaminophen     200 tablet    TAKE ONE TABLET BY MOUTH EVERY 6 HOURS AS NEEDED .  MAX  ACETAMINOPHEN  DOSE  4000  MG  IN  24  HOURS.    Chronic low back pain, unspecified back pain laterality, with sciatica presence unspecified       melatonin 5 MG tablet     180 tablet    Take 1-2 tablets (5-10 mg) by mouth At Bedtime    Primary insomnia       meloxicam 15 MG tablet    MOBIC    90 tablet    TAKE ONE TABLET BY MOUTH ONCE DAILY WITH FOOD    H/O hyperkalemia, Back pain, unspecified back location,  unspecified back pain laterality, unspecified chronicity       methocarbamol 750 MG tablet    ROBAXIN    270 tablet    TAKE ONE TABLET BY MOUTH THREE TIMES DAILY    Spasm of back muscles       mirtazapine 15 MG tablet    REMERON     Take 1 tablet (15 mg) by mouth At Bedtime        omeprazole 20 MG tablet     90 tablet    Take 20 mg by mouth once daily before a meal    Gastroesophageal reflux disease, esophagitis presence not specified       order for DME      Oxygen for home use. LPM: 1/min via nasal cannula. Frequency of use: Continuous with portability; Length of need: 12 mos.        simvastatin 20 MG tablet    ZOCOR    90 tablet    Take 1 tablet (20 mg) by mouth At Bedtime    Hyperlipidemia LDL goal <100       traMADol 50 MG tablet    ULTRAM    90 tablet    TAKE ONE TABLET BY MOUTH THREE TIMES DAILY AS NEEDED    Chronic low back pain, unspecified back pain laterality, with sciatica presence unspecified       umeclidinium-vilanterol 62.5-25 MCG/INH oral inhaler    ANORO ELLIPTA     Inhale 1 puff into the lungs        Vitamin D3 2000 units Tabs     100 tablet    TAKE ONE TABLET BY MOUTH ONCE DAILY    Neuropathy       * Notice:  This list has 2 medication(s) that are the same as other medications prescribed for you. Read the directions carefully, and ask your doctor or other care provider to review them with you.

## 2018-12-05 NOTE — NURSING NOTE
"Chief Complaint   Patient presents with     Surgical Followup     umbilical hernia       Initial /62 (BP Location: Right arm, Patient Position: Sitting, Cuff Size: Adult Large)  Temp 97.1  F (36.2  C) (Tympanic)  Wt 292 lb 6.4 oz (132.6 kg)  BMI 41.96 kg/m2 Estimated body mass index is 41.96 kg/(m^2) as calculated from the following:    Height as of 10/31/18: 5' 10\" (1.778 m).    Weight as of this encounter: 292 lb 6.4 oz (132.6 kg).  Medication Reconciliation: complete    Mone Tineo LPN  "

## 2018-12-14 DIAGNOSIS — E11.42 DIABETIC POLYNEUROPATHY ASSOCIATED WITH TYPE 2 DIABETES MELLITUS (H): Primary | ICD-10-CM

## 2018-12-14 NOTE — TELEPHONE ENCOUNTER
LYRICA 200 MG capsule      Last Written Prescription Date:  02/22/16  Last Fill Quantity: Unknown,   # refills: Unknown  Last Office Visit: 06/27/18  Future Office visit:       Routing refill request to provider for review/approval because:  Drug not on the [facility] or [facility] refill protocol or controlled substance. Please review, fill, and sign as appropriate. Thank you!  Silvana Stewart RN on 12/14/2018 at 3:13 PM

## 2018-12-14 NOTE — TELEPHONE ENCOUNTER
Urgent fax received from [employer]. Medication requires PA, which has not been approved as of today 12/14/18. Would you be willing to write a new Rx for this medication to begin the PA process? Patient is out of med and is in pain.

## 2018-12-15 RX ORDER — PREGABALIN 200 MG/1
200 CAPSULE ORAL 3 TIMES DAILY
Qty: 270 CAPSULE | Refills: 4 | Status: SHIPPED | OUTPATIENT
Start: 2018-12-15 | End: 2020-03-12

## 2019-01-25 DIAGNOSIS — E11.42 DIABETIC POLYNEUROPATHY ASSOCIATED WITH TYPE 2 DIABETES MELLITUS (H): Primary | ICD-10-CM

## 2019-01-25 NOTE — TELEPHONE ENCOUNTER
Accu-check testing supplies    Per pharmacy fax- Patient has new insurance and these aren't covered. Please send new Rx for any brand so we may find what's covered.     Prescription refilled per RN Medication RefillPolicy.................... Dilma Butler ....................  1/25/2019   12:29 PM

## 2019-01-29 ENCOUNTER — TELEPHONE (OUTPATIENT)
Dept: FAMILY MEDICINE | Facility: OTHER | Age: 54
End: 2019-01-29

## 2019-01-29 DIAGNOSIS — E11.65 UNCONTROLLED TYPE 2 DIABETES MELLITUS WITH HYPERGLYCEMIA, WITH LONG-TERM CURRENT USE OF INSULIN (H): Primary | ICD-10-CM

## 2019-01-29 DIAGNOSIS — Z79.4 UNCONTROLLED TYPE 2 DIABETES MELLITUS WITH HYPERGLYCEMIA, WITH LONG-TERM CURRENT USE OF INSULIN (H): Primary | ICD-10-CM

## 2019-01-29 RX ORDER — LANCING DEVICE
EACH MISCELLANEOUS
Qty: 100 EACH | Refills: 11 | Status: SHIPPED | OUTPATIENT
Start: 2019-01-29 | End: 2019-04-05

## 2019-01-29 NOTE — TELEPHONE ENCOUNTER
[facility] is needing a new prescription for a GENERIC blood glucose meter and also GENERIC lancets. Can you please place these orders?  Dorothy Diaz............................... 1/29/2019 1:38 PM

## 2019-02-01 ENCOUNTER — TELEPHONE (OUTPATIENT)
Dept: FAMILY MEDICINE | Facility: OTHER | Age: 54
End: 2019-02-01

## 2019-02-01 NOTE — TELEPHONE ENCOUNTER
[facility] and [facility] name and  of patient. Informed her of note and she states she will refill the medications.    Tere Baez LPN on 2019 at 9:27 AM

## 2019-02-01 NOTE — TELEPHONE ENCOUNTER
"Received faxed message from [facility] in regards to Rx for Gabapentin 300 mg take 2 capsules by mouth three times daily, noted as prescribed by Yary Lambert.    Note from pharmacy:\" Patient gets Lyrica, should he have Gabapentin as well? Please advise\"    Maria G Maza RN on 2/1/2019 at 8:39 AM    "

## 2019-02-04 DIAGNOSIS — Z79.4 UNCONTROLLED TYPE 2 DIABETES MELLITUS WITH HYPERGLYCEMIA, WITH LONG-TERM CURRENT USE OF INSULIN (H): ICD-10-CM

## 2019-02-04 DIAGNOSIS — E11.65 UNCONTROLLED TYPE 2 DIABETES MELLITUS WITH HYPERGLYCEMIA, WITH LONG-TERM CURRENT USE OF INSULIN (H): ICD-10-CM

## 2019-02-06 RX ORDER — BLOOD GLUCOSE CONTROL HIGH,LOW
EACH MISCELLANEOUS
Qty: 1 BOTTLE | Refills: 3 | Status: SHIPPED | OUTPATIENT
Start: 2019-02-06

## 2019-02-06 NOTE — TELEPHONE ENCOUNTER
"Requested Prescriptions   Pending Prescriptions Disp Refills     blood glucose calibration (ACCU-CHEK DONAVON) solution 1 Bottle 3     Sig: Use to calibrate blood glucose monitor as needed as directed.    Diabetic Supplies Protocol Passed - 2/4/2019  1:43 PM       Passed - Medication is active on med list       Passed - Patient is 18 years of age or older       Passed - Recent (6 mo) or future (30 days) visit within the authorizing provider's specialty    Patient had office visit in the last 6 months or has a visit in the next 30 days with authorizing provider.  See \"Patient Info\" tab in inbasket, or \"Choose Columns\" in Meds & Orders section of the refill encounter.         LOV-09/26/2018    Prescription refilled per RN Medication RefillNaomy.................... Dilma Butler ....................  2/6/2019   8:18 AM        "

## 2019-02-13 ENCOUNTER — TELEPHONE (OUTPATIENT)
Dept: FAMILY MEDICINE | Facility: OTHER | Age: 54
End: 2019-02-13

## 2019-02-19 ENCOUNTER — TELEPHONE (OUTPATIENT)
Dept: FAMILY MEDICINE | Facility: OTHER | Age: 54
End: 2019-02-19

## 2019-02-19 DIAGNOSIS — Z79.4 UNCONTROLLED TYPE 2 DIABETES MELLITUS WITH HYPERGLYCEMIA, WITH LONG-TERM CURRENT USE OF INSULIN (H): Primary | ICD-10-CM

## 2019-02-19 DIAGNOSIS — E11.65 UNCONTROLLED TYPE 2 DIABETES MELLITUS WITH HYPERGLYCEMIA, WITH LONG-TERM CURRENT USE OF INSULIN (H): Primary | ICD-10-CM

## 2019-02-19 NOTE — TELEPHONE ENCOUNTER
Rajan states that insurance wont cover diabetic check strips..    Also, he needs to have his lantus changed to basaglar.

## 2019-02-22 RX ORDER — INSULIN GLARGINE 100 [IU]/ML
175 INJECTION, SOLUTION SUBCUTANEOUS DAILY
Qty: 120 ML | Refills: 4 | Status: SHIPPED | OUTPATIENT
Start: 2019-02-22 | End: 2019-06-05

## 2019-02-22 NOTE — TELEPHONE ENCOUNTER
I called [facility] and they said that his test strips are covered. I then called Rajan and left a message letting him know this information.    He needs his lantus changed to Sarahy.    Norma J. Gosselin LPN......  2/22/2019   11:34 AM

## 2019-02-28 ENCOUNTER — TRANSFERRED RECORDS (OUTPATIENT)
Dept: HEALTH INFORMATION MANAGEMENT | Facility: OTHER | Age: 54
End: 2019-02-28

## 2019-03-02 DIAGNOSIS — M54.5 CHRONIC LOW BACK PAIN, UNSPECIFIED BACK PAIN LATERALITY, WITH SCIATICA PRESENCE UNSPECIFIED: ICD-10-CM

## 2019-03-02 DIAGNOSIS — G89.29 CHRONIC LOW BACK PAIN, UNSPECIFIED BACK PAIN LATERALITY, WITH SCIATICA PRESENCE UNSPECIFIED: ICD-10-CM

## 2019-03-04 NOTE — TELEPHONE ENCOUNTER
Routing refill request to provider for review/approval because:  Drug not on the [facility] refill protocol   LOV: 9/26/18 with Valorie Maza RN on 3/4/2019 at 2:38 PM

## 2019-03-05 RX ORDER — TRAMADOL HYDROCHLORIDE 50 MG/1
TABLET ORAL
Qty: 20 TABLET | Refills: 2 | Status: SHIPPED | OUTPATIENT
Start: 2019-03-05 | End: 2019-06-25

## 2019-03-08 ENCOUNTER — TELEPHONE (OUTPATIENT)
Dept: FAMILY MEDICINE | Facility: OTHER | Age: 54
End: 2019-03-08

## 2019-03-08 NOTE — TELEPHONE ENCOUNTER
Pt taking Lyrica, insurance wants pt to take gabapentin.  Pt has taken gabapentin but it doesn't work for pt.

## 2019-03-08 NOTE — TELEPHONE ENCOUNTER
Spoke with patients spouse who stated patients last name and birth date. She stated his insurance is no longer wanting to cover the Lyrica which patients wants to continue taking because it helps far more than the Gabapentin like the insurance would like him to take. Please advise  Michela Teresa LPN 3/8/2019 1:06 PM

## 2019-03-13 ENCOUNTER — ALLIED HEALTH/NURSE VISIT (OUTPATIENT)
Dept: EDUCATION SERVICES | Facility: OTHER | Age: 54
End: 2019-03-13
Attending: FAMILY MEDICINE
Payer: COMMERCIAL

## 2019-03-13 DIAGNOSIS — E11.42 DIABETIC POLYNEUROPATHY ASSOCIATED WITH TYPE 2 DIABETES MELLITUS (H): ICD-10-CM

## 2019-03-13 DIAGNOSIS — E11.65 UNCONTROLLED TYPE 2 DIABETES MELLITUS WITH HYPERGLYCEMIA, WITH LONG-TERM CURRENT USE OF INSULIN (H): Primary | ICD-10-CM

## 2019-03-13 DIAGNOSIS — Z79.4 UNCONTROLLED TYPE 2 DIABETES MELLITUS WITH HYPERGLYCEMIA, WITH LONG-TERM CURRENT USE OF INSULIN (H): Primary | ICD-10-CM

## 2019-03-13 PROCEDURE — G0108 DIAB MANAGE TRN  PER INDIV: HCPCS

## 2019-03-13 NOTE — PROGRESS NOTES
"Diabetes Self-Management Education & Support    Diabetes Education Self Management & Training    SUBJECTIVE/OBJECTIVE:  Presents for: Individual review  Accompanied by: Spouse  Diabetes education in the past 24mo: Yes  Focus of Visit: Healthy Eating, Being Active, Diabetes Pathophysiology  Diabetes type: Type 2  Date of diagnosis: 8/2011  Disease course: Improving  How confident are you filling out medical forms by yourself:: Somewhat  Transportation concerns: No  Other concerns:: None  Cultural Influences/Ethnic Background:  Not  or       Diabetes Symptoms & Complications  Blurred vision: Yes  Fatigue: Yes(Tired a lot.)  Neuropathy: Yes  Foot ulcerations: No  Polydipsia: Yes(All my life.)  Polyphagia: Yes(Lately, due to cutting back on portions, but my numbers are great!)  Polyuria: No  Weakness: No  Weight loss: No  Slow healing wounds: No  Recent Infection(s): No  Symptom course: Stable  Weight trend: Fluctuating minimally  Autonomic neuropathy: Yes  CVA: No  Heart disease: No  Nephropathy: Yes  Peripheral neuropathy: Yes  Retinopathy: Yes  Sexual dysfunction: Yes    Patient Problem List and Family Medical History reviewed for relevant medical history, current medical status, and diabetes risk factors.    Vitals:  There were no vitals taken for this visit.  Estimated body mass index is 41.96 kg/m  as calculated from the following:    Height as of 10/31/18: 1.778 m (5' 10\").    Weight as of 12/5/18: 132.6 kg (292 lb 6.4 oz).   Last 3 BP:   BP Readings from Last 3 Encounters:   12/05/18 126/62   11/27/18 122/69   10/31/18 150/60       History   Smoking Status     Former Smoker     Packs/day: 0.50     Years: 28.00     Types: Cigarettes     Quit date: 12/21/2011   Smokeless Tobacco     Former User     Types: Chew     Quit date: 9/5/1988       Labs:  Lab Results   Component Value Date    A1C 8.0 09/26/2018     Lab Results   Component Value Date     09/26/2018     Lab Results   Component Value Date "    LDL 87 06/27/2018     HDL Cholesterol   Date Value Ref Range Status   06/27/2018 33 23 - 92 mg/dL Final   ]  GFR Estimate   Date Value Ref Range Status   09/26/2018 50 (L) >60 mL/min/1.7m2 Final     GFR Estimate If Black   Date Value Ref Range Status   09/26/2018 60 (L) >60 mL/min/1.7m2 Final     Lab Results   Component Value Date    CR 1.48 09/26/2018     No results found for: MICROALBUMIN    Healthy Eating  Healthy Eating Assessed Today: Yes  Cultural/Catholic diet restrictions?: No  Patient on a regular basis: Counts carbohydrates, Snacks frequently throughout the day, Eats 3 meals a day  Meal planning: Carbohydrate counting, Smaller portions, Low salt  Meals include: Lunch, Dinner, Snacks, Breakfast  Snacks: apples, trying to stay away from the goodies.  Beverages: Diet soda, Other, Water(Crystal Light.)  Has patient met with a dietitian in the past?: Yes    Being Active  Being Active Assessed Today: Yes  Exercise:: Yes  Days per week of moderate to strenuous exercise (like a brisk walk): 5  On average, minutes per day of exercise at this level: 10(50 reps on my [employer] (I pull on my arms and it pushes me up.  I lift my own weight. )  How intense was your typical exercise? : (More like lifting weight.)  Exercise Minutes per Week: 50  Barrier to exercise: Physical limitation(SOB after the 50 reps)    Monitoring  Monitoring Assessed Today: Yes  Did patient bring glucose meter to appointment? : Yes  Blood Glucose Meter: Accu-check  Low Glucose Range (mg/dL): <70  High Glucose Range (mg/dL): >200  Overall Range (mg/dL): 140-180(14d average 153 mg/dL.)    Taking Medications  Diabetes Medication(s)     Insulin       insulin aspart (NOVOLOG FLEXPEN) 100 UNIT/ML injection    Use 60-70units subcutaneous with each meal based on insulin:carb ratio.  Maximum: 240 units per day.     insulin glargine (BASAGLAR KWIKPEN) 100 UNIT/ML pen    Inject 175 Units Subcutaneous daily Dx: E11.65          Taking Medication  "Assessed Today: Yes  Current Treatments: Insulin Injections, Diet  Dose schedule: pre-lunch, pre-dinner, at bedtime, pre-breakfast  Given by: Patient  Injection/Infusion sites: Abdomen  Problems taking diabetes medications regularly?: No  Diabetes medication side effects?: No  Treatment Compliance: All of the time    Problem Solving  Problem Solving Assessed Today: Yes  Hypoglycemia Frequency: Rarely  Hypoglycemia Treatment: Other food(fruit)  Patient carries a carbohydrate source: Yes(Wife, Summer, carries candy in her purse.)  Medical alert: No(I have a med ID bracelet, but it needs an extra link as it is too tight to wear now.)    Hypoglycemia symptoms  Confusion: No  Dizziness or Light-Headedness: No  Headaches: No  Hunger: No  Mood changes: Yes(Wife, Summer, states, \"he has a short fuse.\")  Nervousness/Anxiety: No  Sleepiness: No  Speech difficulty: No  Sweats: No  Tremors: No    Hypoglycemia Complications  Blackouts: No  Hospitalization: No  Nocturnal hypoglycemia: No  Required assistance: No  Required glucagon injection: No  Seizures: No    Reducing Risks  Reducing Risks Assessed Today: Yes  Diabetes Risks: Age over 45 years, Hyperlipidemia, Family History, Sedentary Lifestyle  CAD Risks: Dyslipidemia, Obesity, Male sex, Diabetes Mellitus, Family history, Stress, Sedentary lifestyle  Has dilated eye exam at least once a year?: Yes  Sees dentist every 6 months?: Yes  Sees podiatrist (foot doctor)?: Yes    Healthy Coping  Healthy Coping Assessed Today: Yes  Emotional response to diabetes: Acceptance  Informal Support system:: Spouse, Gloria based, Family  Stage of change: MAINTENANCE (Working to maintain change, with risk of relapse)(I'm putting more effort in lately.)  Difficulty affording diabetes management supplies?: No  Support resources: Offerings in Clinic Communities  Patient Activation Measure Survey Score:  No flowsheet data found.    ASSESSMENT:  Patient shares he has been SMBG 4 times daily, using " "his [employer] Expert Bolus Calculator BG meter and trying to decrease portion sizes.  He states, \"My numbers are great!\"    BG average has decreased from 90d , 60d , 30d  and now to 14d  mg/dL.    Reviewed meal planning and physical activity.  Recommend carbohydrate counting, 30 - 45 grams, 0 - 15 grams per snack (limiting snacks to help with weight loss and tighter BG control).  Recommend low to moderate physical activity, such as walking, working up to a minimum of 30 minutes most days, as tolerated.    Due to current BG trend, recommend no new setting changes in his Expert BG meter.  Continue SMBG 4 x daily and follow up with PCP, as recommended.    Patient due for HbA1c and will be scheduling follow up with PCP soon.         Patient's most recent   Lab Results   Component Value Date    A1C 8.0 09/26/2018    is not meeting goal of <7.0    INTERVENTION:   Diabetes knowledge and skills assessment:     Patient is knowledgeable in diabetes management concepts related to: Monitoring and Taking Medication    Patient needs further education on the following diabetes management concepts: Healthy Eating, Being Active, Problem Solving and Reducing Risks    Based on learning assessment above, most appropriate setting for further diabetes education would be: Group class or Individual setting.    Education provided today on:  AADE Self-Care Behaviors:  Healthy Eating: carbohydrate counting and portion control  Being Active: relationship to blood glucose  Problem Solving: high blood glucose - causes, signs/symptoms, treatment and prevention, low blood glucose - causes, signs/symptoms, treatment and prevention, carrying a carbohydrate source at all times and medical identification  Reducing Risks: major complications of diabetes, prevention, early diagnostic measures and treatment of complications, appropriate dental care, annual eye exam, aspirin therapy, A1C - goals, relating to blood glucose " levels, how often to check, lipids levels and goals and blood pressure and goals    Opportunities for ongoing education and support in diabetes-self management were discussed.    Pt verbalized understanding of concepts discussed and recommendations provided today.       Education Materials Provided:  D5 Health Goals, Eating For Better Health, Activity Pyramid, A1c flier, Diabetes Success Plan    PLAN:  See Patient Instructions for co-developed, patient-stated behavior change goals.  AVS printed and provided to patient today. See Follow-Up section for recommended follow-up.    Fina Hale RN, BSN, CDE  3/13/2019 12:59 PM   Time Spent: 60 minutes  Encounter Type: Individual    Any diabetes medication dose changes were made via the CDE Protocol and Collaborative Practice Agreement with the patient's primary care provider. A copy of this encounter was shared with the provider.

## 2019-03-13 NOTE — PATIENT INSTRUCTIONS
Diabetes Goals and Reminders    Your A1c test should be done every 3 months.  Your goal is less than 7%.   Your last result is:  Lab Results   Component Value Date    A1C 8.0 09/26/2018       Your LDL cholesterol test should be done at least once a year.  Take a statin, if prescribed by your doctor, based on age and risk factors.  Your last result is:  LDL Cholesterol Calculated   Date Value Ref Range Status   06/27/2018 87 <100 mg/dL Final     Comment:     Desirable:       <100 mg/dl       Have blood pressure and weight checked every three months.  Your blood pressure goal is 140/90 or less.  Your last blood pressure reading was:   BP Readings from Last 1 Encounters:   12/05/18 126/62       Test your blood sugar 4 times per day.  Your home blood glucose target ranges are:  Fasting or Before meals:   2 hours after a meal: Less than 180  Bedtime 100 - 140 mg/dL       Avoid all tobacco.  Follow your healthy diet and exercise plan.  See the eye doctor every year.  See the dentist every six months.  Have kidney function tested yearly.    Take all medicine as prescribed.  Please let me know if you are having symptoms you don t expect or if you wish to stop any medicine.    Follow Up Plan  Please call or visit Diabetes Education if blood sugars are consistently out of target.  Your future lab plan is:  HgA1c at anytime.    If you need your cholesterol checked at your next appointment, you should fast 8 to 10 hours before your appointment.  Do not eat or drink anything besides water.  Drink plenty of water and take all your usual medicine.    SUMMARY FOR TODAY'S VISIT    1.  Continue testing blood sugar 4 time(s) daily, as directed.  Continue current AccuChek [employer] Expert settings at this time.  Average  mg/dL.    2. Continue carbohydrate counting, 30 - 45 grams per meal, 0 - 15 grams per snack (limiting snacks to help with weight loss and tighter blood sugar control).     3.  Recommend low to moderate physical  activity, such as walking, working up to a minimum of 30 minutes most days, as tolerated.     4.  Follow-up for continued diabetes education, as needed.     Fina Hale RN, BSN, CDE  3/13/2019 10:50 AM

## 2019-03-21 ENCOUNTER — OFFICE VISIT (OUTPATIENT)
Dept: FAMILY MEDICINE | Facility: OTHER | Age: 54
End: 2019-03-21
Attending: FAMILY MEDICINE
Payer: COMMERCIAL

## 2019-03-21 VITALS
WEIGHT: 296.8 LBS | DIASTOLIC BLOOD PRESSURE: 74 MMHG | RESPIRATION RATE: 16 BRPM | BODY MASS INDEX: 42.59 KG/M2 | TEMPERATURE: 97.5 F | HEART RATE: 76 BPM | SYSTOLIC BLOOD PRESSURE: 138 MMHG

## 2019-03-21 DIAGNOSIS — E11.65 UNCONTROLLED TYPE 2 DIABETES MELLITUS WITH HYPERGLYCEMIA, WITH LONG-TERM CURRENT USE OF INSULIN (H): Primary | ICD-10-CM

## 2019-03-21 DIAGNOSIS — Z79.4 UNCONTROLLED TYPE 2 DIABETES MELLITUS WITH HYPERGLYCEMIA, WITH LONG-TERM CURRENT USE OF INSULIN (H): Primary | ICD-10-CM

## 2019-03-21 DIAGNOSIS — Z12.11 SPECIAL SCREENING FOR MALIGNANT NEOPLASMS, COLON: ICD-10-CM

## 2019-03-21 DIAGNOSIS — E11.42 DIABETIC POLYNEUROPATHY ASSOCIATED WITH TYPE 2 DIABETES MELLITUS (H): ICD-10-CM

## 2019-03-21 LAB
ALBUMIN SERPL-MCNC: 4.2 G/DL (ref 3.5–5.7)
ALP SERPL-CCNC: 55 U/L (ref 34–104)
ALT SERPL W P-5'-P-CCNC: 31 U/L (ref 7–52)
ANION GAP SERPL CALCULATED.3IONS-SCNC: 4 MMOL/L (ref 3–14)
AST SERPL W P-5'-P-CCNC: 23 U/L (ref 13–39)
BILIRUB SERPL-MCNC: 0.3 MG/DL (ref 0.3–1)
BUN SERPL-MCNC: 27 MG/DL (ref 7–25)
CALCIUM SERPL-MCNC: 9.7 MG/DL (ref 8.6–10.3)
CHLORIDE SERPL-SCNC: 99 MMOL/L (ref 98–107)
CHOLEST SERPL-MCNC: 156 MG/DL
CO2 SERPL-SCNC: 35 MMOL/L (ref 21–31)
CREAT SERPL-MCNC: 1.36 MG/DL (ref 0.7–1.3)
GFR SERPL CREATININE-BSD FRML MDRD: 55 ML/MIN/{1.73_M2}
GLUCOSE SERPL-MCNC: 191 MG/DL (ref 70–105)
HBA1C MFR BLD: 7.2 % (ref 4–6)
HDLC SERPL-MCNC: 30 MG/DL (ref 23–92)
LDLC SERPL CALC-MCNC: 80 MG/DL
NONHDLC SERPL-MCNC: 126 MG/DL
POTASSIUM SERPL-SCNC: 4.9 MMOL/L (ref 3.5–5.1)
PROT SERPL-MCNC: 6.7 G/DL (ref 6.4–8.9)
SODIUM SERPL-SCNC: 138 MMOL/L (ref 134–144)
TRIGL SERPL-MCNC: 228 MG/DL

## 2019-03-21 PROCEDURE — G0463 HOSPITAL OUTPT CLINIC VISIT: HCPCS

## 2019-03-21 PROCEDURE — 83036 HEMOGLOBIN GLYCOSYLATED A1C: CPT | Performed by: FAMILY MEDICINE

## 2019-03-21 PROCEDURE — 80053 COMPREHEN METABOLIC PANEL: CPT | Performed by: FAMILY MEDICINE

## 2019-03-21 PROCEDURE — 99214 OFFICE O/P EST MOD 30 MIN: CPT | Performed by: FAMILY MEDICINE

## 2019-03-21 PROCEDURE — 80061 LIPID PANEL: CPT | Performed by: FAMILY MEDICINE

## 2019-03-21 PROCEDURE — 36415 COLL VENOUS BLD VENIPUNCTURE: CPT | Performed by: FAMILY MEDICINE

## 2019-03-21 ASSESSMENT — ENCOUNTER SYMPTOMS
HEARTBURN: 0
EYE PAIN: 0
DIZZINESS: 0
FEVER: 0
ABDOMINAL PAIN: 0
MYALGIAS: 0
SHORTNESS OF BREATH: 0
PARESTHESIAS: 0
DIARRHEA: 0
HEMATOCHEZIA: 0
HEADACHES: 0
CONSTIPATION: 0
WEAKNESS: 0
FREQUENCY: 0
NAUSEA: 0
PALPITATIONS: 0
DYSURIA: 0
NERVOUS/ANXIOUS: 0
JOINT SWELLING: 0
COUGH: 0
SORE THROAT: 0
ARTHRALGIAS: 0
HEMATURIA: 0
CHILLS: 0

## 2019-03-21 ASSESSMENT — ANXIETY QUESTIONNAIRES
7. FEELING AFRAID AS IF SOMETHING AWFUL MIGHT HAPPEN: NOT AT ALL
6. BECOMING EASILY ANNOYED OR IRRITABLE: MORE THAN HALF THE DAYS
1. FEELING NERVOUS, ANXIOUS, OR ON EDGE: NOT AT ALL
5. BEING SO RESTLESS THAT IT IS HARD TO SIT STILL: SEVERAL DAYS
GAD7 TOTAL SCORE: 5
2. NOT BEING ABLE TO STOP OR CONTROL WORRYING: NOT AT ALL
3. WORRYING TOO MUCH ABOUT DIFFERENT THINGS: NOT AT ALL

## 2019-03-21 ASSESSMENT — PATIENT HEALTH QUESTIONNAIRE - PHQ9
SUM OF ALL RESPONSES TO PHQ QUESTIONS 1-9: 15
5. POOR APPETITE OR OVEREATING: MORE THAN HALF THE DAYS

## 2019-03-21 NOTE — NURSING NOTE
Patient here for diabetic check.  Medication Reconciliation: complete    Dorothy Michel LPN     Previous A1C is at goal of <8  Lab Results   Component Value Date    A1C 8.0 09/26/2018    A1C 9.1 06/27/2018     Urine microalbumin:creatine: 6/27/18  Foot exam unknown  Eye exam February 2019    Tobacco User no  Patient is on a daily aspirin  Patient is on a Statin.  Blood pressure today of:     BP Readings from Last 1 Encounters:   03/21/19 138/74      is at the goal of <139/89 for diabetics.    Dorothy Michel LPN on 3/21/2019 at 10:53 AM

## 2019-03-21 NOTE — PROGRESS NOTES
Nursing Notes:   Dorothy Michel LPN  3/21/2019 10:55 AM  Signed  Patient here for diabetic check.  Medication Reconciliation: complete    Dorothy Michel LPN     Previous A1C is at goal of <8  Lab Results   Component Value Date    A1C 8.0 09/26/2018    A1C 9.1 06/27/2018     Urine microalbumin:creatine: 6/27/18  Foot exam unknown  Eye exam February 2019    Tobacco User no  Patient is on a daily aspirin  Patient is on a Statin.  Blood pressure today of:     BP Readings from Last 1 Encounters:   03/21/19 138/74      is at the goal of <139/89 for diabetics.    Dorothy Michel LPN on 3/21/2019 at 10:53 AM          SUBJECTIVE:   Jake Bermudez is a 54 year old male who presents to clinic today for the following health issues:    CHRISTOPHER Tolentino is here with his wife for a diabetic check.  He is proud to report that 60% of his readings are in the normal range; 10% are hypoglycemic, 10% are below goal; 20% are above goal.  He is currently checking his blood sugars 4x/day.  Currently taking: Basaglar and Novolog.  He denies symptoms of hypoglycemia.  Continues to watch what he is eating; no change in weight.  Eye exam 2/28/19; monitors feet daily.  Follows with Neurology regularly for diabetic foot neuropathy.  On Lyrica.    Due for colonoscopy.    Patient Active Problem List    Diagnosis Date Noted     Umbilical hernia with obstruction, without gangrene 10/31/2018     Priority: Medium     PFS (patellofemoral syndrome) 06/28/2018     Priority: Medium     Secondary to quadriceps weakness; referred to [facility] therapy by Bennie Palacios, 6/2018.       Restrictive lung disease 03/07/2018     Priority: Medium     Nocturnal hypoxia 03/07/2018     Priority: Medium     Family history of ischemic heart disease 02/01/2018     Priority: Medium     Acquired keratoderma 02/01/2018     Priority: Medium     Enthesopathy of ankle and tarsus 02/01/2018     Priority: Medium     ARMANDO (obstructive sleep apnea) 01/17/2018     Priority:  Medium     Overview:   Treated with NOC O2       Lung nodule < 6cm on CT 12/01/2017     Priority: Medium     Overview:   Seen on 11/30/2017; repeat in one year for follow up.       Spondylosis of lumbar region without myelopathy or radiculopathy 10/19/2017     Priority: Medium     CKD (chronic kidney disease) stage 3, GFR 30-59 ml/min (H) 09/11/2016     Priority: Medium     Overview:   Secondary to DM2  Overview:   Secondary to DM2       PTSD (post-traumatic stress disorder) 09/08/2016     Priority: Medium     Diabetic polyneuropathy associated with type 2 diabetes mellitus (H) 01/20/2016     Priority: Medium     Diabetic, retinopathy, proliferative (H) 07/01/2014     Priority: Medium     Overview:   Mild proliferative changes on left side (6/2014)--Dr Ortiz  No evidence of retinopathy, but early cataract formation b/l (1/2015) - Dr. Rishi Jacobs       Obesity (BMI 30-39.9) 06/29/2014     Priority: Medium     Lateral epicondylitis of left elbow 10/03/2013     Priority: Medium     Overview:   Seen by Dr Tayla Jung 8/2013       Neuropathy 04/09/2013     Priority: Medium     Overview:   Follows with Dr. Gallagher, Neurology at [facility] - on gabapentin.  Overview:   Follows with Dr. Gallagher, Neurology at [facility] - on gabapentin.       Impotence of organic origin 07/16/2012     Priority: Medium     Overview:   D/C cymbalta       Other symptoms referable to upper arm joint 07/16/2012     Priority: Medium     Overview:   mass below elbow Lt       Personal history of other diseases of circulatory system 07/02/2012     Priority: Medium     Backache 04/03/2012     Priority: Medium     Corns and callosities 03/02/2012     Priority: Medium     Personal history of tobacco use, presenting hazards to health 01/31/2012     Priority: Medium     Empyema (H) 01/26/2012     Priority: Medium     Overview:   Rt lung 12/24/11       On mechanically assisted ventilation (H) 01/03/2012     Priority: Medium     Overview:   IMO  Update    Overview:   IMO Update       Acute respiratory failure with hypoxia (H) 12/24/2011     Priority: Medium     Atelectasis of right lung 12/24/2011     Priority: Medium     Community acquired bacterial pneumonia 12/24/2011     Priority: Medium     Empyema of right pleural space (H) 12/24/2011     Priority: Medium     GERD (gastroesophageal reflux disease) 12/24/2011     Priority: Medium     Hyperlipidemia 12/24/2011     Priority: Medium     Moderate cigarette smoker (10-19 per day) 12/24/2011     Priority: Medium     Obesity 12/24/2011     Priority: Medium     Overview:   BMI is 34.3.       Rotator cuff syndrome of left shoulder 12/24/2011     Priority: Medium     Overview:   S/P Left rotator cuff repair 12/1/2011.  S/P Right rotator cuff repair 8/2010.       Dyslipidemia 09/13/2011     Priority: Medium     Uncontrolled type 2 diabetes mellitus with hyperglycemia, with long-term current use of insulin (H) 09/07/2011     Priority: Medium     Esophageal reflux 09/07/2011     Priority: Medium     Other, multiple and ill-defined closed fractures of lower limb 08/05/2010     Priority: Medium     Shoulder pain 08/05/2010     Priority: Medium     Past Medical History:   Diagnosis Date     Lateral epicondylitis of left elbow     10/3/2013,Seen by Dr Bourne [place] 8/2013     Type 2 diabetes mellitus with proliferative retinopathy without macular edema (H)     7/1/2014,Mild proliferative changes on left side--sees Dr Ortiz exam 6/2014      Past Surgical History:   Procedure Laterality Date     ARTHROSCOPY SHOULDER      [id number],SHOULDER SURGERY,Bilateral     CHEST TUBE INSERTION      [id number],CHEST TUBE INSERTION     HERNIORRHAPHY UMBILICAL N/A 11/27/2018    Procedure: Umbilical hernia Repair with Mesh;  Surgeon: Mason Rhodes MD;  Location: [facility] OR     LUNG SURGERY      [id number],LUNG SURGERY,pneumonia     TRACHEOSTOMY      [id number],HCHG TRACH PR1,history trach with pneumonia     Family History   Problem  Relation Age of Onset     Heart Disease Other         Heart Disease,Family History Coronary Heart Disease male     Diabetes Other         Diabetes     Cancer Father         Cancer,stomach ca     Social History     Tobacco Use     Smoking status: Former Smoker     Packs/day: 0.50     Years: 28.00     Pack years: 14.00     Types: Cigarettes     Last attempt to quit: 2011     Years since quittin.2     Smokeless tobacco: Former User     Types: Chew     Quit date: 1988   Substance Use Topics     Alcohol use: No     Social History     Social History Narrative    Alcohol Use - no    2 children  unemployed 10 1/2 % disability for back injury  Patient was a former smoker.  1/2 PPD     Current Outpatient Medications   Medication Sig Dispense Refill     blood glucose (ACCU-CHEK DONAVON PLUS) test strip USE  STRIP TO CHECK GLUCOSE 4 TIMES DAILY 400 strip 2     albuterol (PROAIR HFA/PROVENTIL HFA/VENTOLIN HFA) 108 (90 Base) MCG/ACT inhaler Inhale 2 puffs into the lungs every 4 hours as needed for shortness of breath / dyspnea or wheezing 1 Inhaler 11     alcohol swab prep pads Use to swab area of injection/france as directed. 100 each 6     Aspirin (ASPIR-81 PO) Take 81 mg by mouth daily with food       BD ANN U/F 32G X 4 MM insulin pen needle USE AS DIRECTED 4 TIMES DAILY FOR  ADMINISTERING  INSULIN  AT  HOME. 400 each 2     blood glucose (NO BRAND SPECIFIED) lancets standard Dispense item as covered by patient's insurance-Use as directed to test blood sugar 4 times daily. 400 each 1     blood glucose (NO BRAND SPECIFIED) lancing device Device to be used with lancets. 100 each 11     blood glucose calibration (ACCU-CHEK DONAVON) solution Use to calibrate blood glucose monitor as needed as directed. 1 Bottle 3     blood glucose monitoring (ACCU-CHEK MULTICLIX) lancets USE STRIP TO CHECK BLOOD GLUCOSE 4 TIMES DAILY. 408 each 4     blood glucose monitoring (NO BRAND SPECIFIED) meter device kit Use to test blood  sugar 5-6 times daily or as directed.  Dx: DM2 with hyperglycemia, long term insulin use 1 kit 0     Cholecalciferol (VITAMIN D3) 2000 units TABS TAKE ONE TABLET BY MOUTH ONCE DAILY 100 tablet 2     clonazePAM (KLONOPIN) 0.5 MG tablet TAKE 1 TABLET BY MOUTH AT BEDTIME 30 tablet 5     FLUoxetine (PROZAC) 20 MG capsule TAKE 1 CAPSULE BY MOUTH IN THE MORNING 90 capsule 3     fluticasone-salmeterol (ADVAIR) 250-50 MCG/DOSE diskus inhaler Inhale 1 puff into the lungs 2 times daily 3 Inhaler 3     gabapentin (NEURONTIN) 300 MG capsule Take 900 mg by mouth 3 times daily 900 mg TID, 1200 mg at bedtime       hydrochlorothiazide (HYDRODIURIL) 25 MG tablet Take 1 tablet (25 mg) by mouth daily 90 tablet 3     HYDROcodone-acetaminophen (NORCO) 5-325 MG tablet Take 1-2 tablets by mouth every 4 hours as needed for moderate to severe pain (Moderate to Severe Pain) 20 tablet 0     insulin aspart (NOVOLOG FLEXPEN) 100 UNIT/ML injection Use 60-70units subcutaneous with each meal based on insulin:carb ratio.  Maximum: 240 units per day. 90 mL 4     insulin glargine (BASAGLAR KWIKPEN) 100 UNIT/ML pen Inject 175 Units Subcutaneous daily Dx: E11.65 120 mL 4     lisinopril (PRINIVIL/ZESTRIL) 20 MG tablet Take 1 tablet (20 mg) by mouth daily 90 tablet 4     LYRICA 200 MG capsule Take 1 capsule (200 mg) by mouth 3 times daily 270 capsule 4     MAPAP 500 MG tablet TAKE ONE TABLET BY MOUTH EVERY 6 HOURS AS NEEDED .  MAX  ACETAMINOPHEN  DOSE  4000  MG  IN  24  HOURS. 200 tablet 5     melatonin 5 MG tablet Take 1-2 tablets (5-10 mg) by mouth At Bedtime 180 tablet 3     meloxicam (MOBIC) 15 MG tablet TAKE ONE TABLET BY MOUTH ONCE DAILY WITH FOOD 90 tablet 0     methocarbamol (ROBAXIN) 750 MG tablet TAKE ONE TABLET BY MOUTH THREE TIMES DAILY 270 tablet 3     mirtazapine (REMERON) 15 MG tablet Take 1 tablet (15 mg) by mouth At Bedtime       omeprazole 20 MG tablet Take 20 mg by mouth once daily before a meal 90 tablet 3     order for DME Oxygen for  home use. LPM: 1/min via nasal cannula. Frequency of use: Continuous with portability; Length of need: 12 mos.       simvastatin (ZOCOR) 20 MG tablet Take 1 tablet (20 mg) by mouth At Bedtime 90 tablet 3     traMADol (ULTRAM) 50 MG tablet TAKE 1 TABLET BY MOUTH THREE TIMES DAILY AS NEEDED 20 tablet 2     umeclidinium-vilanterol (ANORO ELLIPTA) 62.5-25 MCG/INH oral inhaler Inhale 1 puff into the lungs       Allergies   Allergen Reactions     Penicillins      Other reaction(s): Diaphoresis     Piperacillin-Tazobactam In D5w Other (See Comments)     Other reaction(s): Diaphoresis, Fever  Fever while on zosyn, vanc. No rash, no wbc elevation     Vancomycin Other (See Comments)     Fever while on zosyn, vanc, no rash or WBC elevation  Other reaction(s): Diaphoresis, Fever  Fever while on zosyn, vanc, no rash or WBC elevation     Zosyn Other (See Comments)     Fever while on zosyn, vanc. No rash, no wbc elevation  Fever while on zosyn, vanc. No rash, no wbc elevation     Review of Systems   Constitutional: Negative for chills and fever.   HENT: Negative for congestion, ear pain, hearing loss and sore throat.    Eyes: Negative for pain and visual disturbance.   Respiratory: Negative for cough and shortness of breath.    Cardiovascular: Negative for chest pain, palpitations and peripheral edema.   Gastrointestinal: Negative for abdominal pain, constipation, diarrhea, heartburn, hematochezia and nausea.   Genitourinary: Negative for discharge, dysuria, frequency, genital sores, hematuria, impotence and urgency.   Musculoskeletal: Negative for arthralgias, joint swelling and myalgias.   Skin: Negative for rash.   Neurological: Negative for dizziness, weakness, headaches and paresthesias.   Psychiatric/Behavioral: Negative for mood changes. The patient is not nervous/anxious.         OBJECTIVE:     /74 (BP Location: Right arm, Patient Position: Sitting, Cuff Size: Adult Large)   Pulse 76   Temp 97.5  F (36.4  C)  (Tympanic)   Resp 16   Wt 134.6 kg (296 lb 12.8 oz)   BMI 42.59 kg/m    Body mass index is 42.59 kg/m .  Physical Exam   Constitutional: He is oriented to person, place, and time. He appears well-developed and well-nourished. No distress.   HENT:   Right Ear: Tympanic membrane and external ear normal.   Left Ear: Tympanic membrane and external ear normal.   Nose: Nose normal.   Mouth/Throat: Oropharynx is clear and moist. No oral lesions. No oropharyngeal exudate.   Eyes: Conjunctivae are normal. Pupils are equal, round, and reactive to light. Right eye exhibits no discharge. Left eye exhibits no discharge.   Neck: Neck supple. No tracheal deviation present. No thyromegaly present.   Cardiovascular: Normal rate, regular rhythm, S1 normal, S2 normal, normal heart sounds and normal pulses. Exam reveals no S3 and no S4.   No murmur heard.  Pulmonary/Chest: Effort normal and breath sounds normal. No respiratory distress. He has no wheezes. He has no rales.   Abdominal: Soft. Bowel sounds are normal. He exhibits no mass. There is no hepatosplenomegaly. There is no tenderness.   Musculoskeletal: Normal range of motion. He exhibits no edema or deformity.   Lymphadenopathy:     He has no cervical adenopathy.   Neurological: He is alert and oriented to person, place, and time. He has normal strength and normal reflexes. He exhibits normal muscle tone.   Skin: Skin is warm and dry. No lesion and no rash noted.   Psychiatric: He has a normal mood and affect. His speech is normal. Judgment and thought content normal. Cognition and memory are normal.     Diagnostic Test Results:  Results for orders placed or performed in visit on 03/21/19   Lipid Panel   Result Value Ref Range    Cholesterol 156 <200 mg/dL    Triglycerides 228 (H) <150 mg/dL    HDL Cholesterol 30 23 - 92 mg/dL    LDL Cholesterol Calculated 80 <100 mg/dL    Non HDL Cholesterol 126 <130 mg/dL   Comprehensive Metabolic Panel   Result Value Ref Range    Sodium  138 134 - 144 mmol/L    Potassium 4.9 3.5 - 5.1 mmol/L    Chloride 99 98 - 107 mmol/L    Carbon Dioxide 35 (H) 21 - 31 mmol/L    Anion Gap 4 3 - 14 mmol/L    Glucose 191 (H) 70 - 105 mg/dL    Urea Nitrogen 27 (H) 7 - 25 mg/dL    Creatinine 1.36 (H) 0.70 - 1.30 mg/dL    GFR Estimate 55 (L) >60 mL/min/[1.73_m2]    GFR Estimate If Black 66 >60 mL/min/[1.73_m2]    Calcium 9.7 8.6 - 10.3 mg/dL    Bilirubin Total 0.3 0.3 - 1.0 mg/dL    Albumin 4.2 3.5 - 5.7 g/dL    Protein Total 6.7 6.4 - 8.9 g/dL    Alkaline Phosphatase 55 34 - 104 U/L    ALT 31 7 - 52 U/L    AST 23 13 - 39 U/L   Hemoglobin A1c   Result Value Ref Range    Hemoglobin A1C 7.2 (H) 4.0 - 6.0 %       ASSESSMENT/PLAN:     1. Diabetic polyneuropathy associated with type 2 diabetes mellitus (H)  Refilled testing supplies.  Continue to follow with Neurology.  - blood glucose (ACCU-CHEK DONAVON PLUS) test strip; USE  STRIP TO CHECK GLUCOSE 4 TIMES DAILY  Dispense: 400 strip; Refill: 2    2. Uncontrolled type 2 diabetes mellitus with hyperglycemia, with long-term current use of insulin (H)  Reportedly improved along with review of his blood sugar device.  He will have refill of testing strips - may require a PA due to his type of testing device.  I would support continued use as his Hgb A1c has improved within the last year of using this device from 10% to today's 7.2% decreasing his risk of further health complications.  Continue insulin - at previous dosing.  Monitoring labs collected as below.  Congratulated on lifestyle efforts - hopeful for weight changes in the future.  - Hemoglobin A1c; Future  - Comprehensive Metabolic Panel; Future  - Lipid Panel; Future  - Lipid Panel  - Comprehensive Metabolic Panel  - Hemoglobin A1c    3. Special screening for malignant neoplasms, colon  Due for colonoscopy - ordered to be completed with Dr. Rhodes as he recently completed Rajan's hernia surgery.  - GASTROENTEROLOGY ADULT REF PROCEDURE ONLY    Follow up in 3-4  months.    Kira Grider, [facility]

## 2019-03-22 ASSESSMENT — ANXIETY QUESTIONNAIRES: GAD7 TOTAL SCORE: 5

## 2019-03-24 DIAGNOSIS — K21.9 GASTROESOPHAGEAL REFLUX DISEASE, ESOPHAGITIS PRESENCE NOT SPECIFIED: ICD-10-CM

## 2019-03-26 RX ORDER — NICOTINE POLACRILEX 4 MG/1
GUM, CHEWING ORAL
Qty: 90 TABLET | Refills: 3 | Status: SHIPPED | OUTPATIENT
Start: 2019-03-26 | End: 2020-07-14

## 2019-03-26 NOTE — TELEPHONE ENCOUNTER
"Requested Prescriptions   Pending Prescriptions Disp Refills     omeprazole 20 MG tablet 90 tablet 3     Sig: Take 20 mg by mouth once daily before a meal    PPI Protocol Passed - 3/26/2019 11:49 AM       Passed - Not on Clopidogrel (unless Pantoprazole ordered)       Passed - No diagnosis of osteoporosis on record       Passed - Recent (12 mo) or future (30 days) visit within the authorizing provider's specialty    Patient had office visit in the last 12 months or has a visit in the next 30 days with authorizing provider or within the authorizing provider's specialty.  See \"Patient Info\" tab in inbasket, or \"Choose Columns\" in Meds & Orders section of the refill encounter.             Passed - Medication is active on med list       Passed - Patient is age 18 or older          LOV-03/21/2019-Follow up in 3-4 months.    Prescription refilled per RN Medication RefillPolicy.................... Dilma Butler ....................  3/26/2019   11:51 AM        "

## 2019-04-02 DIAGNOSIS — E11.65 UNCONTROLLED TYPE 2 DIABETES MELLITUS WITH HYPERGLYCEMIA, WITH LONG-TERM CURRENT USE OF INSULIN (H): Primary | ICD-10-CM

## 2019-04-02 DIAGNOSIS — Z79.4 UNCONTROLLED TYPE 2 DIABETES MELLITUS WITH HYPERGLYCEMIA, WITH LONG-TERM CURRENT USE OF INSULIN (H): Primary | ICD-10-CM

## 2019-04-02 RX ORDER — GLUCOSAMINE HCL/CHONDROITIN SU 500-400 MG
CAPSULE ORAL
Qty: 100 EACH | Refills: 6 | Status: SHIPPED | OUTPATIENT
Start: 2019-04-02 | End: 2019-09-05

## 2019-04-02 RX ORDER — LANCETS
EACH MISCELLANEOUS
Qty: 102 EACH | Refills: 6 | Status: CANCELLED | OUTPATIENT
Start: 2019-04-02

## 2019-04-02 RX ORDER — LANCING DEVICE/LANCETS
KIT MISCELLANEOUS
Qty: 1 EACH | Refills: 0 | Status: CANCELLED | OUTPATIENT
Start: 2019-04-02

## 2019-04-02 NOTE — TELEPHONE ENCOUNTER
DM Supplies   LOV-03/21/2019    Prescription refilled per RN Medication RefillPolicy.................... Dilma Butler ....................  4/2/2019   4:12 PM

## 2019-04-04 DIAGNOSIS — Z79.4 UNCONTROLLED TYPE 2 DIABETES MELLITUS WITH HYPERGLYCEMIA, WITH LONG-TERM CURRENT USE OF INSULIN (H): ICD-10-CM

## 2019-04-04 DIAGNOSIS — E11.65 UNCONTROLLED TYPE 2 DIABETES MELLITUS WITH HYPERGLYCEMIA, WITH LONG-TERM CURRENT USE OF INSULIN (H): ICD-10-CM

## 2019-04-04 RX ORDER — LANCING DEVICE/LANCETS
KIT MISCELLANEOUS
Qty: 1 EACH | Refills: 0 | Status: CANCELLED | OUTPATIENT
Start: 2019-04-04

## 2019-04-04 RX ORDER — GLUCOSAMINE HCL/CHONDROITIN SU 500-400 MG
CAPSULE ORAL
Qty: 100 EACH | Refills: 6 | Status: CANCELLED | OUTPATIENT
Start: 2019-04-04

## 2019-04-05 RX ORDER — LANCING DEVICE
EACH MISCELLANEOUS
Qty: 1 EACH | Refills: 0 | Status: SHIPPED | OUTPATIENT
Start: 2019-04-05 | End: 2021-05-01

## 2019-04-05 NOTE — TELEPHONE ENCOUNTER
Contacted pharmacy who states they did not receive this Rx. Will resend as requested by pharmacy. Prescription refilled per RN Medication RefillPolicy.................... Dilma Butler ....................  4/5/2019   9:33 AM    blood glucose (NO BRAND SPECIFIED) lancing device 100 each 11 1/29/2019 1/29/2020 --   Sig: Device to be used with lancets.   Sent to pharmacy as: blood glucose (NO BRAND SPECIFIED) lancing device   Class: E-Prescribe   Order: [id number]   E-Prescribing Status: Receipt confirmed by pharmacy (1/29/2019  2:15 PM CST)   Printout Tracking     External Result Report   Medication Administration Instructions     Device to be used with lancets.   Pharmacy     [id number] - [place], [place] - [address] [place] [address]

## 2019-04-16 DIAGNOSIS — Z86.39 H/O HYPERKALEMIA: ICD-10-CM

## 2019-04-16 DIAGNOSIS — M54.9 BACK PAIN, UNSPECIFIED BACK LOCATION, UNSPECIFIED BACK PAIN LATERALITY, UNSPECIFIED CHRONICITY: ICD-10-CM

## 2019-04-18 RX ORDER — MELOXICAM 15 MG/1
TABLET ORAL
Qty: 90 TABLET | Refills: 3 | Status: SHIPPED | OUTPATIENT
Start: 2019-04-18 | End: 2020-05-08

## 2019-04-18 NOTE — TELEPHONE ENCOUNTER
Mobic  Last Written Prescription Date:  08/29/2018  Last Fill Quantity: 90,   # refills: 0  Last Office Visit:03/21/2019  Future Office visit:       Routing refill request to provider for review/approval because:   Failed - Abnormal serum creatinine on file in past 12 months   Recent Labs   Lab Test 03/21/19  1137   CR 1.36*          Unable to complete prescription refill per RNMedication Refill Policy.................... Dilma Butler ....................  4/18/2019   3:24 PM

## 2019-05-17 DIAGNOSIS — F41.1 GAD (GENERALIZED ANXIETY DISORDER): ICD-10-CM

## 2019-05-20 NOTE — TELEPHONE ENCOUNTER
Routing refill request to provider for review/approval because:  Drug not on the [facility] refill protocol     LOV 3-21-19.  Last filled on 11-13-18 for #30 X 5 refills. Ashanti Zuñiga RN on 5/20/2019 at 2:45 PM

## 2019-05-21 RX ORDER — CLONAZEPAM 0.5 MG/1
TABLET ORAL
Qty: 30 TABLET | Refills: 5 | Status: SHIPPED | OUTPATIENT
Start: 2019-05-21 | End: 2019-12-03

## 2019-06-05 ENCOUNTER — TELEPHONE (OUTPATIENT)
Dept: FAMILY MEDICINE | Facility: OTHER | Age: 54
End: 2019-06-05

## 2019-06-05 DIAGNOSIS — E11.65 UNCONTROLLED TYPE 2 DIABETES MELLITUS WITH HYPERGLYCEMIA, WITH LONG-TERM CURRENT USE OF INSULIN (H): Primary | ICD-10-CM

## 2019-06-05 DIAGNOSIS — Z79.4 UNCONTROLLED TYPE 2 DIABETES MELLITUS WITH HYPERGLYCEMIA, WITH LONG-TERM CURRENT USE OF INSULIN (H): Primary | ICD-10-CM

## 2019-06-05 RX ORDER — BLOOD-GLUCOSE METER
EACH MISCELLANEOUS
Qty: 1 KIT | Refills: 0 | Status: SHIPPED | OUTPATIENT
Start: 2019-06-05

## 2019-06-05 NOTE — TELEPHONE ENCOUNTER
Left message to call back....................  6/5/2019   12:14 PM  Tere Baez LPN on 6/5/2019 at 12:14 PM

## 2019-06-05 NOTE — TELEPHONE ENCOUNTER
Rx for Lantus was sent to pharmacy.  Would recommend starting @ 165u daily and increase by 2 units every 3 days (if blood sugars remain >140).  Max increase up to 175u (previous dose of Basaglar).  With medication changes, we can notice some disruption in blood sugars.    I also sent a new Rx for a new Accu-check Concepcion machine and test strips as covered by insurance.      Kira Grider, DO on 6/5/2019 at 12:10 PM

## 2019-06-05 NOTE — TELEPHONE ENCOUNTER
Received fax from [facility] stating that patients Basaglar is no longer going to be covered. Preferred alternative is Humalog, Lantus solostar, lantus, levemir flextouch, levemir, novolog or novolog flexpen.    Also, his one touch verio test strips are no longer going to be covered and preferred alternatives are accu-chek liz plus, accu-chek compact, accu-chek guide test strip, accu-chek smartview, or contour next ez, contour.    Dorothy Diaz............................... 6/5/2019 12:00 PM

## 2019-06-06 NOTE — TELEPHONE ENCOUNTER
Spoke with Summer yesterday and informed her of the changes. She states that Fina does not want Rajan to change his meter as he is doing really well with the one he has. Anyway a PA can be done?    Dorothy Diaz............................... 6/6/2019 10:35 AM

## 2019-06-06 NOTE — TELEPHONE ENCOUNTER
Unfortunately patient's current BG meter, AccuChek Concepcion Expert meter is no longer manufactured.  Once his machine stops working, he must get a new device.  This Expert BG meter uses Insulin-to-Carb Ratios and Insulin Sensitivity Factors with a BG target and Insulin-on-Board option to help tighten BG control while decreasing risk of hypoglycemia.     This has been very helpful to Rajan and he would like to keep using it, but unfortunately the company does not manufacture these Expert devices any longer.    Patient's Expert BG device uses AccuChek Concepcion Plus BG testing strips.  It is nice to read his insurance still covers the AccuChek.  If his meter is still working, all he would need is the AccuChek Concepcion Plus BG testing strips.    Fina Hale RN, BSN, CDE  6/6/2019 4:59 PM

## 2019-06-11 NOTE — TELEPHONE ENCOUNTER
Spoke with wife Summer and informed her of Fina's response.  Dorothy Diaz............................... 6/11/2019 1:55 PM

## 2019-06-11 NOTE — TELEPHONE ENCOUNTER
Left message for Rajan or Summer to return call.  Dorothy Diaz............................... 6/11/2019 9:44 AM

## 2019-06-19 DIAGNOSIS — E11.65 UNCONTROLLED TYPE 2 DIABETES MELLITUS WITH HYPERGLYCEMIA, WITH LONG-TERM CURRENT USE OF INSULIN (H): ICD-10-CM

## 2019-06-19 DIAGNOSIS — Z79.4 UNCONTROLLED TYPE 2 DIABETES MELLITUS WITH HYPERGLYCEMIA, WITH LONG-TERM CURRENT USE OF INSULIN (H): ICD-10-CM

## 2019-06-19 NOTE — LETTER
June 21, 2019      Jake Bermudez  [id number]  [facility] [address]        Dear Jake,     This is to remind you that you are due for your 3 month diabetic check visit.  Your last office visit with Kira Grider DO was on 03/21/2019.     Additional refills of your medication require you to complete this visit.    Please call [phone] to schedule your appointment.    Thank you for choosing [facility] for your health care needs.    Sincerely,      Refill RN  [facility]

## 2019-06-21 NOTE — TELEPHONE ENCOUNTER
"Requested Prescriptions   Pending Prescriptions Disp Refills     insulin aspart (NOVOLOG FLEXPEN) 100 UNIT/ML pen [Pharmacy Med Name: NOVOLOG FLEXPEN 100UNIT/ML INJ]  4     Sig: INJECT 60-70 UNITS SUBCUTANEOUSLY WITH EACH MEAL BASED ON INSULIN:CARB RATIO. MAXIMUM  UNITS PER DAY       Short Acting Insulin Protocol Passed - 6/19/2019  3:56 PM        Passed - Blood pressure less than 140/90 in past 6 months     BP Readings from Last 3 Encounters:   03/21/19 138/74   12/05/18 126/62   11/27/18 122/69                 Passed - LDL on file in past 12 months     Recent Labs   Lab Test 03/21/19  1137   LDL 80             Passed - Microalbumin on file in past 12 months     Recent Labs   Lab Test 06/27/18  1101 09/08/16  2157   MICROL 445  --    UMALCR 161.82*  --    [id number]  --  12.4             Passed - Serum creatinine on file in past 12 months     Recent Labs   Lab Test 03/21/19  1137   CR 1.36*             Passed - HgbA1C in past 3 or 6 months     If HgbA1C is 8 or greater, it needs to be on file within the past 3 months.  If less than 8, must be on file within the past 6 months.     Recent Labs   Lab Test 03/21/19  1137  01/17/18  1150   A1C 7.2*   < >  --    [id number]  --   --  10.9*    < > = values in this interval not displayed.             Passed - Medication is active on med list        Passed - Patient is age 18 or older        Passed - Recent (6 mo) or future (30 days) visit within the authorizing provider's specialty     Patient had office visit in the last 6 months or has a visit in the next 30 days with authorizing provider or within the authorizing provider's specialty.  See \"Patient Info\" tab in inbasket, or \"Choose Columns\" in Meds & Orders section of the refill encounter.            LOV-03/21/2019-Follow up in 3-4 months.    Due for exam.  Limited refill per protocol and letter mailed.  Dilma Butler ........   6/21/2019    11:01 AM      "

## 2019-06-25 DIAGNOSIS — M54.5 CHRONIC LOW BACK PAIN, UNSPECIFIED BACK PAIN LATERALITY, WITH SCIATICA PRESENCE UNSPECIFIED: ICD-10-CM

## 2019-06-25 DIAGNOSIS — G89.29 CHRONIC LOW BACK PAIN, UNSPECIFIED BACK PAIN LATERALITY, WITH SCIATICA PRESENCE UNSPECIFIED: ICD-10-CM

## 2019-06-27 RX ORDER — TRAMADOL HYDROCHLORIDE 50 MG/1
TABLET ORAL
Qty: 21 TABLET | Refills: 5 | Status: SHIPPED | OUTPATIENT
Start: 2019-06-27 | End: 2019-09-05

## 2019-06-27 NOTE — TELEPHONE ENCOUNTER
TraMADol (ULTRAM) 50 MG tablet   Last Written Prescription Date:  03/05/2019  Last Fill Quantity: 20,   # refills: 2  Last Office Visit: 03/21/2019  Future Office visit:       Routing refill request to provider for review/approval because: Drug not on the [facility], [facility] or [facility] refill protocol or controlled substance.     Unable to complete prescription refill per RNMedication Refill Policy.................... Dilma Butler ....................  6/27/2019   8:21 AM

## 2019-07-29 ENCOUNTER — TELEPHONE (OUTPATIENT)
Dept: FAMILY MEDICINE | Facility: OTHER | Age: 54
End: 2019-07-29

## 2019-07-29 NOTE — TELEPHONE ENCOUNTER
"[facility]es [facility] and [facility] called patient Left message to call back, calling to confirm if he did indeed want these or if was sent by online information pharmacies.  Justina ADAN ....................  7/29/2019   11:47 AM    "

## 2019-07-29 NOTE — TELEPHONE ENCOUNTER
Patient called back, notified of fax request and list of what was checked. Patient states did not request them, does not need.  Kianna Mccarthy LPN .............7/29/2019     12:01 PM    Clinic RN manager notified.  Kianna Mccarthy LPN .............7/29/2019     12:05 PM

## 2019-07-30 ENCOUNTER — TRANSFERRED RECORDS (OUTPATIENT)
Dept: HEALTH INFORMATION MANAGEMENT | Facility: OTHER | Age: 54
End: 2019-07-30

## 2019-08-25 DIAGNOSIS — F32.A DEPRESSION, UNSPECIFIED DEPRESSION TYPE: ICD-10-CM

## 2019-08-25 NOTE — LETTER
August 29, 2019      Jake Bermudez  [id number]  [facility] [place] [address]        Dear Jake,     This is to remind you that you are due for your 4 month follow up office visit with   Kira Grider DO.  Your last visit was on 03/21/2019.    Additional refills of your medication require you to complete this visit.    Please call [phone] to schedule your appointment.    Thank you for choosing [facility] and [facility] for your health care needs.    Sincerely,      Refill RN  [facility]

## 2019-08-26 ENCOUNTER — ALLIED HEALTH/NURSE VISIT (OUTPATIENT)
Dept: EDUCATION SERVICES | Facility: OTHER | Age: 54
End: 2019-08-26
Attending: FAMILY MEDICINE
Payer: COMMERCIAL

## 2019-08-26 DIAGNOSIS — E11.65 UNCONTROLLED TYPE 2 DIABETES MELLITUS WITH HYPERGLYCEMIA, WITH LONG-TERM CURRENT USE OF INSULIN (H): Primary | ICD-10-CM

## 2019-08-26 DIAGNOSIS — Z79.4 UNCONTROLLED TYPE 2 DIABETES MELLITUS WITH HYPERGLYCEMIA, WITH LONG-TERM CURRENT USE OF INSULIN (H): Primary | ICD-10-CM

## 2019-08-26 PROCEDURE — G0108 DIAB MANAGE TRN  PER INDIV: HCPCS

## 2019-08-26 NOTE — PATIENT INSTRUCTIONS
Diabetes Goals and Reminders    Your A1c test should be done every 3 months.  Your goal is less than 7%.   Your last result is:  Lab Results   Component Value Date    A1C 7.2 03/21/2019       Your LDL cholesterol test should be done at least once a year.  Take a statin, if prescribed by your doctor, based on age and risk factors.  Your last result is:  LDL Cholesterol Calculated   Date Value Ref Range Status   03/21/2019 80 <100 mg/dL Final     Comment:     Desirable:       <100 mg/dl       Have blood pressure and weight checked every three months.  Your blood pressure goal is 140/90 or less.  Your last blood pressure reading was:   BP Readings from Last 1 Encounters:   03/21/19 138/74       Test your blood sugar 4 times per day.  Your home blood glucose target ranges are:  Fasting or Before meals:   2 hours after a meal: Less than 180  Bedtime 100 - 140 mg/dL       Avoid all tobacco.  Follow your healthy diet and exercise plan.  See the eye doctor every year.  See the dentist every six months.  Have kidney function tested yearly.    Take all medicine as prescribed.  Please let me know if you are having symptoms you don t expect or if you wish to stop any medicine.    Follow Up Plan  Please call or visit Diabetes Education if blood sugars are consistently out of target.  Your future lab plan is:  HgA1c at anytime.    If you need your cholesterol checked at your next appointment, you should fast 8 to 10 hours before your appointment.  Do not eat or drink anything besides water.  Drink plenty of water and take all your usual medicine.    SUMMARY FOR TODAY'S VISIT    1.  Discussed D5 Health Goals.  You have met 5 of 5 goals at this time.  (BP less than 140/90, Statin therapy asrecommended, A1c less than 8%, tobacco free, ASA therapy as recommended).      Achieving the five treatment goals that make up the D5 not only reduces yourrisk for serious cardiovascular problems like heart attack and stroke, it puts you on  a path to better health!  See www.theD5.org for more information.     2.  Discussed the low carb plan to help decrease weight, improve blood pressure, improve A1c, decrease triglycerides and increase good HDL cholesterol.  These benefits were summarized from the ADA Consensus report in 2019.      Begin carbohydrate counting, low carb plan:  Up to ~ 1 choice (15 grams) with breakfast, 2 choices (30 grams) with lunch and 2 choices (30 grams) with supper.       Example Meal Plan:  Breakfast - 2 hard cooked eggs, 1 medium tomato sliced, 1/2 avocado   Lunch - 4 cups lettuce, 14 mini carrots, 1 cup chopped red pepper, 6 cherry tomatoes, 1 oz shredded cheddar cheese, 2 Tbsp olive oil, 2 Tbsp cider vinegar with 6 oz grilled chicken breast..    Supper - 5 oz [place] portion, 2 cups broccoli roasted with 2 Tbsp olive oil  Snacks - 2 oz raw almonds, 1 cup fresh raspberries    A key strategy in this plan is, along with medication need, to participate in low to moderate physical activity, such as walking, working up to a minimum of 30 minutes most days, as tolerated.      3.  Continue current insulin dosing, no new recommendations recommended at this time.  14-day average  mg/dL (range 81 - 266 mg/dL)    4.  Follow up for continued diabetes education, as needed.      Fina Hale RN, BSN, CDE  8/26/2019 9:50 AM

## 2019-08-26 NOTE — PROGRESS NOTES
"Diabetes Self-Management Education & Support    Diabetes Education Self Management & Training    SUBJECTIVE/OBJECTIVE:  Presents for: Individual review  Accompanied by: Self  Diabetes education in the past 24mo: Yes  Focus of Visit: Healthy Eating  Diabetes type: Type 2  Date of diagnosis: 8/2011  Disease course: Improving  How confident are you filling out medical forms by yourself:: Somewhat  Transportation concerns: No  Other concerns:: None  Cultural Influences/Ethnic Background:  Not  or       Diabetes Symptoms & Complications  Blurred vision: Yes  Fatigue: Yes(Tired a lot.)  Neuropathy: Yes  Foot ulcerations: No  Polydipsia: Yes(All my life.)  Polyphagia: Yes(Lately, due to cutting back on portions, but my numbers are great!)  Polyuria: No  Weakness: No  Weight loss: No  Slow healing wounds: No  Recent Infection(s): No  Symptom course: Stable  Weight trend: Fluctuating minimally  Autonomic neuropathy: Yes  CVA: No  Heart disease: No  Nephropathy: Yes  Peripheral neuropathy: Yes  Retinopathy: Yes  Sexual dysfunction: Yes    Patient Problem List and Family Medical History reviewed for relevant medical history, current medical status, and diabetes risk factors.    Vitals:  There were no vitals taken for this visit.  Estimated body mass index is 42.59 kg/m  as calculated from the following:    Height as of 10/31/18: 1.778 m (5' 10\").    Weight as of 3/21/19: 134.6 kg (296 lb 12.8 oz).   Last 3 BP:   BP Readings from Last 3 Encounters:   03/21/19 138/74   12/05/18 126/62   11/27/18 122/69       History   Smoking Status     Former Smoker     Packs/day: 0.50     Years: 28.00     Types: Cigarettes     Quit date: 12/21/2011   Smokeless Tobacco     Former User     Types: Chew     Quit date: 9/5/1988       Labs:  Lab Results   Component Value Date    A1C 7.2 03/21/2019     Lab Results   Component Value Date     03/21/2019     Lab Results   Component Value Date    LDL 80 03/21/2019     HDL Cholesterol "   Date Value Ref Range Status   03/21/2019 30 23 - 92 mg/dL Final   ]  GFR Estimate   Date Value Ref Range Status   03/21/2019 55 (L) >60 mL/min/[1.73_m2] Final     GFR Estimate If Black   Date Value Ref Range Status   03/21/2019 66 >60 mL/min/[1.73_m2] Final     Lab Results   Component Value Date    CR 1.36 03/21/2019     No results found for: MICROALBUMIN    Healthy Eating  Healthy Eating Assessed Today: Yes  Cultural/Yarsanism diet restrictions?: No  Meal planning: Carbohydrate counting, Smaller portions, Low salt(Lower carb at meals and rare snacks.)  Meals include: Lunch, Dinner, Breakfast  Beverages: Diet soda, Other, Water(Crystal Light.)  Has patient met with a dietitian in the past?: Yes    Being Active  Being Active Assessed Today: Yes  Exercise:: Yes  Barrier to exercise: Physical limitation(SOB after the 50 reps of health riser, as well as neuropathy and back issues.)    Monitoring  Monitoring Assessed Today: Yes  Did patient bring glucose meter to appointment? : Yes  Blood Glucose Meter: Accu-check  Low Glucose Range (mg/dL): 70-90  High Glucose Range (mg/dL): >200  Overall Range (mg/dL): 140-180(14d average 157 mg/dL.)    See scanned BG report for more information.    Taking Medications  Diabetes Medication(s)     Insulin       insulin aspart (NOVOLOG FLEXPEN) 100 UNIT/ML injection    Use 60-70units subcutaneous with each meal based on insulin:carb ratio.  Maximum: 240 units per day.     insulin aspart (NOVOLOG FLEXPEN) 100 UNIT/ML pen    INJECT 60-70 UNITS SUBCUTANEOUSLY WITH EACH MEAL BASED ON INSULIN:CARB RATIO. MAXIMUM  UNITS PER DAY     insulin glargine (LANTUS SOLOSTAR PEN) 100 UNIT/ML pen    Inject 175 Units Subcutaneous At Bedtime          Taking Medication Assessed Today: Yes  Current Treatments: Insulin Injections, Diet  Dose schedule: pre-lunch, pre-dinner, at bedtime, pre-breakfast  Given by: Patient  Injection/Infusion sites: Abdomen  Problems taking diabetes medications regularly?:  "No  Diabetes medication side effects?: No  Treatment Compliance: All of the time    Problem Solving  Problem Solving Assessed Today: Yes  Hypoglycemia Frequency: Rarely  Hypoglycemia Treatment: Other food(fruit)  Patient carries a carbohydrate source: Yes(Wife, Summer, carries candy in her purse.)  Medical alert: No(I have a med ID bracelet, but I need to bring it in to get it adjusted.)    Hypoglycemia symptoms  Confusion: No  Dizziness or Light-Headedness: No  Headaches: No  Hunger: No  Mood changes: Yes(Wife, Summer, states, \"he has a short fuse.\")  Nervousness/Anxiety: No  Sleepiness: No  Speech difficulty: No  Sweats: No  Tremors: No    Hypoglycemia Complications  Blackouts: No  Hospitalization: No  Nocturnal hypoglycemia: No  Required assistance: No  Required glucagon injection: No  Seizures: No    Reducing Risks  Reducing Risks Assessed Today: Yes  Diabetes Risks: Age over 45 years, Hyperlipidemia, Family History, Sedentary Lifestyle  CAD Risks: Dyslipidemia, Obesity, Male sex, Diabetes Mellitus, Family history, Stress, Sedentary lifestyle  Has dilated eye exam at least once a year?: Yes  Sees dentist every 6 months?: Yes  Sees podiatrist (foot doctor)?: Yes    Healthy Coping  Healthy Coping Assessed Today: Yes  Emotional response to diabetes: Acceptance  Informal Support system:: Spouse, Gloria based, Family  Stage of change: MAINTENANCE (Working to maintain change, with risk of relapse)(I'm putting more effort in lately.)  Difficulty affording diabetes management supplies?: No  Support resources: Offerings in Clinic Communities  Patient Activation Measure Survey Score:  No flowsheet data found.    ASSESSMENT:  Discussed the low carb plan to help decrease weight, improve blood pressure, improve A1c, decrease triglycerides and increase good HDL cholesterol.  These benefits were summarized from the ADA Consensus report in 2019.      Begin carbohydrate counting, low carb plan:  Up to ~ 1 choice (15 grams) with " breakfast, 2 choices (30 grams) with lunch and 2 choices (30 grams) with supper.       Example Meal Plan:  Breakfast - 2 hard cooked eggs, 1 medium tomato sliced, 1/2 avocado   Lunch - 4 cups lettuce, 14 mini carrots, 1 cup chopped red pepper, 6 cherry tomatoes, 1 oz shredded cheddar cheese, 2 Tbsp olive oil, 2 Tbsp cider vinegar with 6 oz grilled chicken breast..    Supper - 5 oz [place] portion, 2 cups broccoli roasted with 2 Tbsp olive oil  Snacks - 2 oz raw almonds, 1 cup fresh raspberries    A key strategy in this plan is, along with medication need, to participate in low to moderate physical activity, such as walking, working up to a minimum of 30 minutes most days, as tolerated.  meal planning and CHO counting.  Recommend carbohydrate counting, 1 - 4 choices per meal, 0 - 1 choice per snack (limiting snacks to help with weight loss and tighter BG control).     Discussed importance and benefits of physical activity.  Recommend low to moderate physical activity, such as walking, working up to a minimum of 30 minutes most days, as tolerated.    No new insulin changes recommended at this time, continue current settings in AccuChek Expert BG meter.  Target BG 80 - 120 mg/dL, ICR 7.5 units per 8 - 10 grams, ISF 1 unit drops BG 8 mg/dL.    Discussed D5 Health Goals and patient has met 5 of 5 goals at this time.  (BP less than 140/90, ASA therapy as recommended, statin therapy as recommended, A1c less than 8%,tobacco free).       Patient's most recent   Lab Results   Component Value Date    A1C 7.2 03/21/2019    is not meeting goal of <7.0    INTERVENTION:   Diabetes knowledge and skills assessment:     Patient is knowledgeable in diabetes management concepts related to: Healthy Eating, Monitoring and Taking Medication    Patient needs further education on the following diabetes management concepts: Healthy Eating, Being Active, Taking Medication, Problem Solving, Reducing Risks and Healthy Coping    Based on learning  assessment above, most appropriate setting for further diabetes education would be: Group class or Individual setting.    Education provided today on:  AADE Self-Care Behaviors:  Diabetes Pathophysiology  Healthy Eating: carbohydrate counting and ADA 2019 Nutrition Consensus Report Review   Being Active: relationship to blood glucose  Problem Solving: high blood glucose - causes, signs/symptoms, treatment and prevention, low blood glucose - causes, signs/symptoms, treatment and prevention and carrying a carbohydrate source at all times  Reducing Risks: major complications of diabetes, prevention, early diagnostic measures and treatment of complications and A1C - goals, relating to blood glucose levels, how often to check  Healthy Coping: benefits of making appropriate lifestyle changes    Opportunities for ongoing education and support in diabetes-self management were discussed.    Pt verbalized understanding of concepts discussed and recommendations provided today.       Education Materials Provided:  My Food Plan, ADA 2019 Nutrition Consensus Report Review, D5 Health Goals    PLAN:  See Patient Instructions for co-developed, patient-stated behavior change goals.  AVS printed and provided to patient today. See Follow-Up section for recommended follow-up.    Fina Hale RN, BSN, CDE  8/26/2019 10:05 AM   Time Spent: 60 minutes  Encounter Type: Individual    Any diabetes medication dose changes were made via the CDE Protocol and Collaborative Practice Agreement with the patient's primary care provider. A copy of this encounter was shared with the provider.

## 2019-08-27 DIAGNOSIS — E11.65 UNCONTROLLED TYPE 2 DIABETES MELLITUS WITH HYPERGLYCEMIA, WITH LONG-TERM CURRENT USE OF INSULIN (H): ICD-10-CM

## 2019-08-27 DIAGNOSIS — Z79.4 UNCONTROLLED TYPE 2 DIABETES MELLITUS WITH HYPERGLYCEMIA, WITH LONG-TERM CURRENT USE OF INSULIN (H): ICD-10-CM

## 2019-08-27 NOTE — LETTER
August 28, 2019      Jake Bermudez  [address] [facility] [address]        A refill request was received from your pharmacy for needles.    Additional refills require an office visit with Dr. Grider for diabetic follow up and labs.    Please call [phone] to schedule appointment.        Sincerely,      Refill Nurse

## 2019-08-28 RX ORDER — PEN NEEDLE, DIABETIC 32GX 5/32"
NEEDLE, DISPOSABLE MISCELLANEOUS
Qty: 400 EACH | Refills: 0 | Status: SHIPPED | OUTPATIENT
Start: 2019-08-28 | End: 2019-11-23

## 2019-08-28 NOTE — TELEPHONE ENCOUNTER
Prescription approved per [facility].  LOV: 3/21/19  Patient due for diabetic follow up   Letter sent.    Maria G Maza RN on 8/28/2019 at 9:16 AM

## 2019-08-29 NOTE — TELEPHONE ENCOUNTER
PCP is out. Will route to teamlet for consideration.     Prozac    Last Written Prescription Date:  08/20/2018  Last Fill Quantity: 90,   # refills: 3  Last Office Visit: 03/21/2019-Follow up in 3-4 months. Due for follow up visit. Reminder letter sent.   Future Office visit:       Routing refill request to provider for review/approval because: Failed - PHQ-9 score over than 5 in past 6 months  03/21/2019-PHQ-9 score 15     Unable to complete prescription refill per RNMedication Refill Policy.................... Dilma Butler RN ....................  8/29/2019   12:58 PM

## 2019-09-04 DIAGNOSIS — M54.5 CHRONIC LOW BACK PAIN, UNSPECIFIED BACK PAIN LATERALITY, WITH SCIATICA PRESENCE UNSPECIFIED: ICD-10-CM

## 2019-09-04 DIAGNOSIS — G89.29 CHRONIC LOW BACK PAIN, UNSPECIFIED BACK PAIN LATERALITY, WITH SCIATICA PRESENCE UNSPECIFIED: ICD-10-CM

## 2019-09-04 RX ORDER — TRAMADOL HYDROCHLORIDE 50 MG/1
TABLET ORAL
Qty: 21 TABLET | Refills: 5 | Status: CANCELLED | OUTPATIENT
Start: 2019-09-04

## 2019-09-04 NOTE — TELEPHONE ENCOUNTER
Pharmacy note:   Refill over 30 days from previous fill - RX void please send new rx  Chula Arambula LPN........................9/4/2019  3:04 PM

## 2019-09-05 ENCOUNTER — OFFICE VISIT (OUTPATIENT)
Dept: FAMILY MEDICINE | Facility: OTHER | Age: 54
End: 2019-09-05
Attending: FAMILY MEDICINE
Payer: COMMERCIAL

## 2019-09-05 VITALS
OXYGEN SATURATION: 94 % | SYSTOLIC BLOOD PRESSURE: 156 MMHG | HEART RATE: 70 BPM | BODY MASS INDEX: 43.28 KG/M2 | TEMPERATURE: 98 F | DIASTOLIC BLOOD PRESSURE: 90 MMHG | WEIGHT: 301.6 LBS

## 2019-09-05 DIAGNOSIS — J98.4 RESTRICTIVE LUNG DISEASE: ICD-10-CM

## 2019-09-05 DIAGNOSIS — G62.9 NEUROPATHY: ICD-10-CM

## 2019-09-05 DIAGNOSIS — F43.10 PTSD (POST-TRAUMATIC STRESS DISORDER): ICD-10-CM

## 2019-09-05 DIAGNOSIS — E78.5 HYPERLIPIDEMIA, UNSPECIFIED HYPERLIPIDEMIA TYPE: ICD-10-CM

## 2019-09-05 DIAGNOSIS — K21.9 GASTROESOPHAGEAL REFLUX DISEASE WITHOUT ESOPHAGITIS: ICD-10-CM

## 2019-09-05 DIAGNOSIS — G47.33 OSA (OBSTRUCTIVE SLEEP APNEA): ICD-10-CM

## 2019-09-05 DIAGNOSIS — M54.5 CHRONIC LOW BACK PAIN, UNSPECIFIED BACK PAIN LATERALITY, WITH SCIATICA PRESENCE UNSPECIFIED: ICD-10-CM

## 2019-09-05 DIAGNOSIS — E11.65 UNCONTROLLED TYPE 2 DIABETES MELLITUS WITH HYPERGLYCEMIA, WITH LONG-TERM CURRENT USE OF INSULIN (H): Primary | ICD-10-CM

## 2019-09-05 DIAGNOSIS — G89.29 CHRONIC LOW BACK PAIN, UNSPECIFIED BACK PAIN LATERALITY, WITH SCIATICA PRESENCE UNSPECIFIED: ICD-10-CM

## 2019-09-05 DIAGNOSIS — I10 BENIGN ESSENTIAL HYPERTENSION: ICD-10-CM

## 2019-09-05 DIAGNOSIS — N18.30 CKD (CHRONIC KIDNEY DISEASE) STAGE 3, GFR 30-59 ML/MIN (H): ICD-10-CM

## 2019-09-05 DIAGNOSIS — Z79.4 UNCONTROLLED TYPE 2 DIABETES MELLITUS WITH HYPERGLYCEMIA, WITH LONG-TERM CURRENT USE OF INSULIN (H): Primary | ICD-10-CM

## 2019-09-05 DIAGNOSIS — E78.5 HYPERLIPIDEMIA LDL GOAL <100: ICD-10-CM

## 2019-09-05 LAB
ALBUMIN SERPL-MCNC: 4.3 G/DL (ref 3.5–5.7)
ALP SERPL-CCNC: 58 U/L (ref 34–104)
ALT SERPL W P-5'-P-CCNC: 28 U/L (ref 7–52)
ANION GAP SERPL CALCULATED.3IONS-SCNC: 7 MMOL/L (ref 3–14)
AST SERPL W P-5'-P-CCNC: 23 U/L (ref 13–39)
BILIRUB SERPL-MCNC: 0.3 MG/DL (ref 0.3–1)
BUN SERPL-MCNC: 19 MG/DL (ref 7–25)
CALCIUM SERPL-MCNC: 9.4 MG/DL (ref 8.6–10.3)
CHLORIDE SERPL-SCNC: 97 MMOL/L (ref 98–107)
CHOLEST SERPL-MCNC: 161 MG/DL
CO2 SERPL-SCNC: 35 MMOL/L (ref 21–31)
CREAT SERPL-MCNC: 1.39 MG/DL (ref 0.7–1.3)
GFR SERPL CREATININE-BSD FRML MDRD: 53 ML/MIN/{1.73_M2}
GLUCOSE SERPL-MCNC: 207 MG/DL (ref 70–105)
HBA1C MFR BLD: 7.2 % (ref 4–6)
HDLC SERPL-MCNC: 31 MG/DL (ref 23–92)
LDLC SERPL CALC-MCNC: 68 MG/DL
NONHDLC SERPL-MCNC: 130 MG/DL
POTASSIUM SERPL-SCNC: 4.6 MMOL/L (ref 3.5–5.1)
PROT SERPL-MCNC: 7 G/DL (ref 6.4–8.9)
SODIUM SERPL-SCNC: 139 MMOL/L (ref 134–144)
TRIGL SERPL-MCNC: 310 MG/DL

## 2019-09-05 PROCEDURE — 80061 LIPID PANEL: CPT | Mod: ZL | Performed by: FAMILY MEDICINE

## 2019-09-05 PROCEDURE — 80053 COMPREHEN METABOLIC PANEL: CPT | Mod: ZL | Performed by: FAMILY MEDICINE

## 2019-09-05 PROCEDURE — 83036 HEMOGLOBIN GLYCOSYLATED A1C: CPT | Mod: ZL | Performed by: FAMILY MEDICINE

## 2019-09-05 PROCEDURE — G0463 HOSPITAL OUTPT CLINIC VISIT: HCPCS

## 2019-09-05 PROCEDURE — 36415 COLL VENOUS BLD VENIPUNCTURE: CPT | Mod: ZL | Performed by: FAMILY MEDICINE

## 2019-09-05 PROCEDURE — 99214 OFFICE O/P EST MOD 30 MIN: CPT | Performed by: FAMILY MEDICINE

## 2019-09-05 RX ORDER — TRAMADOL HYDROCHLORIDE 50 MG/1
TABLET ORAL
Qty: 90 TABLET | Refills: 5 | Status: SHIPPED | OUTPATIENT
Start: 2019-09-05 | End: 2019-12-04

## 2019-09-05 RX ORDER — GLUCOSAMINE HCL/CHONDROITIN SU 500-400 MG
CAPSULE ORAL
Qty: 100 EACH | Refills: 6 | Status: SHIPPED | OUTPATIENT
Start: 2019-09-05 | End: 2021-05-01

## 2019-09-05 RX ORDER — HYDROCHLOROTHIAZIDE 25 MG/1
25 TABLET ORAL DAILY
Qty: 90 TABLET | Refills: 4 | Status: SHIPPED | OUTPATIENT
Start: 2019-09-05 | End: 2020-07-14

## 2019-09-05 RX ORDER — SIMVASTATIN 20 MG
20 TABLET ORAL AT BEDTIME
Qty: 90 TABLET | Refills: 4 | Status: SHIPPED | OUTPATIENT
Start: 2019-09-05 | End: 2020-07-14

## 2019-09-05 RX ORDER — ALBUTEROL SULFATE 90 UG/1
2 AEROSOL, METERED RESPIRATORY (INHALATION) EVERY 4 HOURS PRN
Qty: 1 INHALER | Refills: 11 | Status: SHIPPED | OUTPATIENT
Start: 2019-09-05 | End: 2020-07-14

## 2019-09-05 RX ORDER — LISINOPRIL 20 MG/1
20 TABLET ORAL DAILY
Qty: 90 TABLET | Refills: 4 | Status: SHIPPED | OUTPATIENT
Start: 2019-09-05 | End: 2020-07-14

## 2019-09-05 ASSESSMENT — ENCOUNTER SYMPTOMS
FLANK PAIN: 0
UNEXPECTED WEIGHT CHANGE: 0
CHILLS: 0
DYSURIA: 0
WOUND: 0
APNEA: 0
ABDOMINAL PAIN: 0
FATIGUE: 0
BACK PAIN: 0
FEVER: 0
CHEST TIGHTNESS: 0
APPETITE CHANGE: 0
ACTIVITY CHANGE: 0
SHORTNESS OF BREATH: 0
ARTHRALGIAS: 0
HEMATURIA: 0
PALPITATIONS: 0

## 2019-09-05 ASSESSMENT — PAIN SCALES - GENERAL: PAINLEVEL: MODERATE PAIN (4)

## 2019-09-05 ASSESSMENT — PATIENT HEALTH QUESTIONNAIRE - PHQ9: SUM OF ALL RESPONSES TO PHQ QUESTIONS 1-9: 13

## 2019-09-05 NOTE — LETTER
Jake Bermudez  [address] [facility] [address]    9/16/2019      Dear Mr. Bermudez,      I wanted to let you know about your recent labs.   Your labs have returned stable from previous.  Your triglycerides remain the most elevated (on your blood cholesterol) and this can contribute to heart disease (stroke, heart attack, etc).  I would highly recommend getting at least two 10-minute walks in per day to help with movement/activity.    Please contact us at [phone] with any questions or concerns that you have.    I have attached your lab results for your records.        Sincerely,     Kira Grider, DO   [facility]    Resulted Orders   Lipid Panel   Result Value Ref Range    Cholesterol 161 <200 mg/dL    Triglycerides 310 (H) <150 mg/dL      Comment:      Borderline high:  150-199 mg/dl  High:             200-499 mg/dl  Very high:       >499 mg/dl      HDL Cholesterol 31 23 - 92 mg/dL    LDL Cholesterol Calculated 68 <100 mg/dL      Comment:      Desirable:       <100 mg/dl    Non HDL Cholesterol 130 (H) <130 mg/dL      Comment:      Above Desirable:  130-159 mg/dl  Borderline high:  160-189 mg/dl  High:             190-219 mg/dl  Very high:       >219 mg/dl     Comprehensive Metabolic Panel   Result Value Ref Range    Sodium 139 134 - 144 mmol/L    Potassium 4.6 3.5 - 5.1 mmol/L    Chloride 97 (L) 98 - 107 mmol/L    Carbon Dioxide 35 (H) 21 - 31 mmol/L    Anion Gap 7 3 - 14 mmol/L    Glucose 207 (H) 70 - 105 mg/dL    Urea Nitrogen 19 7 - 25 mg/dL    Creatinine 1.39 (H) 0.70 - 1.30 mg/dL    GFR Estimate 53 (L) >60 mL/min/[1.73_m2]    GFR Estimate If Black 64 >60 mL/min/[1.73_m2]    Calcium 9.4 8.6 - 10.3 mg/dL    Bilirubin Total 0.3 0.3 - 1.0 mg/dL    Albumin 4.3 3.5 - 5.7 g/dL    Protein Total 7.0 6.4 - 8.9 g/dL    Alkaline Phosphatase 58 34 - 104 U/L    ALT 28 7 - 52 U/L    AST 23 13 - 39 U/L   Hemoglobin A1c   Result Value Ref Range    Hemoglobin A1C 7.2 (H) 4.0 - 6.0 %

## 2019-09-05 NOTE — PROGRESS NOTES
"Nursing Notes:   Dorothy Michel LPN  9/5/2019 10:03 AM  Signed  Patient here for diabetic check.  Medication Reconciliation: complete  Dorothy Michel LPN    SUBJECTIVE:   Jake Bermudez is a 54 year old male who presents to clinic today for the following health issues:    CHRISTOPHER Tolentino is here for a diabetic check; last seen in the office on 3/21/2019.  In the interim has seen Neurology for his polyneuropathy related to uncontrolled DM2.  He remains on Lantus 175u @ bedtime; Novolog 60-70u TID (based on carb ratio, max per day 240u).  On asa, lisinopril and simvastatin.   Taking lyrica at maximum 600mg/day; gabapentin 600mg TID; robaxin, cymbalta -- all for his painful neuropathy.  Up until now, has been following with Dr. Yary Gallagher @ [facility].  At their last visit they discussed a neuropathic pain gel trial, and consideration for a \"dorsal column stimulator in the lumbar spine to block the substasis gelatinosis and the propagation of the pain fibers up to the thalamus and/or morphine pump, which would be excessive and would have some danger of paralysis.\"  Last eye exam: 2/28/2019.    Since that visit:  Blood sugars have been stable.  Weight is up 5# within the last 6 months.      Patient Active Problem List    Diagnosis Date Noted     Umbilical hernia with obstruction, without gangrene 10/31/2018     Priority: Medium     PFS (patellofemoral syndrome) 06/28/2018     Priority: Medium     Secondary to quadriceps weakness; referred to [facility] therapy by Bennie Palacios, 6/2018.       Restrictive lung disease 03/07/2018     Priority: Medium     Nocturnal hypoxia 03/07/2018     Priority: Medium     Family history of ischemic heart disease 02/01/2018     Priority: Medium     Acquired keratoderma 02/01/2018     Priority: Medium     Enthesopathy of ankle and tarsus 02/01/2018     Priority: Medium     ARMANDO (obstructive sleep apnea) 01/17/2018     Priority: Medium     Overview:   Treated with NOC " O2       Lung nodule < 6cm on CT 12/01/2017     Priority: Medium     Overview:   Seen on 11/30/2017; repeat in one year for follow up.       Spondylosis of lumbar region without myelopathy or radiculopathy 10/19/2017     Priority: Medium     CKD (chronic kidney disease) stage 3, GFR 30-59 ml/min (H) 09/11/2016     Priority: Medium     Overview:   Secondary to DM2  Overview:   Secondary to DM2       PTSD (post-traumatic stress disorder) 09/08/2016     Priority: Medium     Diabetic polyneuropathy associated with type 2 diabetes mellitus (H) 01/20/2016     Priority: Medium     Diabetic, retinopathy, proliferative (H) 07/01/2014     Priority: Medium     Overview:   Mild proliferative changes on left side (6/2014)--Dr Ortiz  No evidence of retinopathy, but early cataract formation b/l (1/2015) - Dr. Rishi Jacobs       Obesity (BMI 30-39.9) 06/29/2014     Priority: Medium     Lateral epicondylitis of left elbow 10/03/2013     Priority: Medium     Overview:   Seen by Dr Tayla Jung 8/2013       Neuropathy 04/09/2013     Priority: Medium     Overview:   Follows with Dr. Gallagher, Neurology at [facility] - on gabapentin.  Overview:   Follows with Dr. Gallagher, Neurology at [facility] - on gabapentin.       Impotence of organic origin 07/16/2012     Priority: Medium     Overview:   D/C cymbalta       Other symptoms referable to upper arm joint 07/16/2012     Priority: Medium     Overview:   mass below elbow Lt       Personal history of other diseases of circulatory system 07/02/2012     Priority: Medium     Backache 04/03/2012     Priority: Medium     Corns and callosities 03/02/2012     Priority: Medium     Personal history of tobacco use, presenting hazards to health 01/31/2012     Priority: Medium     Acute respiratory failure with hypoxia (H) 12/24/2011     Priority: Medium     Atelectasis of right lung 12/24/2011     Priority: Medium     Community acquired bacterial pneumonia 12/24/2011     Priority: Medium     GERD  (gastroesophageal reflux disease) 12/24/2011     Priority: Medium     Hyperlipidemia 12/24/2011     Priority: Medium     Moderate cigarette smoker (10-19 per day) 12/24/2011     Priority: Medium     Obesity 12/24/2011     Priority: Medium     Overview:   BMI is 34.3.       Rotator cuff syndrome of left shoulder 12/24/2011     Priority: Medium     Overview:   S/P Left rotator cuff repair 12/1/2011.  S/P Right rotator cuff repair 8/2010.       Dyslipidemia 09/13/2011     Priority: Medium     Uncontrolled type 2 diabetes mellitus with hyperglycemia, with long-term current use of insulin (H) 09/07/2011     Priority: Medium     Other, multiple and ill-defined closed fractures of lower limb 08/05/2010     Priority: Medium     Shoulder pain 08/05/2010     Priority: Medium     Past Medical History:   Diagnosis Date     Empyema (H) 1/26/2012    Overview:  Rt lung 12/24/11     Lateral epicondylitis of left elbow     10/3/2013,Seen by Dr Tayla Jung 8/2013     On mechanically assisted ventilation (H) 1/3/2012    Overview:  IMO Update  Overview:  IMO Update     Type 2 diabetes mellitus with proliferative retinopathy without macular edema (H) 07/01/2014    Mild proliferative changes on left side--sees Dr Ortiz exam 6/2014.        Past Surgical History:   Procedure Laterality Date     ARTHROSCOPY SHOULDER      [id number],SHOULDER SURGERY,Bilateral     CHEST TUBE INSERTION      [id number],CHEST TUBE INSERTION     HERNIORRHAPHY UMBILICAL N/A 11/27/2018    Procedure: Umbilical hernia Repair with Mesh;  Surgeon: Mason Rhodes MD;  Location: [facility] OR     LUNG SURGERY      [id number],LUNG SURGERY,pneumonia     TRACHEOSTOMY      [id number],HCHG TRACH PR1,history trach with pneumonia     Family History   Problem Relation Age of Onset     Heart Disease Other         multiple males     Diabetes Other      Cancer Father         Stomach CA     Colon Cancer Paternal Uncle      Colon Cancer Paternal Grandfather      Social History     Tobacco  Use     Smoking status: Former Smoker     Packs/day: 0.50     Years: 28.00     Pack years: 14.00     Types: Cigarettes     Last attempt to quit: 2011     Years since quittin.7     Smokeless tobacco: Former User     Types: Chew     Quit date: 1988   Substance Use Topics     Alcohol use: No     Social History     Social History Narrative    Alcohol Use - no    2 children  unemployed 10 1/2 % disability for back injury  Patient was a former smoker.  1/2 PPD     Current Outpatient Medications   Medication Sig Dispense Refill     albuterol (PROAIR HFA/PROVENTIL HFA/VENTOLIN HFA) 108 (90 Base) MCG/ACT inhaler Inhale 2 puffs into the lungs every 4 hours as needed for shortness of breath / dyspnea or wheezing 1 Inhaler 11     alcohol swab prep pads Use to swab area of injection/france as directed. 100 each 6     Aspirin (ASPIR-81 PO) Take 81 mg by mouth daily with food       BD ANN U/F 32G X 4 MM insulin pen needle USE AS DIRECTED 4 TIMES DAILY FOR  ADMINISTERING  INSULIN  AT  HOME. 400 each 0     blood glucose (ACCU-CHEK DONAVON PLUS) test strip USE  STRIP TO CHECK GLUCOSE 4 TIMES DAILY 400 strip 2     blood glucose (ACCU-CHEK DONAVON) test strip Use to test blood sugar 4 times daily or as directed.  Dx: hyperglycemia 400 each 4     blood glucose (NO BRAND SPECIFIED) lancets standard Dispense item as covered by patient's insurance-Use as directed to test blood sugar 4 times daily. 400 each 1     blood glucose (NO BRAND SPECIFIED) lancing device Device to be used with lancets. 1 each 0     blood glucose calibration (ACCU-CHEK DONAVON) solution Use to calibrate blood glucose monitor as needed as directed. 1 Bottle 3     blood glucose monitoring (ACCU-CHEK DONAVON PLUS) meter device kit Use to test blood sugar 4 times daily or as directed.  Dx: hyperglycemia 1 kit 0     blood glucose monitoring (ACCU-CHEK MULTICLIX) lancets USE STRIP TO CHECK BLOOD GLUCOSE 4 TIMES DAILY. 408 each 4     blood glucose monitoring  (NO BRAND SPECIFIED) meter device kit Use to test blood sugar 5-6 times daily or as directed.  Dx: DM2 with hyperglycemia, long term insulin use 1 kit 0     Cholecalciferol (VITAMIN D3) 2000 units TABS TAKE ONE TABLET BY MOUTH ONCE DAILY 100 tablet 2     clonazePAM (KLONOPIN) 0.5 MG tablet TAKE 1 TABLET BY MOUTH AT BEDTIME 30 tablet 5     FLUoxetine (PROZAC) 20 MG capsule TAKE 1 CAPSULE BY MOUTH IN THE MORNING 90 capsule 0     fluticasone-salmeterol (ADVAIR) 250-50 MCG/DOSE diskus inhaler Inhale 1 puff into the lungs 2 times daily 3 Inhaler 3     gabapentin (NEURONTIN) 300 MG capsule Take 900 mg by mouth 3 times daily 900 mg TID, 1200 mg at bedtime       hydrochlorothiazide (HYDRODIURIL) 25 MG tablet Take 1 tablet (25 mg) by mouth daily 90 tablet 3     insulin aspart (NOVOLOG FLEXPEN) 100 UNIT/ML injection Use 60-70units subcutaneous with each meal based on insulin:carb ratio.  Maximum: 240 units per day. 90 mL 4     insulin aspart (NOVOLOG FLEXPEN) 100 UNIT/ML pen INJECT 60-70 UNITS SUBCUTANEOUSLY WITH EACH MEAL BASED ON INSULIN:CARB RATIO. MAXIMUM  UNITS PER DAY 90 mL 2     insulin glargine (LANTUS SOLOSTAR PEN) 100 UNIT/ML pen Inject 175 Units Subcutaneous At Bedtime 162 mL 4     lisinopril (PRINIVIL/ZESTRIL) 20 MG tablet Take 1 tablet (20 mg) by mouth daily 90 tablet 4     LYRICA 200 MG capsule Take 1 capsule (200 mg) by mouth 3 times daily 270 capsule 4     MAPAP 500 MG tablet TAKE ONE TABLET BY MOUTH EVERY 6 HOURS AS NEEDED .  MAX  ACETAMINOPHEN  DOSE  4000  MG  IN  24  HOURS. 200 tablet 5     melatonin 5 MG tablet Take 1-2 tablets (5-10 mg) by mouth At Bedtime 180 tablet 3     meloxicam (MOBIC) 15 MG tablet TAKE 1 TABLET BY MOUTH ONCE DAILY WITH FOOD 90 tablet 3     methocarbamol (ROBAXIN) 750 MG tablet TAKE ONE TABLET BY MOUTH THREE TIMES DAILY 270 tablet 3     mirtazapine (REMERON) 15 MG tablet Take 1 tablet (15 mg) by mouth At Bedtime       omeprazole 20 MG tablet Take 20 mg by mouth once daily  before a meal 90 tablet 3     order for DME Oxygen for home use. LPM: 1/min via nasal cannula. Frequency of use: Continuous with portability; Length of need: 12 mos.       simvastatin (ZOCOR) 20 MG tablet Take 1 tablet (20 mg) by mouth At Bedtime 90 tablet 3     traMADol (ULTRAM) 50 MG tablet TAKE 1 TABLET BY MOUTH THREE TIMES DAILY AS NEEDED 21 tablet 5     umeclidinium-vilanterol (ANORO ELLIPTA) 62.5-25 MCG/INH oral inhaler Inhale 1 puff into the lungs       Allergies   Allergen Reactions     Penicillins      Other reaction(s): Diaphoresis     Piperacillin-Tazobactam In D5w Other (See Comments)     Other reaction(s): Diaphoresis, Fever  Fever while on zosyn, vanc. No rash, no wbc elevation     Vancomycin Other (See Comments)     Fever while on zosyn, vanc, no rash or WBC elevation  Other reaction(s): Diaphoresis, Fever  Fever while on zosyn, vanc, no rash or WBC elevation     Zosyn Other (See Comments)     Fever while on zosyn, vanc. No rash, no wbc elevation  Fever while on zosyn, vanc. No rash, no wbc elevation     Review of Systems   Constitutional: Negative for activity change, appetite change, chills, fatigue, fever and unexpected weight change.   Respiratory: Negative for apnea, chest tightness and shortness of breath.    Cardiovascular: Negative for chest pain, palpitations and peripheral edema.   Gastrointestinal: Negative for abdominal pain.   Genitourinary: Negative for dysuria, flank pain and hematuria.   Musculoskeletal: Negative for arthralgias, back pain and gait problem.   Skin: Negative for rash and wound.      OBJECTIVE:     BP (!) 156/90 (BP Location: Right arm, Patient Position: Sitting, Cuff Size: Adult Large)   Pulse 70   Temp 98  F (36.7  C) (Tympanic)   Wt 136.8 kg (301 lb 9.6 oz)   SpO2 94%   BMI 43.28 kg/m    Body mass index is 43.28 kg/m .  Physical Exam   Constitutional: He appears well-developed and well-nourished.   HENT:   Head: Normocephalic and atraumatic.   Right Ear: External  ear normal.   Left Ear: External ear normal.   Eyes: Pupils are equal, round, and reactive to light. EOM are normal.   Neck: Normal range of motion. Neck supple.   Cardiovascular: Normal rate and intact distal pulses.   Pulmonary/Chest: Effort normal and breath sounds normal.   Skin: Skin is warm. Capillary refill takes less than 2 seconds.   Psychiatric: He has a normal mood and affect. Judgment normal.   Nursing note and vitals reviewed.    Diagnostic Test Results:  Results for orders placed or performed in visit on 09/05/19   Lipid Panel   Result Value Ref Range    Cholesterol 161 <200 mg/dL    Triglycerides 310 (H) <150 mg/dL    HDL Cholesterol 31 23 - 92 mg/dL    LDL Cholesterol Calculated 68 <100 mg/dL    Non HDL Cholesterol 130 (H) <130 mg/dL   Comprehensive Metabolic Panel   Result Value Ref Range    Sodium 139 134 - 144 mmol/L    Potassium 4.6 3.5 - 5.1 mmol/L    Chloride 97 (L) 98 - 107 mmol/L    Carbon Dioxide 35 (H) 21 - 31 mmol/L    Anion Gap 7 3 - 14 mmol/L    Glucose 207 (H) 70 - 105 mg/dL    Urea Nitrogen 19 7 - 25 mg/dL    Creatinine 1.39 (H) 0.70 - 1.30 mg/dL    GFR Estimate 53 (L) >60 mL/min/[1.73_m2]    GFR Estimate If Black 64 >60 mL/min/[1.73_m2]    Calcium 9.4 8.6 - 10.3 mg/dL    Bilirubin Total 0.3 0.3 - 1.0 mg/dL    Albumin 4.3 3.5 - 5.7 g/dL    Protein Total 7.0 6.4 - 8.9 g/dL    Alkaline Phosphatase 58 34 - 104 U/L    ALT 28 7 - 52 U/L    AST 23 13 - 39 U/L   Hemoglobin A1c   Result Value Ref Range    Hemoglobin A1C 7.2 (H) 4.0 - 6.0 %       ASSESSMENT/PLAN:     1. Uncontrolled type 2 diabetes mellitus with hyperglycemia, with long-term current use of insulin (H)  Contributing to neuropathy; and unlikely to change lifestyle, eating, exercise habits due to limited resources.  Continue current doses of insulin, medications.  Monitoring labs today.  Eye exam UTD.  - Comprehensive Metabolic Panel; Standing  - Hemoglobin A1c; Standing  - Lipid Panel; Standing  - alcohol swab prep pads; Use to  swab area of injection/france as directed.  Dispense: 100 each; Refill: 6  - Lipid Panel  - Comprehensive Metabolic Panel  - Hemoglobin A1c    2. ARMANDO (obstructive sleep apnea)  Chronic; not consistent with CPAP device.     3. Gastroesophageal reflux disease without esophagitis  Chronic, stable. OTC meds prn.    4. Hyperlipidemia, unspecified hyperlipidemia type  Chronic, with uncontrolled triglycerides; likely related to uncontrolled DM.  - Lipid Panel; Standing  - Lipid Panel    5. CKD (chronic kidney disease) stage 3, GFR 30-59 ml/min (H)  Chronic, stable for last few years; monitoring BMP today.  - Comprehensive Metabolic Panel; Standing  - Comprehensive Metabolic Panel    6. PTSD (post-traumatic stress disorder)  On prozac; stable.    7. Neuropathy  Follows with Neurology - Dr. Gallagher (who is retiring) will be seeing Dr. Sharonda Lloyd in the future.    8. Restrictive lung disease  Stable; triggered after severe pneumonia and empyema a few years ago.  Likely also multifactorial with ARMANDO, body habitus, etc.  Continue albuterol for prn use; if symptoms worsen - will return to pulmonology.  - albuterol (PROAIR HFA/PROVENTIL HFA/VENTOLIN HFA) 108 (90 Base) MCG/ACT inhaler; Inhale 2 puffs into the lungs every 4 hours as needed for shortness of breath / dyspnea or wheezing  Dispense: 1 Inhaler; Refill: 11    9. Benign essential hypertension  Chronic, uncontrolled.  Not consistent with hydrochlorothiazide medication.  Encouraged to use timer, med chart or pill box organizer to help consistency.  Was previously well controlled when taking regularly.  If remains elevated at follow up appointment; will increase lisinopril to 30mg daily.  - Comprehensive Metabolic Panel; Standing  - Hemoglobin A1c; Standing  - Lipid Panel; Standing  - hydrochlorothiazide (HYDRODIURIL) 25 MG tablet; Take 1 tablet (25 mg) by mouth daily  Dispense: 90 tablet; Refill: 4  - lisinopril (PRINIVIL/ZESTRIL) 20 MG tablet; Take 1 tablet (20 mg) by mouth  daily  Dispense: 90 tablet; Refill: 4  - Lipid Panel  - Comprehensive Metabolic Panel  - Hemoglobin A1c    10. Hyperlipidemia LDL goal <100  - simvastatin (ZOCOR) 20 MG tablet; Take 1 tablet (20 mg) by mouth At Bedtime  Dispense: 90 tablet; Refill: 4    11. Chronic low back pain, unspecified back pain laterality, with sciatica presence unspecified  Chronic, stable.  Expect worsening of symptoms without change in lifestyle.  - traMADol (ULTRAM) 50 MG tablet; TAKE 1 TABLET BY MOUTH THREE TIMES DAILY AS NEEDED  Dispense: 90 tablet; Refill: 5    Follow up in 3-4 months.    Kira Grider, [facility] AND \[facility] [place] [facility]\""

## 2019-09-10 ENCOUNTER — TELEPHONE (OUTPATIENT)
Dept: FAMILY MEDICINE | Facility: OTHER | Age: 54
End: 2019-09-10

## 2019-09-10 NOTE — TELEPHONE ENCOUNTER
Rajan states that he got a letter from his insurance stating that his Advair needs to be changed to a generic and his ventolin needs to be albuterol. I called [facility] pharmacy and they state that they do not have any issues on their end regarding Rajan's prescriptions. I called Rajan and informed him that there is no issues with his medications that we can see.  Dorothy Diaz............................... 9/11/2019 10:29 AM

## 2019-10-03 DIAGNOSIS — G89.29 CHRONIC LOW BACK PAIN: ICD-10-CM

## 2019-10-03 DIAGNOSIS — M62.830 SPASM OF BACK MUSCLES: ICD-10-CM

## 2019-10-03 DIAGNOSIS — M54.50 CHRONIC LOW BACK PAIN: ICD-10-CM

## 2019-10-04 RX ORDER — METHOCARBAMOL 750 MG/1
TABLET, FILM COATED ORAL
Qty: 90 TABLET | Refills: 10 | Status: SHIPPED | OUTPATIENT
Start: 2019-10-04 | End: 2021-04-28

## 2019-10-04 RX ORDER — ACETAMINOPHEN 500 MG/1
TABLET ORAL
Qty: 200 TABLET | Refills: 4 | Status: SHIPPED | OUTPATIENT
Start: 2019-10-04 | End: 2020-12-29

## 2019-10-04 NOTE — TELEPHONE ENCOUNTER
"Requested Prescriptions   Pending Prescriptions Disp Refills     methocarbamol (ROBAXIN) 750 MG tablet [Pharmacy Med Name: METHOCARBAM 750MG   TAB] 90 tablet 11     Sig: TAKE 1 TABLET BY MOUTH THREE TIMES DAILY       There is no refill protocol information for this order        EQ PAIN RELIEVER 500 MG tablet [Pharmacy Med Name: PAIN RELIEVER 500MG   TAB] 200 tablet 5     Sig: TAKE 1 TABLET BY MOUTH EVERY 6 HOURS AS NEEDED .  MAX  ACETAMINOPHEN  DOSE  4000  MG  IN  24  HOURS.       Analgesics (Non-Narcotic Tylenol and ASA Only) Passed - 10/3/2019  5:30 AM        Passed - Recent (12 mo) or future (30 days) visit within the authorizing provider's specialty     Patient has had an office visit with the authorizing provider or a provider within the authorizing providers department within the previous 12 mos or has a future within next 30 days. See \"Patient Info\" tab in inbasket, or \"Choose Columns\" in Meds & Orders section of the refill encounter.              Passed - Patient is 7 months old or older     If patient is a peds patient of the age 7 mos -12 years, ok to refill using weight-based dosing.     If >3g daily and/or sig is not \"prn\", check for liver enzymes. If normal in the last year, ok to refill.  If not, refer to the provider.          Passed - Medication is active on med list        LOV 9/5/19  Prescription approved per [facility] Refill Protocol.  Brenda J. Goodell, RN on 10/4/2019 at 2:03 PM    "

## 2019-10-30 ENCOUNTER — MEDICAL CORRESPONDENCE (OUTPATIENT)
Dept: HEALTH INFORMATION MANAGEMENT | Facility: OTHER | Age: 54
End: 2019-10-30

## 2019-11-07 ENCOUNTER — TELEPHONE (OUTPATIENT)
Dept: FAMILY MEDICINE | Facility: OTHER | Age: 54
End: 2019-11-07

## 2019-11-07 NOTE — TELEPHONE ENCOUNTER
Request for clobetasol 0.05% ointment, apply 2-3 grams to affected area (s) 3-4 times daily. No on current medication list.  Adri Hernandez LPN, LPN  11/7/2019  8:47 AM     Fax to send Rx to: [phone]

## 2019-11-07 NOTE — TELEPHONE ENCOUNTER
Left message for patient to return call to verify refill request for Clobetasol.    Spoke with patient and patient states he did not request medication    Maria G Maza RN on 11/7/2019 at 8:56 AM

## 2019-11-21 DIAGNOSIS — E11.65 UNCONTROLLED TYPE 2 DIABETES MELLITUS WITH HYPERGLYCEMIA, WITH LONG-TERM CURRENT USE OF INSULIN (H): ICD-10-CM

## 2019-11-21 DIAGNOSIS — Z79.4 UNCONTROLLED TYPE 2 DIABETES MELLITUS WITH HYPERGLYCEMIA, WITH LONG-TERM CURRENT USE OF INSULIN (H): ICD-10-CM

## 2019-11-22 DIAGNOSIS — Z79.4 UNCONTROLLED TYPE 2 DIABETES MELLITUS WITH HYPERGLYCEMIA, WITH LONG-TERM CURRENT USE OF INSULIN (H): ICD-10-CM

## 2019-11-22 DIAGNOSIS — E11.65 UNCONTROLLED TYPE 2 DIABETES MELLITUS WITH HYPERGLYCEMIA, WITH LONG-TERM CURRENT USE OF INSULIN (H): ICD-10-CM

## 2019-11-23 DIAGNOSIS — E11.65 UNCONTROLLED TYPE 2 DIABETES MELLITUS WITH HYPERGLYCEMIA, WITH LONG-TERM CURRENT USE OF INSULIN (H): ICD-10-CM

## 2019-11-23 DIAGNOSIS — Z79.4 UNCONTROLLED TYPE 2 DIABETES MELLITUS WITH HYPERGLYCEMIA, WITH LONG-TERM CURRENT USE OF INSULIN (H): ICD-10-CM

## 2019-11-26 RX ORDER — LANCETS
EACH MISCELLANEOUS
Qty: 400 EACH | Refills: 0 | Status: SHIPPED | OUTPATIENT
Start: 2019-11-26 | End: 2019-12-01

## 2019-11-26 RX ORDER — PEN NEEDLE, DIABETIC 32GX 5/32"
NEEDLE, DISPOSABLE MISCELLANEOUS
Qty: 400 EACH | Refills: 0 | Status: SHIPPED | OUTPATIENT
Start: 2019-11-26 | End: 2020-03-20

## 2019-11-26 NOTE — TELEPHONE ENCOUNTER
Routing refill request to provider for review/approval because:  Drug not on the [facility] refill protocol     LOV: 9/5/19  Maria G Maza RN on 11/26/2019 at 2:58 PM

## 2019-11-26 NOTE — TELEPHONE ENCOUNTER
Received a request from [facility] for a Clobetasol 0.05% cream, looks like this is a possible scam. Patient uses [facility] and cream is not even on his med list.  Adri Hernandez, ANTIONETTE, LPN  11/26/2019  9:22 AM

## 2019-11-26 NOTE — TELEPHONE ENCOUNTER
"Requested Prescriptions   Pending Prescriptions Disp Refills     blood glucose monitoring (ACCU-CHEK FASTCLIX) lancets [Pharmacy Med Name: FASTCLIX LANCETS    MIS] 408 each 4     Sig: USE   TO CHECK GLUCOSE 4 TIMES DAILY       Diabetic Supplies Protocol Passed - 11/23/2019  5:30 AM        Passed - Medication is active on med list        Passed - Patient is 18 years of age or older        Passed - Recent (6 mo) or future (30 days) visit within the authorizing provider's specialty     Patient had office visit in the last 6 months or has a visit in the next 30 days with authorizing provider.  See \"Patient Info\" tab in inbasket, or \"Choose Columns\" in Meds & Orders section of the refill encounter.            BD ANN U/F 32G X 4 MM insulin pen needle [Pharmacy Med Name: BD PEN NEEDLE/ANN [id number] [facility]] 400 each 0     Sig: USE AS DIRECTED 4 TIMES DAILY FOR  ADMINISTERING  INSULIN  AT  HOME.       Diabetic Supplies Protocol Passed - 11/23/2019  5:30 AM        Passed - Medication is active on med list        Passed - Patient is 18 years of age or older        Passed - Recent (6 mo) or future (30 days) visit within the authorizing provider's specialty     Patient had office visit in the last 6 months or has a visit in the next 30 days with authorizing provider.  See \"Patient Info\" tab in inbasket, or \"Choose Columns\" in Meds & Orders section of the refill encounter.            LOV 9/5/19 Prescription approved per [facility] Refill Protocol.  Tamie Christianson RN on 11/26/2019 at 11:29 AM    "

## 2019-11-27 RX ORDER — THERMOMETER, ELECTRONIC,ORAL
EACH MISCELLANEOUS
Qty: 100 EACH | Refills: 6 | Status: SHIPPED | OUTPATIENT
Start: 2019-11-27 | End: 2021-05-01

## 2019-12-01 DIAGNOSIS — E11.65 UNCONTROLLED TYPE 2 DIABETES MELLITUS WITH HYPERGLYCEMIA, WITH LONG-TERM CURRENT USE OF INSULIN (H): ICD-10-CM

## 2019-12-01 DIAGNOSIS — Z79.4 UNCONTROLLED TYPE 2 DIABETES MELLITUS WITH HYPERGLYCEMIA, WITH LONG-TERM CURRENT USE OF INSULIN (H): ICD-10-CM

## 2019-12-02 RX ORDER — LANCETS
EACH MISCELLANEOUS
Qty: 408 EACH | Refills: 0 | Status: SHIPPED | OUTPATIENT
Start: 2019-12-02 | End: 2019-12-16

## 2019-12-02 NOTE — TELEPHONE ENCOUNTER
"Requested Prescriptions   Pending Prescriptions Disp Refills     blood glucose monitoring (ACCU-CHEK FASTCLIX) lancets [Pharmacy Med Name: FASTCLIX LANCETS    MIS] 408 each 4     Sig: USE   TO CHECK GLUCOSE 4 TIMES DAILY       Diabetic Supplies Protocol Passed - 12/1/2019  6:56 PM        Passed - Medication is active on med list        Passed - Patient is 18 years of age or older        Passed - Recent (6 mo) or future (30 days) visit within the authorizing provider's specialty     Patient had office visit in the last 6 months or has a visit in the next 30 days with authorizing provider.  See \"Patient Info\" tab in inbasket, or \"Choose Columns\" in Meds & Orders section of the refill encounter.            lov 09/05/19  Prescription approved per [facility] Refill Protocol.    "

## 2019-12-03 DIAGNOSIS — F41.1 GAD (GENERALIZED ANXIETY DISORDER): ICD-10-CM

## 2019-12-03 DIAGNOSIS — F32.A DEPRESSION, UNSPECIFIED DEPRESSION TYPE: ICD-10-CM

## 2019-12-03 RX ORDER — CLONAZEPAM 0.5 MG/1
TABLET ORAL
Qty: 30 TABLET | Refills: 5 | Status: SHIPPED | OUTPATIENT
Start: 2019-12-03 | End: 2020-06-18

## 2019-12-04 DIAGNOSIS — G89.29 CHRONIC MIDLINE LOW BACK PAIN, UNSPECIFIED WHETHER SCIATICA PRESENT: ICD-10-CM

## 2019-12-04 DIAGNOSIS — E11.42 DIABETIC POLYNEUROPATHY ASSOCIATED WITH TYPE 2 DIABETES MELLITUS (H): Primary | ICD-10-CM

## 2019-12-04 DIAGNOSIS — M54.50 CHRONIC MIDLINE LOW BACK PAIN, UNSPECIFIED WHETHER SCIATICA PRESENT: ICD-10-CM

## 2019-12-04 DIAGNOSIS — J98.4 RESTRICTIVE LUNG DISEASE: ICD-10-CM

## 2019-12-04 NOTE — TELEPHONE ENCOUNTER
[facility] sent Rx request for the following:      Fluoxetine 20 mg capsule      Last Prescription Date:   8/29/19  Last Fill Qty/Refills:         90, R-0    Last Office Visit:              9/5/19   Future Office visit:           None noted    Routing refill request to provider for review/approval because:    Requested Prescriptions   Pending Prescriptions Disp Refills     FLUoxetine (PROZAC) 20 MG capsule [Pharmacy Med Name: FLUOXETINE 20MG     CAP] 90 capsule 0     Sig: TAKE 1 CAPSULE BY MOUTH ONCE DAILY IN THE MORNING       SSRIs Protocol Failed - 12/4/2019  4:20 PM        Failed - PHQ-9 score less than 5 in past 6 months     Please review last PHQ-9 score.     Score of 13 on 9/5/19     Unable to complete prescription refill per RNMedication Refill Policy.................... Ashanti Murphy RN ....................  12/4/2019   4:23 PM

## 2019-12-05 NOTE — TELEPHONE ENCOUNTER
Routing refill request to provider for review/approval because:  Drug not on the [facility] refill protocol     [facility] requesting refills of: Tramadol & Advair    Tramadol last filled 9/5/2019 90# 5 refills    Advair last filled 11/9/18 3 inhalers, 3 refills    Note: Advair is no longer covered by patients insurance, insurance provided substitutes:       LOV:9/5/19  No future visit    Roxanne Collier RN on 12/5/2019 at 4:56 PM

## 2019-12-06 RX ORDER — TRAMADOL HYDROCHLORIDE 50 MG/1
TABLET ORAL
Qty: 90 TABLET | Refills: 5 | Status: SHIPPED | OUTPATIENT
Start: 2019-12-06 | End: 2020-02-13

## 2019-12-06 RX ORDER — FLUTICASONE PROPIONATE 110 UG/1
1 AEROSOL, METERED RESPIRATORY (INHALATION) 2 TIMES DAILY
Qty: 3 INHALER | Refills: 4 | Status: SHIPPED | OUTPATIENT
Start: 2019-12-06 | End: 2021-04-28

## 2019-12-16 DIAGNOSIS — E11.65 UNCONTROLLED TYPE 2 DIABETES MELLITUS WITH HYPERGLYCEMIA, WITH LONG-TERM CURRENT USE OF INSULIN (H): ICD-10-CM

## 2019-12-16 DIAGNOSIS — Z79.4 UNCONTROLLED TYPE 2 DIABETES MELLITUS WITH HYPERGLYCEMIA, WITH LONG-TERM CURRENT USE OF INSULIN (H): ICD-10-CM

## 2019-12-17 ENCOUNTER — TELEPHONE (OUTPATIENT)
Dept: FAMILY MEDICINE | Facility: OTHER | Age: 54
End: 2019-12-17

## 2019-12-17 NOTE — TELEPHONE ENCOUNTER
Patient would like to speak to nurse about a possible medication for toenail fungus without being seen. Patient has heard of certain medication reactions with antifungal medication.     Dulce Cheema on 12/17/2019 at 1:54 PM

## 2019-12-17 NOTE — TELEPHONE ENCOUNTER
"Talked with patient and he states that his nails are discolored and theres \"gunk\" under them.  Both him and his wife would like a prescription sent to [facility]. Phone note also on wife.  Adri Hernandez, ANTIONETTE, LPN  12/17/2019  2:41 PM             "

## 2019-12-17 NOTE — TELEPHONE ENCOUNTER
Would not be indicated as treatment of a fungal nail infection is not harmful; and treatment is too harmful to liver.  Has drug interactions with some of Rajan's medications.  Other causes of thickened, discolored nails can include: normal aging, genetics, medications, diabetes, etc.  Therefore it is not recommended to treat.  Kira Grider, DO

## 2019-12-18 RX ORDER — LANCETS
EACH MISCELLANEOUS
Qty: 408 EACH | Refills: 0 | Status: SHIPPED | OUTPATIENT
Start: 2019-12-18 | End: 2020-02-12

## 2019-12-18 NOTE — TELEPHONE ENCOUNTER
Verified name &  of patient with wife Summer, notified below.    Marcia Ramesh LPN on 2019 at 11:16 AM

## 2019-12-18 NOTE — TELEPHONE ENCOUNTER
Left message to call back....................  12/18/2019   8:25 AM  Adri Hernandez LPN, LPN  12/18/2019  8:25 AM

## 2019-12-18 NOTE — TELEPHONE ENCOUNTER
"Requested Prescriptions   Pending Prescriptions Disp Refills     blood glucose monitoring (ACCU-CHEK FASTCLIX) lancets [Pharmacy Med Name: FASTCLIX LANCETS    MIS]  0     Sig: USE   TO CHECK GLUCOSE 4 TIMES DAILY       Diabetic Supplies Protocol Passed - 12/18/2019  2:31 PM        Passed - Medication is active on med list        Passed - Patient is 18 years of age or older        Passed - Recent (6 mo) or future (30 days) visit within the authorizing provider's specialty     Patient had office visit in the last 6 months or has a visit in the next 30 days with authorizing provider.  See \"Patient Info\" tab in inbasket, or \"Choose Columns\" in Meds & Orders section of the refill encounter.            LOV 09/05/2019 Kira Grider MD  LAST A1c 09/05/2019.  Prescription approved per [facility] Refill Protocol.  Deborah Cheema RN on 12/18/2019 at 2:33 PM      "

## 2020-01-29 ENCOUNTER — TRANSFERRED RECORDS (OUTPATIENT)
Dept: HEALTH INFORMATION MANAGEMENT | Facility: OTHER | Age: 55
End: 2020-01-29

## 2020-01-29 NOTE — TELEPHONE ENCOUNTER
Received faxed medication request from [facility]. Pt has never used this pharmacy in the past and they are not in the database. Call to pt to verify if this pharmacy is where they want to get their refills. The request is for Diflorasone Diacetate ointment and Hydrocortisone Butyrate lotion 0.1% which have never been on pt's med list. Left message requesting a call back. Ren Gonzáles RN, BSN  ....................  1/29/2020   11:50 AM

## 2020-01-29 NOTE — TELEPHONE ENCOUNTER
Patient returned call states he did not request this medications and uses [facility] for prescriptions. Palma Arnold RN    1/29/2020  2:39 PM

## 2020-02-12 DIAGNOSIS — Z79.4 UNCONTROLLED TYPE 2 DIABETES MELLITUS WITH HYPERGLYCEMIA, WITH LONG-TERM CURRENT USE OF INSULIN (H): ICD-10-CM

## 2020-02-12 DIAGNOSIS — K21.9 GASTROESOPHAGEAL REFLUX DISEASE WITHOUT ESOPHAGITIS: Primary | ICD-10-CM

## 2020-02-12 DIAGNOSIS — E11.65 UNCONTROLLED TYPE 2 DIABETES MELLITUS WITH HYPERGLYCEMIA, WITH LONG-TERM CURRENT USE OF INSULIN (H): ICD-10-CM

## 2020-02-12 DIAGNOSIS — G89.29 CHRONIC MIDLINE LOW BACK PAIN, UNSPECIFIED WHETHER SCIATICA PRESENT: ICD-10-CM

## 2020-02-12 DIAGNOSIS — J98.4 RESTRICTIVE LUNG DISEASE: ICD-10-CM

## 2020-02-12 DIAGNOSIS — E11.42 DIABETIC POLYNEUROPATHY ASSOCIATED WITH TYPE 2 DIABETES MELLITUS (H): ICD-10-CM

## 2020-02-12 DIAGNOSIS — M54.50 CHRONIC MIDLINE LOW BACK PAIN, UNSPECIFIED WHETHER SCIATICA PRESENT: ICD-10-CM

## 2020-02-12 NOTE — TELEPHONE ENCOUNTER
"Routing refill request to provider for review/approval because:  Drug not on the [facility] refill protocol     Drug not active on patient's medication list  Labs not current:  HGBA1C  Patient needs to be seen because:  Diabetic follow up     LOV:   9/5/19    Advair not on current medication list   Left message for patient to return call to verify request for Advair and to advise need for diabetic follow up and labs.  Per pharmacy \" patient exceeded 30 days before refill. Need new Rx    Called patient and patient states he does use Advair inhaler still when he remembers to use it.  Patient states that he will call back late to schedule appointment for Diabetic follow up.    Maria G Maza RN on 2/13/2020 at 8:33 AM        "

## 2020-02-17 RX ORDER — LANCETS
EACH MISCELLANEOUS
Qty: 408 EACH | Refills: 0 | Status: SHIPPED | OUTPATIENT
Start: 2020-02-17 | End: 2020-07-14

## 2020-02-17 RX ORDER — TRAMADOL HYDROCHLORIDE 50 MG/1
TABLET ORAL
Qty: 90 TABLET | Refills: 2 | Status: SHIPPED | OUTPATIENT
Start: 2020-02-17 | End: 2020-04-17

## 2020-02-17 RX ORDER — TRAMADOL HYDROCHLORIDE 50 MG/1
TABLET ORAL
Qty: 90 TABLET | Refills: 2 | Status: SHIPPED | OUTPATIENT
Start: 2020-02-17 | End: 2020-02-17

## 2020-02-17 NOTE — TELEPHONE ENCOUNTER
Rx sent to [email or web address]; initially Tramadol printed as it was defaulted to that.  I re-sent it eRx to [facility].  Kira Grider, DO on 2/17/2020 at 8:19 AM

## 2020-03-04 ENCOUNTER — TRANSFERRED RECORDS (OUTPATIENT)
Dept: HEALTH INFORMATION MANAGEMENT | Facility: OTHER | Age: 55
End: 2020-03-04

## 2020-03-04 LAB — RETINOPATHY: NEGATIVE

## 2020-03-09 DIAGNOSIS — E11.42 DIABETIC POLYNEUROPATHY ASSOCIATED WITH TYPE 2 DIABETES MELLITUS (H): ICD-10-CM

## 2020-03-09 NOTE — TELEPHONE ENCOUNTER
Per pharmacy fax, Dr. Gallagher retired, will you authorize?    Héctor Dawson RN on 3/9/2020 at 4:13 PM

## 2020-03-11 NOTE — TELEPHONE ENCOUNTER
[facility] sent Rx request for the following:   LYRICA 200 MG capsule    Take 1 capsule (200 mg) by mouth 3 times daily     Last Prescription Date:   12/15/2018  Last Fill Qty/Refills:         270, R-4    Last Office Visit:              9/5/19  Future Office visit:           3/25/2020      Call to patient and message left to return call to assess whether he will have enough medication to last until his appointment or if he is having it filled at [facility]. He followed with Neurology - Dr. Gallagher (who is retiring) and was to be seeing Dr. Sharonda Lloyd in the future. Awaiting return call prior to routing.     Sakshi Ovalles RN on 3/11/2020 at 1:46 PM       No return call. Will route to provider for consideration as drug is no on [facility] refill protocol.     Sakshi Ovalles RN on 3/11/2020 at 4:39 PM

## 2020-03-12 RX ORDER — PREGABALIN 200 MG/1
200 CAPSULE ORAL 3 TIMES DAILY
Qty: 270 CAPSULE | Refills: 4 | Status: SHIPPED | OUTPATIENT
Start: 2020-03-12 | End: 2020-09-17

## 2020-03-13 NOTE — TELEPHONE ENCOUNTER
Patient calls back. Chart review indicates Lyrica was filled on 03/12/2020. Patient informed and verbalized understanding and intent to comply. Unable to complete prescription refill per RNMedication Refill Policy.................... Dilma Butler RN ....................  3/13/2020   8:11 AM            03/12/20 0707  Sign  Kira Grider, DO  Reorder from Order: [id number]    03/12/20 0707  Taking Flag Checked  Kira Grider, DO     Outpatient Medication Detail      Disp  Refills  Start  End  MIGUELINA    LYRICA 200 MG capsule  270 capsule  4  3/12/2020   Yes    Sig - Route: Take 1 capsule (200 mg) by mouth 3 times daily - Oral    Sent to pharmacy as: LYRICA 200 MG capsule    Class: E-Prescribe    Order: [id number]    E-Prescribing Status: Receipt confirmed by pharmacy (3/12/2020  7:08 AM CDT)

## 2020-03-19 DIAGNOSIS — E11.65 UNCONTROLLED TYPE 2 DIABETES MELLITUS WITH HYPERGLYCEMIA, WITH LONG-TERM CURRENT USE OF INSULIN (H): ICD-10-CM

## 2020-03-19 DIAGNOSIS — Z79.4 UNCONTROLLED TYPE 2 DIABETES MELLITUS WITH HYPERGLYCEMIA, WITH LONG-TERM CURRENT USE OF INSULIN (H): ICD-10-CM

## 2020-03-20 RX ORDER — PEN NEEDLE, DIABETIC 32GX 5/32"
NEEDLE, DISPOSABLE MISCELLANEOUS
Qty: 400 EACH | Refills: 1 | Status: SHIPPED | OUTPATIENT
Start: 2020-03-20 | End: 2020-10-12

## 2020-03-20 NOTE — TELEPHONE ENCOUNTER
Refill Request for: BD ANN U/F 32G X 4 MM insulin pen needle    Received From: [facility]  Last Written Prescription Date: 11/26/2019 for 400 each and 0 refills  LOV: 9/5/2019 with PCP  Next Appointment: No upcoming office visit on file during initial refill review with PCP  Protocol: Diabetic Supplies Protocol Passed - 03/20/2020 11:11 AM    Prescription approved per [facility] Medication Refill Protocol.      Dana BRAUNN, RN on 3/20/2020 at 11:25 AM

## 2020-04-10 DIAGNOSIS — Z79.4 UNCONTROLLED TYPE 2 DIABETES MELLITUS WITH HYPERGLYCEMIA, WITH LONG-TERM CURRENT USE OF INSULIN (H): ICD-10-CM

## 2020-04-10 DIAGNOSIS — E11.65 UNCONTROLLED TYPE 2 DIABETES MELLITUS WITH HYPERGLYCEMIA, WITH LONG-TERM CURRENT USE OF INSULIN (H): ICD-10-CM

## 2020-04-10 RX ORDER — INSULIN ASPART 100 [IU]/ML
INJECTION, SOLUTION INTRAVENOUS; SUBCUTANEOUS
Qty: 90 ML | Refills: 4 | Status: SHIPPED | OUTPATIENT
Start: 2020-04-10 | End: 2021-01-08

## 2020-04-10 NOTE — TELEPHONE ENCOUNTER
Insulin aspart (NOVOLOG FLEXPEN) 100 UNIT/ML pen   Last Written Prescription Date:  11/21/2019  Last Fill Quantity: 90 ml,   # refills: 2  Last Office Visit: 09/05/2019  Last A1C was on 09/05/2019- 7.2  Future Office visit:       Routing refill request to provider for review/approval   Due for every 6 month OV and A1C     Unable to complete prescription refill per RNMedication Refill Policy.................... Dilma Butler RN ....................  4/10/2020   4:05 PM

## 2020-04-17 ENCOUNTER — TELEPHONE (OUTPATIENT)
Dept: FAMILY MEDICINE | Facility: OTHER | Age: 55
End: 2020-04-17

## 2020-04-17 DIAGNOSIS — M54.50 CHRONIC MIDLINE LOW BACK PAIN, UNSPECIFIED WHETHER SCIATICA PRESENT: Primary | ICD-10-CM

## 2020-04-17 DIAGNOSIS — G62.9 NEUROPATHY: ICD-10-CM

## 2020-04-17 DIAGNOSIS — G89.29 CHRONIC MIDLINE LOW BACK PAIN, UNSPECIFIED WHETHER SCIATICA PRESENT: Primary | ICD-10-CM

## 2020-04-17 RX ORDER — LIDOCAINE PAIN RELIEF 40 MG/1000MG
PATCH TOPICAL
COMMUNITY
Start: 2020-03-24 | End: 2020-04-17

## 2020-04-17 RX ORDER — LIDOCAINE PAIN RELIEF 40 MG/1000MG
PATCH TOPICAL
Qty: 30 PATCH | Refills: 11 | Status: SHIPPED | OUTPATIENT
Start: 2020-04-17 | End: 2021-04-28

## 2020-04-17 NOTE — TELEPHONE ENCOUNTER
Spoke with [facility] and [facility]. Appears patient was out of refills and needed refills on them. Patient is needing refills on his lidocaine patch. Pharmacy states refill request was sent to another provider from another facility to be refilled from the provider whom prescribed the patch. She will check with that provider to see if it was filled.  Lani Mayfield LPN.........................4/17/2020  2:16 PM

## 2020-04-17 NOTE — TELEPHONE ENCOUNTER
Lidocaine Patch- listed as historical. Dutch'd up sig per pharmacy request.   Last Office Visit:  09/05/2019  Future Office visit:       Routing refill request to provider for review/approval.   Unable to complete prescription refill per RNMedication Refill Policy.................... Dilma Butler RN ....................  4/17/2020   3:15 PM

## 2020-05-07 DIAGNOSIS — Z86.39 H/O HYPERKALEMIA: ICD-10-CM

## 2020-05-07 DIAGNOSIS — M54.9 BACK PAIN, UNSPECIFIED BACK LOCATION, UNSPECIFIED BACK PAIN LATERALITY, UNSPECIFIED CHRONICITY: ICD-10-CM

## 2020-05-08 RX ORDER — MELOXICAM 15 MG/1
TABLET ORAL
Qty: 90 TABLET | Refills: 3 | Status: SHIPPED | OUTPATIENT
Start: 2020-05-08 | End: 2021-04-28

## 2020-05-08 NOTE — TELEPHONE ENCOUNTER
Meloxicam (MOBIC) 15 MG tablet  Last Written Prescription Date: 04/18/2019  Last Fill Quantity: 90,   # refills: 3  Last Office Visit: 09/05/2019  Future Office visit:       Routing refill request to provider for review/approval because:  Failed - Blood pressure over 140/90 in past 12 months   BP Readings from Last 3 Encounters:   09/05/19 (!) 156/90   03/21/19 138/74   12/05/18 126/62         Failed - No normal CBC on file in past 12 months   Recent Labs   Lab Test 09/26/18  1017 11/30/17  1058   WBC 7.3  --    GICHWBC  --  8.1   RBC 4.64  --    GICHRBC  --  5.38   HGB 13.9 16.3   HCT 42.3 48.6    242         Failed - No normal serum creatinine on file in past 12 months   Recent Labs   Lab Test 09/05/19  1035   CR 1.39*          Unable to complete prescription refill per RNMedication Refill Policy.................... Dilma Butler RN ....................  5/8/2020   2:40 PM

## 2020-05-28 DIAGNOSIS — K21.9 GASTROESOPHAGEAL REFLUX DISEASE WITHOUT ESOPHAGITIS: ICD-10-CM

## 2020-05-28 NOTE — TELEPHONE ENCOUNTER
"Refill Request for: Omeprazole (PRILOSEC) 20 MG DR capsule    Received From: [facility]  Last Written Prescription Date: 2/17/2020 for 90 capsules and 0 refills  LOV: 3/21/2019 with PCP. Per LOV Note: \"Follow up in 3-4 months.\"  Next Appointment: No upcoming office visit on file during initial refill review with PCP  Protocol: PPI Protocol Passed - 05/28/2020 12:34 PM    Prescription approved per [facility] Medication Refill Protocol.  Limited supply refill for 90 days with no additional refills. Patient is overdue for annual physical, DM recheck, and medication management visit. Letter sent regarding need for appointment.     Dana BRAUNN, RN on 5/28/2020 at 12:41 PM        "

## 2020-05-28 NOTE — LETTER
5/28/2020       Dear Jake Bermudez,    A refill of Omeprazole (Prilosec) has been requested by your pharmacy: [facility] - [place], [place]. We noticed that you are overdue for an annual exam, diabetes recheck, and annual medication management visit. Your last comprehensive visit with Dr. Kira Grider was on March 21, 2019.    This refill request has been approved for a limited 90-day supply at this time. To continue to receive medication refills an office visit with your provider is needed. Your health is very important to us. Please call the clinic at [phone] to schedule your appointment.    Thank you for choosing [facility] and [facility] for your health care needs.      Sincerely,      Refill RN  [facility]

## 2020-06-16 DIAGNOSIS — E11.65 UNCONTROLLED TYPE 2 DIABETES MELLITUS WITH HYPERGLYCEMIA, WITH LONG-TERM CURRENT USE OF INSULIN (H): ICD-10-CM

## 2020-06-16 DIAGNOSIS — Z79.4 UNCONTROLLED TYPE 2 DIABETES MELLITUS WITH HYPERGLYCEMIA, WITH LONG-TERM CURRENT USE OF INSULIN (H): ICD-10-CM

## 2020-06-16 NOTE — LETTER
June 18, 2020      Jake Bermudez  [address] [address] [address]        Dear Jake,     A refill request was received from your pharmacy for Lantus.    Additional refills require an office visit with Dr. Grider for diabetic follow up and labs.    Please call [phone] to schedule appointment.      Sincerely,      Refill Nurse

## 2020-06-17 DIAGNOSIS — F41.1 GAD (GENERALIZED ANXIETY DISORDER): ICD-10-CM

## 2020-06-18 RX ORDER — INSULIN GLARGINE 100 [IU]/ML
INJECTION, SOLUTION SUBCUTANEOUS
Qty: 45 ML | Refills: 0 | Status: SHIPPED | OUTPATIENT
Start: 2020-06-18 | End: 2020-07-14

## 2020-06-18 NOTE — TELEPHONE ENCOUNTER
Routing refill request to provider for review/approval because:   HgbA1C in past 3 or 6 months Protocol Details    Recent (6 mo) or future (30 days) visit within the authorizing provider's specialty      LOV: 9/5/2019  Patient was to follow up in 3-4 months  Letter sent    Maria G Maza RN on 6/18/2020 at 11:05 AM

## 2020-06-18 NOTE — TELEPHONE ENCOUNTER
Routing refill request to provider for review/approval because:  Drug not on the [facility] refill protocol     LOV: 9/5/2019  Maria G Maza RN on 6/18/2020 at 3:39 PM

## 2020-06-19 DIAGNOSIS — Z79.4 UNCONTROLLED TYPE 2 DIABETES MELLITUS WITH HYPERGLYCEMIA, WITH LONG-TERM CURRENT USE OF INSULIN (H): ICD-10-CM

## 2020-06-19 DIAGNOSIS — E11.65 UNCONTROLLED TYPE 2 DIABETES MELLITUS WITH HYPERGLYCEMIA, WITH LONG-TERM CURRENT USE OF INSULIN (H): ICD-10-CM

## 2020-06-19 DIAGNOSIS — K21.9 GASTROESOPHAGEAL REFLUX DISEASE WITHOUT ESOPHAGITIS: ICD-10-CM

## 2020-06-19 RX ORDER — CLONAZEPAM 0.5 MG/1
TABLET ORAL
Qty: 30 TABLET | Refills: 0 | Status: SHIPPED | OUTPATIENT
Start: 2020-06-19 | End: 2020-07-14

## 2020-06-19 NOTE — LETTER
June 22, 2020      Jake Bermudez  [address] [address] [address]        Dear Jake,     A refill request was received from your pharmacy for test strips.    Additional refills require an office visit with Dr. Grider for annual review and labs.    Please call [phone] to schedule appointment.        Sincerely,    Refill Nurse

## 2020-06-22 RX ORDER — BLOOD SUGAR DIAGNOSTIC
STRIP MISCELLANEOUS
Qty: 400 EACH | Refills: 0 | Status: SHIPPED | OUTPATIENT
Start: 2020-06-22 | End: 2020-07-14

## 2020-06-22 NOTE — TELEPHONE ENCOUNTER
Omeprazole refilled on 5/28/2020 #90 to [facility]  Prescription approved per [facility] Refill Protocol.  LOV: 9/5/2019  Patient is due for annual review and labs.  Letter sent.    Maria G Maza RN on 6/22/2020 at 10:12 AM

## 2020-07-14 ENCOUNTER — OFFICE VISIT (OUTPATIENT)
Dept: FAMILY MEDICINE | Facility: OTHER | Age: 55
End: 2020-07-14
Attending: FAMILY MEDICINE
Payer: COMMERCIAL

## 2020-07-14 VITALS
BODY MASS INDEX: 43.95 KG/M2 | HEIGHT: 70 IN | TEMPERATURE: 98.6 F | RESPIRATION RATE: 20 BRPM | HEART RATE: 84 BPM | WEIGHT: 307 LBS | SYSTOLIC BLOOD PRESSURE: 158 MMHG | DIASTOLIC BLOOD PRESSURE: 86 MMHG

## 2020-07-14 DIAGNOSIS — N18.30 CKD (CHRONIC KIDNEY DISEASE) STAGE 3, GFR 30-59 ML/MIN (H): ICD-10-CM

## 2020-07-14 DIAGNOSIS — E11.65 UNCONTROLLED TYPE 2 DIABETES MELLITUS WITH HYPERGLYCEMIA, WITH LONG-TERM CURRENT USE OF INSULIN (H): ICD-10-CM

## 2020-07-14 DIAGNOSIS — Z79.4 UNCONTROLLED TYPE 2 DIABETES MELLITUS WITH HYPERGLYCEMIA, WITH LONG-TERM CURRENT USE OF INSULIN (H): ICD-10-CM

## 2020-07-14 DIAGNOSIS — E66.01 CLASS 3 SEVERE OBESITY DUE TO EXCESS CALORIES WITH SERIOUS COMORBIDITY AND BODY MASS INDEX (BMI) OF 40.0 TO 44.9 IN ADULT (H): ICD-10-CM

## 2020-07-14 DIAGNOSIS — G62.9 NEUROPATHY: ICD-10-CM

## 2020-07-14 DIAGNOSIS — E78.5 HYPERLIPIDEMIA, UNSPECIFIED HYPERLIPIDEMIA TYPE: ICD-10-CM

## 2020-07-14 DIAGNOSIS — F41.1 GAD (GENERALIZED ANXIETY DISORDER): ICD-10-CM

## 2020-07-14 DIAGNOSIS — J98.4 RESTRICTIVE LUNG DISEASE: ICD-10-CM

## 2020-07-14 DIAGNOSIS — E66.813 CLASS 3 SEVERE OBESITY DUE TO EXCESS CALORIES WITH SERIOUS COMORBIDITY AND BODY MASS INDEX (BMI) OF 40.0 TO 44.9 IN ADULT (H): ICD-10-CM

## 2020-07-14 DIAGNOSIS — M54.50 CHRONIC BILATERAL LOW BACK PAIN WITHOUT SCIATICA: Primary | ICD-10-CM

## 2020-07-14 DIAGNOSIS — K21.9 GASTROESOPHAGEAL REFLUX DISEASE WITHOUT ESOPHAGITIS: ICD-10-CM

## 2020-07-14 DIAGNOSIS — I10 BENIGN ESSENTIAL HYPERTENSION: ICD-10-CM

## 2020-07-14 DIAGNOSIS — M51.369 DDD (DEGENERATIVE DISC DISEASE), LUMBAR: ICD-10-CM

## 2020-07-14 DIAGNOSIS — G89.29 CHRONIC BILATERAL LOW BACK PAIN WITHOUT SCIATICA: Primary | ICD-10-CM

## 2020-07-14 DIAGNOSIS — E78.5 HYPERLIPIDEMIA LDL GOAL <100: ICD-10-CM

## 2020-07-14 LAB
ALBUMIN SERPL-MCNC: 4.4 G/DL (ref 3.5–5.7)
ALP SERPL-CCNC: 56 U/L (ref 34–104)
ALT SERPL W P-5'-P-CCNC: 35 U/L (ref 7–52)
ANION GAP SERPL CALCULATED.3IONS-SCNC: 7 MMOL/L (ref 3–14)
AST SERPL W P-5'-P-CCNC: 25 U/L (ref 13–39)
BILIRUB SERPL-MCNC: 0.3 MG/DL (ref 0.3–1)
BUN SERPL-MCNC: 24 MG/DL (ref 7–25)
CALCIUM SERPL-MCNC: 9.5 MG/DL (ref 8.6–10.3)
CHLORIDE SERPL-SCNC: 97 MMOL/L (ref 98–107)
CHOLEST SERPL-MCNC: 162 MG/DL
CO2 SERPL-SCNC: 33 MMOL/L (ref 21–31)
CREAT SERPL-MCNC: 1.35 MG/DL (ref 0.7–1.3)
GFR SERPL CREATININE-BSD FRML MDRD: 55 ML/MIN/{1.73_M2}
GLUCOSE SERPL-MCNC: 311 MG/DL (ref 70–105)
HBA1C MFR BLD: 7.7 % (ref 4–6)
HDLC SERPL-MCNC: 31 MG/DL (ref 23–92)
LDLC SERPL CALC-MCNC: ABNORMAL MG/DL
NONHDLC SERPL-MCNC: 131 MG/DL
POTASSIUM SERPL-SCNC: 4.7 MMOL/L (ref 3.5–5.1)
PROT SERPL-MCNC: 7.5 G/DL (ref 6.4–8.9)
SODIUM SERPL-SCNC: 137 MMOL/L (ref 134–144)
TRIGL SERPL-MCNC: 424 MG/DL

## 2020-07-14 PROCEDURE — 80053 COMPREHEN METABOLIC PANEL: CPT | Mod: ZL | Performed by: FAMILY MEDICINE

## 2020-07-14 PROCEDURE — 80061 LIPID PANEL: CPT | Mod: ZL | Performed by: FAMILY MEDICINE

## 2020-07-14 PROCEDURE — 36415 COLL VENOUS BLD VENIPUNCTURE: CPT | Mod: ZL | Performed by: FAMILY MEDICINE

## 2020-07-14 PROCEDURE — 83036 HEMOGLOBIN GLYCOSYLATED A1C: CPT | Mod: ZL | Performed by: FAMILY MEDICINE

## 2020-07-14 PROCEDURE — 99214 OFFICE O/P EST MOD 30 MIN: CPT | Performed by: FAMILY MEDICINE

## 2020-07-14 PROCEDURE — G0463 HOSPITAL OUTPT CLINIC VISIT: HCPCS

## 2020-07-14 PROCEDURE — 82043 UR ALBUMIN QUANTITATIVE: CPT | Mod: ZL | Performed by: FAMILY MEDICINE

## 2020-07-14 RX ORDER — SIMVASTATIN 40 MG
40 TABLET ORAL AT BEDTIME
Qty: 90 TABLET | Refills: 4 | Status: SHIPPED | OUTPATIENT
Start: 2020-07-14 | End: 2021-05-01

## 2020-07-14 RX ORDER — HYDROCHLOROTHIAZIDE 25 MG/1
25 TABLET ORAL DAILY
Qty: 90 TABLET | Refills: 4 | Status: SHIPPED | OUTPATIENT
Start: 2020-07-14 | End: 2021-05-01

## 2020-07-14 RX ORDER — CLONAZEPAM 0.5 MG/1
0.5 TABLET ORAL AT BEDTIME
Qty: 30 TABLET | Refills: 5 | Status: SHIPPED | OUTPATIENT
Start: 2020-07-14 | End: 2021-01-20

## 2020-07-14 RX ORDER — LANCETS
EACH MISCELLANEOUS
Qty: 408 EACH | Refills: 3 | Status: SHIPPED | OUTPATIENT
Start: 2020-07-14 | End: 2021-04-01

## 2020-07-14 RX ORDER — ALBUTEROL SULFATE 90 UG/1
2 AEROSOL, METERED RESPIRATORY (INHALATION) EVERY 4 HOURS PRN
Qty: 1 INHALER | Refills: 11 | Status: SHIPPED | OUTPATIENT
Start: 2020-07-14 | End: 2021-05-01

## 2020-07-14 RX ORDER — LISINOPRIL 30 MG/1
30 TABLET ORAL DAILY
Qty: 90 TABLET | Refills: 4 | Status: SHIPPED | OUTPATIENT
Start: 2020-07-14 | End: 2021-05-01

## 2020-07-14 RX ORDER — BLOOD SUGAR DIAGNOSTIC
STRIP MISCELLANEOUS
Qty: 400 EACH | Refills: 0 | Status: SHIPPED | OUTPATIENT
Start: 2020-07-14 | End: 2021-02-05

## 2020-07-14 ASSESSMENT — ENCOUNTER SYMPTOMS
CHOKING: 0
DIZZINESS: 0
CHILLS: 0
LIGHT-HEADEDNESS: 0
HEADACHES: 0
CHEST TIGHTNESS: 0
COUGH: 0
APPETITE CHANGE: 0
FATIGUE: 0
WOUND: 0
FEVER: 0
ACTIVITY CHANGE: 0

## 2020-07-14 ASSESSMENT — PAIN SCALES - GENERAL: PAINLEVEL: NO PAIN (0)

## 2020-07-14 ASSESSMENT — PATIENT HEALTH QUESTIONNAIRE - PHQ9: SUM OF ALL RESPONSES TO PHQ QUESTIONS 1-9: 15

## 2020-07-14 ASSESSMENT — MIFFLIN-ST. JEOR: SCORE: 2233.79

## 2020-07-14 NOTE — NURSING NOTE
"Chief Complaint   Patient presents with     Diabetes       Initial BP (!) 154/88   Pulse 84   Temp 98.6  F (37  C) (Tympanic)   Resp 20   Ht 1.778 m (5' 10\")   Wt 139.3 kg (307 lb)   BMI 44.05 kg/m   Estimated body mass index is 44.05 kg/m  as calculated from the following:    Height as of this encounter: 1.778 m (5' 10\").    Weight as of this encounter: 139.3 kg (307 lb).  Medication Reconciliation: complete    Adri Hernandez LPN     Previous A1C is at goal of <8  Lab Results   Component Value Date    A1C 7.2 09/05/2019    A1C 7.2 03/21/2019    A1C 8.0 09/26/2018    A1C 9.1 06/27/2018     Urine microalbumin:creatine: 6/27/18  Foot exam due  Eye exam 3/04/2020    Tobacco User no  Patient is on a daily aspirin  Patient is on a Statin.  Blood pressure today of:     BP Readings from Last 1 Encounters:   07/14/20 (!) 154/88      is not at the goal of <139/89 for diabetics.    Adri Hernandez LPN on 7/14/2020 at 2:48 PM      "

## 2020-07-14 NOTE — PROGRESS NOTES
"Nursing Notes:   Adri Hernandez LPN  7/14/2020  2:49 PM  Sign at exiting of workspace  Chief Complaint   Patient presents with     Diabetes     Initial BP (!) 154/88   Pulse 84   Temp 98.6  F (37  C) (Tympanic)   Resp 20   Ht 1.778 m (5' 10\")   Wt 139.3 kg (307 lb)   BMI 44.05 kg/m   Estimated body mass index is 44.05 kg/m  as calculated from the following:    Height as of this encounter: 1.778 m (5' 10\").    Weight as of this encounter: 139.3 kg (307 lb).  Medication Reconciliation: complete  Adri Hernandez LPN     Previous A1C is at goal of <8  Lab Results   Component Value Date    A1C 7.2 09/05/2019    A1C 7.2 03/21/2019    A1C 8.0 09/26/2018    A1C 9.1 06/27/2018     Urine microalbumin:creatine: 6/27/18  Foot exam due  Eye exam 3/04/2020    Tobacco User no  Patient is on a daily aspirin  Patient is on a Statin.  Blood pressure today of:  BP Readings from Last 1 Encounters:   07/14/20 (!) 154/88    is not at the goal of <139/89 for diabetics.  Adri Hernandez LPN on 7/14/2020 at 2:48 PM      SUBJECTIVE:   Jake Bermudez is a 55 year old male who presents to clinic today for the following health issues:    CHRISTOPHER Tolentino is here with his wife for medication visit/diabetic check.  He continues to check his blood sugar 3-4 times per day.  However he is not able to tell me what his blood sugars run.  He has a sense they are higher than previous, but are not \"too bad.\"  He denies any symptoms of dizziness, shakiness, diaphoresis, or other symptoms of hypoglycemia.  He denies any chest pain, shortness of breath.  He is up-to-date on his eye exam, completing this in March 2020.  He continues to have severe neuropathy for which he is following with neurology, and utilizes tramadol, Lyrica, Mobic.  He has a sedentary lifestyle, and continues to be morbidly obese.    It is noted that his blood pressure remains elevated today.  He has had blood pressures in the 150/80 range in the past.  However they have persisted till " today.  He does not check his blood pressure at home.  He is currently on lisinopril 20 mg p.o. daily.  He denies any difficulty taking his medications, or missing significant number of doses.    He has had a significant increase in his back pain within the last one year.  Difficulty standing at times for more than 5 minutes.  Worst motion is bending over and getting back up.  Body habitus also limits this ability.  Waxes and wanes based on the day.  Already taking tramadol, mobic, lyrica.  Has not tried much for topical treatments; limited by cost.  No bowel or bladder changes or incontinence, no saddle paresthesias/numbness.  Most of pain is located in midline/paraspinal musculature of mid to low back.  No significant radiation.  Wife is interested in a shower chair/seat, as she has had to help him while he lifts his leg up and over the tub after standing in the shower for ~5-10 minutes.  Due to his body habitus; this could pose a danger to her and him if he were to fall.      Patient Active Problem List    Diagnosis Date Noted     Umbilical hernia with obstruction, without gangrene 10/31/2018     Priority: Medium     PFS (patellofemoral syndrome) 06/28/2018     Priority: Medium     Secondary to quadriceps weakness; referred to [facility] therapy by Bennie Palacios, 6/2018.       Restrictive lung disease 03/07/2018     Priority: Medium     Nocturnal hypoxia 03/07/2018     Priority: Medium     Family history of ischemic heart disease 02/01/2018     Priority: Medium     Acquired keratoderma 02/01/2018     Priority: Medium     Enthesopathy of ankle and tarsus 02/01/2018     Priority: Medium     ARMANDO (obstructive sleep apnea) 01/17/2018     Priority: Medium     Overview:   Treated with NOC O2       Lung nodule < 6cm on CT 12/01/2017     Priority: Medium     Overview:   Seen on 11/30/2017; repeat in one year for follow up.       Spondylosis of lumbar region without myelopathy or radiculopathy 10/19/2017     Priority:  Medium     CKD (chronic kidney disease) stage 3, GFR 30-59 ml/min (H) 09/11/2016     Priority: Medium     Overview:   Secondary to DM2  Overview:   Secondary to DM2       PTSD (post-traumatic stress disorder) 09/08/2016     Priority: Medium     Diabetic polyneuropathy associated with type 2 diabetes mellitus (H) 01/20/2016     Priority: Medium     Diabetic, retinopathy, proliferative (H) 07/01/2014     Priority: Medium     Overview:   Mild proliferative changes on left side (6/2014)--Dr Ortiz  No evidence of retinopathy, but early cataract formation b/l (1/2015) - Dr. Rishi Jacobs       Obesity (BMI 30-39.9) 06/29/2014     Priority: Medium     Lateral epicondylitis of left elbow 10/03/2013     Priority: Medium     Overview:   Seen by Dr Tayla Jung 8/2013       Neuropathy 04/09/2013     Priority: Medium     Overview:   Follows with Dr. Gallagher, Neurology at [facility] on gabapentin.  Overview:   Follows with Dr. Gallagher, Neurology at [facility] gabapentin.       Impotence of organic origin 07/16/2012     Priority: Medium     Overview:   D/C cymbalta       Other symptoms referable to upper arm joint 07/16/2012     Priority: Medium     Overview:   mass below elbow Lt       Personal history of other diseases of circulatory system 07/02/2012     Priority: Medium     Backache 04/03/2012     Priority: Medium     Corns and callosities 03/02/2012     Priority: Medium     Personal history of tobacco use, presenting hazards to health 01/31/2012     Priority: Medium     Acute respiratory failure with hypoxia (H) 12/24/2011     Priority: Medium     Atelectasis of right lung 12/24/2011     Priority: Medium     Community acquired bacterial pneumonia 12/24/2011     Priority: Medium     GERD (gastroesophageal reflux disease) 12/24/2011     Priority: Medium     Hyperlipidemia 12/24/2011     Priority: Medium     Moderate cigarette smoker (10-19 per day) 12/24/2011     Priority: Medium     Obesity 12/24/2011     Priority:  Medium     Overview:   BMI is 34.3.       Rotator cuff syndrome of left shoulder 2011     Priority: Medium     Overview:   S/P Left rotator cuff repair 2011.  S/P Right rotator cuff repair 2010.       Dyslipidemia 2011     Priority: Medium     Uncontrolled type 2 diabetes mellitus with hyperglycemia, with long-term current use of insulin (H) 2011     Priority: Medium     Other, multiple and ill-defined closed fractures of lower limb 2010     Priority: Medium     Shoulder pain 2010     Priority: Medium     Past Medical History:   Diagnosis Date     Empyema (H) 2012    Overview:  Rt lung 11     Lateral epicondylitis of left elbow     10/3/2013,Seen by Dr Tayla Reyes [facility] 2013     On mechanically assisted ventilation (H) 1/3/2012    Overview:  IMO Update  Overview:  IMO Update     Type 2 diabetes mellitus with proliferative retinopathy without macular edema (H) 2014    Mild proliferative changes on left side--sees Dr Ortiz exam 2014.        Past Surgical History:   Procedure Laterality Date     ARTHROSCOPY SHOULDER      [id number],SHOULDER SURGERY,Bilateral     CHEST TUBE INSERTION      [id number],CHEST TUBE INSERTION     HERNIORRHAPHY UMBILICAL N/A 2018    Procedure: Umbilical hernia Repair with Mesh;  Surgeon: Mason Rhodes MD;  Location: [facility] OR     LUNG SURGERY      [id number],LUNG SURGERY,pneumonia     TRACHEOSTOMY      [id number],[facility]CHG TRACH PR1,history trach with pneumonia     Family History   Problem Relation Age of Onset     Heart Disease Other         multiple males     Diabetes Other      Cancer Father         Stomach CA     Colon Cancer Paternal Uncle      Colon Cancer Paternal Grandfather      Social History     Tobacco Use     Smoking status: Former Smoker     Packs/day: 0.50     Years: 28.00     Pack years: 14.00     Types: Cigarettes     Last attempt to quit: 2011     Years since quittin.5     Smokeless tobacco: Former User     Types:  Clarisa     Quit date: 9/5/1988   Substance Use Topics     Alcohol use: No     Social History     Social History Narrative    Alcohol Use - no    2 children  unemployed 10 1/2 % disability for back injury  Patient was a former smoker.  1/2 PPD     Current Outpatient Medications   Medication Sig Dispense Refill     ADVAIR DISKUS 250-50 MCG/DOSE inhaler INHALE 1 PUFF INTO LUNGS TWICE DAILY 3 Inhaler 0     albuterol (PROAIR HFA/PROVENTIL HFA/VENTOLIN HFA) 108 (90 Base) MCG/ACT inhaler Inhale 2 puffs into the lungs every 4 hours as needed for shortness of breath / dyspnea or wheezing 1 Inhaler 11     alcohol swab prep pads Use to swab area of injection/france as directed. 100 each 6     Aspirin (ASPIR-81 PO) Take 81 mg by mouth daily with food       BD ANN U/F 32G X 4 MM insulin pen needle USE AS DIRECTED 4 TIMES DAILY FOR  ADMINISTERING  INSULIN  AT  HOME. 400 each 1     blood glucose (ACCU-CHEK DONAVON PLUS) test strip USE TO CHECK GLUCOSE 4 TIMES DAILY OR AS DIRECTED. DX HYPERGLYCEMIA 400 each 0     blood glucose (ACCU-CHEK DONAVON PLUS) test strip USE  STRIP TO CHECK GLUCOSE 4 TIMES DAILY 400 strip 2     blood glucose (NO BRAND SPECIFIED) lancing device Device to be used with lancets. 1 each 0     blood glucose calibration (ACCU-CHEK DONAVON) solution Use to calibrate blood glucose monitor as needed as directed. 1 Bottle 3     blood glucose monitoring (ACCU-CHEK DONAVON PLUS) meter device kit Use to test blood sugar 4 times daily or as directed.  Dx: hyperglycemia 1 kit 0     blood glucose monitoring (ACCU-CHEK FASTCLIX) lancets USE   TO CHECK GLUCOSE 4 TIMES DAILY 408 each 0     Cholecalciferol (VITAMIN D3) 2000 units TABS TAKE ONE TABLET BY MOUTH ONCE DAILY 100 tablet 2     clonazePAM (KLONOPIN) 0.5 MG tablet TAKE 1 TABLET BY MOUTH AT BEDTIME 30 tablet 0     EQ PAIN RELIEVER 500 MG tablet TAKE 1 TABLET BY MOUTH EVERY 6 HOURS AS NEEDED .  MAX  ACETAMINOPHEN  DOSE  4000  MG  IN  24  HOURS. 200 tablet 4     FLUoxetine  (PROZAC) 20 MG capsule TAKE 1 CAPSULE BY MOUTH ONCE DAILY IN THE MORNING 90 capsule 4     fluticasone (FLOVENT HFA) 110 MCG/ACT inhaler Inhale 1 puff into the lungs 2 times daily 3 Inhaler 4     gabapentin (NEURONTIN) 300 MG capsule Take 900 mg by mouth 3 times daily 900 mg TID, 1200 mg at bedtime       hydrochlorothiazide (HYDRODIURIL) 25 MG tablet Take 1 tablet (25 mg) by mouth daily 90 tablet 4     insulin aspart (NOVOLOG FLEXPEN) 100 UNIT/ML pen Use 60-70units subcutaneous with each meal based on insulin:carb ratio.  Maximum: 240 units per day. 90 mL 4     insulin aspart (NOVOLOG FLEXPEN) 100 UNIT/ML pen INJECT 60-70 UNITS SUBCUTANEOUSLY WITH EACH MEAL BASED ON INSULIN:CARB RATIO. MAXIMUM  UNITS PER DAY 90 mL 2     LANTUS SOLOSTAR 100 UNIT/ML soln INJECT 175 UNITS SUBCUTANEOUSLY AT BEDTIME 45 mL 0     LIDOCAINE PAIN RELIEF 4 % Patch Apply one patch topically to clean, dry skin. Leave on for 12 hours then remove. Must wait at least 12 hours before applying patch again 30 patch 11     lisinopril (PRINIVIL/ZESTRIL) 20 MG tablet Take 1 tablet (20 mg) by mouth daily 90 tablet 4     LYRICA 200 MG capsule Take 1 capsule (200 mg) by mouth 3 times daily 270 capsule 4     melatonin 5 MG tablet Take 1-2 tablets (5-10 mg) by mouth At Bedtime 180 tablet 3     meloxicam (MOBIC) 15 MG tablet Take 1 tablet by mouth once daily with food 90 tablet 3     methocarbamol (ROBAXIN) 750 MG tablet TAKE 1 TABLET BY MOUTH THREE TIMES DAILY 90 tablet 10     mirtazapine (REMERON) 15 MG tablet Take 1 tablet (15 mg) by mouth At Bedtime       omeprazole (PRILOSEC) 20 MG DR capsule TAKE 1 CAPSULE BY MOUTH ONCE DAILY BEFORE A MEAL 90 capsule 0     order for DME Oxygen for home use. LPM: 1/min via nasal cannula. Frequency of use: Continuous with portability; Length of need: 12 mos.       RELION ALCOHOL SWABS 70 % PADS USE TO SWAB AREA OF INJECTION/RAY AS DIRECTED 100 each 6     simvastatin (ZOCOR) 20 MG tablet Take 1 tablet (20 mg) by  "mouth At Bedtime 90 tablet 4     traMADol (ULTRAM) 50 MG tablet Take 1 tablet by mouth three times daily as needed 90 tablet 2     umeclidinium-vilanterol (ANORO ELLIPTA) 62.5-25 MCG/INH oral inhaler Inhale 1 puff into the lungs       Allergies   Allergen Reactions     Penicillins      Other reaction(s): Diaphoresis     Piperacillin-Tazobactam In D5w Other (See Comments)     Other reaction(s): Diaphoresis, Fever  Fever while on zosyn, vanc. No rash, no wbc elevation     Vancomycin Other (See Comments)     Fever while on zosyn, vanc, no rash or WBC elevation  Other reaction(s): Diaphoresis, Fever  Fever while on zosyn, vanc, no rash or WBC elevation     Zosyn Other (See Comments)     Fever while on zosyn, vanc. No rash, no wbc elevation  Fever while on zosyn, vanc. No rash, no wbc elevation     Review of Systems   Constitutional: Negative for activity change, appetite change, chills, fatigue and fever.   Respiratory: Negative for cough, choking and chest tightness.    Cardiovascular: Negative for chest pain.   Skin: Negative for rash and wound.   Neurological: Negative for dizziness, light-headedness and headaches.   All other systems reviewed and are negative.     OBJECTIVE:     BP (!) 158/86   Pulse 84   Temp 98.6  F (37  C) (Tympanic)   Resp 20   Ht 1.778 m (5' 10\")   Wt 139.3 kg (307 lb)   BMI 44.05 kg/m    Body mass index is 44.05 kg/m .  Physical Exam  Vitals signs and nursing note reviewed.   Constitutional:       Appearance: Normal appearance. He is obese.   HENT:      Head: Normocephalic and atraumatic.      Right Ear: Tympanic membrane and ear canal normal.      Left Ear: Tympanic membrane and ear canal normal.      Nose: Nose normal.   Neck:      Musculoskeletal: Normal range of motion.   Cardiovascular:      Rate and Rhythm: Normal rate and regular rhythm.      Pulses: Normal pulses.           Dorsalis pedis pulses are 2+ on the right side and 2+ on the left side.        Posterior tibial pulses are " 2+ on the right side and 2+ on the left side.   Pulmonary:      Effort: Pulmonary effort is normal.      Breath sounds: Normal breath sounds.   Musculoskeletal:      Right foot: No deformity or Charcot foot.      Left foot: No deformity or Charcot foot.   Feet:      Right foot:      Protective Sensation: 3 sites tested. 6 sites sensed.      Skin integrity: Skin integrity normal.      Left foot:      Protective Sensation: 3 sites tested. 6 sites sensed.      Skin integrity: Skin integrity normal.   Skin:     General: Skin is warm.      Capillary Refill: Capillary refill takes less than 2 seconds.   Neurological:      General: No focal deficit present.      Mental Status: He is alert.   Psychiatric:         Mood and Affect: Mood normal.     Diagnostic Test Results:  No results found for any visits on 07/14/20.    ASSESSMENT/PLAN:     1. Restrictive lung disease  Due to body habitus and prior severe lung injury of empyema.  Refilled Advair/albuterol as it has been helpful in the past.  Consider PFTs in future if covid-19 restrictions are lifted.  - albuterol (PROAIR HFA/PROVENTIL HFA/VENTOLIN HFA) 108 (90 Base) MCG/ACT inhaler; Inhale 2 puffs into the lungs every 4 hours as needed for shortness of breath / dyspnea or wheezing  Dispense: 1 Inhaler; Refill: 11  - ADVAIR DISKUS 250-50 MCG/DOSE inhaler; INHALE 1 PUFF INTO LUNGS TWICE DAILY  Dispense: 3 Inhaler; Refill: 4    2. Uncontrolled type 2 diabetes mellitus with hyperglycemia, with long-term current use of insulin (H)  Over goal of 7%; spent a great deal discussing need for movement of his body.  He relayed information that he had improvement in back pain and blood sugar numbers when he was more active.  Previous hemoglobin A1c on same medical regimen was 7.2%.  I believe we can get back there with strict diet and activity focus.  Rajan appears to be motivated to improve upon this.  We discussed importance of 150 minutes of physical activity every week.  Because of  "his diabetes, neuropathy, lung disease, hypertension, hypertriglyceridemia -and BMI of 44.05 today; we did discuss the possibility of a bariatric consult for weight loss surgery.  He declines this stating \"that is not for me.\"  Despite that, benefits of weight loss including improvement in his degenerative disc disease of lumbar spine, blood sugar numbers, hypertension, hypertriglyceridemia are all discussed.  He continues to decline this referral.  Monitoring labs today.  Refills on test strips, lancets and Lantus.  No dosage changes today; re-focus on activity and diety restrictions.  - blood glucose (ACCU-CHEK DONAVON PLUS) test strip; USE TO CHECK GLUCOSE 4 TIMES DAILY OR AS DIRECTED. DX HYPERGLYCEMIA  Dispense: 400 each; Refill: 0  - blood glucose monitoring (ACCU-CHEK FASTCLIX) lancets; Use to test blood sugar 4 times daily or as directed.  Dispense: 408 each; Refill: 3  - insulin glargine (LANTUS SOLOSTAR) 100 UNIT/ML pen; INJECT 175 UNITS SUBCUTANEOUSLY AT BEDTIME  Dispense: 45 mL; Refill: 11  - Albumin Random Urine Quantitative with Creat Ratio  - Lipid Panel  - Hemoglobin A1c  - Comprehensive Metabolic Panel    3. MIRANDA (generalized anxiety disorder)  Chronic, stable; refilled clonazepam at current dose.  Aware of risk - combined use of benzodiazepine and tramadol.  He does not take them together, and will continue to monitor for increased sedation.  Patient is aware, along with his wife.  - clonazePAM (KLONOPIN) 0.5 MG tablet; Take 1 tablet (0.5 mg) by mouth At Bedtime  Dispense: 30 tablet; Refill: 5    4. Benign essential hypertension  Worsened over the last year.  We will resume hydrochlorothiazide at 25 mg p.o. daily this was refilled x1 year.  We will increase his lisinopril from 20 mg to 30 mg p.o. daily.  He will start to check his blood pressure at home a couple times per week.  If he consistently gets readings above 140 systolic or over 90 diastolic, he will notify the clinic for an increase in his " lisinopril to 40 mg p.o. daily.  - hydrochlorothiazide (HYDRODIURIL) 25 MG tablet; Take 1 tablet (25 mg) by mouth daily  Dispense: 90 tablet; Refill: 4  - lisinopril (ZESTRIL) 30 MG tablet; Take 1 tablet (30 mg) by mouth daily  Dispense: 90 tablet; Refill: 4  - Lipid Panel  - Hemoglobin A1c  - Comprehensive Metabolic Panel    5. Gastroesophageal reflux disease without esophagitis  Chronic, stable.  Refilled omeprazole at previous dosing.  Again with weight loss, increased activity, dietary restrictions there is a chance he could get off of this medication as well.  - omeprazole (PRILOSEC) 20 MG DR capsule; TAKE 1 CAPSULE BY MOUTH ONCE DAILY BEFORE A MEAL  Dispense: 90 capsule; Refill: 4    6. Hyperlipidemia LDL goal <100  Triglycerides increasing, we will try to maximize in simvastatin prior to addition of other medication.  Statin 40 mg p.o. daily sent to pharmacy today.  - simvastatin (ZOCOR) 40 MG tablet; Take 1 tablet (40 mg) by mouth At Bedtime  Dispense: 90 tablet; Refill: 4    7. Chronic bilateral low back pain without sciatica  Worsening.  Has tramadol and other adjunctive pain medication treatments available to him.  Encourage topicals.  Also encouraged increased activity.  He is interested in being evaluated for possible spine X therapy with occupational medicine.  This referral was placed today.  Again it was discussed possible bariatric surgery for weight loss, however he declines.  - OCCUPATIONAL MEDICINE REFERRAL  - Misc. Devices (BATH/SHOWER SEAT) MISC; 1 each daily as needed (to assist during ADLs of bathing/grooming)  Dispense: 1 each; Refill: 0    8. DDD (degenerative disc disease), lumbar  See #7.  - OCCUPATIONAL MEDICINE REFERRAL  - Misc. Devices (BATH/SHOWER SEAT) MISC; 1 each daily as needed (to assist during ADLs of bathing/grooming)  Dispense: 1 each; Refill: 0    9. Hyperlipidemia, unspecified hyperlipidemia type  See #6.  - Lipid Panel    10. CKD (chronic kidney disease) stage 3, GFR 30-59  ml/min (H)  Chronic, stable for the last 1+ year.  We will need to monitor with dosage increases today.  - Comprehensive Metabolic Panel    11. Neuropathy  Following with neurology, on Lyrica.  Encouraged improvement in blood sugars, weight loss.  We will plan to order a shower seat to help with mobility within the shower, allow him to sit and maintain energy/strength to get back out of the shower to decrease the risk of fall.  [facility] to [facility].  - Misc. Devices (BATH/SHOWER SEAT) MISC; 1 each daily as needed (to assist during ADLs of bathing/grooming)  Dispense: 1 each; Refill: 0    12. Class 3 severe obesity due to excess calories with serious comorbidity and body mass index (BMI) of 40.0 to 44.9 in adult (H)  Chronic, worsened.  Declines referral to bariatric surgery.  See above for other plan of care.  - Misc. Devices (BATH/SHOWER SEAT) MISC; 1 each daily as needed (to assist during ADLs of bathing/grooming)  Dispense: 1 each; Refill: 0      Kira Grider DO  [facility] [place]

## 2020-07-14 NOTE — PATIENT INSTRUCTIONS
Patient Education     Diet: Diabetes  Food is an important tool that you can use to control diabetes and stay healthy. Eating well-balanced meals in the correct amounts will help you control your blood glucose levels and prevent low blood sugar reactions. It will also help you reduce the health risks of diabetes. There is no one specific diet that is right for everyone with diabetes. But there are general guidelines to follow. A registered dietitian (RD) will create a tailored diet approach that s just right for you. He or she will also help you plan healthy meals and snacks. If you have any questions, call your dietitian for advice.     Guidelines for success  Talk with your healthcare provider before starting a diabetes diet or weight loss program. If you haven't talked with a dietitian yet, ask your provider for a referral. The following guidelines can help you succeed:    Select foods from the 6 food groups below. Your dietitian will help you find food choices within each group. He or she will also show you serving sizes and how many servings you can have at each meal.  ? Grains, beans, and starchy vegetables  ? Vegetables  ? Fruit  ? Milk or yogurt  ? Meat, poultry, fish, or tofu  ? Healthy fats    Check your blood sugar levels as directed by your provider. Take any medicine as prescribed by your provider.    Learn to read food labels and pick the right portion sizes.    Eat only the amount of food in your meal plan. Eat about the same amount of food at regular times each day. Don t skip meals. Eat meals 4 to 5 hours apart, with snacks in between.    Limit alcohol. It raises blood sugar levels. Drink water or calorie-free diet drinks that use safe sweeteners.    Eat less fat to help lower your risk of heart disease. Use nonfat or low-fat dairy products and lean meats. Avoid fried foods. Use cooking oils that are unsaturated, such as olive, canola, or peanut oil.    Talk with your dietitian about safe sugar  substitutes.    Avoid added salt. It can contribute to high blood pressure, which can cause heart disease. People with diabetes already have a risk of high blood pressure and heart disease.    Stay at a healthy weight. If you need to lose weight, cut down on your portion sizes. But don t skip meals. Exercise is an important part of any weight management program. Talk with your provider about an exercise program that s right for you.    For more information about the best diet plan for you, talk with a registered dietitian (RD). To find an RD in your area, contact:  ? Academy of Nutrition and Dietetics www.eatright.org  ? The American Diabetes Association 184-653-8056 www.diabetes.org  Date Last Reviewed: 8/1/2016 2000-2019 [employer]. [address] [place] [address], [place] [address]. All rights reserved. This information is not intended as a substitute for professional medical care. Always follow your healthcare professional's instructions.      Patient Education     Diabetes: Activity Tips    Being more active can help you manage your diabetes. The tips on this sheet can help you get the most from your exercise. They can also help you stay safe.  Staying active  It s important for adults to spend less time sitting and being inactive. This is especially true if you have type 2 diabetes. When you are sitting for long periods of time, get up for short sessions of light activity every 30 minutes.  Aim for at least 150 minutes a week of exercise or physical activity. That's about 30 minutes a day 5 to 7 days a week. Don t let more than 2 days go by without being active. Break up your daily activity into 10-minute blocks. Or choose a daily schedule that works for you. Make your goal 30 minutes of daily physical activity.  Benefit from briskness  Brisk activity gets your heart beating faster. This can help make you more fit, lose extra weight, and manage your blood sugar level. Try brisk walking. Or try  swimming or bike riding if you have foot or leg problems. You can break up your exercise into chunks throughout the day. Work up to at least 30 minutes of steady, brisk exercise on most days.  Warm up and cool down  Warming up and cooling down reduce your risk for injury. This also helps limit muscle soreness. Do a mild version of your activity for 5 minutes before and after your routine. You can also learn stretches that will help keep your muscles loose. Your healthcare provider may show you good ways to warm up and stretch.  Do the talk-sing test  The talk-sing test is a simple way to tell how hard you re exercising. If you can talk while exercising, you re in a safe range. If you re out of breath, slow down. If you can carry a tune, it s time to  the pace. Walk up a hill. Add more resistance on your stationary bike. Or swim faster.  What about eating?  You may be told to plan your exercise for 1 to 2 hours after a meal. In most cases, you don t need to eat while being active. Test your blood sugar before exercising if you take insulin or medicine that can cause low blood sugar. And carry a fast-acting sugar that will raise your blood sugar level quickly. This includes glucose tablets or glucose gel in a tube. Use it if you feel low blood sugar symptoms.  Safety tips  These tips can help you stay safe as you become fit:    Exercise with a friend or carry a cell phone if you have one.    Carry or wear ID such as a necklace or bracelet that says you have diabetes.    Use the correct footwear and safety equipment for your activity.    Drink water before, during, and after exercise.    Dress for the weather.    Don t exercise in very hot or very cold weather.    Don t exercise if you are sick.    Test your blood sugar before and after you exercise if you are told to do so. Have a small carbohydrate snack if your blood sugar is low before you start exercising.   When to stop exercising and call your healthcare  provider  Stop exercising and call your healthcare provider right away if you notice any of the following:    Pain, pressure, tightness, or heaviness in the chest    Pain or heaviness in the neck, shoulders, back, arms, legs, or feet    Unusual shortness of breath    Dizziness or lightheadedness    Unusually rapid or slow pulse    Increased joint or muscle pain    Nausea or vomiting  Date Last Reviewed: 11/1/2018 2000-2019 [employer]. [address] [place] [address], [place] [address]. All rights reserved. This information is not intended as a substitute for professional medical care. Always follow your healthcare professional's instructions.         Patient Education     Exercises to Strengthen Your Lower Back  Strong lower back and abdominal muscles work together to support your spine. The exercises below will help strengthen the lower back. It is important that you begin exercising slowly and increase levels gradually.  Always begin any exercise program with stretching. If you feel pain while doing any of these exercises, stop and talk to your doctor about a more specific exercise program that better suits your condition.   Low back stretch  The point of stretching is to make you more flexible and increase your range of motion. Stretch only as much as you are able. Stretch slowly. Do not push your stretch to the limit. If at any point you feel pain while stretching, this is your (temporary) limit.    Lie on your back with your knees bent and both feet on the ground.    Slowly raise your left knee to your chest as you flatten your lower back against the floor. Hold for 5 seconds.    Relax and repeat the exercise with your right knee.    Do 10 of these exercises for each leg.    Repeat hugging both knees to your chest at the same time.  Building lower back strength  Start your exercise routine with 10 to 30 minutes a day, 1 to 3 times a day.  Initial exercises  Lying on your back:  1. Ankle pumps: Move  your foot up and down, towards your head, and then away. Repeat 10 times with each foot.  2. Heel slides: Slowly bend your knee, drawing the heel of your foot towards you. Then slide your heel/foot from you, straightening your knee. Do not lift your foot off the floor (this is not a leg lift).  3. Abdominal contraction: Bend your knees and put your hands on your stomach. Tighten your stomach muscles. Hold for 5 seconds, then relax. Repeat 10 times.  4. Straight leg raise: Bend one leg at the knee and keep the other leg straight. Tighten your stomach muscles. Slowly lift your straight leg 6 to 12 inches off the floor and hold for up to 5 seconds. Repeat 10 times on each side.  Standin. Wall squats: Stand with your back against the wall. Move your feet about 12 inches away from the wall. Tighten your stomach muscles, and slowly bend your knees until they are at about a 45 degree angle. Do not go down too far. Hold about 5 seconds. Then slowly return to your starting position. Repeat 10 times.  2. Heel raises: Stand facing the wall. Slowly raise the heels of your feet up and down, while keeping your toes on the floor. If you have trouble balancing, you can touch the wall with your hands. Repeat 10 times.  More advanced exercises  When you feel comfortable enough, try these exercises.  1. Kneeling lumbar extension: Begin on your hands and knees. At the same time, raise and straighten your right arm and left leg until they are parallel to the ground. Hold for 2 seconds and come back slowly to a starting position. Repeat with left arm and right leg, alternating 10 times.  2. Prone lumbar extension: Lie face down, arms extended overhead, palms on the floor. At the same time, raise your right arm and left leg as high as comfortably possible. Hold for 10 seconds and slowly return to start. Repeat with left arm and right leg, alternating 10 times. Gradually build up to 20 times. (Advanced: Repeat this exercise raising  both arms and both legs a few inches off the floor at the same time. Hold for 5 seconds and release.)  3. Pelvic tilt: Lie on the floor on your back with your knees bent at 90 degrees. Your feet should be flat on the floor. Inhale, exhale, then slowly contract your abdominal muscles bringing your navel toward your spine. Let your pelvis rock back until your lower back is flat on the floor. Hold for 10 seconds while breathing smoothly.  4. Abdominal crunch: Perform a pelvic tilt (above) flattening your lower back against the floor. Holding the tension in your abdominal muscles, take another breath and raise your shoulder blades off the ground (this is not a full sit-up). Keep your head in line with your body (don t bend your neck forward). Hold for 2 seconds, then slowly lower.  Date Last Reviewed: 6/1/2016 2000-2019 [employer]. [address] [place] [address], [place] [address]. All rights reserved. This information is not intended as a substitute for professional medical care. Always follow your healthcare professional's instructions.

## 2020-07-14 NOTE — LETTER
July 17, 2020      Jake Bermudez  [id number]  [facility] [address]        Dear ,    We are writing to inform you of your test results.    Your cholesterol is up; therefore I'm glad we increased your medication at the appointment.  Your hemoglobin a1c level is up, and I need you to work on movement/exercise to help your overall health and pain levels.  All the rest are relatively stable!  I would like to see you every ~4-6 months for diabetic checks and monitoring some of these labs.      Resulted Orders   Albumin Random Urine Quantitative with Creat Ratio   Result Value Ref Range    Creatinine Urine 140 mg/dL    Albumin Urine mg/L 105 mg/L    Albumin Urine mg/g Cr 75.00 (H) 0 - 17 mg/g Cr   Lipid Panel   Result Value Ref Range    Cholesterol 162 <200 mg/dL    Triglycerides 424 (H) <150 mg/dL      Comment:      Borderline high:  150-199 mg/dl  High:             200-499 mg/dl  Very high:       >499 mg/dl      HDL Cholesterol 31 23 - 92 mg/dL    LDL Cholesterol Calculated  <100 mg/dL     Cannot estimate LDL when triglyceride exceeds 400 mg/dL      Comment:      Desirable:       <100 mg/dl    Non HDL Cholesterol 131 (H) <130 mg/dL      Comment:      Above Desirable:  130-159 mg/dl  Borderline high:  160-189 mg/dl  High:             190-219 mg/dl  Very high:       >219 mg/dl     Hemoglobin A1c   Result Value Ref Range    Hemoglobin A1C 7.7 (H) 4.0 - 6.0 %   Comprehensive Metabolic Panel   Result Value Ref Range    Sodium 137 134 - 144 mmol/L    Potassium 4.7 3.5 - 5.1 mmol/L    Chloride 97 (L) 98 - 107 mmol/L    Carbon Dioxide 33 (H) 21 - 31 mmol/L    Anion Gap 7 3 - 14 mmol/L    Glucose 311 (H) 70 - 105 mg/dL    Urea Nitrogen 24 7 - 25 mg/dL    Creatinine 1.35 (H) 0.70 - 1.30 mg/dL    GFR Estimate 55 (L) >60 mL/min/[1.73_m2]    GFR Estimate If Black 66 >60 mL/min/[1.73_m2]    Calcium 9.5 8.6 - 10.3 mg/dL    Bilirubin Total 0.3 0.3 - 1.0 mg/dL    Albumin 4.4 3.5 - 5.7 g/dL    Protein Total 7.5 6.4 -  8.9 g/dL    Alkaline Phosphatase 56 34 - 104 U/L    ALT 35 7 - 52 U/L    AST 25 13 - 39 U/L       If you have any questions or concerns, please call the clinic at the number listed above.       Sincerely,    Kira Grider, DO  Family Practice

## 2020-07-15 LAB
CREAT UR-MCNC: 140 MG/DL
MICROALBUMIN UR-MCNC: 105 MG/L
MICROALBUMIN/CREAT UR: 75 MG/G CR (ref 0–17)

## 2020-07-20 RX ORDER — INSULIN GLARGINE 100 [IU]/ML
INJECTION, SOLUTION SUBCUTANEOUS
Qty: 45 ML | Refills: 0 | OUTPATIENT
Start: 2020-07-20

## 2020-07-20 NOTE — TELEPHONE ENCOUNTER
Lantus refilled on 7/14/2020 45 ml x 11 refills  To [facility].    Maria G Maza RN on 7/20/2020 at 2:59 PM

## 2020-07-21 ENCOUNTER — OFFICE VISIT (OUTPATIENT)
Dept: FAMILY MEDICINE | Facility: OTHER | Age: 55
End: 2020-07-21
Attending: FAMILY MEDICINE
Payer: COMMERCIAL

## 2020-07-21 VITALS
OXYGEN SATURATION: 91 % | RESPIRATION RATE: 16 BRPM | HEIGHT: 70 IN | HEART RATE: 80 BPM | DIASTOLIC BLOOD PRESSURE: 72 MMHG | WEIGHT: 310 LBS | SYSTOLIC BLOOD PRESSURE: 138 MMHG | BODY MASS INDEX: 44.38 KG/M2 | TEMPERATURE: 98.9 F

## 2020-07-21 DIAGNOSIS — M51.369 DDD (DEGENERATIVE DISC DISEASE), LUMBAR: ICD-10-CM

## 2020-07-21 DIAGNOSIS — M54.50 CHRONIC BILATERAL LOW BACK PAIN WITHOUT SCIATICA: ICD-10-CM

## 2020-07-21 DIAGNOSIS — G89.29 CHRONIC BILATERAL LOW BACK PAIN WITHOUT SCIATICA: ICD-10-CM

## 2020-07-21 PROCEDURE — 99207: CPT | Performed by: CHIROPRACTOR

## 2020-07-21 PROCEDURE — G0463 HOSPITAL OUTPT CLINIC VISIT: HCPCS

## 2020-07-21 ASSESSMENT — MIFFLIN-ST. JEOR: SCORE: 2247.4

## 2020-07-21 ASSESSMENT — PAIN SCALES - GENERAL: PAINLEVEL: MILD PAIN (3)

## 2020-07-21 NOTE — NURSING NOTE
Jake Bermudez is a 55 year old male presenting today to be evaluated for the Spine Program.  Diagnosis: Chronic low back pain without sciatica  Per: Kira Grider MD       Medication Reconciliation: complete    Review Of Systems  Skin: negative  Eyes: negative  Ears/Nose/Throat: negative  Respiratory: positive for sleep apnea  Cardiovascular: negative  Gastrointestinal: negative  Genitourinary: negative  Musculoskeletal: positive for back pain  Neurologic: negative  Psychiatric: negative  Hematologic/Lymphatic/Immunologic: negative  Endocrine: positive for diabetes    Lydia Maza LPN  7/21/2020 10:56 AM

## 2020-07-21 NOTE — PROGRESS NOTES
"Spine Therapy Program Consultation Report      Patient referred by: Dr. Kira Grider    Condition: Chronic low back pain, L4/5 DDD, L5/S1 moderate broad-based disc bulge with encroachment upon both S1 nerve roots - MRI confirmed 2015    Review of records performed: YES    Candidate for program: PENDING    Mr. Bermudez is accompanied by his wife for today's consultation.  He has extensive chronic low back pain associated with confirmed disc involvement per MRI in April 2015.  No additional studies have been performed since then. He notes moderate weight gain since his last MRI as well as increased pain and decreased capability to perform normal ADL's.  He notes no radicular pains today, changes in bowel or bladder habits, or saddle paresthesia.  He states he needs to sit down after 5 minutes of activity due to the pain.    With regards to entering our Spinal Therapy Program, it was recommended that he start with a course of general physical therapy to work on flexibility and endurance.  I do not believe he has the physical capabilities of handling the program as is, but it is certainly something I would like to see him in should he be able to build up physical strength and flexibility with some PT.  I also strongly encouraged him to lose weight, not only for his lumbar spine but also for better control of his diabetes.  Though he notes his diet is \"good\", I would recommend him consult with a nutrition expert or, at the very least, start a home diet.    Mr. Bermudez has insurance that requires PCP referral for therapy.  Therefore, I am sending this note to Dr. Grider with my recommendations.  Should he proceed with therapy, I would like to re-evaluate his status in approximately 12 weeks to see if he would then qualify for our Spinal Therapy Program.  Both he and his wife were in agreement with this plan going forward.    Note sent to Dr. Marni Barboza DC, CICXUAN  Director - Occupational Medicine " Department  [facility]  [address] [place] [address]  Phone [phone]  Fax [phone]    Disclaimer:  This note consists of words and symbols derived from keyboarding, dictation, or using voice recognition software. As a result, there may be errors in the script that have gone undetected. Please consider this when interpreting information found in this note.    3:11 PM 7/21/2020

## 2020-07-27 ENCOUNTER — TELEPHONE (OUTPATIENT)
Dept: FAMILY MEDICINE | Facility: OTHER | Age: 55
End: 2020-07-27

## 2020-07-27 NOTE — TELEPHONE ENCOUNTER
Returned call. Informed patient that he would have to get the physical therapy order through Kira Grider MD per Frederick Barboza DC, CICE's office notes. Other recommendations were to lose some weight and then return to see Frederick Barboza DC, CICE after 12 weeks of physical therapy to see if he is strong enough for the Spine Program.  Lydia Maza LPN  7/27/2020 2:40 PM    No masses; no nipple discharge

## 2020-07-27 NOTE — TELEPHONE ENCOUNTER
Patient's wife called stating that Rajan saw Dr. Barboza. Neither of them could remember if he was referred for therapy or what the plan was. Please advise.      Kristine Moyer on 7/27/2020 at 2:03 PM

## 2020-08-05 RX ORDER — KETOPROFEN 25 MG/1
CAPSULE ORAL
Status: CANCELLED | OUTPATIENT
Start: 2020-08-05

## 2020-08-07 NOTE — TELEPHONE ENCOUNTER
"Patient calls back and states, \" I never heard of that medication or pharmacy. I didn't request that\". Writer will disregard refill request as requested by patient. Unable to complete prescription refill per RNMedication Refill Policy.................... Dilma Butler RN ....................  8/7/2020   9:14 AM          "

## 2020-08-07 NOTE — TELEPHONE ENCOUNTER
Please note unable to locate Ketoprofen as requested in current and or in medication history. Writer unable to reach pharmacy as number provided is a fax number. Message left for patient requesting a return call to verify/ clarify medication request as polo'd up LPN.

## 2020-08-27 ENCOUNTER — TELEPHONE (OUTPATIENT)
Dept: FAMILY MEDICINE | Facility: OTHER | Age: 55
End: 2020-08-27

## 2020-08-27 DIAGNOSIS — M51.369 DDD (DEGENERATIVE DISC DISEASE), LUMBAR: ICD-10-CM

## 2020-08-27 DIAGNOSIS — G89.29 CHRONIC BILATERAL LOW BACK PAIN WITHOUT SCIATICA: Primary | ICD-10-CM

## 2020-08-27 DIAGNOSIS — M54.50 CHRONIC BILATERAL LOW BACK PAIN WITHOUT SCIATICA: Primary | ICD-10-CM

## 2020-09-01 NOTE — TELEPHONE ENCOUNTER
Left message to call back....................  9/1/2020   9:01 AM  Adri Hernandez LPN, LPN  9/1/2020  9:01 AM           Had a message on Dr. Grider's nurses voicemail stating that patient needs an order for PT.

## 2020-09-02 NOTE — TELEPHONE ENCOUNTER
Informed patient that Kira Grider MD will have to order his physical therapy and then the physical therapy department will call him to set it up.  Patient stated understanding.  .Lydia Maza LPN  9/2/2020 2:42 PM

## 2020-09-04 DIAGNOSIS — E11.42 DIABETIC POLYNEUROPATHY ASSOCIATED WITH TYPE 2 DIABETES MELLITUS (H): ICD-10-CM

## 2020-09-04 DIAGNOSIS — M54.50 CHRONIC MIDLINE LOW BACK PAIN, UNSPECIFIED WHETHER SCIATICA PRESENT: ICD-10-CM

## 2020-09-04 DIAGNOSIS — G89.29 CHRONIC MIDLINE LOW BACK PAIN, UNSPECIFIED WHETHER SCIATICA PRESENT: ICD-10-CM

## 2020-09-04 NOTE — TELEPHONE ENCOUNTER
Referral for PT placed; recommendation for general PT course prior to Spine-X therapy.  Kira Grider, DO on 9/4/2020 at 7:29 AM

## 2020-09-08 RX ORDER — TRAMADOL HYDROCHLORIDE 50 MG/1
TABLET ORAL
Qty: 90 TABLET | Refills: 0 | Status: SHIPPED | OUTPATIENT
Start: 2020-09-08 | End: 2020-10-08

## 2020-09-08 NOTE — TELEPHONE ENCOUNTER
Disp  Refills  Start  End  MIGUELINA    traMADol (ULTRAM) 50 MG tablet  90 tablet  2  4/17/2020   No    Sig: Take 1 tablet by mouth three times daily as needed       LOV: 7/21/2020  Future Office visit: No future appointment scheduled at this time.     Routing refill request to provider for review/approval because:  Drug not on the [facility], [facility] or [facility] refill protocol or controlled substance    Requested Prescriptions   Pending Prescriptions Disp Refills     traMADol (ULTRAM) 50 MG tablet [Pharmacy Med Name: traMADol HCl 50 MG Oral Tablet] 90 tablet 0     Sig: Take 1 tablet by mouth three times daily as needed       There is no refill protocol information for this order        Unable to complete prescription refill per RN Medication Refill Policy.................... Irasema Flannery RN ....................  9/8/2020   4:29 PM

## 2020-09-16 DIAGNOSIS — E11.42 DIABETIC POLYNEUROPATHY ASSOCIATED WITH TYPE 2 DIABETES MELLITUS (H): Primary | ICD-10-CM

## 2020-09-17 RX ORDER — PREGABALIN 200 MG/1
CAPSULE ORAL
Qty: 90 CAPSULE | Refills: 5 | Status: SHIPPED | OUTPATIENT
Start: 2020-09-17 | End: 2021-04-15

## 2020-09-17 NOTE — TELEPHONE ENCOUNTER
[id number] [place] sent Rx request for the following:      Requested Prescriptions   Pending Prescriptions Disp Refills   LYRICA 200 MG capsule [Pharmacy Med Name: Lyrica 200 MG Oral Capsule] 90 capsule 0    Sig: TAKE 1 CAPSULE BY MOUTH THREE TIMES DAILY   Last Prescription Date:   3/12/20  Last Fill Qty/Refills:         270, R-4  ( 20)  Last Office Visit:              20  Future Office visit:             2021 9:20 AM  Kira Grider,   [facility]  [id number]  [facility]    Routing refill request to provider for review/approval because:  Drug not on the [facility], [facility] or [facility] refill protocol or controlled substance    Unable to complete prescription refill per RN Medication Refill Policy. Ashanti Lyons RN .............. 2020  3:16 PM

## 2020-10-10 DIAGNOSIS — E11.65 UNCONTROLLED TYPE 2 DIABETES MELLITUS WITH HYPERGLYCEMIA, WITH LONG-TERM CURRENT USE OF INSULIN (H): ICD-10-CM

## 2020-10-10 DIAGNOSIS — Z79.4 UNCONTROLLED TYPE 2 DIABETES MELLITUS WITH HYPERGLYCEMIA, WITH LONG-TERM CURRENT USE OF INSULIN (H): ICD-10-CM

## 2020-10-12 RX ORDER — PEN NEEDLE, DIABETIC 32GX 5/32"
NEEDLE, DISPOSABLE MISCELLANEOUS
Qty: 400 EACH | Refills: 0 | Status: SHIPPED | OUTPATIENT
Start: 2020-10-12 | End: 2021-01-13

## 2020-10-12 NOTE — TELEPHONE ENCOUNTER
Prescription approved per [facility] Refill Protocol.  LVO 7/14/2020 patient to follow up in 4-6 mons patient noted to have appointment scheduled  On 1/12/2021 with Dr. Grider.    Maria G Maza RN on 10/12/2020 at 9:36 AM

## 2020-10-30 ENCOUNTER — MEDICAL CORRESPONDENCE (OUTPATIENT)
Dept: HEALTH INFORMATION MANAGEMENT | Facility: OTHER | Age: 55
End: 2020-10-30

## 2020-12-14 ENCOUNTER — TELEPHONE (OUTPATIENT)
Dept: FAMILY MEDICINE | Facility: OTHER | Age: 55
End: 2020-12-14

## 2020-12-26 DIAGNOSIS — F32.A DEPRESSION, UNSPECIFIED DEPRESSION TYPE: ICD-10-CM

## 2020-12-28 DIAGNOSIS — M54.50 CHRONIC LOW BACK PAIN, UNSPECIFIED BACK PAIN LATERALITY, UNSPECIFIED WHETHER SCIATICA PRESENT: ICD-10-CM

## 2020-12-28 DIAGNOSIS — G89.29 CHRONIC LOW BACK PAIN, UNSPECIFIED BACK PAIN LATERALITY, UNSPECIFIED WHETHER SCIATICA PRESENT: ICD-10-CM

## 2020-12-28 NOTE — TELEPHONE ENCOUNTER
FLUoxetine (PROZAC) 20 MG capsule  Last Written Prescription Date: 12/04/2019  Last Fill Quantity: 90,   # refills: 4  Last Office Visit: 07/14/2020-PHQ-9 score 15  Future Office visit:    Next 5 appointments (look out 90 days)    Jan 12, 2021  9:20 AM  SHORT with Kira Grider DO  [facility] [address]  [place] [id number] [phone]           Routing refill request to provider for review/approval because:  Failed - No PHQ-9 score less than 5 in past 6 months. 07/14/2020-PHQ-9 score 15      Unable to complete prescription refill per RNMedication Refill Policy.................... Dilma Butler RN ....................  12/28/2020   9:27 AM

## 2020-12-29 RX ORDER — ACETAMINOPHEN 500 MG
TABLET ORAL
Qty: 100 TABLET | Refills: 11 | Status: SHIPPED | OUTPATIENT
Start: 2020-12-29 | End: 2021-02-09

## 2020-12-29 NOTE — TELEPHONE ENCOUNTER
EQ PAIN RELIEVER 500 MG tablet   Last Written Prescription Date: 10/04/2019  Last Fill Quantity: 200,   # refills: 4  Last Office Visit: 07/14/2020  Future Office visit:    Next 5 appointments (look out 90 days)    Jan 12, 2021  9:20 AM  SHORT with Kira Grider DO  [facility] ([facility] [address]  [place] [address]  [phone]           Routing refill request to provider for review/approval.     Unable to complete prescription refill per RNMedication Refill Policy.................... Dilma Butler RN ....................  12/29/2020   10:21 AM

## 2021-01-07 DIAGNOSIS — Z79.4 UNCONTROLLED TYPE 2 DIABETES MELLITUS WITH HYPERGLYCEMIA, WITH LONG-TERM CURRENT USE OF INSULIN (H): ICD-10-CM

## 2021-01-07 DIAGNOSIS — E11.65 UNCONTROLLED TYPE 2 DIABETES MELLITUS WITH HYPERGLYCEMIA, WITH LONG-TERM CURRENT USE OF INSULIN (H): ICD-10-CM

## 2021-01-08 RX ORDER — INSULIN ASPART 100 [IU]/ML
INJECTION, SOLUTION INTRAVENOUS; SUBCUTANEOUS
Qty: 90 ML | Refills: 0 | Status: SHIPPED | OUTPATIENT
Start: 2021-01-08 | End: 2021-03-02

## 2021-01-08 NOTE — TELEPHONE ENCOUNTER
"Requested Prescriptions   Pending Prescriptions Disp Refills     NOVOLOG FLEXPEN 100 UNIT/ML soln [Pharmacy Med Name: NovoLOG FlexPen 100 UNIT/ML Subcutaneous Solution Pen-injector] 90 mL 0     Sig: USE 60-70 UNITS SUBCUTANEOUSLY WITH EACH MEAL BASED ON INSULIN:CARB RATIO. MAXIMUM 240 UNITS PER DAY.       Short Acting Insulin Protocol Passed - 1/7/2021  7:29 PM        Passed - Serum creatinine on file in past 12 months     Recent Labs   Lab Test 07/14/20  1536   CR 1.35*       Ok to refill medication if creatinine is low          Passed - HgbA1C in past 3 or 6 months     If HgbA1C is 8 or greater, it needs to be on file within the past 3 months.  If less than 8, must be on file within the past 6 months.     Recent Labs   Lab Test 07/14/20  1536 01/17/18  1150 01/17/18  1150   A1C 7.7*   < >  --    [id number]  --   --  10.9*    < > = values in this interval not displayed.             Passed - Medication is active on med list        Passed - Patient is age 18 or older        Passed - Recent (6 mo) or future (30 days) visit within the authorizing provider's specialty     Patient had office visit in the last 6 months or has a visit in the next 30 days with authorizing provider or within the authorizing provider's specialty.  See \"Patient Info\" tab in inbasket, or \"Choose Columns\" in Meds & Orders section of the refill encounter.                   Last Written Prescription Date:  04/10/2020  Last Fill Quantity: 90ml,   # refills: 4  Last Office Visit: 07/194/2020 with Kira Grider MD  Future Office visit:    Next 5 appointments (look out 90 days)    Jan 12, 2021  9:20 AM  SHORT with Kira Grider DO  [facility] ([facility]) [address]  [place] [id number] [phone]      Unable to complete prescription refill per RN medication refill policy. Will route to provider for review and consideration.  Deborah Cheema RN on 1/8/2021 at 8:22 AM               "

## 2021-01-12 PROBLEM — K42.0 UMBILICAL HERNIA WITH OBSTRUCTION, WITHOUT GANGRENE: Status: RESOLVED | Noted: 2018-10-31 | Resolved: 2021-01-12

## 2021-01-13 DIAGNOSIS — Z79.4 UNCONTROLLED TYPE 2 DIABETES MELLITUS WITH HYPERGLYCEMIA, WITH LONG-TERM CURRENT USE OF INSULIN (H): ICD-10-CM

## 2021-01-13 DIAGNOSIS — E11.65 UNCONTROLLED TYPE 2 DIABETES MELLITUS WITH HYPERGLYCEMIA, WITH LONG-TERM CURRENT USE OF INSULIN (H): ICD-10-CM

## 2021-01-13 RX ORDER — PEN NEEDLE, DIABETIC 32GX 5/32"
NEEDLE, DISPOSABLE MISCELLANEOUS
Qty: 400 EACH | Refills: 0 | Status: SHIPPED | OUTPATIENT
Start: 2021-01-13 | End: 2021-04-01

## 2021-01-13 NOTE — LETTER
January 13, 2021      Jake Bermudez  [address] [facility] [address]        Dear Jake,     A refill request was received from your pharmacy for pen needles.    Additional refill require an office visit with Dr. Grider for diabetic follow up and labs.    Please call [phone] to schedule appointment.          Sincerely,    Refill Nurse

## 2021-01-13 NOTE — TELEPHONE ENCOUNTER
Prescription approved per [facility] Refill Protocol.  LOV: 7/14/2020  Patient is due for diabetic follow up and  labs  Letter sent  Maria G Maza RN on 1/13/2021 at 4:14 PM

## 2021-01-20 DIAGNOSIS — F41.1 GAD (GENERALIZED ANXIETY DISORDER): ICD-10-CM

## 2021-01-20 NOTE — TELEPHONE ENCOUNTER
clonazePAM (KLONOPIN) 0.5 MG tablet     Sig: TAKE 1 TABLET BY MOUTH AT BEDTIME           Last Written Prescription Date:  7/14/2020  Last Fill Quantity: 30,   # refills: 5  Last Office Visit: 7/21/2020  Future Office visit:       Routing refill request to provider for review/approval because:  Non-protocol medication. To Kira Grider for consideration. Tamie Christianson RN on 1/20/2021 at 8:50 AM

## 2021-01-21 RX ORDER — CLONAZEPAM 0.5 MG/1
TABLET ORAL
Qty: 30 TABLET | Refills: 2 | Status: SHIPPED | OUTPATIENT
Start: 2021-01-21 | End: 2021-04-28

## 2021-02-01 DIAGNOSIS — M54.50 CHRONIC MIDLINE LOW BACK PAIN, UNSPECIFIED WHETHER SCIATICA PRESENT: ICD-10-CM

## 2021-02-01 DIAGNOSIS — G89.29 CHRONIC MIDLINE LOW BACK PAIN, UNSPECIFIED WHETHER SCIATICA PRESENT: ICD-10-CM

## 2021-02-01 DIAGNOSIS — E11.42 DIABETIC POLYNEUROPATHY ASSOCIATED WITH TYPE 2 DIABETES MELLITUS (H): ICD-10-CM

## 2021-02-02 RX ORDER — TRAMADOL HYDROCHLORIDE 50 MG/1
TABLET ORAL
Qty: 90 TABLET | Refills: 0 | Status: SHIPPED | OUTPATIENT
Start: 2021-02-02 | End: 2021-02-24

## 2021-02-02 NOTE — TELEPHONE ENCOUNTER
TraMADol (ULTRAM) 50 MG tablet  Last Written Prescription Date: 10/08/2020  Last Fill Quantity: 90,   # refills: 2  Last Office Visit: 07/21/2020  Future Office visit:       Routing refill request to provider for review/approval because:  Drug not on the [facility], [facility] or [facility] refill protocol or controlled substance.     Unable to complete prescription refill per RNMedication Refill Policy.................... Dilma Butler RN ....................  2/2/2021   8:46 AM

## 2021-02-04 DIAGNOSIS — E11.65 UNCONTROLLED TYPE 2 DIABETES MELLITUS WITH HYPERGLYCEMIA, WITH LONG-TERM CURRENT USE OF INSULIN (H): Primary | ICD-10-CM

## 2021-02-04 DIAGNOSIS — Z79.4 UNCONTROLLED TYPE 2 DIABETES MELLITUS WITH HYPERGLYCEMIA, WITH LONG-TERM CURRENT USE OF INSULIN (H): Primary | ICD-10-CM

## 2021-02-05 RX ORDER — BLOOD SUGAR DIAGNOSTIC
STRIP MISCELLANEOUS
Qty: 400 STRIP | Refills: 0 | Status: SHIPPED | OUTPATIENT
Start: 2021-02-05 | End: 2021-04-28

## 2021-02-05 NOTE — TELEPHONE ENCOUNTER
[id number] sent Rx request for the following:      Requested Prescriptions   Pending Prescriptions Disp Refills   blood glucose (ACCU-CHEK DONAVON PLUS) test strip [Pharmacy Med Name: Accu-Chek Donavon Plus In Vitro Strip]  0    Sig: USE  STRIP TO CHECK GLUCOSE 4 TIMES DAILY OR  AS  DIRECTED   Last Prescription Date:   7/14/20  Last Fill Qty/Refills:         400, R-0    Last Office Visit:              7/14/20  Future Office visit:           None  Routing refill request to provider for review/approval because:   Diabetic Supplies Protocol Failed - 2/5/2021  9:19 AM       Failed - Recent (6 mo) or future (30 days) visit within the authorizing provider's specialty     Per LOV Note, Pt was instructed to follow up 4-6 months later, for diabetic check-up; between 11/14/20 and 1/14/21. Reminder letter sent to Pt on 1/13/21. Pt cancelled the last 3 appointments. Routing to PCP for refill consideration. Unable to complete prescription refill per RN Medication Refill Policy. Ashanti Lyons RN .............. 2/5/2021  9:25 AM

## 2021-02-08 DIAGNOSIS — G89.29 CHRONIC LOW BACK PAIN, UNSPECIFIED BACK PAIN LATERALITY, UNSPECIFIED WHETHER SCIATICA PRESENT: ICD-10-CM

## 2021-02-08 DIAGNOSIS — M54.50 CHRONIC LOW BACK PAIN, UNSPECIFIED BACK PAIN LATERALITY, UNSPECIFIED WHETHER SCIATICA PRESENT: ICD-10-CM

## 2021-02-09 RX ORDER — ACETAMINOPHEN 500 MG
TABLET ORAL
Qty: 100 TABLET | Refills: 4 | Status: SHIPPED | OUTPATIENT
Start: 2021-02-09 | End: 2022-01-25

## 2021-02-09 NOTE — TELEPHONE ENCOUNTER
"[facility] sent Rx request for the following:   acetaminophen (EQ PAIN RELIEVER) 500 MG tablet  Sig:TAKE 1 TABLET BY MOUTH EVERY 6 HOURS AS NEEDED .  MAX  ACETAMINOPHEN  DOSE  4000  MG  IN  24  HOURS.    Last Prescription Date:   12/29/2020  Last Fill Qty/Refills:         100, R-11    Last Office Visit:              07/14/2020 ([facility])   Future Office visit:           02/24/2021 ([facility])   Analgesics (Non-Narcotic Tylenol and ASA Only) Passed - 2/8/2021  8:12 AM        Passed - Recent (12 mo) or future (30 days) visit within the authorizing provider's specialty     Patient has had an office visit with the authorizing provider or a provider within the authorizing providers department within the previous 12 mos or has a future within next 30 days. See \"Patient Info\" tab in inbasket, or \"Choose Columns\" in Meds & Orders section of the refill encounter.              Passed - Patient is 7 months old or older     If patient is a peds patient of the age 7 mos -12 years, ok to refill using weight-based dosing.     If >3g daily and/or sig is not \"prn\", check for liver enzymes. If normal in the last year, ok to refill.  If not, refer to the provider.          Passed - Medication is active on med list         Warnings Override History for acetaminophen (EQ PAIN RELIEVER) 500 MG tablet [[id number]]    Overridden by Kira Grider DO on Dec 29, 2020 12:13 PM   Allergy/Contraindication   1. PIPERACILLIN-TAZOBACTAM IN D5W [Level: Drug Class Match] [Reason: Tolerated medication/side effects in past]      Overridden by Kira Grider DO on Dec 29, 2020 12:12 PM   Allergy/Contraindication   1. PIPERACILLIN-TAZOBACTAM IN D5W [Level: Drug Class Match] [Reason: Tolerated medication/side effects in past]       Prescription approved per [facility] Refill Protocol.  Dian Junior RN ....................  2/9/2021   10:47 AM      "

## 2021-02-18 DIAGNOSIS — E11.42 DIABETIC POLYNEUROPATHY ASSOCIATED WITH TYPE 2 DIABETES MELLITUS (H): ICD-10-CM

## 2021-02-18 DIAGNOSIS — M54.50 CHRONIC MIDLINE LOW BACK PAIN, UNSPECIFIED WHETHER SCIATICA PRESENT: Primary | ICD-10-CM

## 2021-02-18 DIAGNOSIS — G89.29 CHRONIC MIDLINE LOW BACK PAIN, UNSPECIFIED WHETHER SCIATICA PRESENT: Primary | ICD-10-CM

## 2021-02-19 DIAGNOSIS — G89.29 CHRONIC LOW BACK PAIN, UNSPECIFIED BACK PAIN LATERALITY, UNSPECIFIED WHETHER SCIATICA PRESENT: ICD-10-CM

## 2021-02-19 DIAGNOSIS — M54.50 CHRONIC LOW BACK PAIN, UNSPECIFIED BACK PAIN LATERALITY, UNSPECIFIED WHETHER SCIATICA PRESENT: ICD-10-CM

## 2021-02-22 RX ORDER — ACETAMINOPHEN 500 MG
TABLET ORAL
Qty: 100 TABLET | Refills: 0 | OUTPATIENT
Start: 2021-02-22

## 2021-02-22 NOTE — TELEPHONE ENCOUNTER
Left message for patient requesting a return call to verify that he has enough to get to upcoming visit.   TraMADol (ULTRAM) 50 MG tablet    Last Written Prescription Date: 02/02/2021. Due 03/02/2021  Last Fill Quantity: 90,   # refills: 0  Last Office Visit: 07/14/2020  Future Office visit:    Next 5 appointments (look out 90 days)    Feb 24, 2021  9:20 AM  SHORT with Kira Grider [facility] ([facility] [address]  [place] [address]  [phone]           Routing refill request to provider for review/approval because:  Drug not on the [facility], [facility] or [facility] refill protocol or controlled substance.     Unable to complete prescription refill per RNMedication Refill Policy.................... Dilma Butler RN ....................  2/22/2021   8:17 AM

## 2021-02-22 NOTE — TELEPHONE ENCOUNTER
Filled 02/09/2021 #100 x 4. Pharmacy alerted. Unable to complete prescription refill per RNMedication Refill Policy.................... Dilma Butler RN ....................  2/22/2021   12:45 PM    acetaminophen (EQ PAIN RELIEVER) 500 MG tablet 100 tablet 4 2/9/2021  No   Sig: TAKE 1 TABLET BY MOUTH EVERY 6 HOURS AS NEEDED .  MAX  ACETAMINOPHEN  DOSE  4000  MG  IN  24  HOURS.   Sent to pharmacy as: Acetaminophen 500 MG Oral Tablet (EQ Pain Reliever)   Class: E-Prescribe   Order: [id number]   E-Prescribing Status: Receipt confirmed by pharmacy (2/9/2021 10:49 AM CST)   Printout Tracking    External Result Report   Pharmacy    [id number] - [place], [place] - [address] [place] [address]

## 2021-02-23 ENCOUNTER — TELEPHONE (OUTPATIENT)
Dept: FAMILY MEDICINE | Facility: OTHER | Age: 56
End: 2021-02-23

## 2021-02-23 NOTE — TELEPHONE ENCOUNTER
"Patient called back and he did not request these supplies. He uses only [facility] for his diabetic supplies. Did fax it back stating not to resend it as \"patient did not request this\".  Adri Hernandez LPN, LPN  2/23/2021  10:40 AM             "
Left message to call back....................  2/23/2021   9:05 AM  Adri Hernandez LPN, LPN  2/23/2021  9:05 AM           Received a fax from [facility] requesting testing supplies. Please verify that patient is requesting this vs scam.  Adri Hernandez LPN, LPN  2/23/2021  9:07 AM             
36.7

## 2021-02-24 RX ORDER — TRAMADOL HYDROCHLORIDE 50 MG/1
TABLET ORAL
Qty: 90 TABLET | Refills: 0 | Status: SHIPPED | OUTPATIENT
Start: 2021-02-24 | End: 2021-04-28
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
## 2021-02-27 DIAGNOSIS — Z79.4 UNCONTROLLED TYPE 2 DIABETES MELLITUS WITH HYPERGLYCEMIA, WITH LONG-TERM CURRENT USE OF INSULIN (H): ICD-10-CM

## 2021-02-27 DIAGNOSIS — E11.65 UNCONTROLLED TYPE 2 DIABETES MELLITUS WITH HYPERGLYCEMIA, WITH LONG-TERM CURRENT USE OF INSULIN (H): ICD-10-CM

## 2021-03-02 RX ORDER — INSULIN ASPART 100 [IU]/ML
INJECTION, SOLUTION INTRAVENOUS; SUBCUTANEOUS
Qty: 75 ML | Refills: 0 | Status: SHIPPED | OUTPATIENT
Start: 2021-03-02 | End: 2021-04-28

## 2021-03-02 RX ORDER — INSULIN ASPART 100 [IU]/ML
INJECTION, SOLUTION INTRAVENOUS; SUBCUTANEOUS
Qty: 30 ML | Refills: 0 | Status: SHIPPED | OUTPATIENT
Start: 2021-03-02 | End: 2021-03-02

## 2021-03-02 NOTE — TELEPHONE ENCOUNTER
Fax received requesting clarification of qty . 30 ml is 12.5 day supply. Qty corrected and new Rx sent. Beti Savage RN, BSN....................  3/2/2021   4:57 PM

## 2021-03-02 NOTE — TELEPHONE ENCOUNTER
"[facility] sent Rx request for the following:     NOVOLOG FLEXPEN 100 UNIT/ML sol  Sig: INJECT 60-70 UNITS SUBCUTANEOUSLY WITH EACH MEAL BASED ON INSULIN:CARB RATIO. MAXIMUM 240 UNITS PER DAY.   Last Prescription Date:   1/8/2021  Last Fill Qty/Refills:         90 ml, R-0    Last Office Visit:              7/14/2020  Future Office visit:           3/10/21          Short Acting Insulin Protocol Failed - 2/27/2021 10:27 AM        Failed - HgbA1C in past 3 or 6 months     If HgbA1C is 8 or greater, it needs to be on file within the past 3 months.  If less than 8, must be on file within the past 6 months.     Recent Labs   Lab Test 07/14/20  1536 01/17/18  1150 01/17/18  1150   A1C 7.7*   < >  --    [id number]  --   --  10.9*    < > = values in this interval not displayed.             Passed - Serum creatinine on file in past 12 months     Recent Labs   Lab Test 07/14/20  1536   CR 1.35*       Ok to refill medication if creatinine is low          Passed - Medication is active on med list        Passed - Patient is age 18 or older        Passed - Recent (6 mo) or future (30 days) visit within the authorizing provider's specialty     Patient had office visit in the last 6 months or has a visit in the next 30 days with authorizing provider or within the authorizing provider's specialty.  See \"Patient Info\" tab in inbasket, or \"Choose Columns\" in Meds & Orders section of the refill encounter.               A LIMITED 30 day refill will be provided to patient to prevent break in medicaion; patient has upcoming appointment 3/10. Prescription refilled per RN Medication Refill Policy.................... Beti Savage ....................  3/2/2021   11:18 AM        "

## 2021-04-01 DIAGNOSIS — E11.65 UNCONTROLLED TYPE 2 DIABETES MELLITUS WITH HYPERGLYCEMIA, WITH LONG-TERM CURRENT USE OF INSULIN (H): ICD-10-CM

## 2021-04-01 DIAGNOSIS — Z79.4 UNCONTROLLED TYPE 2 DIABETES MELLITUS WITH HYPERGLYCEMIA, WITH LONG-TERM CURRENT USE OF INSULIN (H): ICD-10-CM

## 2021-04-01 RX ORDER — PEN NEEDLE, DIABETIC 32GX 5/32"
NEEDLE, DISPOSABLE MISCELLANEOUS
Qty: 360 EACH | Refills: 4 | Status: SHIPPED | OUTPATIENT
Start: 2021-04-01 | End: 2021-05-01

## 2021-04-01 RX ORDER — LANCETS
EACH MISCELLANEOUS
Qty: 360 EACH | Refills: 4 | Status: SHIPPED | OUTPATIENT
Start: 2021-04-01 | End: 2022-07-27

## 2021-04-01 NOTE — TELEPHONE ENCOUNTER
Please refill his RX for Diabetic needles.  He has a scheduled apt. For medication in a month.  That was the soonest he could get in.      Valorie Phipps on 4/1/2021 at 10:03 AM

## 2021-04-08 DIAGNOSIS — Z79.4 UNCONTROLLED TYPE 2 DIABETES MELLITUS WITH HYPERGLYCEMIA, WITH LONG-TERM CURRENT USE OF INSULIN (H): ICD-10-CM

## 2021-04-08 DIAGNOSIS — E11.65 UNCONTROLLED TYPE 2 DIABETES MELLITUS WITH HYPERGLYCEMIA, WITH LONG-TERM CURRENT USE OF INSULIN (H): ICD-10-CM

## 2021-04-08 RX ORDER — INSULIN ASPART 100 [IU]/ML
INJECTION, SOLUTION INTRAVENOUS; SUBCUTANEOUS
Qty: 90 ML | Refills: 0 | Status: SHIPPED | OUTPATIENT
Start: 2021-04-08 | End: 2021-05-01

## 2021-04-08 NOTE — TELEPHONE ENCOUNTER
[facility] sent Rx request for the following:     insulin aspart (NOVOLOG FLEXPEN) 100 UNIT/ML pen  Sig   INJECT 60-70 UNITS SUBCUTANEOUSLY WITH EACH MEAL BASED ON INSULIN:CARB RATIO. MAXIMUM  UNITS PER DAY  Last Prescription Date:   3/2/2021  Last Fill Qty/Refills:         75 ml, R-0    Last Office Visit:              With PCP 7/14/2020  Future Office visit:           4/28/2021    Routing refill request to provider for review/approval because:  Labs not current:    Notes several no show/cancellations.      Short Acting Insulin Protocol Failed - 4/8/2021  8:55 AM        Failed - HgbA1C in past 3 or 6 months     If HgbA1C is 8 or greater, it needs to be on file within the past 3 months.  If less than 8, must be on file within the past 6 months.     Recent Labs   Lab Test 07/14/20  1536 01/17/18  1150 01/17/18  1150   A1C 7.7*   < >  --    [id number]  --   --  10.9*    < > = values in this interval not displayed.        Unable to complete prescription refill per RN Medication Refill Policy.................... Beti Savage RN ....................  4/8/2021   3:45 PM

## 2021-04-14 DIAGNOSIS — E11.42 DIABETIC POLYNEUROPATHY ASSOCIATED WITH TYPE 2 DIABETES MELLITUS (H): ICD-10-CM

## 2021-04-15 RX ORDER — PREGABALIN 200 MG/1
CAPSULE ORAL
Qty: 90 CAPSULE | Refills: 0 | Status: SHIPPED | OUTPATIENT
Start: 2021-04-15 | End: 2021-04-28

## 2021-04-15 NOTE — TELEPHONE ENCOUNTER
[facility] sent Rx request for the following:     LYRICA 200 MG capsule  Sig TAKE 1 CAPSULE BY MOUTH THREE TIMES DAILY  Last Prescription Date:   9/17/2020  Last Fill Qty/Refills:         90, R-5    Last Office Visit:              3/10/2021  Future Office visit:           4/28/2021    Routing refill request to provider for review/approval because:  Drug not on the G, P or [facility] refill protocol or controlled substance        There is no refill protocol information for this order        Unable to complete prescription refill per RN Medication Refill Policy.................... Beti Savage RN ....................  4/15/2021   10:16 AM

## 2021-04-28 ENCOUNTER — VIRTUAL VISIT (OUTPATIENT)
Dept: FAMILY MEDICINE | Facility: OTHER | Age: 56
End: 2021-04-28
Attending: FAMILY MEDICINE
Payer: COMMERCIAL

## 2021-04-28 DIAGNOSIS — Z86.39 H/O HYPERKALEMIA: ICD-10-CM

## 2021-04-28 DIAGNOSIS — J98.4 RESTRICTIVE LUNG DISEASE: ICD-10-CM

## 2021-04-28 DIAGNOSIS — G89.29 CHRONIC MIDLINE LOW BACK PAIN, UNSPECIFIED WHETHER SCIATICA PRESENT: ICD-10-CM

## 2021-04-28 DIAGNOSIS — K21.9 GASTROESOPHAGEAL REFLUX DISEASE WITHOUT ESOPHAGITIS: ICD-10-CM

## 2021-04-28 DIAGNOSIS — F32.A DEPRESSION, UNSPECIFIED DEPRESSION TYPE: ICD-10-CM

## 2021-04-28 DIAGNOSIS — M54.50 CHRONIC MIDLINE LOW BACK PAIN, UNSPECIFIED WHETHER SCIATICA PRESENT: ICD-10-CM

## 2021-04-28 DIAGNOSIS — I10 BENIGN ESSENTIAL HYPERTENSION: ICD-10-CM

## 2021-04-28 DIAGNOSIS — E11.65 UNCONTROLLED TYPE 2 DIABETES MELLITUS WITH HYPERGLYCEMIA, WITH LONG-TERM CURRENT USE OF INSULIN (H): ICD-10-CM

## 2021-04-28 DIAGNOSIS — E11.42 DIABETIC POLYNEUROPATHY ASSOCIATED WITH TYPE 2 DIABETES MELLITUS (H): ICD-10-CM

## 2021-04-28 DIAGNOSIS — F41.1 GAD (GENERALIZED ANXIETY DISORDER): ICD-10-CM

## 2021-04-28 DIAGNOSIS — E78.5 HYPERLIPIDEMIA LDL GOAL <100: ICD-10-CM

## 2021-04-28 DIAGNOSIS — Z79.4 UNCONTROLLED TYPE 2 DIABETES MELLITUS WITH HYPERGLYCEMIA, WITH LONG-TERM CURRENT USE OF INSULIN (H): ICD-10-CM

## 2021-04-28 DIAGNOSIS — Z12.11 COLON CANCER SCREENING: Primary | ICD-10-CM

## 2021-04-28 DIAGNOSIS — G62.9 NEUROPATHY: ICD-10-CM

## 2021-04-28 PROCEDURE — 99443 PR PHYSICIAN TELEPHONE EVALUATION 21-30 MIN: CPT | Performed by: FAMILY MEDICINE

## 2021-04-28 RX ORDER — LIDOCAINE PAIN RELIEF 40 MG/1000MG
PATCH TOPICAL
Qty: 30 PATCH | Refills: 11 | Status: SHIPPED | OUTPATIENT
Start: 2021-04-28 | End: 2022-05-20

## 2021-04-28 RX ORDER — PREGABALIN 200 MG/1
CAPSULE ORAL
Qty: 90 CAPSULE | Refills: 5 | Status: SHIPPED | OUTPATIENT
Start: 2021-04-28 | End: 2021-10-29

## 2021-04-28 RX ORDER — TRAMADOL HYDROCHLORIDE 50 MG/1
TABLET ORAL
Qty: 90 TABLET | Refills: 5 | Status: SHIPPED | OUTPATIENT
Start: 2021-04-28 | End: 2021-10-29

## 2021-04-28 RX ORDER — DEXAMETHASONE 4 MG/1
1 TABLET ORAL 2 TIMES DAILY
Qty: 12 G | Refills: 11 | Status: SHIPPED | OUTPATIENT
Start: 2021-04-28 | End: 2022-05-20

## 2021-04-28 RX ORDER — INSULIN ASPART 100 [IU]/ML
INJECTION, SOLUTION INTRAVENOUS; SUBCUTANEOUS
Qty: 90 ML | Refills: 11 | Status: SHIPPED | OUTPATIENT
Start: 2021-04-28 | End: 2022-05-17

## 2021-04-28 RX ORDER — MELOXICAM 15 MG/1
TABLET ORAL
Qty: 90 TABLET | Refills: 4 | Status: SHIPPED | OUTPATIENT
Start: 2021-04-28 | End: 2022-05-20

## 2021-04-28 RX ORDER — BLOOD SUGAR DIAGNOSTIC
STRIP MISCELLANEOUS
Qty: 400 STRIP | Refills: 0 | Status: SHIPPED | OUTPATIENT
Start: 2021-04-28 | End: 2021-10-10

## 2021-04-28 RX ORDER — CLONAZEPAM 0.5 MG/1
0.5 TABLET ORAL AT BEDTIME
Qty: 30 TABLET | Refills: 5 | Status: SHIPPED | OUTPATIENT
Start: 2021-04-28 | End: 2021-10-29

## 2021-04-28 ASSESSMENT — PATIENT HEALTH QUESTIONNAIRE - PHQ9: SUM OF ALL RESPONSES TO PHQ QUESTIONS 1-9: 15

## 2021-04-28 ASSESSMENT — PAIN SCALES - GENERAL: PAINLEVEL: NO PAIN (0)

## 2021-04-28 NOTE — NURSING NOTE
"Chief Complaint   Patient presents with     Recheck Medication       Initial There were no vitals taken for this visit. Estimated body mass index is 44.48 kg/m  as calculated from the following:    Height as of 7/21/20: 1.778 m (5' 10\").    Weight as of 7/21/20: 140.6 kg (310 lb).  Medication Reconciliation: complete    Adri Hernandez LPN     Previous A1C is at goal of <8  Lab Results   Component Value Date    A1C 7.7 07/14/2020    A1C 7.2 09/05/2019    A1C 7.2 03/21/2019    A1C 8.0 09/26/2018    A1C 9.1 06/27/2018     Urine microalbumin:creatine: 7/14/20  Foot exam 7/14/20  Eye exam 3/04/2020    Tobacco User no  Patient is on a daily aspirin  Patient is on a Statin.  Blood pressure today of:     BP Readings from Last 1 Encounters:   07/21/20 138/72      is at the goal of <139/89 for diabetics.    Adri Hernandez LPN on 4/28/2021 at 10:11 AM      "
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
## 2021-05-01 RX ORDER — LISINOPRIL 30 MG/1
30 TABLET ORAL DAILY
Qty: 90 TABLET | Refills: 4 | Status: SHIPPED | OUTPATIENT
Start: 2021-05-01 | End: 2022-05-20

## 2021-05-01 RX ORDER — HYDROCHLOROTHIAZIDE 25 MG/1
25 TABLET ORAL DAILY
Qty: 90 TABLET | Refills: 4 | Status: SHIPPED | OUTPATIENT
Start: 2021-05-01 | End: 2022-05-20

## 2021-05-01 RX ORDER — ALBUTEROL SULFATE 90 UG/1
2 AEROSOL, METERED RESPIRATORY (INHALATION) EVERY 4 HOURS PRN
Qty: 18 G | Refills: 11 | Status: SHIPPED | OUTPATIENT
Start: 2021-05-01 | End: 2022-05-06

## 2021-05-01 RX ORDER — GLUCOSAMINE HCL/CHONDROITIN SU 500-400 MG
CAPSULE ORAL
Qty: 100 EACH | Refills: 6 | Status: SHIPPED | OUTPATIENT
Start: 2021-05-01 | End: 2023-03-09

## 2021-05-01 RX ORDER — INSULIN ASPART 100 [IU]/ML
INJECTION, SOLUTION INTRAVENOUS; SUBCUTANEOUS
Qty: 90 ML | Refills: 0 | Status: SHIPPED | OUTPATIENT
Start: 2021-05-01 | End: 2021-05-01

## 2021-05-01 RX ORDER — SIMVASTATIN 40 MG
40 TABLET ORAL AT BEDTIME
Qty: 90 TABLET | Refills: 4 | Status: SHIPPED | OUTPATIENT
Start: 2021-05-01 | End: 2022-05-20

## 2021-05-01 NOTE — PATIENT INSTRUCTIONS
PREVENTATIVE:  Due for vaccines: Covid-19, Hep B series; Shingles series   Colonoscopy OR Cologuard test for colon cancer screening - DUE  Pulmonary Function Testing - DUE  Chest CT for monitoring of lung nodules - DUE  Eye exam - DUE  Reconsider treatment for sleep apnea; as new mask types are being developed, there may be one that is a better fit and will help with long term lung and heart function.    Please schedule an in-person office visit at your earliest convenience to begin getting caught up on needed laboratory tests, and scheduling of these other needed exams.

## 2021-07-20 ENCOUNTER — TELEPHONE (OUTPATIENT)
Dept: FAMILY MEDICINE | Facility: OTHER | Age: 56
End: 2021-07-20

## 2021-07-20 NOTE — TELEPHONE ENCOUNTER
There are future orders. Anything else you may want?  Adri Hernandez LPN, LPN  7/20/2021  1:38 PM

## 2021-07-21 NOTE — TELEPHONE ENCOUNTER
Patient notified that lab orders have been placed.  Adri Hernandez LPN, LPN  7/21/2021  11:38 AM

## 2021-08-06 ENCOUNTER — LAB (OUTPATIENT)
Dept: LAB | Facility: OTHER | Age: 56
End: 2021-08-06
Attending: FAMILY MEDICINE
Payer: COMMERCIAL

## 2021-08-06 DIAGNOSIS — Z79.4 UNCONTROLLED TYPE 2 DIABETES MELLITUS WITH HYPERGLYCEMIA, WITH LONG-TERM CURRENT USE OF INSULIN (H): ICD-10-CM

## 2021-08-06 DIAGNOSIS — G89.29 CHRONIC MIDLINE LOW BACK PAIN, UNSPECIFIED WHETHER SCIATICA PRESENT: ICD-10-CM

## 2021-08-06 DIAGNOSIS — M54.50 CHRONIC MIDLINE LOW BACK PAIN, UNSPECIFIED WHETHER SCIATICA PRESENT: ICD-10-CM

## 2021-08-06 DIAGNOSIS — E11.65 UNCONTROLLED TYPE 2 DIABETES MELLITUS WITH HYPERGLYCEMIA, WITH LONG-TERM CURRENT USE OF INSULIN (H): ICD-10-CM

## 2021-08-06 DIAGNOSIS — G62.9 NEUROPATHY: ICD-10-CM

## 2021-08-06 DIAGNOSIS — E11.42 DIABETIC POLYNEUROPATHY ASSOCIATED WITH TYPE 2 DIABETES MELLITUS (H): ICD-10-CM

## 2021-08-06 LAB
ALBUMIN SERPL-MCNC: 4.1 G/DL (ref 3.5–5.7)
ALP SERPL-CCNC: 56 U/L (ref 34–104)
ALT SERPL W P-5'-P-CCNC: 26 U/L (ref 7–52)
ANION GAP SERPL CALCULATED.3IONS-SCNC: 6 MMOL/L (ref 3–14)
AST SERPL W P-5'-P-CCNC: 20 U/L (ref 13–39)
BILIRUB SERPL-MCNC: 0.3 MG/DL (ref 0.3–1)
BUN SERPL-MCNC: 14 MG/DL (ref 7–25)
CALCIUM SERPL-MCNC: 9.4 MG/DL (ref 8.6–10.3)
CHLORIDE BLD-SCNC: 97 MMOL/L (ref 98–107)
CHOLEST SERPL-MCNC: 149 MG/DL
CO2 SERPL-SCNC: 35 MMOL/L (ref 21–31)
CREAT SERPL-MCNC: 1.27 MG/DL (ref 0.7–1.3)
CREAT UR-MCNC: 174 MG/DL
CREAT UR-MCNC: 175 MG/DL
FASTING STATUS PATIENT QL REPORTED: ABNORMAL
GFR SERPL CREATININE-BSD FRML MDRD: 63 ML/MIN/1.73M2
GLUCOSE BLD-MCNC: 166 MG/DL (ref 70–105)
HBA1C MFR BLD: 7.7 % (ref 4–6.2)
HDLC SERPL-MCNC: 33 MG/DL (ref 23–92)
LDLC SERPL CALC-MCNC: 78 MG/DL
MICROALBUMIN UR-MCNC: 686 MG/L
MICROALBUMIN/CREAT UR: 394.25 MG/G CR (ref 0–17)
NONHDLC SERPL-MCNC: 116 MG/DL
POTASSIUM BLD-SCNC: 4.2 MMOL/L (ref 3.5–5.1)
PROT SERPL-MCNC: 6.8 G/DL (ref 6.4–8.9)
SODIUM SERPL-SCNC: 138 MMOL/L (ref 134–144)
TRIGL SERPL-MCNC: 190 MG/DL

## 2021-08-06 PROCEDURE — 82043 UR ALBUMIN QUANTITATIVE: CPT | Mod: ZL

## 2021-08-06 PROCEDURE — 80053 COMPREHEN METABOLIC PANEL: CPT | Mod: ZL

## 2021-08-06 PROCEDURE — 80061 LIPID PANEL: CPT | Mod: ZL

## 2021-08-06 PROCEDURE — 82570 ASSAY OF URINE CREATININE: CPT | Mod: ZL

## 2021-08-06 PROCEDURE — 36415 COLL VENOUS BLD VENIPUNCTURE: CPT | Mod: ZL

## 2021-08-06 PROCEDURE — 80307 DRUG TEST PRSMV CHEM ANLYZR: CPT | Mod: ZL

## 2021-08-06 PROCEDURE — 83036 HEMOGLOBIN GLYCOSYLATED A1C: CPT | Mod: ZL

## 2021-08-11 LAB
7AMINOCLONAZEPAM UR QL CFM: PRESENT
CREATININE URINE MG/DL  (SYNCED VALUE): 175 MG/DL
N-NORTRAMADOL/CREAT UR CFM: >7429 NG/MG {CREAT}
O-NORTRAMADOL UR CFM-MCNC: ABNORMAL NG/ML
PREGABALIN UR QL CFM: PRESENT
TRAMADOL CTO UR CFM-MCNC: ABNORMAL NG/ML
TRAMADOL/CREAT UR: >7429 NG/MG {CREAT}

## 2021-08-27 ENCOUNTER — OFFICE VISIT (OUTPATIENT)
Dept: FAMILY MEDICINE | Facility: OTHER | Age: 56
End: 2021-08-27
Attending: FAMILY MEDICINE
Payer: COMMERCIAL

## 2021-08-27 VITALS
BODY MASS INDEX: 44.35 KG/M2 | WEIGHT: 309.13 LBS | RESPIRATION RATE: 20 BRPM | SYSTOLIC BLOOD PRESSURE: 148 MMHG | DIASTOLIC BLOOD PRESSURE: 82 MMHG | TEMPERATURE: 98.6 F | HEART RATE: 80 BPM | OXYGEN SATURATION: 90 %

## 2021-08-27 DIAGNOSIS — E11.3599 PROLIFERATIVE DIABETIC RETINOPATHY ASSOCIATED WITH TYPE 2 DIABETES MELLITUS, UNSPECIFIED LATERALITY, UNSPECIFIED PROLIFERATIVE RETINOPATHY TYPE (H): ICD-10-CM

## 2021-08-27 DIAGNOSIS — Z79.4 UNCONTROLLED TYPE 2 DIABETES MELLITUS WITH HYPERGLYCEMIA, WITH LONG-TERM CURRENT USE OF INSULIN (H): Primary | ICD-10-CM

## 2021-08-27 DIAGNOSIS — K21.9 GASTROESOPHAGEAL REFLUX DISEASE WITHOUT ESOPHAGITIS: ICD-10-CM

## 2021-08-27 DIAGNOSIS — F43.10 PTSD (POST-TRAUMATIC STRESS DISORDER): ICD-10-CM

## 2021-08-27 DIAGNOSIS — N18.30 STAGE 3 CHRONIC KIDNEY DISEASE, UNSPECIFIED WHETHER STAGE 3A OR 3B CKD (H): ICD-10-CM

## 2021-08-27 DIAGNOSIS — E78.5 HYPERLIPIDEMIA, UNSPECIFIED HYPERLIPIDEMIA TYPE: ICD-10-CM

## 2021-08-27 DIAGNOSIS — E66.01 MORBID OBESITY WITH BMI OF 40.0-44.9, ADULT (H): ICD-10-CM

## 2021-08-27 DIAGNOSIS — E11.42 DIABETIC POLYNEUROPATHY ASSOCIATED WITH TYPE 2 DIABETES MELLITUS (H): ICD-10-CM

## 2021-08-27 DIAGNOSIS — E11.65 UNCONTROLLED TYPE 2 DIABETES MELLITUS WITH HYPERGLYCEMIA, WITH LONG-TERM CURRENT USE OF INSULIN (H): Primary | ICD-10-CM

## 2021-08-27 PROCEDURE — 99214 OFFICE O/P EST MOD 30 MIN: CPT | Performed by: FAMILY MEDICINE

## 2021-08-27 PROCEDURE — G0463 HOSPITAL OUTPT CLINIC VISIT: HCPCS | Mod: 25

## 2021-08-27 PROCEDURE — G0463 HOSPITAL OUTPT CLINIC VISIT: HCPCS

## 2021-08-27 PROCEDURE — 90715 TDAP VACCINE 7 YRS/> IM: CPT

## 2021-08-27 ASSESSMENT — PAIN SCALES - GENERAL: PAINLEVEL: NO PAIN (0)

## 2021-08-27 ASSESSMENT — PATIENT HEALTH QUESTIONNAIRE - PHQ9: SUM OF ALL RESPONSES TO PHQ QUESTIONS 1-9: 10

## 2021-08-27 NOTE — PROGRESS NOTES
"c  SUBJECTIVE:   Jake Bermudez is a 56 year old male who presents to clinic today for the following health issues:    CHRISTOPHER  Rajan is here for multiple concerns, including:    --DM check.  Last was a virtual visit in 4/2021.  Labs done 8/6/2021.  Hgb A1c at that time was 7.7%.  4x/day testing  AM: , lately 150-160.  Lunch: \"pretty good\"  Supper: \"fair\"  HS: \"kind of high\"  More activity through the day \"sometimes.\"  Later in the visit, Summer states he doesn't leave his chair or the AC during the summer.  Remains obese; but doesn't have a problem with it and no desire to change.  Continues to have foot pain/neuropathy.  Best is at night - Belly sleeping. Feet off the end of the bed; fan on them.  Helps the feet pain the most.    --snoring, headaches, high BP.  Has undiagnosed/untreated ARMANDO due to declining sleep study or wearing a mask at all.  Discussed long term heart/health effects and continues to decline referral.    -- Has a spot on his side he would like looked at.  Was always a red mole; but it grew.  Was \"dragging\" on the sheets at times, so wife put a bandaid on it.  Now smaller than a pea.  Wondering if anything else needs to be done.  Not bothering him at all now.    PREVENTATIVE:  Eye exam: DUE  Colon cancer screening: Due; did not complete cologuard recently.  Still has it at home.  Chest CT follow up: DUE  Tdap DUE - will complete today; flu yearly; Shingrix DUE - declines at this time; PNA done x1, repeat at 65/66. Hep B - declines  Consider Covid vaccine - declines.    Patient Active Problem List    Diagnosis Date Noted     PFS (patellofemoral syndrome) 06/28/2018     Priority: Medium     Secondary to quadriceps weakness; [facility] by Bennie Palaicos, 6/2018.       Restrictive lung disease 03/07/2018     Priority: Medium     Family history of ischemic heart disease 02/01/2018     Priority: Medium     Acquired keratoderma 02/01/2018     Priority: Medium     Enthesopathy of " ankle and tarsus 02/01/2018     Priority: Medium     ARMANDO (obstructive sleep apnea) 01/17/2018     Priority: Medium     Overview:   Treated with NOC O2       Lung nodule < 6cm on CT 12/01/2017     Priority: Medium     Overview:   Seen on 11/30/2017; repeat in one year for follow up.       Spondylosis of lumbar region without myelopathy or radiculopathy 10/19/2017     Priority: Medium     CKD (chronic kidney disease) stage 3, GFR 30-59 ml/min 09/11/2016     Priority: Medium     Overview:   Secondary to DM2  Overview:   Secondary to DM2       PTSD (post-traumatic stress disorder) 09/08/2016     Priority: Medium     Diabetic polyneuropathy associated with type 2 diabetes mellitus (H) 01/20/2016     Priority: Medium     Diabetic, retinopathy, proliferative (H) 07/01/2014     Priority: Medium     Overview:   Mild proliferative changes on left side (6/2014)--Dr Ortiz  No evidence of retinopathy, but early cataract formation b/l (1/2015) - Dr. Rishi Jacobs       Morbid obesity with BMI of 40.0-44.9, adult (H) 06/29/2014     Priority: Medium     Lateral epicondylitis of left elbow 10/03/2013     Priority: Medium     Overview:   Seen by Dr Tayla Jung 8/2013       Impotence of organic origin 07/16/2012     Priority: Medium     Overview:   D/C cymbalta       Other symptoms referable to upper arm joint 07/16/2012     Priority: Medium     Overview:   mass below elbow Lt       Personal history of other diseases of circulatory system 07/02/2012     Priority: Medium     Chronic bilateral low back pain without sciatica 04/03/2012     Priority: Medium     Corns and callosities 03/02/2012     Priority: Medium     Atelectasis of right lung 12/24/2011     Priority: Medium     GERD (gastroesophageal reflux disease) 12/24/2011     Priority: Medium     Hyperlipidemia 12/24/2011     Priority: Medium     Rotator cuff syndrome of left shoulder 12/24/2011     Priority: Medium     Overview:   S/P Left rotator cuff repair 12/1/2011.   S/P Right rotator cuff repair 2010.       Uncontrolled type 2 diabetes mellitus with hyperglycemia, with long-term current use of insulin (H) 2011     Priority: Medium     Other, multiple and ill-defined closed fractures of lower limb 2010     Priority: Medium     Past Medical History:   Diagnosis Date     Acute respiratory failure with hypoxia (H) 2011     Community acquired bacterial pneumonia 2011     Empyema (H) 2012    Overview:  Rt lung 11     Lateral epicondylitis of left elbow     10/3/2013,Seen by Dr Tayla [facility] 2013     Moderate cigarette smoker (10-19 per day) 2011     On mechanically assisted ventilation (H) 1/3/2012    Overview:  IMO Update  Overview:  IMO Update     Type 2 diabetes mellitus with proliferative retinopathy without macular edema (H) 2014    Mild proliferative changes on left side--sees Dr Ortiz exam 2014.       Umbilical hernia with obstruction, without gangrene 10/31/2018      Past Surgical History:   Procedure Laterality Date     ARTHROSCOPY SHOULDER      [id number],SHOULDER SURGERY,Bilateral     CHEST TUBE INSERTION      ,CHEST TUBE INSERTION     HERNIORRHAPHY UMBILICAL N/A 2018    Procedure: Umbilical hernia Repair with Mesh;  Surgeon: Mason Rhodes MD;  Location: [facility] OR     LUNG SURGERY      [id number],LUNG SURGERY,pneumonia     TRACHEOSTOMY      [id number],HCHG TRACH PR1,history trach with pneumonia     Family History   Problem Relation Age of Onset     Heart Disease Other         multiple males     Diabetes Other      Cancer Father         Stomach CA     Colon Cancer Paternal Uncle      Colon Cancer Paternal Grandfather      Social History     Tobacco Use     Smoking status: Former Smoker     Packs/day: 0.50     Years: 28.00     Pack years: 14.00     Types: Cigarettes     Quit date: 2011     Years since quittin.6     Smokeless tobacco: Former User     Types: Chew     Quit date: 1988   Substance  Use Topics     Alcohol use: No     Social History     Social History Narrative    Alcohol Use - no    2 children  unemployed 10 1/2 % disability for back injury  Patient was a former smoker.  1/2 PPD     Current Outpatient Medications   Medication Sig Dispense Refill     acetaminophen (EQ PAIN RELIEVER) 500 MG tablet TAKE 1 TABLET BY MOUTH EVERY 6 HOURS AS NEEDED .  MAX  ACETAMINOPHEN  DOSE  4000  MG  IN  24  HOURS. 100 tablet 4     ADVAIR DISKUS 250-50 MCG/DOSE inhaler INHALE 1 PUFF INTO LUNGS TWICE DAILY 3 each 4     albuterol (PROAIR HFA/PROVENTIL HFA/VENTOLIN HFA) 108 (90 Base) MCG/ACT inhaler Inhale 2 puffs into the lungs every 4 hours as needed for shortness of breath / dyspnea or wheezing 18 g 11     alcohol swab prep pads Use to swab area of injection/france as directed. 100 each 6     Aspirin (ASPIR-81 PO) Take 81 mg by mouth daily with food       blood glucose (ACCU-CHEK DONAVON PLUS) test strip Use to check blood glucose 4 times daily. Dispense as insurance allows. Dx. Code: E11.65. 400 strip 0     blood glucose calibration (ACCU-CHEK DONAVON) solution Use to calibrate blood glucose monitor as needed as directed. 1 Bottle 3     blood glucose monitoring (ACCU-CHEK DONAVON PLUS) meter device kit Use to test blood sugar 4 times daily or as directed.  Dx: hyperglycemia 1 kit 0     blood glucose monitoring (ACCU-CHEK FASTCLIX) lancets Use to test blood sugar 4 times daily or as directed. 360 each 4     Cholecalciferol (VITAMIN D3) 2000 units TABS TAKE ONE TABLET BY MOUTH ONCE DAILY 100 tablet 2     clonazePAM (KLONOPIN) 0.5 MG tablet Take 1 tablet (0.5 mg) by mouth At Bedtime 30 tablet 5     FLUoxetine (PROZAC) 20 MG capsule Take 1 capsule (20 mg) by mouth every morning 90 capsule 4     fluticasone (FLOVENT HFA) 110 MCG/ACT inhaler Inhale 1 puff into the lungs 2 times daily 12 g 11     hydrochlorothiazide (HYDRODIURIL) 25 MG tablet Take 1 tablet (25 mg) by mouth daily 90 tablet 4     insulin glargine (LANTUS  SOLOSTAR) 100 UNIT/ML pen INJECT 175 UNITS SUBCUTANEOUSLY AT BEDTIME 45 mL 11     insulin pen needle (32G X 4 MM) 32G X 4 MM miscellaneous Use 1 pen needles 5x/day to inject insulin or as directed. 500 each 4     LIDOCAINE PAIN RELIEF 4 % Patch Apply one patch topically to clean, dry skin. Leave on for 12 hours then remove. Must wait at least 12 hours before applying patch again 30 patch 11     lisinopril (ZESTRIL) 30 MG tablet Take 1 tablet (30 mg) by mouth daily 90 tablet 4     LYRICA 200 MG capsule TAKE 1 CAPSULE BY MOUTH THREE TIMES DAILY 90 capsule 5     melatonin 5 MG tablet Take 1-2 tablets (5-10 mg) by mouth At Bedtime 180 tablet 3     meloxicam (MOBIC) 15 MG tablet Take 1 tablet by mouth once daily with food 90 tablet 4     mirtazapine (REMERON) 15 MG tablet Take 1 tablet (15 mg) by mouth At Bedtime       Misc. Devices (BATH/SHOWER SEAT) MISC 1 each daily as needed (to assist during ADLs of bathing/grooming) 1 each 0     NOVOLOG FLEXPEN 100 UNIT/ML soln INJECT 60-70 UNITS SUBCUTANEOUSLY WITH EACH MEAL BASED ON INSULIN:CARB RATIO. MAXIMUM 240 UNITS PER DAY. 90 mL 11     omeprazole (PRILOSEC) 20 MG DR capsule TAKE 1 CAPSULE BY MOUTH ONCE DAILY BEFORE A MEAL 90 capsule 4     order for DME Oxygen for home use. LPM: 1/min via nasal cannula. Frequency of use: Continuous with portability; Length of need: 12 mos.       simvastatin (ZOCOR) 40 MG tablet Take 1 tablet (40 mg) by mouth At Bedtime 90 tablet 4     traMADol (ULTRAM) 50 MG tablet Take 1 tablet by mouth three times daily as needed 90 tablet 5     umeclidinium-vilanterol (ANORO ELLIPTA) 62.5-25 MCG/INH oral inhaler Inhale 1 puff into the lungs       Allergies   Allergen Reactions     Penicillins      Other reaction(s): Diaphoresis     Piperacillin-Tazobactam In D5w Other (See Comments)     Other reaction(s): Diaphoresis, Fever  Fever while on zosyn, vanc. No rash, no wbc elevation     Vancomycin Other (See Comments)     Fever while on zosyn, vanc, no rash or  WBC elevation  Other reaction(s): Diaphoresis, Fever  Fever while on zosyn, vanc, no rash or WBC elevation     Zosyn Other (See Comments)     Fever while on zosyn, vanc. No rash, no wbc elevation  Fever while on zosyn, vanc. No rash, no wbc elevation       OBJECTIVE:     BP (!) 148/82   Pulse 80   Temp 98.6  F (37  C) (Tympanic)   Resp 20   Wt 140.2 kg (309 lb 2 oz)   SpO2 90%   BMI 44.35 kg/m    Body mass index is 44.35 kg/m .  Physical Exam  Vitals and nursing note reviewed.   Constitutional:       Appearance: He is obese.   HENT:      Head: Normocephalic and atraumatic.   Cardiovascular:      Rate and Rhythm: Normal rate and regular rhythm.      Pulses:           Dorsalis pedis pulses are 1+ on the right side and 1+ on the left side.        Posterior tibial pulses are 1+ on the right side and 1+ on the left side.   Pulmonary:      Effort: Pulmonary effort is normal.   Musculoskeletal:      Right foot: Normal range of motion. No deformity or bunion.      Left foot: Normal range of motion. No deformity or bunion.   Feet:      Right foot:      Protective Sensation: 5 sites tested. 6 sites sensed.      Skin integrity: Callus and dry skin present.      Toenail Condition: Right toenails are abnormally thick and long.      Left foot:      Protective Sensation: 5 sites tested. 6 sites sensed.      Skin integrity: Callus and dry skin present.      Toenail Condition: Left toenails are abnormally thick and long.      Comments: Feet caked with dirt and debris.  Skin:     General: Skin is warm.      Capillary Refill: Capillary refill takes less than 2 seconds.      Comments: Cherry angioma vs strawberry hemangioma type lesion on L side, involuting?  No concerning appearance.   Neurological:      Mental Status: He is alert.     Diagnostic Test Results:  No results found for any visits on 08/27/21.    ASSESSMENT/PLAN:     1. Uncontrolled type 2 diabetes mellitus with hyperglycemia, with long-term current use of insulin  (H)  Needs to evaluate diet with CDE; and general education.  Gets some thoughts in his mind and doesn't seem to   - AMB Adult Diabetes Educator Referral; Future    2. Lung nodule < 6cm on CT  Due for repeat imaging.  Ordered.  + tobacco history.  - CT Chest w Contrast; Future    3. Hyperlipidemia, unspecified hyperlipidemia type  On simvastatin; triglycerides only slightly out of goal.  Likely better control with lower Hgb A1c.    4. Proliferative diabetic retinopathy associated with type 2 diabetes mellitus, unspecified laterality, unspecified proliferative retinopathy type (H)  Due for eye exam; encouraged to schedule.    5. Gastroesophageal reflux disease without esophagitis  Chronic - body habitus, diet playing the biggest role.  Refill meds prn as no effort is being taken to change/improve his conditions.    6. Morbid obesity with BMI of 40.0-44.9, adult (H)  Does not have any desire to change; will remain at high risk of CVD event/death.    7. Stage 3 chronic kidney disease, unspecified whether stage 3a or 3b CKD  2/2 to DM, obesity, HTN, more.  Recent BMP unchanged.  On ACEI. Continue to monitor on q3-6 month intervals at minimum.    8. PTSD (post-traumatic stress disorder)  Follow with mental health.    9. Diabetic polyneuropathy associated with type 2 diabetes mellitus (H)  Follows with Neurology; hasn't seen new one yet since his previous one retired.   Will benefit from DM shoes; and referral placed to [facility].  Uses Lyrica, Tramadol for pain.    Activity Duration    Pre-charting 4 minutes    Pre-charting 4 minutes    Exam room 50 minutes    Total time: 59 minutes*       Kira Grider,   [facility]

## 2021-08-27 NOTE — NURSING NOTE
"Chief Complaint   Patient presents with     Diabetes       Initial BP (!) 148/82   Pulse 80   Temp 98.6  F (37  C) (Tympanic)   Resp 20   Wt 140.2 kg (309 lb 2 oz)   SpO2 90%   BMI 44.35 kg/m   Estimated body mass index is 44.35 kg/m  as calculated from the following:    Height as of 7/21/20: 1.778 m (5' 10\").    Weight as of this encounter: 140.2 kg (309 lb 2 oz).  Medication Reconciliation: complete    Adri Hernandez LPN     Previous A1C is at goal of <8  Lab Results   Component Value Date    A1C 7.7 08/06/2021    A1C 7.7 07/14/2020    A1C 7.2 09/05/2019    A1C 7.2 03/21/2019    A1C 8.0 09/26/2018    A1C 9.1 06/27/2018     Urine microalbumin:creatine: 8/06/21  Foot exam 7/14/20  Eye exam 3/20    Tobacco User no  Patient is on a daily aspirin  Patient is on a Statin.  Blood pressure today of:     BP Readings from Last 1 Encounters:   08/27/21 (!) 148/82      is not at the goal of <139/89 for diabetics.    Adri Hernandez LPN on 8/27/2021 at 11:19 AM        "

## 2021-08-30 ENCOUNTER — TELEPHONE (OUTPATIENT)
Dept: FAMILY MEDICINE | Facility: OTHER | Age: 56
End: 2021-08-30

## 2021-08-30 NOTE — TELEPHONE ENCOUNTER
Received a call for an appointment, wasn't sure from who, maybe a call regarding toenail issues?  Was seen last week. Please call, thanks!    Marika Ponce on 8/30/2021 at 10:42 AM

## 2021-09-02 ENCOUNTER — TELEPHONE (OUTPATIENT)
Dept: FAMILY MEDICINE | Facility: OTHER | Age: 56
End: 2021-09-02

## 2021-09-02 NOTE — TELEPHONE ENCOUNTER
Talked with wife and she states that when Rajan was in last week, Dr Grider looked at his feet and they wondering if she had any thoughts on wether he should be referred for the toes that were thick and long or wether he has a fungal infection that he could be treated for?  Okay for 9/07/21 when Dr Grider is back.  Adri Hernandez, ANTIONETTE, LPN  9/2/2021  11:49 AM

## 2021-09-07 ENCOUNTER — TELEPHONE (OUTPATIENT)
Dept: FAMILY MEDICINE | Facility: OTHER | Age: 56
End: 2021-09-07

## 2021-09-07 NOTE — TELEPHONE ENCOUNTER
Left message to call back....................  9/7/2021   9:37 AM  Adri Hernandez LPN, LPN  9/7/2021  9:37 AM

## 2021-09-07 NOTE — TELEPHONE ENCOUNTER
Had recommended [facility]: (footcare performed by an LPN)  [phone]  [address] [address]    If he prefers referral to podiatry, let me know and I can put that in.  Kira Grider, DO

## 2021-09-07 NOTE — TELEPHONE ENCOUNTER
Wife notified of Dr Grider's response in telephone encounter dated 8/30/21.  Adri Hernandez LPN, LPN  9/7/2021  11:30 AM

## 2021-10-09 DIAGNOSIS — E11.65 UNCONTROLLED TYPE 2 DIABETES MELLITUS WITH HYPERGLYCEMIA, WITH LONG-TERM CURRENT USE OF INSULIN (H): ICD-10-CM

## 2021-10-09 DIAGNOSIS — Z79.4 UNCONTROLLED TYPE 2 DIABETES MELLITUS WITH HYPERGLYCEMIA, WITH LONG-TERM CURRENT USE OF INSULIN (H): ICD-10-CM

## 2021-10-10 RX ORDER — BLOOD SUGAR DIAGNOSTIC
STRIP MISCELLANEOUS
Qty: 100 STRIP | Refills: 11 | Status: SHIPPED | OUTPATIENT
Start: 2021-10-10 | End: 2022-09-30

## 2021-10-19 ENCOUNTER — TELEPHONE (OUTPATIENT)
Dept: FAMILY MEDICINE | Facility: OTHER | Age: 56
End: 2021-10-19

## 2021-10-20 ENCOUNTER — TELEPHONE (OUTPATIENT)
Dept: FAMILY MEDICINE | Facility: OTHER | Age: 56
End: 2021-10-20

## 2021-10-20 NOTE — TELEPHONE ENCOUNTER
Please call the patient.  He would like to try Revitive.  This is for his foot pain.          Valorie Phipps on 10/20/2021 at 2:15 PM

## 2021-10-20 NOTE — TELEPHONE ENCOUNTER
Talked with wife and there is a machine that was advertised on the TV and he is interested in it. It is supposed to help with diabetics and foot pain/issues. She is wondering if Dr Grider has heard of this before and if we can do some research on it. She would like to know if it is a prescription and if any place in town would carry it. They do not have access to the Internet so she is requesting we look into it for them.  Adri Hernandez LPN, LPN  10/20/2021  2:34 PM      After researching this on the Internet, it is not covered under insurance and runs around $180-$200.  Looks like you have to go through their website to order it. It is a foot massage.      Patient was notified of what this writer researched.  Adri Hernandez LPN, LPN  10/20/2021  2:37 PM

## 2021-10-21 ENCOUNTER — TELEPHONE (OUTPATIENT)
Dept: FAMILY MEDICINE | Facility: OTHER | Age: 56
End: 2021-10-21

## 2021-10-21 NOTE — TELEPHONE ENCOUNTER
Called patient to do a well check, as he was a no show for his appointment for foot care.  Patient stated he forgot about appointment.  Giovana Juan LPN on 10/21/2021 at 10:55 AM

## 2021-10-26 DIAGNOSIS — M54.50 CHRONIC MIDLINE LOW BACK PAIN, UNSPECIFIED WHETHER SCIATICA PRESENT: ICD-10-CM

## 2021-10-26 DIAGNOSIS — G89.29 CHRONIC MIDLINE LOW BACK PAIN, UNSPECIFIED WHETHER SCIATICA PRESENT: ICD-10-CM

## 2021-10-26 DIAGNOSIS — E11.42 DIABETIC POLYNEUROPATHY ASSOCIATED WITH TYPE 2 DIABETES MELLITUS (H): ICD-10-CM

## 2021-10-26 DIAGNOSIS — F41.1 GAD (GENERALIZED ANXIETY DISORDER): ICD-10-CM

## 2021-10-28 NOTE — TELEPHONE ENCOUNTER
[facility] sent Rx request for the following:     Requested Prescriptions   Pending Prescriptions Disp Refills     LYRICA 200 MG capsule [Pharmacy Med Name: Lyrica 200 MG Oral Capsule] 90 capsule 0     Sig: TAKE 1 CAPSULE BY MOUTH THREE TIMES DAILY         Last Prescription Date:   4/28/2021  Last Fill Qty/Refills:         90, R-5    Last Office Visit:              11/18/20212 Alvinjackshawn    Future Office visit:           8/27/2021    Routing refill request to provider for review/approval because:  Drug not on the G, P or  Health refill protocol or controlled substance     There is no refill protocol information for this order          clonazePAM (KLONOPIN) 0.5 MG tablet [Pharmacy Med Name: clonazePAM 0.5 MG Oral Tablet] 30 tablet 0     Sig: TAKE 1 TABLET BY MOUTH AT BEDTIME         Last Prescription Date:   4/28/2021  Last Fill Qty/Refills:         30, R-5      Routing refill request to provider for review/approval because:  Drug not on the G, P or  Health refill protocol or controlled substance     There is no refill protocol information for this order          traMADol (ULTRAM) 50 MG tablet [Pharmacy Med Name: traMADol HCl 50 MG Oral Tablet] 90 tablet 0     Sig: Take 1 tablet by mouth three times daily as needed     Last Prescription Date:   4/28/2021  Last Fill Qty/Refills:         90, R-5      Routing refill request to provider for review/approval because:  Drug not on the G, P or  Health refill protocol or controlled substance        There is no refill protocol information for this order        Unable to complete prescription refill per RN Medication Refill Policy.................... Beti Savage RN ....................  10/28/2021   10:38 AM

## 2021-10-29 RX ORDER — CLONAZEPAM 0.5 MG/1
TABLET ORAL
Qty: 30 TABLET | Refills: 5 | Status: SHIPPED | OUTPATIENT
Start: 2021-10-29 | End: 2022-05-11

## 2021-10-29 RX ORDER — TRAMADOL HYDROCHLORIDE 50 MG/1
TABLET ORAL
Qty: 90 TABLET | Refills: 5 | Status: SHIPPED | OUTPATIENT
Start: 2021-10-29 | End: 2022-05-06

## 2021-10-29 RX ORDER — PREGABALIN 200 MG/1
CAPSULE ORAL
Qty: 90 CAPSULE | Refills: 1 | Status: SHIPPED | OUTPATIENT
Start: 2021-10-29 | End: 2022-01-04

## 2021-11-02 ENCOUNTER — MEDICAL CORRESPONDENCE (OUTPATIENT)
Dept: HEALTH INFORMATION MANAGEMENT | Facility: OTHER | Age: 56
End: 2021-11-02

## 2021-11-17 PROBLEM — L60.3 NAIL DYSTROPHY: Chronic | Status: ACTIVE | Noted: 2021-11-17

## 2021-11-17 NOTE — PROGRESS NOTES
"Jake Bermudez  : 1965 Age: 56 year old Sex: male MRN: [id number]    Nursing Notes:   Giovana Juan LPN  2021 11:47 AM  Signed  .  Chief Complaint   Patient presents with     Musculoskeletal Problem     F/N care       Initial BP (!) 142/82 (BP Location: Right arm, Patient Position: Sitting, Cuff Size: Adult Large)   Pulse 82   Temp 98.4  F (36.9  C) (Tympanic)   Resp 20   Wt 139.4 kg (307 lb 6.4 oz)   SpO2 91%   BMI 44.11 kg/m   Estimated body mass index is 44.11 kg/m  as calculated from the following:    Height as of 20: 1.778 m (5' 10\").    Weight as of this encounter: 139.4 kg (307 lb 6.4 oz).     Medication Reconciliation: complete    FOOD SECURITY SCREENING QUESTIONS  Hunger Vital Signs:  Within the past 12 months we worried whether our food would run out before we got money to buy more. Never  Within the past 12 months the food we bought just didn't last and we didn't have money to get more. Never    Advance care plan reviewed      Giovana Juan LPN         Nursing note reviewed with patient.  Accuracy and completeness verified.     Encounter Date:  2021    Patient Name:  Jake Bermudez  Patient MRN:   [id number]     Last Footcare Appt: n/a    PCP: Kira Grider    Other Providers Seen for Foot/Nail Care (name/location/date): [] Yes [x] No List (if applicable):    ABN Given to patient and signed in clinic today:    [x] Yes [] No    SUBJECTIVE:    Patient Reported Symptoms: RIGHT  LEFT   Neuropathic symptoms [x]  [x]  DESCRIBE:   Pain in foot/toes at rest [x]  [x]  DESCRIBE:   Intermittent claudication []  []  DESCRIBE:   Previous foot ulcer/injury [x]  []  DESCRIBE: fissure in end of R #2   Amputation []  []  DESCRIBE:    OBJECTIVE:    Nurse/Provider Inspection of Feet/Nails: RIGHT  LEFT   Infection []  []  DESCRIBE:   Ulcerations []  []  DESCRIBE:   Calluses/Corns [x]  [x]  DESCRIBE: heels   Fissures/Cracks [x]  []  DESCRIBE: 2nd toe tip   Nail " Disorders/Changes [x]  [x]  DESCRIBE: fungal growth all 10 toenails   Other [x]      [x]  DESCRIBE: has dried cat vomit on feet, thick brown dried material on heels and between toes    VASCULAR TESTING:    Foot Pulses:  RIGHT  LEFT   Dorsalis pedis []  []    Posterior tibial []  []   Capillary refill of the hallux (<3 seconds): [] []  Digital hair present: [] []  Varicosities (+/- presents): [] []  Edema (pitting vs. non pitting): [x] [x]     DESCRIBE: [] 1+ minimal   [x] 2+ moderate   [] 3+ severe  Skin Temperature:  [x] [x]     DESCRIBE: [] Cold   [] Hot    [x] Warm to touch    NEUROLOGIC TESTING:        RIGHT LEFT  Sharp/dull Testing: (the blunt end and sharp end of swab stick) [x] [x]  Proprioception: (moves hallux up/down in space) [x] [x]  Superficial Reflexes: (Babinski sign) [x] [x]    Monofilament Testing: RIGHT  LEFT  RIGHT LEFT    Site 1 [x]  []  Site 6 [x] [x]    Site 2 [x]  [x]  Site 7 [x] [x]    Site 3 [x]  [x]  Site 8 [x] [x]    Site 4 [x]  [x]  Site 9 [x] []    Site 5 [x]  [x]  Site 10 [] []      MUSCULOSKELETAL: RIGHT LEFT  Foot Type: [] []  DESCRIBE: [] Pes cavus (high arch) [x] pes rectus (normal) [] pes planus (flat)  Digital deformity: [] []  DESCRIBE: [] bunion [] claw toe [] hammer toe [] mallet toe [] hallux limitus [] other  Joint ROM: [x] [x]  DESCRIBE: [x] adequate ROM to MTPH, STJ, and AJ without crepitus  Adequate Muscle Strength: [x] [x]   Other [] []  DESCRIBE:     DERMATOLOGIC:      Nails: RIGHT LEFT  Onychauxis (thickened, long nail): [x] [x]  DESCRIBE: all 10 toenails  Onycholysis ( nail): [] []  DESCRIBE:   Onychocryptosis (ingrown toenail): [] []  DESCRIBE:    Onychogryphosis (eliseo's horn nail): [] []  DESCRIBE:   Onchomycosis (fungal nail): [x] [x]  DESCRIBE: all 10 toenails    Skin: RIGHT LEFT  [x] Hyperkeratotic lesions [] verruca tissue [] foreign body: [x] [x]  DESCRIBE: bilateral heels  [] Mild edema [x] erythema [x] open lesions [] interdigit  maceration: [] [x]  DESCRIBE: left foot redness, open wound R 2nd toe  [] Varicosities [] telangiectasis [] pigmented lesion [] venous stasis:  [] []  DESCRIBE:   Adequate padding to the plantar aspect of the foot: [] []  DESCRIBE:     Footwear Assessment:  Type: diabetic shoes   Condition Good [x] Fair [] Poor []   Fit Good [x] Fair [] Poor []            Yes No  Does the patient use an ambulatory aid?      [] [x] DESCRIBE:  Does the patient understand the effects of diabetes on foot health? [] [x]  Can the patient identify appropriate foot care practices?    [] [x]  Are the patient's feet adequately cared for?      [] [x]            Yes No  Does the patient have impaired vision?      [x] []  Can the patient reach own feet for safe self care?     [] [x]  Are there other factors influencing ability to safely care for own feet? [x] []     Pertinent Past Medical History:  Patient Active Problem List   Diagnosis     Personal history of other diseases of circulatory system     Chronic bilateral low back pain without sciatica     Corns and callosities     CKD (chronic kidney disease) stage 3, GFR 30-59 ml/min (H)     Diabetic polyneuropathy associated with type 2 diabetes mellitus (H)     Uncontrolled type 2 diabetes mellitus with hyperglycemia, with long-term current use of insulin (H)     Diabetic, retinopathy, proliferative (H)     Impotence of organic origin     Family history of ischemic heart disease     Other, multiple and ill-defined closed fractures of lower limb     Lung nodule < 6cm on CT     Lateral epicondylitis of left elbow     Acquired keratoderma     Enthesopathy of ankle and tarsus     Morbid obesity with BMI of 40.0-44.9, adult (H)     Other symptoms referable to upper arm joint     PTSD (post-traumatic stress disorder)     Restrictive lung disease     Atelectasis of right lung     GERD (gastroesophageal reflux disease)     Hyperlipidemia     ARMANDO (obstructive sleep apnea)     Rotator cuff syndrome of  left shoulder     Spondylosis of lumbar region without myelopathy or radiculopathy     PFS (patellofemoral syndrome)     Past Medical History:   Diagnosis Date     Acute respiratory failure with hypoxia (H) 12/24/2011     Community acquired bacterial pneumonia 12/24/2011     Empyema (H) 1/26/2012    Overview:  Rt lung 12/24/11     Lateral epicondylitis of left elbow     10/3/2013,Seen by Dr Tayla Jung 8/2013     Moderate cigarette smoker (10-19 per day) 12/24/2011     On mechanically assisted ventilation (H) 1/3/2012    Overview:  IMO Update  Overview:  IMO Update     Type 2 diabetes mellitus with proliferative retinopathy without macular edema (H) 07/01/2014    Mild proliferative changes on left side--sees Dr Ortiz exam 6/2014.       Umbilical hernia with obstruction, without gangrene 10/31/2018       Diagnostics:  Hemoglobin A1C   Date Value Ref Range Status   08/06/2021 7.7 (H) 4.0 - 6.2 % Final   07/14/2020 7.7 (H) 4.0 - 6.0 % Final     ASSESSMENT/PLAN:    Diabetic polyneuropathy associated with type 2 diabetes mellitus (H)  Uncontrolled type 2 diabetes mellitus with hyperglycemia, with long-term current use of insulin (H)  Nail dystrophy  - TRIMMING DYSTROPHIC NAILS,ANY NUMBER  - ROUTINE FOOT CARE BY A PHYSICIAN OF A DIABETIC PATIENT WITH DIABETIC SENSORY  All toenails filed down to manageable thickness with Dremel tool and angled [place].  Toenails were then trimmed with clippers.  Lotion applied to bilateral lower extremities.  Patient tolerated well. Time spent doing foot/nail care was 35 minutes.      [x] Patient is unable to trim own toenails due to mobility limitation as a result of obesity, RLD, diabetic retinopathy    [x] Patient has limited sensation in both feet as a result of neuropathic damage due to uncontrolled T2DM.    [x] Patient is on chronic anticoagulation and chronic use of this medication poses a increased risk of bleeding should patient sustain a nail/skin injury.    [x] Patient  has noted onychomycosis for some time now and has tried various OTC treatments without success.     Educated Patient On:   [x] Proper healthy diet. Eliminate soda, high fructose corn syrup products, artificial sweeteners, and high sodium, high cholesterol foods. Encouraged more fruits and   vegetables and an increase in daily water intake.  [x] Educated on importance of diet and exercise for weight loss and reduction on symptoms.   [x] Daily exercise for at least 30 minutes is recommended. This can be walking, riding a bike, low impact aerobic activity, or yoga.    [x] Importance of daily skin assessments.   [x] Importance of not walking barefoot. Recommend wearing Crocs in house versus going barefoot.   [x] Importance of good, supportive shoes. Handout given today.  [x] Importance of smoking cessation. Greater than 3 minutes were spent on smoking cessation including encouragement to reduce cigarette use and discussion of modalities to assist with this. Cessation was encouraged in order to improve pain, reduce mortality, improve quality of life, and long term outcomes.     Recommended:      YES NO   Diabetic socks:      [x] [] [] Not applicable   Compression stockings/sleeves:   [x] [] [] Not applicable   Diabetic/supportive shoegear with wide toe box:  [x] [] [] Not applicable   Use of Minh's VapoRub daily to feet and/or toenails: [x] [] [] Not applicable   Smoking Cessation:     [] [] [x] Not applicable    FOLLOW-UP:    Return to clinic: [] One week [] 30 Days (wounds) [x] 63+ Days   [] As directed by Provider    I explained my diagnostic considerations and recommendations to the patient, who voiced understanding and agreement with the treatment plan. All questions were answered. We discussed potential side effects of any prescribed or recommended therapies, as well as expectations for response to treatments.     BRANDYN Samuels, AGNP-C  Internal Medicine  [facility]    11/18/2021  2:16 PM    Total time spent with this patient was 40 minutes which included chart review, procedures, patient education, visualization and interpretation of labs/images, time spent with the patient and documentation.     [x]  TRIMMING DYSTROPHIC NAILS,ANY NUMBER  [] 14172 TRIM HYPERKERATOTIC SKIN LESION, 1  [] 68443 TRIM HYPERKERATOTIC SKIN LESION,2-4  [] 14224 TRIM HYPERKERATOTIC SKIN LESION,>4  [] 23232 TRIM NON DYSTROPHIC NAIL(S)  [] 99107 DEBRIDEMENT OF NAIL(S) 1-5  [] 76086 DEBRIDEMENT OF NAIL(S) 6 OR MORE  [] 31157 REMOVAL NAIL/NAIL BED, PARTIAL OR COMPLETE

## 2021-11-18 ENCOUNTER — OFFICE VISIT (OUTPATIENT)
Dept: INTERNAL MEDICINE | Facility: OTHER | Age: 56
End: 2021-11-18
Attending: NURSE PRACTITIONER
Payer: COMMERCIAL

## 2021-11-18 VITALS
OXYGEN SATURATION: 91 % | BODY MASS INDEX: 44.11 KG/M2 | WEIGHT: 307.4 LBS | HEART RATE: 82 BPM | TEMPERATURE: 98.4 F | RESPIRATION RATE: 20 BRPM | DIASTOLIC BLOOD PRESSURE: 80 MMHG | SYSTOLIC BLOOD PRESSURE: 136 MMHG

## 2021-11-18 DIAGNOSIS — L84 CORNS AND CALLOSITIES: ICD-10-CM

## 2021-11-18 DIAGNOSIS — L60.3 NAIL DYSTROPHY: Chronic | ICD-10-CM

## 2021-11-18 DIAGNOSIS — Z79.4 UNCONTROLLED TYPE 2 DIABETES MELLITUS WITH HYPERGLYCEMIA, WITH LONG-TERM CURRENT USE OF INSULIN (H): ICD-10-CM

## 2021-11-18 DIAGNOSIS — E11.42 DIABETIC POLYNEUROPATHY ASSOCIATED WITH TYPE 2 DIABETES MELLITUS (H): Primary | ICD-10-CM

## 2021-11-18 DIAGNOSIS — E11.65 UNCONTROLLED TYPE 2 DIABETES MELLITUS WITH HYPERGLYCEMIA, WITH LONG-TERM CURRENT USE OF INSULIN (H): ICD-10-CM

## 2021-11-18 PROCEDURE — G0127 TRIM NAIL(S): HCPCS | Mod: Q7,GA | Performed by: NURSE PRACTITIONER

## 2021-11-18 PROCEDURE — G0463 HOSPITAL OUTPT CLINIC VISIT: HCPCS | Mod: 25

## 2021-11-18 PROCEDURE — G0463 HOSPITAL OUTPT CLINIC VISIT: HCPCS

## 2021-11-18 PROCEDURE — 99215 OFFICE O/P EST HI 40 MIN: CPT | Mod: 25 | Performed by: NURSE PRACTITIONER

## 2021-11-18 RX ORDER — NALOXONE HYDROCHLORIDE 4 MG/.1ML
SPRAY NASAL
COMMUNITY
Start: 2021-10-29

## 2021-11-18 ASSESSMENT — PAIN SCALES - GENERAL: PAINLEVEL: MODERATE PAIN (5)

## 2021-11-18 NOTE — NURSING NOTE
".  Chief Complaint   Patient presents with     Musculoskeletal Problem     F/N care       Initial BP (!) 142/82 (BP Location: Right arm, Patient Position: Sitting, Cuff Size: Adult Large)   Pulse 82   Temp 98.4  F (36.9  C) (Tympanic)   Resp 20   Wt 139.4 kg (307 lb 6.4 oz)   SpO2 91%   BMI 44.11 kg/m   Estimated body mass index is 44.11 kg/m  as calculated from the following:    Height as of 7/21/20: 1.778 m (5' 10\").    Weight as of this encounter: 139.4 kg (307 lb 6.4 oz).     Medication Reconciliation: complete    FOOD SECURITY SCREENING QUESTIONS  Hunger Vital Signs:  Within the past 12 months we worried whether our food would run out before we got money to buy more. Never  Within the past 12 months the food we bought just didn't last and we didn't have money to get more. Never    Advance care plan reviewed      Giovana Juan LPN    "

## 2021-11-19 ASSESSMENT — PATIENT HEALTH QUESTIONNAIRE - PHQ9: SUM OF ALL RESPONSES TO PHQ QUESTIONS 1-9: 16

## 2021-11-26 ENCOUNTER — TELEPHONE (OUTPATIENT)
Dept: LAB | Facility: OTHER | Age: 56
End: 2021-11-26
Payer: COMMERCIAL

## 2021-11-26 DIAGNOSIS — E11.65 UNCONTROLLED TYPE 2 DIABETES MELLITUS WITH HYPERGLYCEMIA, WITH LONG-TERM CURRENT USE OF INSULIN (H): Primary | ICD-10-CM

## 2021-11-26 DIAGNOSIS — Z79.4 UNCONTROLLED TYPE 2 DIABETES MELLITUS WITH HYPERGLYCEMIA, WITH LONG-TERM CURRENT USE OF INSULIN (H): Primary | ICD-10-CM

## 2021-11-26 DIAGNOSIS — E78.5 HYPERLIPIDEMIA, UNSPECIFIED HYPERLIPIDEMIA TYPE: ICD-10-CM

## 2022-01-13 ENCOUNTER — TRANSFERRED RECORDS (OUTPATIENT)
Dept: HEALTH INFORMATION MANAGEMENT | Facility: OTHER | Age: 57
End: 2022-01-13
Payer: COMMERCIAL

## 2022-01-24 DIAGNOSIS — M54.50 CHRONIC LOW BACK PAIN, UNSPECIFIED BACK PAIN LATERALITY, UNSPECIFIED WHETHER SCIATICA PRESENT: ICD-10-CM

## 2022-01-24 DIAGNOSIS — G89.29 CHRONIC LOW BACK PAIN, UNSPECIFIED BACK PAIN LATERALITY, UNSPECIFIED WHETHER SCIATICA PRESENT: ICD-10-CM

## 2022-01-25 RX ORDER — ACETAMINOPHEN 500 MG/1
TABLET ORAL
Qty: 100 TABLET | Refills: 4 | Status: SHIPPED | OUTPATIENT
Start: 2022-01-25 | End: 2022-05-20

## 2022-01-25 NOTE — TELEPHONE ENCOUNTER
"Rx approved per Prescription Refill Protocol requirements. Teresita Flores RN on 1/25/2022 at 9:11 AM    Last Prescription Date: 2/9/21  Last Qty/Refills: 100 / 4  Last Office Visit: 11/18/21 [facility]  Future Office Visit: 2/17/22     Requested Prescriptions   Pending Prescriptions Disp Refills     EQ ACETAMINOPHEN 500 MG tablet [Pharmacy Med Name: EQ Acetaminophen 500 MG Oral Tablet] 100 tablet 0     Sig: TAKE 1 TABLET BY MOUTH EVERY 6 HOURS AS NEEDED . DO NOT EXCEED 4000 MG OF ACETAMINOPHEN PER 24 HOURS       Analgesics (Non-Narcotic Tylenol and ASA Only) Passed - 1/24/2022 12:19 PM        Passed - Recent (12 mo) or future (30 days) visit within the authorizing provider's specialty     Patient has had an office visit with the authorizing provider or a provider within the authorizing providers department within the previous 12 mos or has a future within next 30 days. See \"Patient Info\" tab in inbasket, or \"Choose Columns\" in Meds & Orders section of the refill encounter.              Passed - Patient is 7 months old or older     If patient is a peds patient of the age 7 mos -12 years, ok to refill using weight-based dosing.     If >3g daily and/or sig is not \"prn\", check for liver enzymes. If normal in the last year, ok to refill.  If not, refer to the provider.          Passed - Medication is active on med list             "

## 2022-03-31 DIAGNOSIS — Z79.4 UNCONTROLLED TYPE 2 DIABETES MELLITUS WITH HYPERGLYCEMIA, WITH LONG-TERM CURRENT USE OF INSULIN (H): ICD-10-CM

## 2022-03-31 DIAGNOSIS — E11.65 UNCONTROLLED TYPE 2 DIABETES MELLITUS WITH HYPERGLYCEMIA, WITH LONG-TERM CURRENT USE OF INSULIN (H): ICD-10-CM

## 2022-03-31 RX ORDER — INSULIN GLARGINE 100 [IU]/ML
INJECTION, SOLUTION SUBCUTANEOUS
Qty: 45 ML | Refills: 0 | Status: SHIPPED | OUTPATIENT
Start: 2022-03-31 | End: 2022-04-27

## 2022-03-31 NOTE — TELEPHONE ENCOUNTER
[facility] sent Rx request for the following:      Lantus SoloStar 100 UNIT/ML Subcutaneous Solution Pen-injector  Sig: INJECT 175 UNITS SUBCUTANEOUSLY AT BEDTIME      Last Prescription Date:   5/1/2021  Last Fill Qty/Refills:         45ml, R-11    Last Office Visit:              3/24/2022   Future Office visit:           4/21/2022    Ren Gonzáles RN, BSN  ....................  3/31/2022   1:01 PM

## 2022-04-05 DIAGNOSIS — E11.65 UNCONTROLLED TYPE 2 DIABETES MELLITUS WITH HYPERGLYCEMIA, WITH LONG-TERM CURRENT USE OF INSULIN (H): ICD-10-CM

## 2022-04-05 DIAGNOSIS — Z79.4 UNCONTROLLED TYPE 2 DIABETES MELLITUS WITH HYPERGLYCEMIA, WITH LONG-TERM CURRENT USE OF INSULIN (H): ICD-10-CM

## 2022-04-05 RX ORDER — PEN NEEDLE, DIABETIC 32GX 5/32"
NEEDLE, DISPOSABLE MISCELLANEOUS
Qty: 500 EACH | Refills: 4 | Status: SHIPPED | OUTPATIENT
Start: 2022-04-05 | End: 2023-04-17

## 2022-04-05 NOTE — TELEPHONE ENCOUNTER
"Routing to provider as directed. Teresita Flores RN on 4/5/2022 at 1:24 PM    Last Prescription Date: 4/28/21  Last Qty/Refills: 500 / 4  Last Office Visit: 8/27/21 [facility]  Future Office Visit: none     Requested Prescriptions   Pending Prescriptions Disp Refills     BD PEN NEEDLE ANN 2ND GEN 32G X 4 MM miscellaneous [Pharmacy Med Name: BD PEN NEEDLE/ANN [id number] MIS]  0     Sig: USE  4 TIMES DAILY FOR ADMINISTERING INSULIN AT HOME       Diabetic Supplies Protocol Failed - 4/5/2022 10:26 AM        Failed - Recent (6 mo) or future (30 days) visit within the authorizing provider's specialty     Patient had office visit in the last 6 months or has a visit in the next 30 days with authorizing provider.  See \"Patient Info\" tab in inbasket, or \"Choose Columns\" in Meds & Orders section of the refill encounter.            Passed - Medication is active on med list        Passed - Patient is 18 years of age or older             "

## 2022-04-25 DIAGNOSIS — E11.65 UNCONTROLLED TYPE 2 DIABETES MELLITUS WITH HYPERGLYCEMIA, WITH LONG-TERM CURRENT USE OF INSULIN (H): ICD-10-CM

## 2022-04-25 DIAGNOSIS — Z79.4 UNCONTROLLED TYPE 2 DIABETES MELLITUS WITH HYPERGLYCEMIA, WITH LONG-TERM CURRENT USE OF INSULIN (H): ICD-10-CM

## 2022-04-27 RX ORDER — INSULIN GLARGINE 100 [IU]/ML
INJECTION, SOLUTION SUBCUTANEOUS
Qty: 60 ML | Refills: 0 | Status: SHIPPED | OUTPATIENT
Start: 2022-04-27 | End: 2022-05-20

## 2022-04-27 NOTE — TELEPHONE ENCOUNTER
[id number] sent Rx request for the following:      Requested Prescriptions   Pending Prescriptions Disp Refills     LANTUS SOLOSTAR 100 UNIT/ML soln [Pharmacy Med Name: Lantus SoloStar 100 UNIT/ML Subcutaneous Solution Pen-injector] 45 mL 0     Sig: INJECT 175 UNITS SUBCUTANEOUSLY AT BEDTIME       Long Acting Insulin Protocol Failed - 4/27/2022  3:56 PM        Failed - HgbA1C in past 3 or 6 months     If HgbA1C is 8 or greater, it needs to be on file within the past 3 months.  If less than 8, must be on file within the past 6 months.     Recent Labs   Lab Test 08/06/21  1035 06/27/18  1050 01/17/18  1150   A1C 7.7*   < >  --    [id number]  --   --  10.9*    < > = values in this interval not displayed.             Failed - Recent (6 mo) or future (30 days) visit within the authorizing provider's specialty     Last Prescription Date:   3/31/22  Last Fill Qty/Refills:         45 ml, R-0    Last Office Visit:              3/24/22   Future Office visit:           5/26/22    In clinical absence of patient's primary, Mirian Mejia, patient is requesting that this message be sent to the covering provider for consideration please.    Ashanti Lyons RN .............. 4/27/2022  3:57 PM

## 2022-04-27 NOTE — TELEPHONE ENCOUNTER
Reason for call: Medication or medication refill    Name of medication requested: Lantus    Are you out of the medication? 3-4 days    What pharmacy do you use? [facility]    Preferred method for responding to this message: Telephone Call    Phone number patient can be reached at: [phone]    If we cannot reach you directly, may we leave a detailed response at the number you provided? Yes

## 2022-04-27 NOTE — TELEPHONE ENCOUNTER
Prescription sent. Pt has upcoming appointment. Called and spoke to Patient after verifying last name and date of birth. Pt notified. Ashanti Lyons RN .............. 4/27/2022  4:24 PM

## 2022-04-28 DIAGNOSIS — E11.65 UNCONTROLLED TYPE 2 DIABETES MELLITUS WITH HYPERGLYCEMIA, WITH LONG-TERM CURRENT USE OF INSULIN (H): ICD-10-CM

## 2022-04-28 DIAGNOSIS — Z79.4 UNCONTROLLED TYPE 2 DIABETES MELLITUS WITH HYPERGLYCEMIA, WITH LONG-TERM CURRENT USE OF INSULIN (H): ICD-10-CM

## 2022-04-28 NOTE — TELEPHONE ENCOUNTER
[facility]. Prescription refused. Ren Gonzáles RN.......4/28/2022 8:17 AM

## 2022-05-04 DIAGNOSIS — G89.29 CHRONIC MIDLINE LOW BACK PAIN, UNSPECIFIED WHETHER SCIATICA PRESENT: ICD-10-CM

## 2022-05-04 DIAGNOSIS — E11.42 DIABETIC POLYNEUROPATHY ASSOCIATED WITH TYPE 2 DIABETES MELLITUS (H): ICD-10-CM

## 2022-05-04 DIAGNOSIS — J98.4 RESTRICTIVE LUNG DISEASE: ICD-10-CM

## 2022-05-04 DIAGNOSIS — M54.50 CHRONIC MIDLINE LOW BACK PAIN, UNSPECIFIED WHETHER SCIATICA PRESENT: ICD-10-CM

## 2022-05-05 RX ORDER — TRAMADOL HYDROCHLORIDE 50 MG/1
TABLET ORAL
Qty: 90 TABLET | Refills: 3 | OUTPATIENT
Start: 2022-05-05

## 2022-05-05 RX ORDER — ALBUTEROL SULFATE 90 UG/1
AEROSOL, METERED RESPIRATORY (INHALATION)
Qty: 18 G | Refills: 11 | OUTPATIENT
Start: 2022-05-05

## 2022-05-05 NOTE — TELEPHONE ENCOUNTER
Routing refill request to provider for review/approval because:    LOV: 3/24/22    Maria G Maza RN on 5/5/2022 at 12:02 PM

## 2022-05-07 RX ORDER — TRAMADOL HYDROCHLORIDE 50 MG/1
TABLET ORAL
Qty: 90 TABLET | Refills: 5 | Status: SHIPPED | OUTPATIENT
Start: 2022-05-07 | End: 2023-02-07

## 2022-05-07 RX ORDER — ALBUTEROL SULFATE 90 UG/1
2 AEROSOL, METERED RESPIRATORY (INHALATION) EVERY 4 HOURS PRN
Qty: 18 G | Refills: 9 | Status: SHIPPED | OUTPATIENT
Start: 2022-05-07 | End: 2023-06-15

## 2022-05-09 DIAGNOSIS — F41.1 GAD (GENERALIZED ANXIETY DISORDER): ICD-10-CM

## 2022-05-10 NOTE — TELEPHONE ENCOUNTER
Disp Refills Start End MIGUELINA   clonazePAM (KLONOPIN) 0.5 MG tablet 30 tablet 5 10/29/2021  No   Sig: TAKE 1 TABLET BY MOUTH AT BEDTIME       LOV: 3/24/2022  Future Office visit:    Next 5 appointments (look out 90 days)    May 26, 2022 10:00 AM  Office Visit with Deysi Guzman NP  [facility] ([facility] ) [address]  [facility] [id number] [phone]        Routing refill request to provider for review/approval.  Per quality report patient is due for preventative care visit, immunization, colorectal cancer screening, eye exam, diabetic foot exam.     Routed to provider for review and consideration. Irasema Flannery RN  ....................  5/10/2022   3:59 PM

## 2022-05-11 RX ORDER — CLONAZEPAM 0.5 MG/1
TABLET ORAL
Qty: 30 TABLET | Refills: 2 | Status: SHIPPED | OUTPATIENT
Start: 2022-05-11 | End: 2022-09-30

## 2022-05-13 DIAGNOSIS — E11.65 UNCONTROLLED TYPE 2 DIABETES MELLITUS WITH HYPERGLYCEMIA, WITH LONG-TERM CURRENT USE OF INSULIN (H): ICD-10-CM

## 2022-05-13 DIAGNOSIS — Z79.4 UNCONTROLLED TYPE 2 DIABETES MELLITUS WITH HYPERGLYCEMIA, WITH LONG-TERM CURRENT USE OF INSULIN (H): ICD-10-CM

## 2022-05-16 ENCOUNTER — TELEPHONE (OUTPATIENT)
Dept: FAMILY MEDICINE | Facility: OTHER | Age: 57
End: 2022-05-16
Payer: COMMERCIAL

## 2022-05-16 NOTE — TELEPHONE ENCOUNTER
"Received fax from [employer] in regards to Lyrica.  Note from pharmacy :    \"PA sent for Lyrica. Insurance prefers Pregablin, Duloxitine or Gabapentin.\"      Maria G Maza RN on 5/16/2022 at 8:16 AM      "

## 2022-05-17 RX ORDER — INSULIN ASPART 100 [IU]/ML
INJECTION, SOLUTION INTRAVENOUS; SUBCUTANEOUS
Qty: 90 ML | Refills: 11 | Status: SHIPPED | OUTPATIENT
Start: 2022-05-17 | End: 2023-06-15

## 2022-05-17 NOTE — TELEPHONE ENCOUNTER
"Pending as previously ordered. Teresita Flores RN on 5/17/2022 at 9:41 AM    Last Prescription Date: 4/28/21  Last Qty/Refills: 90 / 11  [facility]  Future Office Visit: 6/23/22 [facility]     Requested Prescriptions   Pending Prescriptions Disp Refills     NOVOLOG FLEXPEN 100 UNIT/ML soln [Pharmacy Med Name: NovoLOG FlexPen 100 UNIT/ML Subcutaneous Solution Pen-injector] 60 mL 0     Sig: INJECT 60-70 UNITS SUBCUTANEOUSLY WITH EACH MEAL BASED ON INSULIN TO CARB RATIO. MAXIMUM  UNITS PER DAY       Short Acting Insulin Protocol Failed - 5/13/2022 10:11 AM        Failed - HgbA1C in past 3 or 6 months     If HgbA1C is 8 or greater, it needs to be on file within the past 3 months.  If less than 8, must be on file within the past 6 months.     Recent Labs   Lab Test 08/06/21  1035 06/27/18  1050 01/17/18  1150   A1C 7.7*   < >  --    [id number]  --   --  10.9*    < > = values in this interval not displayed.             Failed - Recent (6 mo) or future (30 days) visit within the authorizing provider's specialty     Patient had office visit in the last 6 months or has a visit in the next 30 days with authorizing provider or within the authorizing provider's specialty.  See \"Patient Info\" tab in inbasket, or \"Choose Columns\" in Meds & Orders section of the refill encounter.            Passed - Serum creatinine on file in past 12 months     Recent Labs   Lab Test 08/06/21  1035   CR 1.27       Ok to refill medication if creatinine is low          Passed - Medication is active on med list        Passed - Patient is age 18 or older                                "

## 2022-05-17 NOTE — TELEPHONE ENCOUNTER
Complete PA; he has tried/failed all those in the past.  Follows with neurology for DM neuropathy as well.  Kira Grider, DO on 5/17/2022 at 9:04 AM

## 2022-05-18 ENCOUNTER — TELEPHONE (OUTPATIENT)
Dept: FAMILY MEDICINE | Facility: OTHER | Age: 57
End: 2022-05-18
Payer: COMMERCIAL

## 2022-05-18 NOTE — TELEPHONE ENCOUNTER
Patient is out of the Novalog pens.  Please call    Evelyne Godfrey on 5/18/2022 at 10:53 AM       [facility]

## 2022-05-18 NOTE — TELEPHONE ENCOUNTER
Talked with patient and told him to check his pharmacy as the refill was sent over yesterday to [facility].   Adri Hernandez, ANTIONETTE, MATTYN  5/18/2022  11:26 AM

## 2022-05-19 NOTE — PROGRESS NOTES
Assessment & Plan     1. Diabetic polyneuropathy associated with type 2 diabetes mellitus (H)  Follow-up on diabetic labs pending.  Continue with Lyrica, meloxicam, lidocaine for symptomatic management.  Discussed that neuropathy symptoms and pain may improve with better management of his diabetes.  Additional treatment plan regarding diabetes as below.  - Hemoglobin A1c; Future  - Basic Metabolic Panel; Future  - LYRICA 200 MG capsule; TAKE 1 CAPSULE BY MOUTH THREE TIMES DAILY  Dispense: 90 capsule; Refill: 5  - meloxicam (MOBIC) 15 MG tablet; Take 1 tablet by mouth once daily with food  Dispense: 90 tablet; Refill: 4  - LIDOCAINE PAIN RELIEF 4 % Patch; Apply one patch topically to clean, dry skin. Leave on for 12 hours then remove. Must wait at least 12 hours before applying patch again  Dispense: 30 patch; Refill: 11  - Basic Metabolic Panel  - Hemoglobin A1c    2. Depression, unspecified depression type  Doing well with current medication and dose.  Refilled as below.  Follow-up as needed.  - FLUoxetine (PROZAC) 20 MG capsule; Take 1 capsule (20 mg) by mouth every morning  Dispense: 90 capsule; Refill: 4    3. Benign essential hypertension  Doing well with current medication and dose.  Refilled as below.  Follow-up as needed.  - Basic Metabolic Panel; Future  - hydrochlorothiazide (HYDRODIURIL) 25 MG tablet; Take 1 tablet (25 mg) by mouth daily  Dispense: 90 tablet; Refill: 4  - lisinopril (ZESTRIL) 30 MG tablet; Take 1 tablet (30 mg) by mouth daily  Dispense: 90 tablet; Refill: 4  - Basic Metabolic Panel    4. Hyperlipidemia LDL goal <100  Doing well with current medication and dose.  Refilled as below.  Lipid panel pending.  Will notify with results and adjust treatment if indicated.  Follow-up as needed.  - Lipid Panel; Future  - simvastatin (ZOCOR) 40 MG tablet; Take 1 tablet (40 mg) by mouth At Bedtime  Dispense: 90 tablet; Refill: 4  - Lipid Panel    5. Gastroesophageal reflux disease without  esophagitis  Doing well with current medication and dose.  Refilled as below.  Follow-up as needed.  - omeprazole (PRILOSEC) 20 MG DR capsule; TAKE 1 CAPSULE BY MOUTH ONCE DAILY BEFORE A MEAL  Dispense: 90 capsule; Refill: 4    6. Uncontrolled type 2 diabetes mellitus with hyperglycemia, with long-term current use of insulin (H)  Likely significantly uncontrolled based on home blood sugars.  Will await A1c and adjust treatment at that time.  Long discussion regarding healthy lifestyle including diet and exercise.  Patient expressed an understanding and will work on lifestyle changes and follow-up for lab recheck in 3 months.  - Hemoglobin A1c; Future  - CBC and Differential; Future  - Basic Metabolic Panel; Future  - insulin glargine (LANTUS SOLOSTAR) 100 UNIT/ML pen; 175 units at HS  Dispense: 60 mL; Refill: 3  - Basic Metabolic Panel  - CBC and Differential  - Hemoglobin A1c    7. Chronic midline low back pain, unspecified whether sciatica present  Continues to manage well with lidocaine, meloxicam, Tylenol.  Follow-up as needed.  - LIDOCAINE PAIN RELIEF 4 % Patch; Apply one patch topically to clean, dry skin. Leave on for 12 hours then remove. Must wait at least 12 hours before applying patch again  Dispense: 30 patch; Refill: 11  - acetaminophen (EQ ACETAMINOPHEN) 500 MG tablet; TAKE 1 TABLET BY MOUTH EVERY 6 HOURS AS NEEDED . DO NOT EXCEED 4000 MG OF ACETAMINOPHEN PER 24 HOURS  Dispense: 100 tablet; Refill: 4    8. Restrictive lung disease  Continues to manage well with Flovent as below.  Follow-up as needed.  - fluticasone (FLOVENT HFA) 110 MCG/ACT inhaler; Inhale 1 puff into the lungs 2 times daily  Dispense: 12 g; Refill: 11      Return if symptoms worsen or fail to improve.    Dafne Ivory PA-C  [facility]    Subjective   Rajan is a 57 year old who presents for the following health issues     History of Present Illness       Reason for visit:  Med refills    He eats 2-3 servings of  fruits and vegetables daily.He consumes 0 sweetened beverage(s) daily.He exercises with enough effort to increase his heart rate 9 or less minutes per day.  He exercises with enough effort to increase his heart rate 3 or less days per week.   He is taking medications regularly.    Today's PHQ-9         PHQ-9 Total Score: 5    PHQ-9 Q9 Thoughts of better off dead/self-harm past 2 weeks :   Not at all    How difficult have these problems made it for you to do your work, take care of things at home, or get along with other people: Not difficult at all     Here for medication refills.  He is overdue for follow-up on diabetic labs, lipid screening, BMP.  He continues with Lyrica for diabetic polyneuropathy, is doing well with medication.  Is requesting refills of his meloxicam and lidocaine patches as well as Tylenol for his chronic low back pain.  Requesting refills of insulin for diabetic management.  Blood sugars at home are averaging 250s per patient report.  Needing refills on Prozac for management of depression.  Needing refills of chlorothiazide and lisinopril for management of blood pressure.  Blood pressure at 142/78 in clinic.  No chest pain or pressure, palpitations, dizziness, lightheadedness, syncope, headache, vision change, shortness of breath.  The refills of Flovent for reactive lung disease.  Doing well with medication.  Tinges to do well with Prilosec for management of GERD.  Has not been focusing on diet or exercise.  Patient reports his diet significantly worsened over the winter months.    PAST MEDICAL HISTORY:   Past Medical History:   Diagnosis Date     Acute respiratory failure with hypoxia (H) 12/24/2011     Community acquired bacterial pneumonia 12/24/2011     Empyema (H) 1/26/2012    Overview:  Rt lung 12/24/11     Lateral epicondylitis of left elbow     10/3/2013,Seen by Dr Tayla Jung 8/2013     Moderate cigarette smoker (10-19 per day) 12/24/2011     On mechanically assisted  ventilation (H) 1/3/2012    Overview:  IMO Update  Overview:  IMO Update     Type 2 diabetes mellitus with proliferative retinopathy without macular edema (H) 07/01/2014    Mild proliferative changes on left side--sees Dr Ortiz exam 6/2014.       Umbilical hernia with obstruction, without gangrene 10/31/2018       PAST SURGICAL HISTORY:   Past Surgical History:   Procedure Laterality Date     ARTHROSCOPY SHOULDER      [id number],SHOULDER SURGERY,Bilateral     CHEST TUBE INSERTION      [id number],CHEST TUBE INSERTION     HERNIORRHAPHY UMBILICAL N/A 11/27/2018    Procedure: Umbilical hernia Repair with Mesh;  Surgeon: Mason Rhodes MD;  Location: [facility] OR     LUNG SURGERY      [id number],LUNG SURGERY,pneumonia     TRACHEOSTOMY      [id number],HCHG TRACH PR1,history trach with pneumonia       FAMILY HISTORY:   Family History   Problem Relation Age of Onset     Heart Disease Other         multiple males     Diabetes Other      Cancer Father         Stomach CA     Colon Cancer Paternal Uncle      Colon Cancer Paternal Grandfather        SOCIAL HISTORY:   Social History     Tobacco Use     Smoking status: Former Smoker     Packs/day: 0.50     Years: 28.00     Pack years: 14.00     Types: Cigarettes     Quit date: 12/21/2011     Years since quitting: 10.4     Smokeless tobacco: Former User     Types: Chew     Quit date: 9/5/1988   Substance Use Topics     Alcohol use: No        Allergies   Allergen Reactions     Penicillins      Other reaction(s): Diaphoresis     Piperacillin-Tazobactam In D5w Other (See Comments)     Other reaction(s): Diaphoresis, Fever  Fever while on zosyn, vanc. No rash, no wbc elevation     Vancomycin Other (See Comments)     Fever while on zosyn, vanc, no rash or WBC elevation  Other reaction(s): Diaphoresis, Fever  Fever while on zosyn, vanc, no rash or WBC elevation     Zosyn Other (See Comments)     Fever while on zosyn, vanc. No rash, no wbc elevation  Fever while on zosyn, vanc. No rash, no wbc  "elevation     Current Outpatient Medications   Medication     acetaminophen (EQ ACETAMINOPHEN) 500 MG tablet     albuterol (PROAIR HFA/PROVENTIL HFA/VENTOLIN HFA) 108 (90 Base) MCG/ACT inhaler     alcohol swab prep pads     Aspirin (ASPIR-81 PO)     BD PEN NEEDLE ANN 2ND GEN 32G X 4 MM miscellaneous     blood glucose (ACCU-CHEK DONAVON PLUS) test strip     blood glucose calibration (ACCU-CHEK DONAVON) solution     blood glucose monitoring (ACCU-CHEK DONAVON PLUS) meter device kit     blood glucose monitoring (ACCU-CHEK FASTCLIX) lancets     clonazePAM (KLONOPIN) 0.5 MG tablet     FLUoxetine (PROZAC) 20 MG capsule     fluticasone (FLOVENT HFA) 110 MCG/ACT inhaler     hydrochlorothiazide (HYDRODIURIL) 25 MG tablet     insulin glargine (LANTUS SOLOSTAR) 100 UNIT/ML pen     LIDOCAINE PAIN RELIEF 4 % Patch     lisinopril (ZESTRIL) 30 MG tablet     LYRICA 200 MG capsule     meloxicam (MOBIC) 15 MG tablet     Misc. Devices (BATH/SHOWER SEAT) MISC     NARCAN 4 MG/0.1ML nasal spray     NOVOLOG FLEXPEN 100 UNIT/ML soln     omeprazole (PRILOSEC) 20 MG DR capsule     order for DME     simvastatin (ZOCOR) 40 MG tablet     traMADol (ULTRAM) 50 MG tablet     No current facility-administered medications for this visit.         Review of Systems   Per HPI        Objective    BP (!) 142/78   Pulse 80   Temp 98  F (36.7  C)   Resp 14   Ht 1.778 m (5' 10\")   Wt 141.5 kg (312 lb)   SpO2 93%   BMI 44.77 kg/m    Body mass index is 44.77 kg/m .  Physical Exam   General: Pleasant, in no apparent distress.  Cardiovascular: Regular rate and rhythm with S1 equal to S2. No murmurs, friction rubs, or gallops.   Respiratory: Lungs are resonant and clear to auscultation bilaterally. No wheezes, crackles, or rhonchi.  Psych: Appropriate mood and affect.            "

## 2022-05-20 ENCOUNTER — OFFICE VISIT (OUTPATIENT)
Dept: FAMILY MEDICINE | Facility: OTHER | Age: 57
End: 2022-05-20
Attending: PHYSICIAN ASSISTANT
Payer: COMMERCIAL

## 2022-05-20 VITALS
WEIGHT: 312 LBS | RESPIRATION RATE: 14 BRPM | SYSTOLIC BLOOD PRESSURE: 142 MMHG | BODY MASS INDEX: 44.67 KG/M2 | TEMPERATURE: 98 F | HEIGHT: 70 IN | HEART RATE: 80 BPM | OXYGEN SATURATION: 93 % | DIASTOLIC BLOOD PRESSURE: 78 MMHG

## 2022-05-20 DIAGNOSIS — I10 BENIGN ESSENTIAL HYPERTENSION: ICD-10-CM

## 2022-05-20 DIAGNOSIS — M54.50 CHRONIC MIDLINE LOW BACK PAIN, UNSPECIFIED WHETHER SCIATICA PRESENT: ICD-10-CM

## 2022-05-20 DIAGNOSIS — Z79.4 UNCONTROLLED TYPE 2 DIABETES MELLITUS WITH HYPERGLYCEMIA, WITH LONG-TERM CURRENT USE OF INSULIN (H): ICD-10-CM

## 2022-05-20 DIAGNOSIS — J98.4 RESTRICTIVE LUNG DISEASE: ICD-10-CM

## 2022-05-20 DIAGNOSIS — E11.42 DIABETIC POLYNEUROPATHY ASSOCIATED WITH TYPE 2 DIABETES MELLITUS (H): ICD-10-CM

## 2022-05-20 DIAGNOSIS — E78.5 HYPERLIPIDEMIA LDL GOAL <100: ICD-10-CM

## 2022-05-20 DIAGNOSIS — F32.A DEPRESSION, UNSPECIFIED DEPRESSION TYPE: ICD-10-CM

## 2022-05-20 DIAGNOSIS — G89.29 CHRONIC MIDLINE LOW BACK PAIN, UNSPECIFIED WHETHER SCIATICA PRESENT: ICD-10-CM

## 2022-05-20 DIAGNOSIS — K21.9 GASTROESOPHAGEAL REFLUX DISEASE WITHOUT ESOPHAGITIS: ICD-10-CM

## 2022-05-20 DIAGNOSIS — E11.65 UNCONTROLLED TYPE 2 DIABETES MELLITUS WITH HYPERGLYCEMIA, WITH LONG-TERM CURRENT USE OF INSULIN (H): ICD-10-CM

## 2022-05-20 LAB
ANION GAP SERPL CALCULATED.3IONS-SCNC: 7 MMOL/L (ref 3–14)
BASOPHILS # BLD AUTO: 0.1 10E3/UL (ref 0–0.2)
BASOPHILS NFR BLD AUTO: 1 %
BUN SERPL-MCNC: 21 MG/DL (ref 7–25)
CALCIUM SERPL-MCNC: 9.2 MG/DL (ref 8.6–10.3)
CHLORIDE BLD-SCNC: 95 MMOL/L (ref 98–107)
CHOLEST SERPL-MCNC: 136 MG/DL
CO2 SERPL-SCNC: 31 MMOL/L (ref 21–31)
CREAT SERPL-MCNC: 1.1 MG/DL (ref 0.7–1.3)
EOSINOPHIL # BLD AUTO: 0.3 10E3/UL (ref 0–0.7)
EOSINOPHIL NFR BLD AUTO: 3 %
ERYTHROCYTE [DISTWIDTH] IN BLOOD BY AUTOMATED COUNT: 13.2 % (ref 10–15)
FASTING STATUS PATIENT QL REPORTED: NO
GFR SERPL CREATININE-BSD FRML MDRD: 78 ML/MIN/1.73M2
GLUCOSE BLD-MCNC: 285 MG/DL (ref 70–105)
HBA1C MFR BLD: 10 % (ref 4–6.2)
HCT VFR BLD AUTO: 44.5 % (ref 40–53)
HDLC SERPL-MCNC: 32 MG/DL (ref 23–92)
HGB BLD-MCNC: 14.8 G/DL (ref 13.3–17.7)
IMM GRANULOCYTES # BLD: 0.1 10E3/UL
IMM GRANULOCYTES NFR BLD: 1 %
LDLC SERPL CALC-MCNC: 56 MG/DL
LYMPHOCYTES # BLD AUTO: 1 10E3/UL (ref 0.8–5.3)
LYMPHOCYTES NFR BLD AUTO: 13 %
MCH RBC QN AUTO: 29.8 PG (ref 26.5–33)
MCHC RBC AUTO-ENTMCNC: 33.3 G/DL (ref 31.5–36.5)
MCV RBC AUTO: 90 FL (ref 78–100)
MONOCYTES # BLD AUTO: 0.7 10E3/UL (ref 0–1.3)
MONOCYTES NFR BLD AUTO: 9 %
NEUTROPHILS # BLD AUTO: 5.7 10E3/UL (ref 1.6–8.3)
NEUTROPHILS NFR BLD AUTO: 73 %
NONHDLC SERPL-MCNC: 104 MG/DL
NRBC # BLD AUTO: 0 10E3/UL
NRBC BLD AUTO-RTO: 0 /100
PLATELET # BLD AUTO: 217 10E3/UL (ref 150–450)
POTASSIUM BLD-SCNC: 4.7 MMOL/L (ref 3.5–5.1)
RBC # BLD AUTO: 4.97 10E6/UL (ref 4.4–5.9)
SODIUM SERPL-SCNC: 133 MMOL/L (ref 134–144)
TRIGL SERPL-MCNC: 240 MG/DL
WBC # BLD AUTO: 7.9 10E3/UL (ref 4–11)

## 2022-05-20 PROCEDURE — 99214 OFFICE O/P EST MOD 30 MIN: CPT | Performed by: PHYSICIAN ASSISTANT

## 2022-05-20 PROCEDURE — 80048 BASIC METABOLIC PNL TOTAL CA: CPT | Mod: ZL | Performed by: PHYSICIAN ASSISTANT

## 2022-05-20 PROCEDURE — 36415 COLL VENOUS BLD VENIPUNCTURE: CPT | Mod: ZL | Performed by: PHYSICIAN ASSISTANT

## 2022-05-20 PROCEDURE — G0463 HOSPITAL OUTPT CLINIC VISIT: HCPCS

## 2022-05-20 PROCEDURE — 80061 LIPID PANEL: CPT | Mod: ZL | Performed by: PHYSICIAN ASSISTANT

## 2022-05-20 PROCEDURE — 83036 HEMOGLOBIN GLYCOSYLATED A1C: CPT | Mod: ZL | Performed by: PHYSICIAN ASSISTANT

## 2022-05-20 PROCEDURE — 85014 HEMATOCRIT: CPT | Mod: ZL | Performed by: PHYSICIAN ASSISTANT

## 2022-05-20 RX ORDER — DEXAMETHASONE 4 MG/1
1 TABLET ORAL 2 TIMES DAILY
Qty: 12 G | Refills: 11 | Status: SHIPPED | OUTPATIENT
Start: 2022-05-20 | End: 2023-06-15

## 2022-05-20 RX ORDER — INSULIN GLARGINE 100 [IU]/ML
INJECTION, SOLUTION SUBCUTANEOUS
Qty: 60 ML | Refills: 3 | Status: SHIPPED | OUTPATIENT
Start: 2022-05-20 | End: 2022-09-22

## 2022-05-20 RX ORDER — ACETAMINOPHEN 500 MG
TABLET ORAL
Qty: 100 TABLET | Refills: 4 | Status: SHIPPED | OUTPATIENT
Start: 2022-05-20 | End: 2023-05-02

## 2022-05-20 RX ORDER — MELOXICAM 15 MG/1
TABLET ORAL
Qty: 90 TABLET | Refills: 4 | Status: SHIPPED | OUTPATIENT
Start: 2022-05-20 | End: 2023-06-15

## 2022-05-20 RX ORDER — HYDROCHLOROTHIAZIDE 25 MG/1
25 TABLET ORAL DAILY
Qty: 90 TABLET | Refills: 4 | Status: SHIPPED | OUTPATIENT
Start: 2022-05-20 | End: 2023-06-15

## 2022-05-20 RX ORDER — LIDOCAINE PAIN RELIEF 40 MG/1000MG
PATCH TOPICAL
Qty: 30 PATCH | Refills: 11 | Status: SHIPPED | OUTPATIENT
Start: 2022-05-20

## 2022-05-20 RX ORDER — SIMVASTATIN 40 MG
40 TABLET ORAL AT BEDTIME
Qty: 90 TABLET | Refills: 4 | Status: SHIPPED | OUTPATIENT
Start: 2022-05-20 | End: 2023-05-23

## 2022-05-20 RX ORDER — LISINOPRIL 30 MG/1
30 TABLET ORAL DAILY
Qty: 90 TABLET | Refills: 4 | Status: SHIPPED | OUTPATIENT
Start: 2022-05-20 | End: 2023-06-15

## 2022-05-20 RX ORDER — PREGABALIN 200 MG/1
CAPSULE ORAL
Qty: 90 CAPSULE | Refills: 5 | Status: SHIPPED | OUTPATIENT
Start: 2022-05-20 | End: 2022-11-28

## 2022-05-20 ASSESSMENT — PAIN SCALES - GENERAL: PAINLEVEL: MILD PAIN (3)

## 2022-05-20 ASSESSMENT — PATIENT HEALTH QUESTIONNAIRE - PHQ9
SUM OF ALL RESPONSES TO PHQ QUESTIONS 1-9: 5
SUM OF ALL RESPONSES TO PHQ QUESTIONS 1-9: 5
10. IF YOU CHECKED OFF ANY PROBLEMS, HOW DIFFICULT HAVE THESE PROBLEMS MADE IT FOR YOU TO DO YOUR WORK, TAKE CARE OF THINGS AT HOME, OR GET ALONG WITH OTHER PEOPLE: NOT DIFFICULT AT ALL

## 2022-05-20 NOTE — NURSING NOTE
Patient presents to clinic for medication refill.  Kaity Nielsen LPN ....................  5/20/2022   1:02 PM

## 2022-05-25 ENCOUNTER — TELEPHONE (OUTPATIENT)
Dept: FAMILY MEDICINE | Facility: OTHER | Age: 57
End: 2022-05-25
Payer: COMMERCIAL

## 2022-05-25 DIAGNOSIS — E11.42 DIABETIC POLYNEUROPATHY ASSOCIATED WITH TYPE 2 DIABETES MELLITUS (H): Primary | ICD-10-CM

## 2022-05-25 NOTE — TELEPHONE ENCOUNTER
Needs new/updatedprescription for diabetic shoes, please fax to: [phone], thank you!    Marika Ponce on 5/25/2022 at 9:21 AM

## 2022-05-26 NOTE — TELEPHONE ENCOUNTER
RX printed out   Brought to  to sign and then fax it.    Dulce Reed LPN .......  5/26/2022  4:02 PM

## 2022-06-07 ENCOUNTER — TRANSFERRED RECORDS (OUTPATIENT)
Dept: HEALTH INFORMATION MANAGEMENT | Facility: OTHER | Age: 57
End: 2022-06-07
Payer: COMMERCIAL

## 2022-06-07 LAB — RETINOPATHY: NEGATIVE

## 2022-06-09 ENCOUNTER — TELEPHONE (OUTPATIENT)
Dept: FAMILY MEDICINE | Facility: OTHER | Age: 57
End: 2022-06-09
Payer: COMMERCIAL

## 2022-06-09 NOTE — TELEPHONE ENCOUNTER
Pt is looking for the results from labs that were done at the last appt 5/20/22.  Please call and leave message if no one answers.      Jaxson Gregg on 6/9/2022 at 2:27 PM

## 2022-06-09 NOTE — TELEPHONE ENCOUNTER
Left message to call back. Nurses cannot print off results. He will either need to sign up for [employer] and access results that way or call [facility] and request a copy from them (# to [facility] is [phone])    Leeanne Duque CMA on 6/9/2022 at 3:11 PM

## 2022-06-09 NOTE — TELEPHONE ENCOUNTER
Notified patient of providers result notes dated 5/20/2022. Patient could not remember being notified of them.  Kaity Nielsen LPN ....................  6/9/2022   2:43 PM

## 2022-06-16 NOTE — TELEPHONE ENCOUNTER
Talked to patient and transferred him to [facility] to get his labs sent to him in the mail.  Adri Hernandez, ANTIONETTE, LPN  6/16/2022  1:16 PM

## 2022-07-21 ENCOUNTER — TELEPHONE (OUTPATIENT)
Dept: FAMILY MEDICINE | Facility: OTHER | Age: 57
End: 2022-07-21

## 2022-07-21 NOTE — TELEPHONE ENCOUNTER
Patient states he needs to talk to [facility] about possibly changing over a pain med. Please call.     Kristine Moyer on 7/21/2022 at 4:32 PM

## 2022-07-22 NOTE — TELEPHONE ENCOUNTER
Talked with patient and he states that his friend told him to call his doctor and see if he can get THC prescribed to him for his pain.  Did let him know that [facility] providers do not prescribe that at this time. Told him to ask his friend who prescribed it for him and he can go that route.   Adri Hernandez, ANTIONETTE, LPN  7/22/2022  9:13 AM

## 2022-07-27 DIAGNOSIS — Z79.4 UNCONTROLLED TYPE 2 DIABETES MELLITUS WITH HYPERGLYCEMIA, WITH LONG-TERM CURRENT USE OF INSULIN (H): ICD-10-CM

## 2022-07-27 DIAGNOSIS — E11.65 UNCONTROLLED TYPE 2 DIABETES MELLITUS WITH HYPERGLYCEMIA, WITH LONG-TERM CURRENT USE OF INSULIN (H): ICD-10-CM

## 2022-07-27 RX ORDER — LANCETS
EACH MISCELLANEOUS
Qty: 400 EACH | Refills: 1 | Status: SHIPPED | OUTPATIENT
Start: 2022-07-27 | End: 2023-12-30

## 2022-07-27 NOTE — TELEPHONE ENCOUNTER
Routing refill request to provider for review/approval because:    LOV: 5/20/22    Maria G Maza RN on 7/27/2022 at 1:51 PM

## 2022-07-28 ENCOUNTER — TELEPHONE (OUTPATIENT)
Dept: FAMILY MEDICINE | Facility: OTHER | Age: 57
End: 2022-07-28

## 2022-07-28 NOTE — TELEPHONE ENCOUNTER
Edible THC products are now legal in [place] and would be an option to purchase.    Otherwise, I can provide a letter that states you have chronic pain and would qualify for medical marijuana.  From there, it would be an application process and typically monthly to q3 month visits to [facility] in [place] for visits with a dispensing pharmacist.    Let me know if Rajan would like a letter created to start this process.    Kira Grider, DO on 7/28/2022 at 6:17 PM

## 2022-07-28 NOTE — TELEPHONE ENCOUNTER
Talked with patient and he is wondering if he can get the THC tablets? His friend told him that he got it from his medical doctor here. He would like to not take so many pain meds. He thinks the THC tablets can help his pain. Did let him know that no providers here at [facility] are licensed to prescribe this and he would like a message sent to Dr. Grider to see if she can help him in any way of getting it. He does not have internet at home so he is unable to go on and apply for a green card.   Adri Hernandez LPN, LPN  7/28/2022  11:37 AM

## 2022-07-29 NOTE — TELEPHONE ENCOUNTER
Patient informed of Dr. Grider' response. He stated he was going to hold off for now as he wanted to look into it more.     Leeanne Duque CMA on 7/29/2022 at 10:50 AM

## 2022-08-19 ENCOUNTER — TELEPHONE (OUTPATIENT)
Dept: FAMILY MEDICINE | Facility: OTHER | Age: 57
End: 2022-08-19

## 2022-08-19 DIAGNOSIS — K08.89 PAIN, DENTAL: Primary | ICD-10-CM

## 2022-08-19 RX ORDER — HYDROCODONE BITARTRATE AND ACETAMINOPHEN 5; 325 MG/1; MG/1
1 TABLET ORAL EVERY 6 HOURS PRN
Qty: 12 TABLET | Refills: 0 | Status: SHIPPED | OUTPATIENT
Start: 2022-08-19 | End: 2022-08-22

## 2022-08-19 NOTE — TELEPHONE ENCOUNTER
Please call the patient.  He had a dental procedure and now has pain.  The dentist office told him to call here and ask for pain medication.      Valorie Phipps on 8/19/2022 at 10:00 AM

## 2022-08-19 NOTE — TELEPHONE ENCOUNTER
Rx for [place] #12 sent to pharmacy.     Alternating schedule recommended to keep pain control:  [example:  Ibuprofen 6am, noon, 6pm, midnight (if awake)  Norco + one over the counter tylenol 325mg-500mg 3am (if awake), 9am, 3pm, 9pm.]  Ice regularly  Would NOT recommend Norco to be taken together with Tramadol.  Follow up with dentist on Monday if still painful; consider ER if worsening over the weekend.    Kira Grider, DO  Family Practice

## 2022-09-27 DIAGNOSIS — F41.1 GAD (GENERALIZED ANXIETY DISORDER): ICD-10-CM

## 2022-09-27 DIAGNOSIS — E11.65 UNCONTROLLED TYPE 2 DIABETES MELLITUS WITH HYPERGLYCEMIA, WITH LONG-TERM CURRENT USE OF INSULIN (H): ICD-10-CM

## 2022-09-27 DIAGNOSIS — Z79.4 UNCONTROLLED TYPE 2 DIABETES MELLITUS WITH HYPERGLYCEMIA, WITH LONG-TERM CURRENT USE OF INSULIN (H): ICD-10-CM

## 2022-09-28 DIAGNOSIS — Z79.4 UNCONTROLLED TYPE 2 DIABETES MELLITUS WITH HYPERGLYCEMIA, WITH LONG-TERM CURRENT USE OF INSULIN (H): ICD-10-CM

## 2022-09-28 DIAGNOSIS — E11.65 UNCONTROLLED TYPE 2 DIABETES MELLITUS WITH HYPERGLYCEMIA, WITH LONG-TERM CURRENT USE OF INSULIN (H): ICD-10-CM

## 2022-09-28 RX ORDER — BLOOD SUGAR DIAGNOSTIC
STRIP MISCELLANEOUS
Qty: 100 STRIP | Refills: 11 | Status: CANCELLED | OUTPATIENT
Start: 2022-09-28

## 2022-09-30 RX ORDER — BLOOD SUGAR DIAGNOSTIC
STRIP MISCELLANEOUS
Qty: 100 STRIP | Refills: 6 | Status: SHIPPED | OUTPATIENT
Start: 2022-09-30 | End: 2023-05-02

## 2022-09-30 NOTE — TELEPHONE ENCOUNTER
[id number] of [place] sent Rx request for the following:      Requested Prescriptions   Pending Prescriptions Disp Refills     clonazePAM (KLONOPIN) 0.5 MG tablet [Pharmacy Med Name: clonazePAM 0.5 MG Oral Tablet] 30 tablet 0     Sig: TAKE 1 TABLET BY MOUTH AT BEDTIME   Last Prescription Date:   5/11/22  Last Fill Qty/Refills:         30, R-2    Last Office Visit:              5/20/22   Future Office visit:           1/19/23    Ashanti Lyons RN .............. 9/30/2022  4:47 PM

## 2022-09-30 NOTE — TELEPHONE ENCOUNTER
Requested Prescriptions   Pending Prescriptions Disp Refills     ACCU-CHEK DONAVON PLUS test strip [Pharmacy Med Name: Accu-Chek Donavon Plus In Vitro Strip]  0     Sig: USE 1 STRIP TO CHECK GLUCOSE 4 TIMES DAILY     Last Written Prescription Date:  10/10/21  Last Fill Quantity: 100 strip,   # refills: 11  Last Office Visit: 5/20/22 [place]  Future Office visit: none       Routing refill request to provider for review/approval   Brenda J. Goodell, RN on 9/30/2022 at 9:10 AM

## 2022-10-04 PROBLEM — R91.1 SOLITARY PULMONARY NODULE: Status: ACTIVE | Noted: 2017-12-01

## 2022-10-04 RX ORDER — CLONAZEPAM 0.5 MG/1
TABLET ORAL
Qty: 30 TABLET | Refills: 0 | Status: SHIPPED | OUTPATIENT
Start: 2022-10-04 | End: 2022-11-09

## 2022-11-01 ENCOUNTER — MEDICAL CORRESPONDENCE (OUTPATIENT)
Dept: HEALTH INFORMATION MANAGEMENT | Facility: OTHER | Age: 57
End: 2022-11-01

## 2022-11-07 DIAGNOSIS — F41.1 GAD (GENERALIZED ANXIETY DISORDER): ICD-10-CM

## 2022-11-09 RX ORDER — CLONAZEPAM 0.5 MG/1
TABLET ORAL
Qty: 30 TABLET | Refills: 0 | Status: SHIPPED | OUTPATIENT
Start: 2022-11-09 | End: 2022-12-14

## 2022-11-09 NOTE — TELEPHONE ENCOUNTER
Routing refill request to provider for review/approval because:    LOV: 5/20/22    Patient due for diabetic follow up     Will route to outreach to call patient to help assist in scheduling this appointment.    Maria G Maza RN on 11/9/2022 at 10:51 AM

## 2022-11-15 NOTE — TELEPHONE ENCOUNTER
Pt scheduled annual exam 12/6/22 with [facility].    Kristine Moyer on 11/15/2022 at 3:32 PM

## 2022-11-15 NOTE — TELEPHONE ENCOUNTER
LMTCB to schedule appointment.    Pt due for diabetic check.    Kristine Moyer on 11/15/2022 at 1:09 PM

## 2022-11-25 DIAGNOSIS — E11.42 DIABETIC POLYNEUROPATHY ASSOCIATED WITH TYPE 2 DIABETES MELLITUS (H): ICD-10-CM

## 2022-11-28 RX ORDER — PREGABALIN 200 MG/1
CAPSULE ORAL
Qty: 90 CAPSULE | Refills: 0 | Status: SHIPPED | OUTPATIENT
Start: 2022-11-28 | End: 2022-12-28

## 2022-12-12 DIAGNOSIS — F41.1 GAD (GENERALIZED ANXIETY DISORDER): ICD-10-CM

## 2022-12-14 RX ORDER — CLONAZEPAM 0.5 MG/1
TABLET ORAL
Qty: 30 TABLET | Refills: 0 | Status: SHIPPED | OUTPATIENT
Start: 2022-12-14 | End: 2023-02-07

## 2022-12-14 NOTE — TELEPHONE ENCOUNTER
clonazePAM 0.5 MG Oral Tablet     Last Written Prescription Date:  11/9/22  Last Fill Quantity: 30,   # refills: 0  Last Office Visit: 5/20/22  Future Office visit:    Next 5 appointments (look out 90 days)    Jan 19, 2023  3:00 PM  Office Visit with Deysi Guzman NP  [facility] ([facility] [address]  [place] [place] [facility] [address]  [phone]           Routing refill request to provider for review/approval because:  Drug not on the [facility], [facility] or [facility] refill protocol or controlled substance. Unable to complete prescription refill per RNMedication Refill Policy.................... Sanjuana Trujillo RN ....................  12/14/2022   12:14 PM

## 2022-12-27 DIAGNOSIS — E11.42 DIABETIC POLYNEUROPATHY ASSOCIATED WITH TYPE 2 DIABETES MELLITUS (H): ICD-10-CM

## 2022-12-28 RX ORDER — PREGABALIN 200 MG/1
CAPSULE ORAL
Qty: 90 CAPSULE | Refills: 0 | Status: SHIPPED | OUTPATIENT
Start: 2022-12-28 | End: 2023-02-13

## 2023-01-05 ENCOUNTER — TELEPHONE (OUTPATIENT)
Dept: FAMILY MEDICINE | Facility: OTHER | Age: 58
End: 2023-01-05

## 2023-01-05 DIAGNOSIS — Z79.4 UNCONTROLLED TYPE 2 DIABETES MELLITUS WITH HYPERGLYCEMIA, WITH LONG-TERM CURRENT USE OF INSULIN (H): Primary | ICD-10-CM

## 2023-01-05 DIAGNOSIS — E11.65 UNCONTROLLED TYPE 2 DIABETES MELLITUS WITH HYPERGLYCEMIA, WITH LONG-TERM CURRENT USE OF INSULIN (H): Primary | ICD-10-CM

## 2023-01-05 NOTE — TELEPHONE ENCOUNTER
Requesting an order for Diabetic shoes. They get them from [facility].      Evelyne Godfrey on 1/5/2023 at 12:39 PM

## 2023-01-06 NOTE — TELEPHONE ENCOUNTER
DME equipment required office visit for documentation required by insurance companies.  Would be due for diabetic check anytime.    Please assist in scheduling and prescription can be done at that time.    Kria Grider, DO     July 16, 2018        Rip Brewster IV, MD  [address]  [address]  [place] [address]             [facility]  [address], [address]  [place] [address]  Phone: [phone]  Fax: [phone]   Patient: Geni Walter   MR Number: [id number]   YOB: 2004   Date of Visit: 7/16/2018       Dear Dr. Brewster:    Thank you for referring Geni Walter to me for evaluation. Attached you will find relevant portions of my assessment and plan of care.    If you have questions, please do not hesitate to call me. I look forward to following Geni Walter along with you.    Sincerely,      Xochitl Butcher MD            CC  No Recipients    Enclosure

## 2023-01-06 NOTE — TELEPHONE ENCOUNTER
Patient notified. He will make an appointment after 1/27/23.  Adri Hernandez LPN, LPN  1/6/2023  10:17 AM

## 2023-01-06 NOTE — TELEPHONE ENCOUNTER
Left message to call back....................  1/6/2023   9:09 AM  Adri Hernandez LPN  1/6/2023  9:09 AM

## 2023-01-11 ENCOUNTER — TRANSFERRED RECORDS (OUTPATIENT)
Dept: HEALTH INFORMATION MANAGEMENT | Facility: OTHER | Age: 58
End: 2023-01-11
Payer: COMMERCIAL

## 2023-01-12 DIAGNOSIS — G89.29 CHRONIC MIDLINE LOW BACK PAIN, UNSPECIFIED WHETHER SCIATICA PRESENT: ICD-10-CM

## 2023-01-12 DIAGNOSIS — E11.42 DIABETIC POLYNEUROPATHY ASSOCIATED WITH TYPE 2 DIABETES MELLITUS (H): ICD-10-CM

## 2023-01-12 DIAGNOSIS — F41.1 GAD (GENERALIZED ANXIETY DISORDER): ICD-10-CM

## 2023-01-12 DIAGNOSIS — M54.50 CHRONIC MIDLINE LOW BACK PAIN, UNSPECIFIED WHETHER SCIATICA PRESENT: ICD-10-CM

## 2023-01-17 RX ORDER — CLONAZEPAM 0.5 MG/1
TABLET ORAL
Qty: 30 TABLET | Refills: 0 | OUTPATIENT
Start: 2023-01-17

## 2023-01-17 RX ORDER — TRAMADOL HYDROCHLORIDE 50 MG/1
TABLET ORAL
Qty: 90 TABLET | Refills: 0 | OUTPATIENT
Start: 2023-01-17

## 2023-01-17 NOTE — TELEPHONE ENCOUNTER
[id number] of [place] sent Rx request for the following:      Requested Prescriptions   Pending Prescriptions Disp Refills     clonazePAM (KLONOPIN) 0.5 MG tablet [Pharmacy Med Name: clonazePAM 0.5 MG Oral Tablet] 30 tablet 0     Sig: TAKE 1 TABLET BY MOUTH AT BEDTIME   Last Prescription Date:   12/14/22  Last Fill Qty/Refills:         30, R-0         traMADol (ULTRAM) 50 MG tablet [Pharmacy Med Name: traMADol HCl 50 MG Oral Tablet] 90 tablet 0     Sig: Take 1 tablet by mouth three times daily as needed   Last Prescription Date:   5/7/22  Last Fill Qty/Refills:         90, R-5      Last Office Visit:              5/20/22   Future Office visit:             Next 5 appointments (look out 90 days)    Jan 19, 2023  3:00 PM  Office Visit with Deysi Guzman NP  [facility] ([facility] ) [address]  [place] [address]  [phone]   Feb 01, 2023 10:40 AM  SHORT with Kira Grider DO  [facility] ([facility] ) [address]  [place] [address]  [phone]        Attempted reaching Pt, to see if he has enough medication to get through to upcoming appointment. Left message on machine to call back. Ashanti Lyons RN .............. 1/17/2023  9:29 AM

## 2023-01-17 NOTE — TELEPHONE ENCOUNTER
Patient returned call. Pt has enough medication to get through to 1/19 appointment. Pharmacy notified. Ashanti Lyons RN .............. 1/17/2023  10:08 AM

## 2023-02-06 NOTE — PROGRESS NOTES
SUBJECTIVE:   CC: Rajan is an 57 year old who presents for preventative health visit.     Healthy Habits:     Getting at least 3 servings of Calcium per day:  NO    Bi-annual eye exam:  Yes    Dental care twice a year:  Yes    Sleep apnea or symptoms of sleep apnea:  Sleep apnea    Diet:  Diabetic    Frequency of exercise:  2-3 days/week    Duration of exercise:  15-30 minutes    Taking medications regularly:  Yes    Medication side effects:  None    PHQ-2 Total Score: 6    Here for annual physical.     Anxiety:  Chronic, continues on Klonopin 0.5 mg daily on average. Needing refills. Last fill 12/14/2022 per  review. Due for updating UDS. Needs CSA updated with PCP.     Chronic pain:  Chronic low back pain and diabetic neuropathy for which he continues on tramadol.  reviewed and appears appropriate. Needing refills today.     DM/HTN/Lipids:  Chronic, poorly controlled. A1c updated 5/2022 and was up to 10.0. It was recommended that he increase insulin dosing further, focus on healthy lifestyle. Blood pressure mildly elevated in clinic at 148/84, down to 138/88 on recheck, does not monitor at home. Due for lipid panel as well. Not focusing on healthy lifestyle.     Today's PHQ-2 Score:   PHQ-2 ( 1999 [employer]) 2/7/2023   Q1: Little interest or pleasure in doing things 3   Q2: Feeling down, depressed or hopeless 3   PHQ-2 Score 6   PHQ-2 Total Score (12-17 Years)- Positive if 3 or more points; Administer PHQ-A if positive -   Q1: Little interest or pleasure in doing things Nearly every day   Q2: Feeling down, depressed or hopeless Nearly every day   PHQ-2 Score 6       Have you ever done Advance Care Planning? (For example, a Health Directive, POLST, or a discussion with a medical provider or your loved ones about your wishes): No, advance care planning information given to patient to review.  Patient declined advance care planning discussion at this time.    Social History     Tobacco Use     Smoking status: Former      Packs/day: 0.50     Years: 28.00     Pack years: 14.00     Types: Cigarettes     Quit date: 2011     Years since quittin.1     Smokeless tobacco: Former     Types: Chew     Quit date: 1988   Substance Use Topics     Alcohol use: No         Last PSA: No results found for: PSA    Reviewed orders with patient. Reviewed health maintenance and updated orders accordingly - Yes      Reviewed and updated as needed this visit by clinical staff   Tobacco  Allergies  Meds      Soc Hx        Reviewed and updated as needed this visit by Provider                 Past Medical History:   Diagnosis Date     Acute respiratory failure with hypoxia (H) 2011     Community acquired bacterial pneumonia 2011     Empyema (H) 2012    Overview:  Rt lung 11     Lateral epicondylitis of left elbow     10/3/2013,Seen by Dr Tayla Reyes [facility] 2013     Moderate cigarette smoker (10-19 per day) 2011     On mechanically assisted ventilation (H) 1/3/2012    Overview:  IMO Update  Overview:  IMO Update     Type 2 diabetes mellitus with proliferative retinopathy without macular edema (H) 2014    Mild proliferative changes on left side--sees Dr Ortiz exam 2014.       Umbilical hernia with obstruction, without gangrene 10/31/2018      Past Surgical History:   Procedure Laterality Date     ARTHROSCOPY SHOULDER      [id number],SHOULDER SURGERY,Bilateral     CHEST TUBE INSERTION      ,CHEST TUBE INSERTION     HERNIORRHAPHY UMBILICAL N/A 2018    Procedure: Umbilical hernia Repair with Mesh;  Surgeon: Mason Rhodes MD;  Location: [facility] OR     LUNG SURGERY      [id number],LUNG SURGERY,pneumonia     TRACHEOSTOMY      [id number],HCHG TRACH PR1,history trach with pneumonia       Review of Systems   Constitutional: Negative for chills and fever.   HENT: Negative for congestion, ear pain, hearing loss and sore throat.    Eyes: Negative for pain and visual disturbance.   Respiratory: Positive for  "shortness of breath. Negative for cough.    Cardiovascular: Positive for peripheral edema. Negative for chest pain and palpitations.   Gastrointestinal: Negative for abdominal pain, constipation, diarrhea, heartburn, hematochezia and nausea.   Genitourinary: Positive for impotence. Negative for dysuria, frequency, genital sores, hematuria, penile discharge and urgency.   Musculoskeletal: Negative for arthralgias, joint swelling and myalgias.   Skin: Negative for rash.   Neurological: Negative for dizziness, weakness, headaches and paresthesias.   Psychiatric/Behavioral: Negative for mood changes. The patient is not nervous/anxious.        OBJECTIVE:   /88   Pulse 82   Temp 98.5  F (36.9  C)   Resp 18   Ht 1.778 m (5' 10\")   Wt 140.3 kg (309 lb 6.4 oz)   SpO2 98%   BMI 44.39 kg/m      Physical Exam  GENERAL: healthy, alert and no distress, obese  EYES: Eyes grossly normal to inspection, PERRL and conjunctivae and sclerae normal  HENT: ear canals and TM's normal, nose and mouth without ulcers or lesions  NECK: no adenopathy, no asymmetry, masses, or scars and thyroid normal to palpation  RESP: lungs clear to auscultation - no rales, rhonchi or wheezes  CV: regular rate and rhythm, normal S1 S2, no S3 or S4, no murmur, click or rub, no peripheral edema and peripheral pulses strong  MS: no gross musculoskeletal defects noted, no edema  NEURO: Normal strength and tone, mentation intact and speech normal  PSYCH: mentation appears normal, affect normal/bright    Diagnostic Test Results:  Labs reviewed in Epic  Results for orders placed or performed in visit on 02/07/23 (from the past 24 hour(s))   Urine Drugs of Abuse Screen Panel 13   Result Value Ref Range    Cannabinoids (26-iab-3-carboxy-9-THC) Presumptive positive-Unconfirmed result (A) Not Detected    Phencyclidine Not Detected Not Detected    Cocaine (Benzoylecgonine) Not Detected Not Detected    Methamphetamine (d-Methamphetamine) Not Detected Not " Detected    Opiates (Morphine) Not Detected Not Detected    Amphetamine (d-Amphetamine) Not Detected Not Detected    Benzodiazepines (Nordiazepam) Presumptive positive-Unconfirmed result (A) Not Detected    Tricyclic Antidepressants (Desipramine) Not Detected Not Detected    Methadone Not Detected Not Detected    Barbiturates (Butalbital) Not Detected Not Detected    Oxycodone Not Detected Not Detected    Propoxyphene (Norpropoxyphene) Not Detected Not Detected    Buprenorphine Not Detected Not Detected   Drug Confirmation Panel Urine with Creat    Narrative    The following orders were created for panel order Drug Confirmation Panel Urine with Creat.  Procedure                               Abnormality         Status                     ---------                               -----------         ------                     Urine Drug Confirmation ...[[id number]]                      In process                 Urine Creatinine for Feng...[[id number]]                      Final result               Cannabinoids qualitative...[[id number]]  Abnormal            Final result                 Please view results for these tests on the individual orders.   Hemoglobin A1c   Result Value Ref Range    Hemoglobin A1C 9.3 (H) 4.0 - 6.2 %   Albumin Random Urine Quantitative with Creat Ratio   Result Value Ref Range    Creatinine Urine mg/dL 206.0 mg/dL    Albumin Urine mg/L 719.9 mg/L    Albumin Urine mg/g Cr 349.47 (H) 0.00 - 17.00 mg/g Cr   Lipid Panel   Result Value Ref Range    Cholesterol 159 <200 mg/dL    Triglycerides 175 (H) <150 mg/dL    Direct Measure HDL 39 (L) >=40 mg/dL    LDL Cholesterol Calculated 85 <=100 mg/dL    Non HDL Cholesterol 120 <130 mg/dL    Narrative    Cholesterol  Desirable:  <200 mg/dL    Triglycerides  Normal:  Less than 150 mg/dL  Borderline High:  150-199 mg/dL  High:  200-499 mg/dL  Very High:  Greater than or equal to 500 mg/dL    Direct Measure HDL  Female:  Greater than or equal to 50 mg/dL   Male:   Greater than or equal to 40 mg/dL    LDL Cholesterol  Desirable:  <100mg/dL  Above Desirable:  100-129 mg/dL   Borderline High:  130-159 mg/dL   High:  160-189 mg/dL   Very High:  >= 190 mg/dL    Non HDL Cholesterol  Desirable:  130 mg/dL  Above Desirable:  130-159 mg/dL  Borderline High:  160-189 mg/dL  High:  190-219 mg/dL  Very High:  Greater than or equal to 220 mg/dL   Comprehensive Metabolic Panel   Result Value Ref Range    Sodium 140 136 - 145 mmol/L    Potassium 4.2 3.4 - 5.3 mmol/L    Chloride 95 (L) 98 - 107 mmol/L    Carbon Dioxide (CO2) 35 (H) 22 - 29 mmol/L    Anion Gap 10 7 - 15 mmol/L    Urea Nitrogen 13.7 6.0 - 20.0 mg/dL    Creatinine 1.10 0.67 - 1.17 mg/dL    Calcium 9.9 8.6 - 10.0 mg/dL    Glucose 177 (H) 70 - 99 mg/dL    Alkaline Phosphatase 73 40 - 129 U/L    AST 51 (H) 10 - 50 U/L    ALT 63 (H) 10 - 50 U/L    Protein Total 7.6 6.4 - 8.3 g/dL    Albumin 4.3 3.5 - 5.2 g/dL    Bilirubin Total 0.4 <=1.2 mg/dL    GFR Estimate 78 >60 mL/min/1.73m2   Urine Creatinine for Drug Screen Panel   Result Value Ref Range    Creatinine Urine for Drug Screen 206 mg/dL   Cannabinoids qualitative urine   Result Value Ref Range    Cannabinoids Urine Screen Positive (A) Screen Negative       ASSESSMENT/PLAN:       ICD-10-CM    1. Routine history and physical examination of adult  Z00.00       2. MIRANDA (generalized anxiety disorder)  F41.1 Drug Confirmation Panel Urine with Creat     Urine Drugs of Abuse Screen Panel 13     clonazePAM (KLONOPIN) 0.5 MG tablet     Drug Confirmation Panel Urine with Creat      3. Diabetic polyneuropathy associated with type 2 diabetes mellitus (H)  E11.42 traMADol (ULTRAM) 50 MG tablet      4. Chronic midline low back pain, unspecified whether sciatica present  M54.50 traMADol (ULTRAM) 50 MG tablet    G89.29       5. Uncontrolled type 2 diabetes mellitus with hyperglycemia, with long-term current use of insulin (H)  E11.65 Hemoglobin A1c    Z79.4 Albumin Random Urine Quantitative with  "Creat Ratio     Lipid Panel     Miscellaneous Order for DME - ONLY FOR DME     Hemoglobin A1c     Albumin Random Urine Quantitative with Creat Ratio     Lipid Panel      6. Benign essential hypertension  I10       7. Hyperlipidemia LDL goal <100  E78.5 Lipid Panel     Lipid Panel      8. Stage 3 chronic kidney disease, unspecified whether stage 3a or 3b CKD (H)  N18.30 Comprehensive Metabolic Panel     Comprehensive Metabolic Panel        1. Declines updating immunizations, colon cancer screening, PSA. Basic lab work pending as above.     2. Chronic, stable. UDS updated, confirmation panel pending. Needing updated CSA with PCP.     3, 4. Chronic, stable. UDS updated, confirmation panel pending. Needing updated CSA with PCP.     5. Poorly controlled, A1c 9.3. Again recommend correctly taking insulin, focusing healthy diet, introducing mild amounts of physical activity, consider further increasing Lantus dose. Short acting insulin is not likely to be helpful as patient is not likely reliable to correctly dose short acting. Can consider meeting with diabetic educator going forward. New order for diabetic shoes.     6. Chronic, mildly elevated. Down to 138/88 on recheck. Continue with lisinopril and hydrochlorothiazide.     7. Chronic, stable. Continue on statin. Focus on healthy lifestyle.     8. Chronic, stable.     Patient has been advised of split billing requirements and indicates understanding: Yes      COUNSELING:   Reviewed preventive health counseling, as reflected in patient instructions      BMI:   Estimated body mass index is 44.39 kg/m  as calculated from the following:    Height as of this encounter: 1.778 m (5' 10\").    Weight as of this encounter: 140.3 kg (309 lb 6.4 oz).   Weight management plan: Discussed healthy diet and exercise guidelines      He reports that he quit smoking about 11 years ago. His smoking use included cigarettes. He has a 14.00 pack-year smoking history. He quit smokeless tobacco " use about 34 years ago.  His smokeless tobacco use included chew.        Dafne Ivory PA-C  [facility]

## 2023-02-07 ENCOUNTER — OFFICE VISIT (OUTPATIENT)
Dept: FAMILY MEDICINE | Facility: OTHER | Age: 58
End: 2023-02-07
Attending: PHYSICIAN ASSISTANT
Payer: COMMERCIAL

## 2023-02-07 VITALS
BODY MASS INDEX: 44.29 KG/M2 | RESPIRATION RATE: 18 BRPM | WEIGHT: 309.4 LBS | HEIGHT: 70 IN | HEART RATE: 82 BPM | DIASTOLIC BLOOD PRESSURE: 88 MMHG | OXYGEN SATURATION: 98 % | SYSTOLIC BLOOD PRESSURE: 138 MMHG | TEMPERATURE: 98.5 F

## 2023-02-07 DIAGNOSIS — F41.1 GAD (GENERALIZED ANXIETY DISORDER): ICD-10-CM

## 2023-02-07 DIAGNOSIS — I10 BENIGN ESSENTIAL HYPERTENSION: ICD-10-CM

## 2023-02-07 DIAGNOSIS — M54.50 CHRONIC MIDLINE LOW BACK PAIN, UNSPECIFIED WHETHER SCIATICA PRESENT: ICD-10-CM

## 2023-02-07 DIAGNOSIS — Z00.00 ROUTINE HISTORY AND PHYSICAL EXAMINATION OF ADULT: Primary | ICD-10-CM

## 2023-02-07 DIAGNOSIS — Z79.4 UNCONTROLLED TYPE 2 DIABETES MELLITUS WITH HYPERGLYCEMIA, WITH LONG-TERM CURRENT USE OF INSULIN (H): ICD-10-CM

## 2023-02-07 DIAGNOSIS — E78.5 HYPERLIPIDEMIA LDL GOAL <100: ICD-10-CM

## 2023-02-07 DIAGNOSIS — E11.42 DIABETIC POLYNEUROPATHY ASSOCIATED WITH TYPE 2 DIABETES MELLITUS (H): ICD-10-CM

## 2023-02-07 DIAGNOSIS — E11.65 UNCONTROLLED TYPE 2 DIABETES MELLITUS WITH HYPERGLYCEMIA, WITH LONG-TERM CURRENT USE OF INSULIN (H): ICD-10-CM

## 2023-02-07 DIAGNOSIS — N18.30 STAGE 3 CHRONIC KIDNEY DISEASE, UNSPECIFIED WHETHER STAGE 3A OR 3B CKD (H): ICD-10-CM

## 2023-02-07 DIAGNOSIS — G89.29 CHRONIC MIDLINE LOW BACK PAIN, UNSPECIFIED WHETHER SCIATICA PRESENT: ICD-10-CM

## 2023-02-07 LAB
ALBUMIN SERPL BCG-MCNC: 4.3 G/DL (ref 3.5–5.2)
ALP SERPL-CCNC: 73 U/L (ref 40–129)
ALT SERPL W P-5'-P-CCNC: 63 U/L (ref 10–50)
AMPHETAMINES UR QL: NOT DETECTED
ANION GAP SERPL CALCULATED.3IONS-SCNC: 10 MMOL/L (ref 7–15)
AST SERPL W P-5'-P-CCNC: 51 U/L (ref 10–50)
BARBITURATES UR QL SCN: NOT DETECTED
BENZODIAZ UR QL SCN: ABNORMAL
BILIRUB SERPL-MCNC: 0.4 MG/DL
BUN SERPL-MCNC: 13.7 MG/DL (ref 6–20)
BUPRENORPHINE UR QL: NOT DETECTED
CALCIUM SERPL-MCNC: 9.9 MG/DL (ref 8.6–10)
CANNABINOIDS UR QL SCN: ABNORMAL
CANNABINOIDS UR QL: ABNORMAL
CHLORIDE SERPL-SCNC: 95 MMOL/L (ref 98–107)
CHOLEST SERPL-MCNC: 159 MG/DL
COCAINE UR QL SCN: NOT DETECTED
CREAT SERPL-MCNC: 1.1 MG/DL (ref 0.67–1.17)
CREAT UR-MCNC: 206 MG/DL
CREAT UR-MCNC: 206 MG/DL
D-METHAMPHET UR QL: NOT DETECTED
DEPRECATED HCO3 PLAS-SCNC: 35 MMOL/L (ref 22–29)
GFR SERPL CREATININE-BSD FRML MDRD: 78 ML/MIN/1.73M2
GLUCOSE SERPL-MCNC: 177 MG/DL (ref 70–99)
HBA1C MFR BLD: 9.3 % (ref 4–6.2)
HDLC SERPL-MCNC: 39 MG/DL
LDLC SERPL CALC-MCNC: 85 MG/DL
METHADONE UR QL SCN: NOT DETECTED
MICROALBUMIN UR-MCNC: 719.9 MG/L
MICROALBUMIN/CREAT UR: 349.47 MG/G CR (ref 0–17)
NONHDLC SERPL-MCNC: 120 MG/DL
OPIATES UR QL SCN: NOT DETECTED
OXYCODONE UR QL SCN: NOT DETECTED
PCP UR QL SCN: NOT DETECTED
POTASSIUM SERPL-SCNC: 4.2 MMOL/L (ref 3.4–5.3)
PROPOXYPH UR QL: NOT DETECTED
PROT SERPL-MCNC: 7.6 G/DL (ref 6.4–8.3)
SODIUM SERPL-SCNC: 140 MMOL/L (ref 136–145)
TRICYCLICS UR QL SCN: NOT DETECTED
TRIGL SERPL-MCNC: 175 MG/DL

## 2023-02-07 PROCEDURE — 82570 ASSAY OF URINE CREATININE: CPT | Mod: ZL | Performed by: PHYSICIAN ASSISTANT

## 2023-02-07 PROCEDURE — 80306 DRUG TEST PRSMV INSTRMNT: CPT | Mod: ZL,XU | Performed by: PHYSICIAN ASSISTANT

## 2023-02-07 PROCEDURE — 80307 DRUG TEST PRSMV CHEM ANLYZR: CPT | Mod: ZL | Performed by: PHYSICIAN ASSISTANT

## 2023-02-07 PROCEDURE — 99396 PREV VISIT EST AGE 40-64: CPT | Performed by: PHYSICIAN ASSISTANT

## 2023-02-07 PROCEDURE — 80061 LIPID PANEL: CPT | Mod: ZL | Performed by: PHYSICIAN ASSISTANT

## 2023-02-07 PROCEDURE — 83036 HEMOGLOBIN GLYCOSYLATED A1C: CPT | Mod: ZL | Performed by: PHYSICIAN ASSISTANT

## 2023-02-07 PROCEDURE — 36415 COLL VENOUS BLD VENIPUNCTURE: CPT | Mod: ZL | Performed by: PHYSICIAN ASSISTANT

## 2023-02-07 PROCEDURE — 80053 COMPREHEN METABOLIC PANEL: CPT | Mod: ZL | Performed by: PHYSICIAN ASSISTANT

## 2023-02-07 RX ORDER — CLONAZEPAM 0.5 MG/1
0.5 TABLET ORAL AT BEDTIME
Qty: 30 TABLET | Refills: 5 | Status: SHIPPED | OUTPATIENT
Start: 2023-02-07 | End: 2023-08-22

## 2023-02-07 RX ORDER — CLONAZEPAM 0.5 MG/1
0.5 TABLET ORAL AT BEDTIME
Qty: 30 TABLET | Refills: 0 | Status: CANCELLED | OUTPATIENT
Start: 2023-02-07

## 2023-02-07 RX ORDER — TRAMADOL HYDROCHLORIDE 50 MG/1
TABLET ORAL
Qty: 90 TABLET | Refills: 5 | Status: SHIPPED | OUTPATIENT
Start: 2023-02-07 | End: 2023-08-31

## 2023-02-07 ASSESSMENT — ENCOUNTER SYMPTOMS
JOINT SWELLING: 0
PALPITATIONS: 0
DIARRHEA: 0
MYALGIAS: 0
HEMATURIA: 0
SORE THROAT: 0
NERVOUS/ANXIOUS: 0
CONSTIPATION: 0
DIZZINESS: 0
CHILLS: 0
HEARTBURN: 0
HEADACHES: 0
COUGH: 0
EYE PAIN: 0
SHORTNESS OF BREATH: 1
ABDOMINAL PAIN: 0
HEMATOCHEZIA: 0
NAUSEA: 0
WEAKNESS: 0
FREQUENCY: 0
DYSURIA: 0
ARTHRALGIAS: 0
FEVER: 0
PARESTHESIAS: 0

## 2023-02-07 ASSESSMENT — PATIENT HEALTH QUESTIONNAIRE - PHQ9
10. IF YOU CHECKED OFF ANY PROBLEMS, HOW DIFFICULT HAVE THESE PROBLEMS MADE IT FOR YOU TO DO YOUR WORK, TAKE CARE OF THINGS AT HOME, OR GET ALONG WITH OTHER PEOPLE: NOT DIFFICULT AT ALL
SUM OF ALL RESPONSES TO PHQ QUESTIONS 1-9: 6
SUM OF ALL RESPONSES TO PHQ QUESTIONS 1-9: 6

## 2023-02-07 ASSESSMENT — PAIN SCALES - GENERAL: PAINLEVEL: MILD PAIN (3)

## 2023-02-07 NOTE — NURSING NOTE
Patient presents to clinic for annual  Physical.  Kaity Nielsen LPN ....................  2/7/2023   11:06 AM

## 2023-02-13 LAB
N-NORTRAMADOL/CREAT UR CFM: 903 NG/MG {CREAT}
O-NORTRAMADOL UR CFM-MCNC: 1860 NG/ML
PREGABALIN UR QL CFM: PRESENT
TRAMADOL CTO UR CFM-MCNC: 520 NG/ML
TRAMADOL/CREAT UR: 252 NG/MG {CREAT}

## 2023-02-13 RX ORDER — TRAMADOL HYDROCHLORIDE 50 MG/1
TABLET ORAL
Qty: 90 TABLET | Refills: 0 | OUTPATIENT
Start: 2023-02-13

## 2023-02-13 NOTE — TELEPHONE ENCOUNTER
[id number] of [place] sent Rx request for the following:    traMADol (ULTRAM) 50 MG tablet 90 tablet 5 2/7/2023  No   Sig: Take 1 tablet by mouth three times daily as needed   Class: E-Prescribe   Order: [id number]   Prior authorization: Payer Waiting for Response   This order has not been released to its destination.   [facility] - [address] [facility]    Order released.    E-Prescribing Status: Receipt confirmed by pharmacy (2/13/2023  2:07 PM CST)    Ashanti Lyons RN .............. 2/13/2023  2:07 PM

## 2023-02-13 NOTE — TELEPHONE ENCOUNTER
[id number] of [place] sent Rx request for the following:    Requested Prescriptions   Pending Prescriptions Disp Refills     LYRICA 200 MG capsule [Pharmacy Med Name: Lyrica 200 MG Oral Capsule] 90 capsule 0     Sig: TAKE 1 CAPSULE BY MOUTH THREE TIMES DAILY   Last Prescription Date:   12/28/22  Last Fill Qty/Refills:         90, R-0     LOV: 2/7/23  No upcoming appointments.    Ashanti Lyons RN .............. 2/13/2023  2:08 PM

## 2023-02-15 RX ORDER — PREGABALIN 200 MG/1
CAPSULE ORAL
Qty: 90 CAPSULE | Refills: 5 | Status: SHIPPED | OUTPATIENT
Start: 2023-02-15 | End: 2024-03-12

## 2023-02-15 NOTE — TELEPHONE ENCOUNTER
Had recent office visit with appropriate pregabalin in UDS.  Refilled; may consider discussion re: clonazepam vs THC at next visit.  Kira Grider, DO

## 2023-02-21 ENCOUNTER — TELEPHONE (OUTPATIENT)
Dept: FAMILY MEDICINE | Facility: OTHER | Age: 58
End: 2023-02-21
Payer: COMMERCIAL

## 2023-02-21 NOTE — TELEPHONE ENCOUNTER
Dafne from [facility] states that the traMADol was denied by insurance. She states that there was a prior auth form sent to [facility] on 2/7/23 that was to be filled out and sent back but they did not receive.     Any questions, call  [phone]    Dagmar Ramirez on 2/21/2023 at 8:56 AM

## 2023-02-21 NOTE — TELEPHONE ENCOUNTER
Questions were answered and sent to insurance.  Kaity Nielsen LPN ....................  2/21/2023   9:10 AM

## 2023-03-06 DIAGNOSIS — E11.65 UNCONTROLLED TYPE 2 DIABETES MELLITUS WITH HYPERGLYCEMIA, WITH LONG-TERM CURRENT USE OF INSULIN (H): ICD-10-CM

## 2023-03-06 DIAGNOSIS — Z79.4 UNCONTROLLED TYPE 2 DIABETES MELLITUS WITH HYPERGLYCEMIA, WITH LONG-TERM CURRENT USE OF INSULIN (H): ICD-10-CM

## 2023-03-09 RX ORDER — ISOPROPYL ALCOHOL 0.75 G/1
SWAB TOPICAL
Qty: 100 EACH | Refills: 1 | Status: SHIPPED | OUTPATIENT
Start: 2023-03-09 | End: 2023-07-13

## 2023-03-09 NOTE — TELEPHONE ENCOUNTER
Routing refill request to provider for review/approval because:      LOV: 2/7/23    Maria G Maza RN on 3/9/2023 at 3:09 PM

## 2023-03-16 DIAGNOSIS — E11.42 DIABETIC POLYNEUROPATHY ASSOCIATED WITH TYPE 2 DIABETES MELLITUS (H): ICD-10-CM

## 2023-03-21 RX ORDER — PREGABALIN 200 MG/1
CAPSULE ORAL
Qty: 90 CAPSULE | Refills: 0 | OUTPATIENT
Start: 2023-03-21

## 2023-03-21 NOTE — TELEPHONE ENCOUNTER
LYRICA 200 MG capsule 90 capsule 5 2/15/2023  Yes   Sig: TAKE 1 CAPSULE BY MOUTH THREE TIMES DAILY     [facility]    Should have refills  Maria G Maza RN on 3/21/2023 at 9:41 AM

## 2023-04-13 DIAGNOSIS — Z79.4 UNCONTROLLED TYPE 2 DIABETES MELLITUS WITH HYPERGLYCEMIA, WITH LONG-TERM CURRENT USE OF INSULIN (H): ICD-10-CM

## 2023-04-13 DIAGNOSIS — E11.65 UNCONTROLLED TYPE 2 DIABETES MELLITUS WITH HYPERGLYCEMIA, WITH LONG-TERM CURRENT USE OF INSULIN (H): ICD-10-CM

## 2023-04-17 ENCOUNTER — TELEPHONE (OUTPATIENT)
Dept: FAMILY MEDICINE | Facility: OTHER | Age: 58
End: 2023-04-17
Payer: COMMERCIAL

## 2023-04-17 DIAGNOSIS — Z79.4 UNCONTROLLED TYPE 2 DIABETES MELLITUS WITH HYPERGLYCEMIA, WITH LONG-TERM CURRENT USE OF INSULIN (H): ICD-10-CM

## 2023-04-17 DIAGNOSIS — E11.65 UNCONTROLLED TYPE 2 DIABETES MELLITUS WITH HYPERGLYCEMIA, WITH LONG-TERM CURRENT USE OF INSULIN (H): ICD-10-CM

## 2023-04-17 RX ORDER — PEN NEEDLE, DIABETIC 32GX 5/32"
NEEDLE, DISPOSABLE MISCELLANEOUS
Qty: 400 EACH | Refills: 2 | Status: CANCELLED | OUTPATIENT
Start: 2023-04-17

## 2023-04-17 RX ORDER — PEN NEEDLE, DIABETIC 32GX 5/32"
NEEDLE, DISPOSABLE MISCELLANEOUS
Qty: 400 EACH | Refills: 2 | Status: SHIPPED | OUTPATIENT
Start: 2023-04-17 | End: 2024-02-16

## 2023-04-17 NOTE — TELEPHONE ENCOUNTER
04/17/23 1309 Sign Kira Grider, DO Reorder from Order:[id number]     BD PEN NEEDLE ANN 2ND GEN 32G X 4 MM miscellaneous 400 each 2 4/17/2023  No   Sig: USE 4 TIMES DAILY FOR ADMINISTERING INSULIN AT HOME.     [facility] - [address]     Attempted reaching Pt, with refill status. Line busy.    Ashanti Lyons RN .............. 4/17/2023  4:07 PM

## 2023-04-17 NOTE — TELEPHONE ENCOUNTER
Reason for call: Medication or medication refill    Name of medication requested: Irma needles    Are you out of the medication? Today last day    [facility]      Preferred method for responding to this message: Telephone Call    Phone number patient can be reached at: Home number on file [phone] (home)    If we cannot reach you directly, may we leave a detailed response at the number you provided? Yes

## 2023-04-17 NOTE — TELEPHONE ENCOUNTER
Called and spoke to Patient after verifying last name and date of birth. Pt informed of prescription. Ashanti Lyons RN .............. 4/17/2023  4:38 PM

## 2023-04-17 NOTE — TELEPHONE ENCOUNTER
"Requested Prescriptions   Pending Prescriptions Disp Refills     BD PEN NEEDLE ANN 2ND GEN 32G X 4 MM miscellaneous [Pharmacy Med Name: BD PEN NEEDLE/ANN [id number] MIS]  0     Sig: USE 4 TIMES DAILY FOR ADMINISTERING INSULIN AT HOME.       Diabetic Supplies Protocol Passed - 4/13/2023  5:37 PM        Passed - Medication is active on med list        Passed - Patient is 18 years of age or older        Passed - Recent (6 mo) or future (30 days) visit within the authorizing provider's specialty     Patient had office visit in the last 6 months or has a visit in the next 30 days with authorizing provider.  See \"Patient Info\" tab in inbasket, or \"Choose Columns\" in Meds & Orders section of the refill encounter.                   Last Written Prescription Date:  4/5/22  Last Fill Quantity: 500,   # refills: 4  Last Office Visit: 2/7/23  Future Office visit:       Routing refill request to provider for review/approval because:  Drug not on the [facility], [facility]MP or  Health refill protocol or controlled substance Tamie Christianson RN on 4/17/2023 at 12:11 PM        "

## 2023-05-01 DIAGNOSIS — Z79.4 UNCONTROLLED TYPE 2 DIABETES MELLITUS WITH HYPERGLYCEMIA, WITH LONG-TERM CURRENT USE OF INSULIN (H): ICD-10-CM

## 2023-05-01 DIAGNOSIS — M54.50 CHRONIC MIDLINE LOW BACK PAIN, UNSPECIFIED WHETHER SCIATICA PRESENT: ICD-10-CM

## 2023-05-01 DIAGNOSIS — G89.29 CHRONIC MIDLINE LOW BACK PAIN, UNSPECIFIED WHETHER SCIATICA PRESENT: ICD-10-CM

## 2023-05-01 DIAGNOSIS — E11.65 UNCONTROLLED TYPE 2 DIABETES MELLITUS WITH HYPERGLYCEMIA, WITH LONG-TERM CURRENT USE OF INSULIN (H): ICD-10-CM

## 2023-05-02 RX ORDER — BLOOD SUGAR DIAGNOSTIC
STRIP MISCELLANEOUS
Qty: 100 STRIP | Refills: 3 | Status: SHIPPED | OUTPATIENT
Start: 2023-05-02 | End: 2023-08-31

## 2023-05-02 RX ORDER — ACETAMINOPHEN 500 MG/1
TABLET ORAL
Qty: 100 TABLET | Refills: 0 | Status: SHIPPED | OUTPATIENT
Start: 2023-05-02 | End: 2023-06-08

## 2023-05-18 DIAGNOSIS — Z79.4 UNCONTROLLED TYPE 2 DIABETES MELLITUS WITH HYPERGLYCEMIA, WITH LONG-TERM CURRENT USE OF INSULIN (H): ICD-10-CM

## 2023-05-18 DIAGNOSIS — E11.65 UNCONTROLLED TYPE 2 DIABETES MELLITUS WITH HYPERGLYCEMIA, WITH LONG-TERM CURRENT USE OF INSULIN (H): ICD-10-CM

## 2023-05-21 DIAGNOSIS — F32.A DEPRESSION, UNSPECIFIED DEPRESSION TYPE: ICD-10-CM

## 2023-05-21 DIAGNOSIS — E78.5 HYPERLIPIDEMIA LDL GOAL <100: ICD-10-CM

## 2023-05-23 RX ORDER — INSULIN GLARGINE 100 [IU]/ML
INJECTION, SOLUTION SUBCUTANEOUS
Qty: 60 ML | Refills: 1 | Status: SHIPPED | OUTPATIENT
Start: 2023-05-23 | End: 2023-07-26

## 2023-05-23 NOTE — TELEPHONE ENCOUNTER
Routing refill request to provider for review/approval because:    LOV: 2/7/23  Patient noted to have an appointment with diabetic educator on 6/7/23    Maria G Maza RN on 5/23/2023 at 1:39 PM

## 2023-05-23 NOTE — TELEPHONE ENCOUNTER
[facility] sent Rx request for the following:    Simvastatin 40 MG Oral Tablet  FLUoxetine HCl 20 MG Oral Capsule  Last Prescription Date:   5/20/22  Last Fill Qty/Refills:         90, R-4    Last Office Visit:              2/7/23   Future Office visit:           None     Tere Baez RN on 5/23/2023 at 3:19 PM

## 2023-05-24 RX ORDER — SIMVASTATIN 40 MG
TABLET ORAL
Qty: 90 TABLET | Refills: 3 | Status: SHIPPED | OUTPATIENT
Start: 2023-05-24 | End: 2024-03-12

## 2023-06-02 DIAGNOSIS — K21.9 GASTROESOPHAGEAL REFLUX DISEASE WITHOUT ESOPHAGITIS: ICD-10-CM

## 2023-06-03 DIAGNOSIS — G89.29 CHRONIC MIDLINE LOW BACK PAIN, UNSPECIFIED WHETHER SCIATICA PRESENT: ICD-10-CM

## 2023-06-03 DIAGNOSIS — M54.50 CHRONIC MIDLINE LOW BACK PAIN, UNSPECIFIED WHETHER SCIATICA PRESENT: ICD-10-CM

## 2023-06-07 NOTE — TELEPHONE ENCOUNTER
"     Disp Refills Start End MIGUELINA   omeprazole (PRILOSEC) 20 MG DR capsule 90 capsule 4 5/20/2022  No   Sig: TAKE 1 CAPSULE BY MOUTH ONCE DAILY BEFORE A MEAL   Sent to pharmacy as: Omeprazole 20 MG Oral Capsule Delayed Release (priLOSEC)   Class: E-Prescribe       Last Office Visit: 02/07/2023  Future Office visit:    Next 5 appointments (look out 90 days)    Garret 15, 2023  9:40 AM  SHORT with Dafne Ivory PA-C  [facility] ([facility] ) [address]  [place] [id number] [phone]         Requested Prescriptions   Pending Prescriptions Disp Refills     omeprazole (PRILOSEC) 20 MG DR capsule [Pharmacy Med Name: Omeprazole 20 MG Oral Capsule Delayed Release] 90 capsule 0     Sig: TAKE 1 CAPSULE BY MOUTH ONCE DAILY BEFORE A MEAL       PPI Protocol Passed - 6/2/2023 12:37 PM        Passed - Not on Clopidogrel (unless Pantoprazole ordered)        Passed - No diagnosis of osteoporosis on record        Passed - Recent (12 mo) or future (30 days) visit within the authorizing provider's specialty     Patient has had an office visit with the authorizing provider or a provider within the authorizing providers department within the previous 12 mos or has a future within next 30 days. See \"Patient Info\" tab in inbasket, or \"Choose Columns\" in Meds & Orders section of the refill encounter.              Passed - Medication is active on med list        Passed - Patient is age 18 or older               Routing refill request to provider for review/approval.       Unable to complete prescription refill per RNMedication Refill Policy.................... Dilma Butler RN ....................  6/7/2023   12:09 PM          "

## 2023-06-08 DIAGNOSIS — E11.65 UNCONTROLLED TYPE 2 DIABETES MELLITUS WITH HYPERGLYCEMIA, WITH LONG-TERM CURRENT USE OF INSULIN (H): Primary | ICD-10-CM

## 2023-06-08 DIAGNOSIS — E11.3599 PROLIFERATIVE DIABETIC RETINOPATHY ASSOCIATED WITH TYPE 2 DIABETES MELLITUS, UNSPECIFIED LATERALITY, UNSPECIFIED PROLIFERATIVE RETINOPATHY TYPE (H): ICD-10-CM

## 2023-06-08 DIAGNOSIS — Z79.4 UNCONTROLLED TYPE 2 DIABETES MELLITUS WITH HYPERGLYCEMIA, WITH LONG-TERM CURRENT USE OF INSULIN (H): Primary | ICD-10-CM

## 2023-06-08 RX ORDER — ACETAMINOPHEN 500 MG/1
TABLET, COATED ORAL
Qty: 100 TABLET | Refills: 0 | Status: SHIPPED | OUTPATIENT
Start: 2023-06-08 | End: 2023-07-19

## 2023-06-08 NOTE — TELEPHONE ENCOUNTER
"   Disp Refills Start End MIGUELINA   EQ ACETAMINOPHEN 500 MG tablet 100 tablet 0 5/2/2023  No   Sig: TAKE 1 TABLET BY MOUTH EVERY 6 HOURS AS NEEDED . DO NOT EXCEED 4000 MG OF ACETAMINOPHEN PER 24 HOURS   Sent to pharmacy as: EQ Acetaminophen 500 MG Oral Tablet (acetaminophen)   Class: E-Prescribe     Last Office Visit: 02/07/2023  Future Office visit:    Next 5 appointments (look out 90 days)    Garret 15, 2023  9:40 AM  SHORT with Dafne Ivory PA-C  [facility] ([facility] ) [address]  [place] [address] [phone]         Requested Prescriptions   Pending Prescriptions Disp Refills     EQ PAIN RELIEVER EX  MG tablet [Pharmacy Med Name: EQ Pain Reliever Ex St 500 MG Oral Tablet] 100 tablet 0     Sig: TAKE 1 TABLET BY MOUTH EVERY 6 HOURS AS NEEDED .  DO  NOT  EXCEED  4000MG  OF  ACETAMINOPHEN  IN  24  HOUR  PERIOD       Analgesics (Non-Narcotic Tylenol and ASA Only) Passed - 6/3/2023 11:52 AM        Passed - Recent (12 mo) or future (30 days) visit within the authorizing provider's specialty     Patient has had an office visit with the authorizing provider or a provider within the authorizing providers department within the previous 12 mos or has a future within next 30 days. See \"Patient Info\" tab in inbasket, or \"Choose Columns\" in Meds & Orders section of the refill encounter.              Passed - Patient is 7 months old or older     If patient is a peds patient of the age 7 mos -12 years, ok to refill using weight-based dosing.     If >3g daily and/or sig is not \"prn\", check for liver enzymes. If normal in the last year, ok to refill.  If not, refer to the provider.          Passed - Medication is active on med list             Routing refill request to provider for review/approval.     Unable to complete prescription refill per RNMedication Refill Policy.................... Dilma Butler RN ....................  6/8/2023   " 8:41 AM

## 2023-06-08 NOTE — TELEPHONE ENCOUNTER
Note from Pharmacy:  BD Pen Needle/Ann 32g x 4mm out of stock. Please send Rx ASAP for 5 mm temporarily.    Last prescription:  BD PEN NEEDLE ANN 2ND GEN 32G X 4 MM miscellaneous 400 each 2 4/17/2023  No   Sig: USE 4 TIMES DAILY FOR ADMINISTERING INSULIN AT HOME.     Ashanti Lyons RN .............. 6/8/2023  2:47 PM

## 2023-06-14 NOTE — PROGRESS NOTES
Assessment & Plan     1. Uncontrolled type 2 diabetes mellitus with hyperglycemia, with long-term current use of insulin (H)  Chronic, A1c improved to 7.8. Continue with medications without change and focusing on healthy lifestyle. Follow up in 3 months for recheck.   - Hemoglobin A1c; Future  - NOVOLOG FLEXPEN 100 UNIT/ML soln; INJECT 60-70 UNITS SUBCUTANEOUSLY WITH EACH MEAL BASED ON INSULIN TO CARB RATIO. MAXIMUM  UNITS PER DAY  Dispense: 90 mL; Refill: 11  - Hemoglobin A1c    2. Diabetic polyneuropathy associated with type 2 diabetes mellitus (H)  Chronic, stable. Refilled as below.   - meloxicam (MOBIC) 15 MG tablet; Take 1 tablet by mouth once daily with food  Dispense: 90 tablet; Refill: 4    3. Benign essential hypertension  Chronic, mildly elevated in clinic today.  Recommend monitor consistently at home over the next 2 to 3 weeks and following up for recheck at that time.  Goal of less than 130/80.  Refilled medication as below.  - hydrochlorothiazide (HYDRODIURIL) 25 MG tablet; Take 1 tablet (25 mg) by mouth daily  Dispense: 90 tablet; Refill: 4  - lisinopril (ZESTRIL) 30 MG tablet; Take 1 tablet (30 mg) by mouth daily  Dispense: 90 tablet; Refill: 4    4. Restrictive lung disease  Chronic, stable.  Refill as below.  - albuterol (PROAIR HFA/PROVENTIL HFA/VENTOLIN HFA) 108 (90 Base) MCG/ACT inhaler; Inhale 2 puffs into the lungs every 4 hours as needed for shortness of breath or wheezing  Dispense: 18 g; Refill: 9  - fluticasone (FLOVENT HFA) 110 MCG/ACT inhaler; Inhale 1 puff into the lungs 2 times daily  Dispense: 12 g; Refill: 11      No follow-ups on file.    Dafne Ivory PA-C  [facility] is a 58 year old, presenting for the following health issues:  Diabetes      History of Present Illness       COPD:  He presents for follow up of COPD.  Overall, COPD symptoms are stable since last visit. He has same as usual fatigue or shortness of breath with  exertion and same as usual shortness of breath at rest.  He sometimes coughs and does not have change in sputum. No recent fever. He can walk 2-5 blocks without stopping to rest. He can walk 2 flights of stairs without resting.The patient has had no ED, urgent care, or hospital admissions because of COPD since the last visit.     Mental Health Follow-up:  Patient presents to follow-up on Depression & Anxiety.Patient's depression since last visit has been:  No change  The patient is not having other symptoms associated with depression.  Patient's anxiety since last visit has been:  No change  The patient is not having other symptoms associated with anxiety.  Any significant life events: grief or loss  Patient is not feeling anxious or having panic attacks.  Patient has no concerns about alcohol or drug use.    Diabetes:   He presents for follow up of diabetes.  He is checking home blood glucose four or more times daily. He checks blood glucose before meals and at bedtime.  Blood glucose is sometimes over 200 and sometimes under 70. He is aware of hypoglycemia symptoms including shakiness. He is concerned about blood sugar frequently over 200.  He is having burning in feet and excessive thirst. The patient has had a diabetic eye exam in the last 12 months. Eye exam performed on march 2022. Location of last eye exam march 2022.        He eats 2-3 servings of fruits and vegetables daily.He consumes 0 sweetened beverage(s) daily.He exercises with enough effort to increase his heart rate 10 to 19 minutes per day.  He exercises with enough effort to increase his heart rate 4 days per week.   He is taking medications regularly.    Today's PHQ-9         PHQ-9 Total Score: 6    PHQ-9 Q9 Thoughts of better off dead/self-harm past 2 weeks :   Not at all    How difficult have these problems made it for you to do your work, take care of things at home, or get along with other people: Not difficult at all  Today's MIRANDA-7 Score: 5      Here for diabetic check.  Continues on NovoLog, Lantus for management of type 2 diabetes. A1c last checked 02/2023 and was 9.3.  Lipids were stable.  Continues on simvastatin.  Needing refills of meloxicam for management of diabetic neuropathy.  Continues on Lyrica as well.  Is needing refills of inhalers for reactive airway disease.  Has been well controlled.  Needing refills of blood pressure medications, is mildly elevated 148/72 in clinic today.  Did come down to 138/76 on recheck.       PAST MEDICAL HISTORY:   Past Medical History:   Diagnosis Date     Acute respiratory failure with hypoxia (H) 12/24/2011     Community acquired bacterial pneumonia 12/24/2011     Empyema (H) 1/26/2012    Overview:  Rt lung 12/24/11     Lateral epicondylitis of left elbow     10/3/2013,Seen by Dr Tayla Reyes [facility] 8/2013     Moderate cigarette smoker (10-19 per day) 12/24/2011     On mechanically assisted ventilation (H) 1/3/2012    Overview:  IMO Update  Overview:  IMO Update     Type 2 diabetes mellitus with proliferative retinopathy without macular edema (H) 07/01/2014    Mild proliferative changes on left side--sees Dr Ortiz exam 6/2014.       Umbilical hernia with obstruction, without gangrene 10/31/2018       PAST SURGICAL HISTORY:   Past Surgical History:   Procedure Laterality Date     ARTHROSCOPY SHOULDER      [id number],SHOULDER SURGERY,Bilateral     CHEST TUBE INSERTION      [id number],CHEST TUBE INSERTION     HERNIORRHAPHY UMBILICAL N/A 11/27/2018    Procedure: Umbilical hernia Repair with Mesh;  Surgeon: Mason Rhodes MD;  Location: [facility] OR     LUNG SURGERY      [id number],LUNG SURGERY,pneumonia     TRACHEOSTOMY      [id number],[facility] TRACH PR1,history trach with pneumonia       FAMILY HISTORY:   Family History   Problem Relation Age of Onset     Heart Disease Other         multiple males     Diabetes Other      Cancer Father         Stomach CA     Colon Cancer Paternal Uncle      Colon Cancer Paternal Grandfather         SOCIAL HISTORY:   Social History     Tobacco Use     Smoking status: Former     Packs/day: 0.50     Years: 28.00     Pack years: 14.00     Types: Cigarettes     Quit date: 2011     Years since quittin.4     Smokeless tobacco: Former     Types: Chew     Quit date: 1988   Vaping Use     Vaping status: Never Used   Substance Use Topics     Alcohol use: No        Allergies   Allergen Reactions     Penicillins      Other reaction(s): Diaphoresis     Piperacillin Sod-Tazobactam So Other (See Comments)     Fever while on zosyn, vanc. No rash, no wbc elevation  Fever while on zosyn, vanc. No rash, no wbc elevation     Piperacillin-Tazobactam In Dex Other (See Comments)     Other reaction(s): Diaphoresis, Fever  Fever while on zosyn, vanc. No rash, no wbc elevation     Vancomycin Other (See Comments)     Fever while on zosyn, vanc, no rash or WBC elevation  Other reaction(s): Diaphoresis, Fever  Fever while on zosyn, vanc, no rash or WBC elevation     Current Outpatient Medications   Medication     ACCU-CHEK DONAVON PLUS test strip     albuterol (PROAIR HFA/PROVENTIL HFA/VENTOLIN HFA) 108 (90 Base) MCG/ACT inhaler     Alcohol Swabs (B-D SINGLE USE SWABS REGULAR) PADS     Aspirin (ASPIR-81 PO)     BD PEN NEEDLE ANN 2ND GEN 32G X 4 MM miscellaneous     blood glucose calibration (ACCU-CHEK DONAVON) solution     blood glucose monitoring (ACCU-CHEK DONAVON PLUS) meter device kit     blood glucose monitoring (ACCU-CHEK FASTCLIX) lancets     clonazePAM (KLONOPIN) 0.5 MG tablet     EQ PAIN RELIEVER EX  MG tablet     FLUoxetine (PROZAC) 20 MG capsule     fluticasone (FLOVENT HFA) 110 MCG/ACT inhaler     hydrochlorothiazide (HYDRODIURIL) 25 MG tablet     insulin pen needle (31G X 5 MM) 31G X 5 MM miscellaneous     LANTUS SOLOSTAR 100 UNIT/ML soln     LIDOCAINE PAIN RELIEF 4 % Patch     lisinopril (ZESTRIL) 30 MG tablet     LYRICA 200 MG capsule     meloxicam (MOBIC) 15 MG tablet     Misc. Devices (BATH/SHOWER  "SEAT) MISC     NARCAN 4 MG/0.1ML nasal spray     NOVOLOG FLEXPEN 100 UNIT/ML soln     omeprazole (PRILOSEC) 20 MG DR capsule     order for DME     simvastatin (ZOCOR) 40 MG tablet     traMADol (ULTRAM) 50 MG tablet     No current facility-administered medications for this visit.         Review of Systems   Per HPI        Objective    /76   Pulse 80   Temp 97.4  F (36.3  C)   Resp 16   Ht 1.778 m (5' 10\")   Wt 134.8 kg (297 lb 3.2 oz)   SpO2 93%   BMI 42.64 kg/m    Body mass index is 42.64 kg/m .  Physical Exam   General: Pleasant, in no apparent distress.  Cardiovascular: Regular rate and rhythm with S1 equal to S2. No murmurs, friction rubs, or gallops.   Respiratory: Lungs are resonant and clear to auscultation bilaterally. No wheezes, crackles, or rhonchi.  Psych: Appropriate mood and affect.        "

## 2023-06-15 ENCOUNTER — OFFICE VISIT (OUTPATIENT)
Dept: FAMILY MEDICINE | Facility: OTHER | Age: 58
End: 2023-06-15
Attending: PHYSICIAN ASSISTANT
Payer: COMMERCIAL

## 2023-06-15 VITALS
HEART RATE: 80 BPM | BODY MASS INDEX: 42.55 KG/M2 | TEMPERATURE: 97.4 F | WEIGHT: 297.2 LBS | SYSTOLIC BLOOD PRESSURE: 138 MMHG | HEIGHT: 70 IN | OXYGEN SATURATION: 93 % | DIASTOLIC BLOOD PRESSURE: 76 MMHG | RESPIRATION RATE: 16 BRPM

## 2023-06-15 DIAGNOSIS — E11.42 DIABETIC POLYNEUROPATHY ASSOCIATED WITH TYPE 2 DIABETES MELLITUS (H): ICD-10-CM

## 2023-06-15 DIAGNOSIS — J98.4 RESTRICTIVE LUNG DISEASE: ICD-10-CM

## 2023-06-15 DIAGNOSIS — E11.65 UNCONTROLLED TYPE 2 DIABETES MELLITUS WITH HYPERGLYCEMIA, WITH LONG-TERM CURRENT USE OF INSULIN (H): ICD-10-CM

## 2023-06-15 DIAGNOSIS — Z79.4 UNCONTROLLED TYPE 2 DIABETES MELLITUS WITH HYPERGLYCEMIA, WITH LONG-TERM CURRENT USE OF INSULIN (H): ICD-10-CM

## 2023-06-15 DIAGNOSIS — I10 BENIGN ESSENTIAL HYPERTENSION: ICD-10-CM

## 2023-06-15 LAB
HBA1C MFR BLD: 7.8 % (ref 4–6.2)
HOLD SPECIMEN: NORMAL

## 2023-06-15 PROCEDURE — 99214 OFFICE O/P EST MOD 30 MIN: CPT | Performed by: PHYSICIAN ASSISTANT

## 2023-06-15 PROCEDURE — G0463 HOSPITAL OUTPT CLINIC VISIT: HCPCS

## 2023-06-15 PROCEDURE — 83036 HEMOGLOBIN GLYCOSYLATED A1C: CPT | Mod: ZL | Performed by: PHYSICIAN ASSISTANT

## 2023-06-15 PROCEDURE — 36415 COLL VENOUS BLD VENIPUNCTURE: CPT | Mod: ZL | Performed by: PHYSICIAN ASSISTANT

## 2023-06-15 RX ORDER — MELOXICAM 15 MG/1
TABLET ORAL
Qty: 90 TABLET | Refills: 4 | Status: SHIPPED | OUTPATIENT
Start: 2023-06-15 | End: 2024-03-12

## 2023-06-15 RX ORDER — LISINOPRIL 30 MG/1
30 TABLET ORAL DAILY
Qty: 90 TABLET | Refills: 4 | Status: SHIPPED | OUTPATIENT
Start: 2023-06-15 | End: 2024-03-12

## 2023-06-15 RX ORDER — ALBUTEROL SULFATE 90 UG/1
2 AEROSOL, METERED RESPIRATORY (INHALATION) EVERY 4 HOURS PRN
Qty: 18 G | Refills: 9 | Status: SHIPPED | OUTPATIENT
Start: 2023-06-15

## 2023-06-15 RX ORDER — FLUTICASONE PROPIONATE 110 UG/1
1 AEROSOL, METERED RESPIRATORY (INHALATION) 2 TIMES DAILY
Qty: 12 G | Refills: 11 | Status: SHIPPED | OUTPATIENT
Start: 2023-06-15 | End: 2024-03-12

## 2023-06-15 RX ORDER — HYDROCHLOROTHIAZIDE 25 MG/1
25 TABLET ORAL DAILY
Qty: 90 TABLET | Refills: 4 | Status: SHIPPED | OUTPATIENT
Start: 2023-06-15 | End: 2024-03-12

## 2023-06-15 RX ORDER — INSULIN ASPART 100 [IU]/ML
INJECTION, SOLUTION INTRAVENOUS; SUBCUTANEOUS
Qty: 90 ML | Refills: 11 | Status: SHIPPED | OUTPATIENT
Start: 2023-06-15 | End: 2024-03-12

## 2023-06-15 ASSESSMENT — ANXIETY QUESTIONNAIRES
8. IF YOU CHECKED OFF ANY PROBLEMS, HOW DIFFICULT HAVE THESE MADE IT FOR YOU TO DO YOUR WORK, TAKE CARE OF THINGS AT HOME, OR GET ALONG WITH OTHER PEOPLE?: SOMEWHAT DIFFICULT
1. FEELING NERVOUS, ANXIOUS, OR ON EDGE: SEVERAL DAYS
GAD7 TOTAL SCORE: 5
7. FEELING AFRAID AS IF SOMETHING AWFUL MIGHT HAPPEN: NOT AT ALL
3. WORRYING TOO MUCH ABOUT DIFFERENT THINGS: NOT AT ALL
5. BEING SO RESTLESS THAT IT IS HARD TO SIT STILL: NOT AT ALL
4. TROUBLE RELAXING: SEVERAL DAYS
2. NOT BEING ABLE TO STOP OR CONTROL WORRYING: NOT AT ALL
GAD7 TOTAL SCORE: 5
6. BECOMING EASILY ANNOYED OR IRRITABLE: NEARLY EVERY DAY
7. FEELING AFRAID AS IF SOMETHING AWFUL MIGHT HAPPEN: NOT AT ALL
IF YOU CHECKED OFF ANY PROBLEMS ON THIS QUESTIONNAIRE, HOW DIFFICULT HAVE THESE PROBLEMS MADE IT FOR YOU TO DO YOUR WORK, TAKE CARE OF THINGS AT HOME, OR GET ALONG WITH OTHER PEOPLE: SOMEWHAT DIFFICULT
GAD7 TOTAL SCORE: 5

## 2023-06-15 ASSESSMENT — PAIN SCALES - GENERAL: PAINLEVEL: NO PAIN (0)

## 2023-06-15 NOTE — NURSING NOTE
Patient presents to clinic for diabetic check.  Kaity Nielsen LPN ....................  6/15/2023   9:17 AM

## 2023-06-20 DIAGNOSIS — E11.42 DIABETIC POLYNEUROPATHY ASSOCIATED WITH TYPE 2 DIABETES MELLITUS (H): ICD-10-CM

## 2023-06-20 RX ORDER — PREGABALIN 200 MG/1
200 CAPSULE ORAL 3 TIMES DAILY
Qty: 90 CAPSULE | Refills: 5 | Status: CANCELLED | OUTPATIENT
Start: 2023-06-20

## 2023-06-20 NOTE — TELEPHONE ENCOUNTER
Prescription for Lyrica 90 tablet with 5 refills was sent by PCP on 2/15.  review shows patient has only filled three times on 2/15, 3/17, 4/28. Should have refills on file.     Dafne Ivory PA-C on 6/20/2023 at 1:10 PM

## 2023-06-20 NOTE — TELEPHONE ENCOUNTER
Reason for call: Medication or medication refill    Name of medication requested: lyrica    Are you out of the medication? YES    What pharmacy do you use? [facility]    Preferred method for responding to this message: Telephone Call    Phone number patient can be reached at: Home number on file:    Summer: [phone]    If we cannot reach you directly, may we leave a detailed response at the number you provided? Yes     Patients spouse, Summer, called on behalf of the patient and stated the pharmacy asked her to call AZEB Calvillo on 6/20/2023 at 12:33 PM

## 2023-06-21 NOTE — TELEPHONE ENCOUNTER
Left message on voicemail informing patient of Dafne Taylorle's response    Leeanne Duque, CMA on 6/21/2023 at 8:16 AM

## 2023-06-22 ENCOUNTER — TELEPHONE (OUTPATIENT)
Dept: FAMILY MEDICINE | Facility: OTHER | Age: 58
End: 2023-06-22
Payer: COMMERCIAL

## 2023-06-22 DIAGNOSIS — E11.42 DIABETIC POLYNEUROPATHY ASSOCIATED WITH TYPE 2 DIABETES MELLITUS (H): Primary | ICD-10-CM

## 2023-06-22 NOTE — TELEPHONE ENCOUNTER
"Under the Rx on the med list it says:  Prior Authorization: Approved.    Is Rajan having difficulty getting the medication?    Also formulary is \"pregabalin\" which is Lyrica -- is there clarification or what is needed if Rajan is having difficulty getting medication?      Kira Grider, DO  "

## 2023-06-22 NOTE — TELEPHONE ENCOUNTER
Formulary Issue (Received denial for Lyrica 200 mg capsule, non formulary.  Formulary options are  Pregablin, Duloxetine,Gabapentin, Savella)

## 2023-06-23 NOTE — TELEPHONE ENCOUNTER
Talked to pharmacy and it is denied. It was approved by insurance 4 months ago but not now. Do you want to switch him to pregablin?  The PA denial is scanned into his chart now.  Adri Hernandez LPN  6/23/2023  11:50 AM

## 2023-06-26 RX ORDER — PREGABALIN 200 MG/1
200 CAPSULE ORAL 3 TIMES DAILY
Qty: 270 CAPSULE | Refills: 1 | Status: SHIPPED | OUTPATIENT
Start: 2023-06-26 | End: 2024-03-12

## 2023-06-26 NOTE — TELEPHONE ENCOUNTER
I haven't talked to Rajan for a couple years re: this medication due to it being filled and working.  Did he have any adverse reactions to the generic? If so, we'll need to do the appeal.    Otherwise, it appears he'll need to return to the generic pregablin. (I have gone ahead and sent this in if there are no other objections to the generic.)    Kira Grider, DO

## 2023-06-27 NOTE — TELEPHONE ENCOUNTER
Tried calling patient back with a busy signal. Will try him again tomorrow.  Adri Hernandez LPN  6/27/2023  4:47 PM

## 2023-06-27 NOTE — TELEPHONE ENCOUNTER
Left message to call back....................  6/27/2023   8:34 AM  Adri Hernandez LPN  6/27/2023  8:34 AM

## 2023-06-27 NOTE — TELEPHONE ENCOUNTER
Talked with patient and he gave a verbal ok to talk to his wife Summer. She states that he has tried gabapentin and duloxetine that did not work for him. He has been on Lyrica brand name for the last 10 years that they started when he was in the hospital and it has worked well for him.   Summer states that she is on pregabalin and when Rajan has ran out of his Lyrica in the past, she has offered him her pregabalin.and it is not effective as the Lyrica.  Adri Hernandez LPN   6/27/2023  4:58 PM           He only has 3 days left of the Lyrica.

## 2023-06-28 NOTE — TELEPHONE ENCOUNTER
Did contact his insurance company and did an appeal over the phone.  They submitted the information and it will take approximately 72 hours for an answer.   Adri Hernandez LPN   6/28/2023  9:00 AM

## 2023-06-28 NOTE — TELEPHONE ENCOUNTER
Tried calling patient to let him know the status but it was busy.  Adri Hernandez LPN  6/28/2023  9:02 AM

## 2023-07-11 DIAGNOSIS — E11.65 UNCONTROLLED TYPE 2 DIABETES MELLITUS WITH HYPERGLYCEMIA, WITH LONG-TERM CURRENT USE OF INSULIN (H): ICD-10-CM

## 2023-07-11 DIAGNOSIS — Z79.4 UNCONTROLLED TYPE 2 DIABETES MELLITUS WITH HYPERGLYCEMIA, WITH LONG-TERM CURRENT USE OF INSULIN (H): ICD-10-CM

## 2023-07-13 RX ORDER — ISOPROPYL ALCOHOL 0.75 G/1
SWAB TOPICAL
Qty: 100 EACH | Refills: 11 | Status: SHIPPED | OUTPATIENT
Start: 2023-07-13

## 2023-07-18 DIAGNOSIS — M54.50 CHRONIC MIDLINE LOW BACK PAIN, UNSPECIFIED WHETHER SCIATICA PRESENT: ICD-10-CM

## 2023-07-18 DIAGNOSIS — G89.29 CHRONIC MIDLINE LOW BACK PAIN, UNSPECIFIED WHETHER SCIATICA PRESENT: ICD-10-CM

## 2023-07-19 RX ORDER — ACETAMINOPHEN 500 MG/1
TABLET, COATED ORAL
Qty: 100 TABLET | Refills: 0 | Status: SHIPPED | OUTPATIENT
Start: 2023-07-19 | End: 2023-08-28

## 2023-07-25 DIAGNOSIS — E11.65 UNCONTROLLED TYPE 2 DIABETES MELLITUS WITH HYPERGLYCEMIA, WITH LONG-TERM CURRENT USE OF INSULIN (H): ICD-10-CM

## 2023-07-25 DIAGNOSIS — Z79.4 UNCONTROLLED TYPE 2 DIABETES MELLITUS WITH HYPERGLYCEMIA, WITH LONG-TERM CURRENT USE OF INSULIN (H): ICD-10-CM

## 2023-07-26 RX ORDER — INSULIN GLARGINE 100 [IU]/ML
INJECTION, SOLUTION SUBCUTANEOUS
Qty: 60 ML | Refills: 11 | Status: SHIPPED | OUTPATIENT
Start: 2023-07-26 | End: 2024-03-12

## 2023-07-26 NOTE — TELEPHONE ENCOUNTER
[facility] sent Rx request for the following:      Requested Prescriptions   Pending Prescriptions Disp Refills    LANTUS SOLOSTAR 100 UNIT/ML soln [Pharmacy Med Name: Lantus SoloStar 100 UNIT/ML Subcutaneous Solution Pen-injector] 60 mL 0     Sig: INJECT 175 UNITS SUBCUTANEOUSLY EVERY DAY AT BEDTIME       Last Prescription Date:   5/23/23  Last Fill Qty/Refills:         60mL, R-1    Last Office Visit:              6/15/23 [facility]   Future Office visit:           None    No follow up advice in previous office note.    Lucie Cox RN on 7/26/2023 at 9:45 AM

## 2023-07-28 ENCOUNTER — TELEPHONE (OUTPATIENT)
Dept: FAMILY MEDICINE | Facility: OTHER | Age: 58
End: 2023-07-28
Payer: COMMERCIAL

## 2023-07-28 DIAGNOSIS — E11.42 DIABETIC POLYNEUROPATHY ASSOCIATED WITH TYPE 2 DIABETES MELLITUS (H): ICD-10-CM

## 2023-07-28 RX ORDER — PREGABALIN 200 MG/1
200 CAPSULE ORAL 3 TIMES DAILY
Qty: 270 CAPSULE | Refills: 5 | Status: SHIPPED | OUTPATIENT
Start: 2023-07-28 | End: 2024-02-01

## 2023-07-28 NOTE — TELEPHONE ENCOUNTER
Patient was put on Pregabalin and it is not working, they would like to go  back to the [facility]. Please call      Evelyne Godfrey on 7/28/2023 at 12:26 PM

## 2023-07-28 NOTE — TELEPHONE ENCOUNTER
Spoke with patient and discussed that new rx will be sent to PCP to address but per insurance Lyrica is not covered and will have to wait for denial letter if new rx is sent in.  Kaity Nielsen LPN ....................  7/28/2023   1:03 PM  EXT [id number]

## 2023-07-28 NOTE — TELEPHONE ENCOUNTER
Will attempt to sent to pharmacy again; but insurance requirements are hard to avoid.  Typically a 1-3 month trial is required before they will then cover the Lyrica.    Kira Grider, DO

## 2023-08-10 ENCOUNTER — TRANSFERRED RECORDS (OUTPATIENT)
Dept: HEALTH INFORMATION MANAGEMENT | Facility: OTHER | Age: 58
End: 2023-08-10
Payer: COMMERCIAL

## 2023-08-10 LAB — RETINOPATHY: POSITIVE

## 2023-08-19 DIAGNOSIS — F41.1 GAD (GENERALIZED ANXIETY DISORDER): ICD-10-CM

## 2023-08-22 RX ORDER — CLONAZEPAM 0.5 MG/1
0.5 TABLET ORAL AT BEDTIME
Qty: 30 TABLET | Refills: 5 | Status: SHIPPED | OUTPATIENT
Start: 2023-08-22 | End: 2024-03-05

## 2023-08-22 NOTE — TELEPHONE ENCOUNTER
[id number] of [place] sent Rx request for the following:      Requested Prescriptions   Pending Prescriptions Disp Refills    clonazePAM (KLONOPIN) 0.5 MG tablet [Pharmacy Med Name: clonazePAM 0.5 MG Oral Tablet] 30 tablet 0     Sig: TAKE 1 TABLET BY MOUTH AT BEDTIME   Last Prescription Date:   2/7/23  Last Fill Qty/Refills:         30, R-5    Last Office Visit:              6/15/23   Future Office visit:           None    Ashanti Lyons RN .............. 8/22/2023  12:04 PM

## 2023-08-23 ENCOUNTER — TELEPHONE (OUTPATIENT)
Dept: FAMILY MEDICINE | Facility: OTHER | Age: 58
End: 2023-08-23
Payer: COMMERCIAL

## 2023-08-23 DIAGNOSIS — G89.29 CHRONIC MIDLINE LOW BACK PAIN, UNSPECIFIED WHETHER SCIATICA PRESENT: ICD-10-CM

## 2023-08-23 DIAGNOSIS — M54.50 CHRONIC MIDLINE LOW BACK PAIN, UNSPECIFIED WHETHER SCIATICA PRESENT: ICD-10-CM

## 2023-08-23 NOTE — TELEPHONE ENCOUNTER
Tramadol is TID prn; Clonazepam is at bedtime.  Has been on both of these medications since prior to 2018 with benefit.  OK to continue for now.    Will continue to discuss risks/benefits at in person visits.    Kira Grider, DO  Family Practice

## 2023-08-23 NOTE — TELEPHONE ENCOUNTER
Tramadol, Clonazepam, Insurance is asking approval from doctor to take both of these meds at same time.  Please call        Evelyne Godfrey on 8/23/2023 at 10:43 AM

## 2023-08-26 DIAGNOSIS — M54.50 CHRONIC MIDLINE LOW BACK PAIN, UNSPECIFIED WHETHER SCIATICA PRESENT: ICD-10-CM

## 2023-08-26 DIAGNOSIS — E11.42 DIABETIC POLYNEUROPATHY ASSOCIATED WITH TYPE 2 DIABETES MELLITUS (H): ICD-10-CM

## 2023-08-26 DIAGNOSIS — G89.29 CHRONIC MIDLINE LOW BACK PAIN, UNSPECIFIED WHETHER SCIATICA PRESENT: ICD-10-CM

## 2023-08-28 RX ORDER — ACETAMINOPHEN 500 MG/1
TABLET, COATED ORAL
Qty: 100 TABLET | Refills: 0 | Status: SHIPPED | OUTPATIENT
Start: 2023-08-28 | End: 2023-10-03

## 2023-08-31 DIAGNOSIS — Z79.4 UNCONTROLLED TYPE 2 DIABETES MELLITUS WITH HYPERGLYCEMIA, WITH LONG-TERM CURRENT USE OF INSULIN (H): ICD-10-CM

## 2023-08-31 DIAGNOSIS — E11.65 UNCONTROLLED TYPE 2 DIABETES MELLITUS WITH HYPERGLYCEMIA, WITH LONG-TERM CURRENT USE OF INSULIN (H): ICD-10-CM

## 2023-08-31 RX ORDER — TRAMADOL HYDROCHLORIDE 50 MG/1
TABLET ORAL
Qty: 90 TABLET | Refills: 0 | Status: SHIPPED | OUTPATIENT
Start: 2023-08-31 | End: 2023-10-07

## 2023-08-31 RX ORDER — BLOOD SUGAR DIAGNOSTIC
STRIP MISCELLANEOUS
Qty: 100 STRIP | Refills: 0 | Status: SHIPPED | OUTPATIENT
Start: 2023-08-31 | End: 2023-09-30

## 2023-08-31 NOTE — TELEPHONE ENCOUNTER
[facility] sent Rx request for the following:      Requested Prescriptions   Pending Prescriptions Disp Refills    traMADol (ULTRAM) 50 MG tablet [Pharmacy Med Name: traMADol HCl 50 MG Oral Tablet] 90 tablet 0     Sig: Take 1 tablet by mouth three times daily as needed       There is no refill protocol information for this order          Last Prescription Date:   2/7/23  Last Fill Qty/Refills:         90, R-5    Last Office Visit:              6/15/23   Future Office visit:           None scheduled    Sending to provider as medication is not on the RN refill protocol.    Ellie Devine RN on 8/31/2023 at 12:44 PM

## 2023-09-30 DIAGNOSIS — Z79.4 UNCONTROLLED TYPE 2 DIABETES MELLITUS WITH HYPERGLYCEMIA, WITH LONG-TERM CURRENT USE OF INSULIN (H): ICD-10-CM

## 2023-09-30 DIAGNOSIS — E11.65 UNCONTROLLED TYPE 2 DIABETES MELLITUS WITH HYPERGLYCEMIA, WITH LONG-TERM CURRENT USE OF INSULIN (H): ICD-10-CM

## 2023-09-30 RX ORDER — BLOOD SUGAR DIAGNOSTIC
STRIP MISCELLANEOUS
Qty: 100 STRIP | Refills: 11 | Status: SHIPPED | OUTPATIENT
Start: 2023-09-30 | End: 2024-09-16

## 2023-10-02 DIAGNOSIS — G89.29 CHRONIC MIDLINE LOW BACK PAIN, UNSPECIFIED WHETHER SCIATICA PRESENT: ICD-10-CM

## 2023-10-02 DIAGNOSIS — E11.42 DIABETIC POLYNEUROPATHY ASSOCIATED WITH TYPE 2 DIABETES MELLITUS (H): ICD-10-CM

## 2023-10-02 DIAGNOSIS — M54.50 CHRONIC MIDLINE LOW BACK PAIN, UNSPECIFIED WHETHER SCIATICA PRESENT: ICD-10-CM

## 2023-10-03 RX ORDER — ACETAMINOPHEN 500 MG/1
TABLET, COATED ORAL
Qty: 100 TABLET | Refills: 0 | Status: SHIPPED | OUTPATIENT
Start: 2023-10-03 | End: 2023-11-30

## 2023-10-06 NOTE — TELEPHONE ENCOUNTER
traMADol (ULTRAM) 50 MG tablet     Sig: Take 1 tablet by mouth three times daily as needed         Last Written Prescription Date:  8/31/23  Last Fill Quantity: 90,   # refills: 0  Last Office Visit: 6/15/23  Future Office visit:       Routing refill request to provider for review/approval because:  Drug not on the [facility], [facility] or [facility] refill protocol or controlled substance Tamie Christianson RN on 10/6/2023 at 10:33 AM

## 2023-10-07 RX ORDER — TRAMADOL HYDROCHLORIDE 50 MG/1
TABLET ORAL
Qty: 90 TABLET | Refills: 1 | Status: SHIPPED | OUTPATIENT
Start: 2023-10-07 | End: 2024-03-08

## 2023-10-31 ENCOUNTER — MEDICAL CORRESPONDENCE (OUTPATIENT)
Dept: HEALTH INFORMATION MANAGEMENT | Facility: OTHER | Age: 58
End: 2023-10-31
Payer: COMMERCIAL

## 2023-11-15 ENCOUNTER — TELEPHONE (OUTPATIENT)
Dept: FAMILY MEDICINE | Facility: OTHER | Age: 58
End: 2023-11-15
Payer: COMMERCIAL

## 2023-11-15 DIAGNOSIS — E11.65 UNCONTROLLED TYPE 2 DIABETES MELLITUS WITH HYPERGLYCEMIA, WITH LONG-TERM CURRENT USE OF INSULIN (H): Primary | ICD-10-CM

## 2023-11-15 DIAGNOSIS — Z79.4 UNCONTROLLED TYPE 2 DIABETES MELLITUS WITH HYPERGLYCEMIA, WITH LONG-TERM CURRENT USE OF INSULIN (H): Primary | ICD-10-CM

## 2023-11-15 NOTE — TELEPHONE ENCOUNTER
"Message from pharmacy, regarding:  NOVOLOG FLEXPEN 100 UNIT/ML soln 90 mL 11 6/15/2023  Yes   Sig: INJECT 60-70 UNITS SUBCUTANEOUSLY WITH EACH MEAL BASED ON INSULIN TO CARB RATIO. MAXIMUM  UNITS PER DAY     \"Please send other med, this is on backorder.\"    Ashanti Lyons RN .............. 11/15/2023  3:52 PM      "

## 2023-11-16 RX ORDER — INSULIN LISPRO 100 [IU]/ML
60-70 INJECTION, SOLUTION INTRAVENOUS; SUBCUTANEOUS
Qty: 60 ML | Refills: 2 | Status: SHIPPED | OUTPATIENT
Start: 2023-11-16 | End: 2024-03-06 | Stop reason: ALTCHOICE

## 2023-11-28 DIAGNOSIS — M54.50 CHRONIC MIDLINE LOW BACK PAIN, UNSPECIFIED WHETHER SCIATICA PRESENT: ICD-10-CM

## 2023-11-28 DIAGNOSIS — G89.29 CHRONIC MIDLINE LOW BACK PAIN, UNSPECIFIED WHETHER SCIATICA PRESENT: ICD-10-CM

## 2023-11-30 RX ORDER — ACETAMINOPHEN 500 MG/1
TABLET, COATED ORAL
Qty: 100 TABLET | Refills: 5 | Status: SHIPPED | OUTPATIENT
Start: 2023-11-30 | End: 2024-08-16

## 2023-11-30 NOTE — TELEPHONE ENCOUNTER
"[facility] [place] sent Rx request for the following:      Requested Prescriptions   Pending Prescriptions Disp Refills    EQ PAIN RELIEVER EX  MG tablet [Pharmacy Med Name: EQ Pain Reliever Ex St 500 MG Oral Tablet] 100 tablet 0     Sig: TAKE 1 TABLET BY MOUTH EVERY 6 HOURS AS NEEDED FOR PAIN . DO NOT EXCEED 4000 MG OF ACETAMINOPHEN PER 24 HOURS       Analgesics (Non-Narcotic Tylenol and ASA Only) Passed - 11/28/2023 10:04 PM        Passed - Recent (12 mo) or future (30 days) visit within the authorizing provider's specialty     Patient has had an office visit with the authorizing provider or a provider within the authorizing providers department within the previous 12 mos or has a future within next 30 days. See \"Patient Info\" tab in inbasket, or \"Choose Columns\" in Meds & Orders section of the refill encounter.              Passed - Patient is 7 months old or older     If patient is a peds patient of the age 7 mos -12 years, ok to refill using weight-based dosing.     If >3g daily and/or sig is not \"prn\", check for liver enzymes. If normal in the last year, ok to refill.  If not, refer to the provider.          Passed - Medication is active on med list             Last Prescription Date:   10/03/23  [facility]  Last Fill Qty/Refills:         100 tablet, R-0    Last Office Visit:              06/15/2023   Future Office visit:           None      Route to PCP in consideration of refill. Sherita Maza RN on 11/30/2023 at 9:39 AM    "

## 2023-12-29 DIAGNOSIS — E11.65 UNCONTROLLED TYPE 2 DIABETES MELLITUS WITH HYPERGLYCEMIA, WITH LONG-TERM CURRENT USE OF INSULIN (H): ICD-10-CM

## 2023-12-29 DIAGNOSIS — Z79.4 UNCONTROLLED TYPE 2 DIABETES MELLITUS WITH HYPERGLYCEMIA, WITH LONG-TERM CURRENT USE OF INSULIN (H): ICD-10-CM

## 2023-12-30 RX ORDER — LANCETS
EACH MISCELLANEOUS
Qty: 102 EACH | Refills: 11 | Status: SHIPPED | OUTPATIENT
Start: 2023-12-30

## 2024-01-03 ENCOUNTER — TRANSFERRED RECORDS (OUTPATIENT)
Dept: HEALTH INFORMATION MANAGEMENT | Facility: OTHER | Age: 59
End: 2024-01-03
Payer: COMMERCIAL

## 2024-01-12 ENCOUNTER — TRANSFERRED RECORDS (OUTPATIENT)
Dept: MULTI SPECIALTY CLINIC | Facility: CLINIC | Age: 59
End: 2024-01-12

## 2024-01-12 LAB — RETINOPATHY: NORMAL

## 2024-01-15 DIAGNOSIS — G89.29 CHRONIC MIDLINE LOW BACK PAIN, UNSPECIFIED WHETHER SCIATICA PRESENT: ICD-10-CM

## 2024-01-15 DIAGNOSIS — M54.50 CHRONIC MIDLINE LOW BACK PAIN, UNSPECIFIED WHETHER SCIATICA PRESENT: ICD-10-CM

## 2024-01-17 RX ORDER — ACETAMINOPHEN 500 MG/1
TABLET, COATED ORAL
Qty: 100 TABLET | Refills: 0 | OUTPATIENT
Start: 2024-01-17

## 2024-01-17 NOTE — TELEPHONE ENCOUNTER
Disp Refills Start End MIGUELINA   EQ PAIN RELIEVER EX  MG tablet 100 tablet 5 11/30/2023 -- Yes   Sig: TAKE 1 TABLET BY MOUTH EVERY 6 HOURS AS NEEDED FOR PAIN . DO NOT EXCEED 4000 MG OF ACETAMINOPHEN PER 24 HOURS   Sent to pharmacy as: EQ Pain Reliever Ex St 500 MG Oral Tablet   Class: E-Prescribe   Order: [id number]   E-Prescribing Status: Receipt confirmed by pharmacy (11/30/2023 12:16 PM CST)   [facility]    [facility]ed [facility] and they have refills.  Maria G Maza RN on 1/17/2024 at 9:21 AM

## 2024-02-01 DIAGNOSIS — E11.42 DIABETIC POLYNEUROPATHY ASSOCIATED WITH TYPE 2 DIABETES MELLITUS (H): ICD-10-CM

## 2024-02-01 RX ORDER — PREGABALIN 200 MG/1
200 CAPSULE ORAL 3 TIMES DAILY
Qty: 90 CAPSULE | Refills: 0 | Status: SHIPPED | OUTPATIENT
Start: 2024-02-01 | End: 2024-03-12

## 2024-02-01 NOTE — TELEPHONE ENCOUNTER
#90 sent; needs to keep appointments for diabetic checks and controlled medication refills.     Kira Grider, DO on 2/1/2024 at 3:45 PM

## 2024-02-01 NOTE — TELEPHONE ENCOUNTER
Spoke to pharmacist Ellie at [facility], prescription is .     Last Prescription Date: 2023  Last Qty/Refills: 270 / R-5  Last Office Visit: 6/15/2023  Future Office Visit: 2024     Requested Prescriptions   Pending Prescriptions Disp Refills    LYRICA 200 MG capsule [Pharmacy Med Name: Lyrica 200 MG Oral Capsule] 90 capsule 0     Sig: TAKE 1 CAPSULE BY MOUTH THREE TIMES DAILY       There is no refill protocol information for this order        Routing refill request to provider for review/approval because:  Drug not on the [facility] refill protocol     Sakshi Ornelas RN on 2024 at 1:47 PM

## 2024-02-13 DIAGNOSIS — Z79.4 UNCONTROLLED TYPE 2 DIABETES MELLITUS WITH HYPERGLYCEMIA, WITH LONG-TERM CURRENT USE OF INSULIN (H): ICD-10-CM

## 2024-02-13 DIAGNOSIS — E11.65 UNCONTROLLED TYPE 2 DIABETES MELLITUS WITH HYPERGLYCEMIA, WITH LONG-TERM CURRENT USE OF INSULIN (H): ICD-10-CM

## 2024-02-16 RX ORDER — PEN NEEDLE, DIABETIC 32GX 5/32"
NEEDLE, DISPOSABLE MISCELLANEOUS
Qty: 400 EACH | Refills: 0 | Status: SHIPPED | OUTPATIENT
Start: 2024-02-16 | End: 2024-08-16

## 2024-02-16 NOTE — TELEPHONE ENCOUNTER
[id number] of [place] sent Rx request for the following:      Requested Prescriptions   Pending Prescriptions Disp Refills    BD PEN NEEDLE ANN 2ND GEN 32G X 4 MM miscellaneous [Pharmacy Med Name: BD PEN NEEDLE/ANN [id number] MIS]  0     Sig: USE 4 TIMES DAILY FOR ADMINISTERING INSULIN AT HOME   Last Prescription Date:   4/17/23  Last Fill Qty/Refills:         400, R-2    Last Office Visit:              6/15/23   Future Office visit:           3/11/24    Unable to complete prescription refill per RN Medication Refill Policy.     Ashanti Lyons RN .............. 2/16/2024  11:27 AM

## 2024-02-27 DIAGNOSIS — F41.1 GAD (GENERALIZED ANXIETY DISORDER): ICD-10-CM

## 2024-03-05 RX ORDER — CLONAZEPAM 0.5 MG/1
0.5 TABLET ORAL AT BEDTIME
Qty: 30 TABLET | Refills: 0 | Status: SHIPPED | OUTPATIENT
Start: 2024-03-05 | End: 2024-04-08

## 2024-03-05 NOTE — TELEPHONE ENCOUNTER
[id number] sent Rx request for the following:      Requested Prescriptions   Pending Prescriptions Disp Refills    clonazePAM (KLONOPIN) 0.5 MG tablet [Pharmacy Med Name: clonazePAM 0.5 MG Oral Tablet] 30 tablet 0     Sig: TAKE 1 TABLET BY MOUTH AT BEDTIME       There is no refill protocol information for this order          Last Prescription Date:   8/22/23  Last Fill Qty/Refills:         30, R-5    Last Office Visit:              6/15/23 [facility] [place]   Future Office visit:           3/12/24      Routing refill request to provider for review/approval because:  Drug not on the [facility] refill protocol     Cara Crowell RN on 3/5/2024 at 9:15 AM

## 2024-03-06 ENCOUNTER — ALLIED HEALTH/NURSE VISIT (OUTPATIENT)
Dept: EDUCATION SERVICES | Facility: OTHER | Age: 59
End: 2024-03-06
Attending: FAMILY MEDICINE
Payer: COMMERCIAL

## 2024-03-06 DIAGNOSIS — E11.42 DIABETIC POLYNEUROPATHY ASSOCIATED WITH TYPE 2 DIABETES MELLITUS (H): ICD-10-CM

## 2024-03-06 DIAGNOSIS — M54.50 CHRONIC MIDLINE LOW BACK PAIN, UNSPECIFIED WHETHER SCIATICA PRESENT: ICD-10-CM

## 2024-03-06 DIAGNOSIS — Z79.4 UNCONTROLLED TYPE 2 DIABETES MELLITUS WITH HYPERGLYCEMIA, WITH LONG-TERM CURRENT USE OF INSULIN (H): Primary | ICD-10-CM

## 2024-03-06 DIAGNOSIS — G89.29 CHRONIC MIDLINE LOW BACK PAIN, UNSPECIFIED WHETHER SCIATICA PRESENT: ICD-10-CM

## 2024-03-06 DIAGNOSIS — E11.65 UNCONTROLLED TYPE 2 DIABETES MELLITUS WITH HYPERGLYCEMIA, WITH LONG-TERM CURRENT USE OF INSULIN (H): Primary | ICD-10-CM

## 2024-03-06 PROCEDURE — G0108 DIAB MANAGE TRN  PER INDIV: HCPCS | Performed by: REGISTERED NURSE

## 2024-03-06 NOTE — PATIENT INSTRUCTIONS
Diabetes Goals and Reminders    Your A1c test should be done every 3 months.  Your goal is less than 7%.   Your last result is:  Lab Results   Component Value Date    A1C 7.8 06/15/2023    A1C 7.7 07/14/2020       Your LDL cholesterol test should be done at least once a year.  Take a statin, if prescribed by your doctor, based on age and risk factors.  Your last result is:  LDL Cholesterol Calculated   Date Value Ref Range Status   02/07/2023 85 <=100 mg/dL Final   07/14/2020  <100 mg/dL Final    Cannot estimate LDL when triglyceride exceeds 400 mg/dL     Comment:     Desirable:       <100 mg/dl       Have blood pressure and weight checked every three months.  Your blood pressure goal is 140/90 or less.  Your last blood pressure reading was:   BP Readings from Last 1 Encounters:   06/15/23 138/76       Test your blood sugar 4 times per day.  Your home blood glucose target ranges are:  Fasting or Before meals:   2 hours after a meal: Less than 180  Bedtime 100 - 140 mg/dL       Avoid all tobacco.  Follow your healthy diet and exercise plan.  See the eye doctor every year.  See the dentist every six months.  Have kidney function tested yearly.    Take all medicine as prescribed.  Please let me know if you are having symptoms you don t expect or if you wish to stop any medicine.    Follow Up Plan  Please call or visit Diabetes Education if blood sugars are consistently out of target.  Your future lab plan is:  HgA1c at anytime (due every 3 - 6 months).    If you need your cholesterol checked at your next appointment, you should fast 8 to 10 hours before your appointment.  Do not eat or drink anything besides water.  Drink plenty of water and take all your usual medicine.    SUMMARY FOR TODAY'S VISIT    1.  Continue testing blood sugar 4 time(s) daily.    Discussed using Dexcom G7 Continuous Glucose Monitoring (CGM) instead of poking your fingers.  [facility]  Please call if any trouble  getting supplies.    2.  Discussed carbohydrate counting using the Plate Method for portion control.  Reviewed balanced diet, food groups and incorporating variety, including fruits/vegetables/whole grains/lean protein/nuts and seeds into meals.      A key strategy in this plan is, along with medication need, to participate in low to moderate physical activity, such as walking, working up to a minimum of 30 minutes most days, as tolerated.       Recommend 30 grams with each meal.    3.  Discussed D5 Health Goals.  You have met 5 of 5 goals at this time.  (BP less than 140/90, Statin therapy as recommended, A1c less than 8%, tobacco free, Aspirin therapy as recommended).      Achieving the five treatment goals that make up the D5 not only reduces your risk for serious cardiovascular problems like heart attack and stroke, it puts you on a path to better health!  See www.theD5.org for more information.    4.  Treatment options:  No new changes at this time.  Follow up for lab work and will reassess insulin need at that time.    5.  Please follow-up for continued diabetes education in 2 - 3 weeks, as needed.       Fina Hale RN, BSN, [facility]  3/6/2024 2:59 PM

## 2024-03-06 NOTE — PROGRESS NOTES
Diabetes Self-Management Education & Support    Presents for:      Type of Service: In Person Visit      ASSESSMENT:  Patient visits for annual diabetes self-management education.  Discussed pathophysiology with interpretation and meaning of HgA1c.  Stressed importance of testing BG daily to help keep tight control of DM2, helping to minimize risk for possible complications of uncontrolled diabetes.  Discussed benefits of keeping A1c under 7% and encouraged patient to continue testing BG daily.     BG report:  14-day average 196 (53n) with SD 55 mg/dL.  , PreLunch , preSupper , bedtime  mg/dL.    Patient using [employer] Concepcion Expert BG meter with bolus calculator which uses BG, ISF and ICR for bolus advice.  Settings are ICR 7 units for 10 grams to 7.5 units for 9 grams carbohydrate.  ISF 1:8 mg/dL.  He likes using this system, but is wondering about CGM.    Current prerequisites for insurance coverage of CGM:  1.  Patient has diabetes:  YES  2.  Patient is utilizing an insulin pump or administers 1 or more injections of insulin per day:  YES.        Patient meets 2 of 2 required criteria for insurance coverage of CGM.    Patient interested to try Dexcom G7 CGM.  He would like a prescription sent to [facility].    Discussed transition from SMBG to CGM and options such as smart pens - InPen or Tempo which utilize a CGM with bolus calculator, but patient wants to continue using his [employer] Expert Bolus Calculator as he does NOT have or want a smart phone.     Recommend patient use a Novolog high dose sliding scale for correction of high glucose and then can continue to use his Concepcion Expert for carbohydrate bolus advice.      Discussed carbohydrate counting using the Plate Method for portion control.  Reviewed balanced diet, food groups and incorporating variety, including fruits/vegetables/whole grains/lean protein/nuts and seeds into meals.       Continue carbohydrate counting, lower carb  plan:  30 grams with breakfast, 30 grams with lunch and 30 grams with supper.       A key strategy in this plan is, along with medication need, to participate in low to moderate physical activity, such as walking, working up to a minimum of 30 minutes most days, as tolerated.        Discussed D5 Health Goals and patient has met 5 of 5 goals at this time.  (BP less than 140/90, ASA therapy as recommended, statin therapy as recommended, A1c less than 8%, tobacco free).      PLAN    Treatment recommendations:    No new Lantus or Novolog ICR setting changes recommended at this time.    Visit with PCP for new HbA1c and follow up for insulin adjustment, as needed.    A prescription for Dexcom G7 CGM system will be sent to PCP for possible approval.    When beginning CGM, recommend using Novolog Sliding Scale for correction of high BG as,    Blood Glucose       Units of Novolog insulin  150  to  199 = 3 units of extra insulin  200  to  249 = 6 units of extra insulin  250  to  299 = 9 units of extra insulin  300  to  349 = 12 units of extra insulin  Equal to or greater than 350 =   15 units of extra insulin       Self-Directed Behavior Goal/s set by patient:  1)  I will consider learning CGM to help improve diabetes care within the next month.       Patient's most recent   Lab Results   Component Value Date    A1C 7.8 06/15/2023    A1C 7.7 07/14/2020     is not meeting goal of <7.0    Diabetes knowledge and skills assessment:   Patient is knowledgeable in diabetes management concepts related to: Monitoring, Taking Medication, and Reducing Risks    Continue education with the following diabetes management concepts: Healthy Eating, Being Active, and Problem Solving    Based on learning assessment above, most appropriate setting for further diabetes education would be: Group class or Individual setting.        Follow-up: TBD per patient schedule.     See Care Plan for co-developed, patient-state behavior change goals.  GIOVANI  "provided for patient today.    Education Materials Provided:  Planning Healthy Meals, Activity Pyramid, Dexcom G7 CGM information, DSMS sheet, Diabetes Success Plan and D5 Health Goals.        SUBJECTIVE/OBJECTIVE:     Cultural Influences/Ethnic Background:  Not  or       Diabetes Symptoms & Complications:          Patient Problem List and Family Medical History reviewed for relevant medical history, current medical status, and diabetes risk factors.    Vitals:  There were no vitals taken for this visit.  Estimated body mass index is 42.64 kg/m  as calculated from the following:    Height as of 6/15/23: 1.778 m (5' 10\").    Weight as of 6/15/23: 134.8 kg (297 lb 3.2 oz).   Last 3 BP:   BP Readings from Last 3 Encounters:   06/15/23 138/76   02/07/23 138/88   05/20/22 (!) 142/78       History   Smoking Status    Former    Packs/day: 0.50    Years: 28.00    Types: Cigarettes    Quit date: 12/21/2011   Smokeless Tobacco    Former    Types: Chew    Quit date: 9/5/1988       Labs:  Lab Results   Component Value Date    A1C 7.8 06/15/2023    A1C 7.7 07/14/2020     Lab Results   Component Value Date     02/07/2023     05/20/2022     07/14/2020     Lab Results   Component Value Date    LDL 85 02/07/2023    LDL  07/14/2020     Cannot estimate LDL when triglyceride exceeds 400 mg/dL     HDL Cholesterol   Date Value Ref Range Status   07/14/2020 31 23 - 92 mg/dL Final     Direct Measure HDL   Date Value Ref Range Status   02/07/2023 39 (L) >=40 mg/dL Final   ]  GFR Estimate   Date Value Ref Range Status   02/07/2023 78 >60 mL/min/1.73m2 Final     Comment:     eGFR calculated using 2021 CKD-EPI equation.   07/14/2020 55 (L) >60 mL/min/[1.73_m2] Final     GFR Estimate If Black   Date Value Ref Range Status   07/14/2020 66 >60 mL/min/[1.73_m2] Final     Lab Results   Component Value Date    CR 1.10 02/07/2023    CR 1.35 07/14/2020     No results found for: \"MICROALBUMIN\"      Taking " Medications:  Diabetes Medication(s)       Insulin       LANTUS SOLOSTAR 100 UNIT/ML soln INJECT 175 UNITS SUBCUTANEOUSLY EVERY DAY AT BEDTIME     NOVOLOG FLEXPEN 100 UNIT/ML soln INJECT 60-70 UNITS SUBCUTANEOUSLY WITH EACH MEAL BASED ON INSULIN TO CARB RATIO. MAXIMUM  UNITS PER DAY            Healthy Coping:     Patient Activation Measure Survey Score:       No data to display                  Care Plan and Education Provided:  There are no care plans that you recently modified to display for this patient.        Fina Hale RN, BSN, [facility]  3/6/2024 3:05 PM     Time Spent: 60 minutes  Encounter Type: Individual    Any diabetes medication dose changes were made via the CDE Protocol per the patient's primary care provider. A copy of this encounter was shared with the provider.

## 2024-03-07 ENCOUNTER — TELEPHONE (OUTPATIENT)
Dept: EDUCATION SERVICES | Facility: OTHER | Age: 59
End: 2024-03-07
Payer: COMMERCIAL

## 2024-03-07 DIAGNOSIS — E11.65 UNCONTROLLED TYPE 2 DIABETES MELLITUS WITH HYPERGLYCEMIA, WITH LONG-TERM CURRENT USE OF INSULIN (H): Primary | ICD-10-CM

## 2024-03-07 DIAGNOSIS — Z79.4 UNCONTROLLED TYPE 2 DIABETES MELLITUS WITH HYPERGLYCEMIA, WITH LONG-TERM CURRENT USE OF INSULIN (H): Primary | ICD-10-CM

## 2024-03-07 RX ORDER — ACYCLOVIR 400 MG/1
TABLET ORAL
Qty: 3 EACH | Refills: 5 | Status: SHIPPED | OUTPATIENT
Start: 2024-03-07

## 2024-03-07 RX ORDER — ACYCLOVIR 400 MG/1
TABLET ORAL
Qty: 1 EACH | Refills: 0 | Status: SHIPPED | OUTPATIENT
Start: 2024-03-07 | End: 2024-03-12

## 2024-03-07 NOTE — TELEPHONE ENCOUNTER
Patient requests to use Dexcom G7 CGM for glucose monitoring.  Patient has [employer] insurance and currently meets 2 of 2 criteria for coverage:    1.  Patient has diabetes:  YES  2.  Patient is utilizing an insulin pump or administers 1 or more injections of insulin per day:  YES       If accepted as written, please sign pending orders.     Thank you,     Fina Hale RN, BSN, [facility]  3/7/2024 9:18 AM

## 2024-03-08 RX ORDER — TRAMADOL HYDROCHLORIDE 50 MG/1
TABLET ORAL
Qty: 90 TABLET | Refills: 0 | Status: SHIPPED | OUTPATIENT
Start: 2024-03-08 | End: 2024-03-12

## 2024-03-08 NOTE — TELEPHONE ENCOUNTER
PDMP Review         Value Time User    State PDMP site checked  Yes 3/8/2024  3:40 PM Kira Grider DO          Rx renewed.  Kira Grider DO on 3/8/2024 at 3:41 PM

## 2024-03-08 NOTE — TELEPHONE ENCOUNTER
[facility] sent Rx request for the following:      Requested Prescriptions   Pending Prescriptions Disp Refills    traMADol (ULTRAM) 50 MG tablet [Pharmacy Med Name: traMADol HCl 50 MG Oral Tablet] 90 tablet 0     Sig: Take 1 tablet by mouth three times daily as needed       There is no refill protocol information for this order          Last Prescription Date:   10/7/23  Last Fill Qty/Refills:         90, R-1    Last Office Visit:              6/15/23   Future Office visit:           3/12/24    Ellie Devine RN on 3/8/2024 at 3:05 PM

## 2024-03-12 ENCOUNTER — TELEPHONE (OUTPATIENT)
Dept: FAMILY MEDICINE | Facility: OTHER | Age: 59
End: 2024-03-12

## 2024-03-12 ENCOUNTER — OFFICE VISIT (OUTPATIENT)
Dept: FAMILY MEDICINE | Facility: OTHER | Age: 59
End: 2024-03-12
Attending: FAMILY MEDICINE
Payer: COMMERCIAL

## 2024-03-12 VITALS
RESPIRATION RATE: 20 BRPM | OXYGEN SATURATION: 94 % | TEMPERATURE: 98.8 F | DIASTOLIC BLOOD PRESSURE: 74 MMHG | SYSTOLIC BLOOD PRESSURE: 144 MMHG | HEART RATE: 81 BPM | BODY MASS INDEX: 42.95 KG/M2 | HEIGHT: 70 IN | WEIGHT: 300 LBS

## 2024-03-12 DIAGNOSIS — F32.A DEPRESSION, UNSPECIFIED DEPRESSION TYPE: ICD-10-CM

## 2024-03-12 DIAGNOSIS — Z79.4 UNCONTROLLED TYPE 2 DIABETES MELLITUS WITH HYPERGLYCEMIA, WITH LONG-TERM CURRENT USE OF INSULIN (H): ICD-10-CM

## 2024-03-12 DIAGNOSIS — E66.01 MORBID OBESITY (H): ICD-10-CM

## 2024-03-12 DIAGNOSIS — Z12.11 COLON CANCER SCREENING: ICD-10-CM

## 2024-03-12 DIAGNOSIS — Z23 NEED FOR SHINGLES VACCINE: ICD-10-CM

## 2024-03-12 DIAGNOSIS — Z00.00 ROUTINE HISTORY AND PHYSICAL EXAMINATION OF ADULT: Primary | ICD-10-CM

## 2024-03-12 DIAGNOSIS — K21.9 GASTROESOPHAGEAL REFLUX DISEASE WITHOUT ESOPHAGITIS: ICD-10-CM

## 2024-03-12 DIAGNOSIS — G89.29 CHRONIC MIDLINE LOW BACK PAIN, UNSPECIFIED WHETHER SCIATICA PRESENT: ICD-10-CM

## 2024-03-12 DIAGNOSIS — E11.42 DIABETIC POLYNEUROPATHY ASSOCIATED WITH TYPE 2 DIABETES MELLITUS (H): ICD-10-CM

## 2024-03-12 DIAGNOSIS — F41.1 GAD (GENERALIZED ANXIETY DISORDER): ICD-10-CM

## 2024-03-12 DIAGNOSIS — E11.65 UNCONTROLLED TYPE 2 DIABETES MELLITUS WITH HYPERGLYCEMIA, WITH LONG-TERM CURRENT USE OF INSULIN (H): ICD-10-CM

## 2024-03-12 DIAGNOSIS — M54.50 CHRONIC MIDLINE LOW BACK PAIN, UNSPECIFIED WHETHER SCIATICA PRESENT: ICD-10-CM

## 2024-03-12 DIAGNOSIS — J98.4 RESTRICTIVE LUNG DISEASE: ICD-10-CM

## 2024-03-12 DIAGNOSIS — E78.1 HYPERTRIGLYCERIDEMIA: ICD-10-CM

## 2024-03-12 DIAGNOSIS — E78.5 HYPERLIPIDEMIA LDL GOAL <100: ICD-10-CM

## 2024-03-12 DIAGNOSIS — R91.1 SOLITARY PULMONARY NODULE: ICD-10-CM

## 2024-03-12 DIAGNOSIS — I10 BENIGN ESSENTIAL HYPERTENSION: ICD-10-CM

## 2024-03-12 DIAGNOSIS — F11.90 CHRONIC, CONTINUOUS USE OF OPIOIDS: ICD-10-CM

## 2024-03-12 DIAGNOSIS — N18.30 STAGE 3 CHRONIC KIDNEY DISEASE, UNSPECIFIED WHETHER STAGE 3A OR 3B CKD (H): ICD-10-CM

## 2024-03-12 DIAGNOSIS — Z23 NEED FOR HEPATITIS B VACCINATION: ICD-10-CM

## 2024-03-12 LAB
ALBUMIN SERPL BCG-MCNC: 4.3 G/DL (ref 3.5–5.2)
ALP SERPL-CCNC: 67 U/L (ref 40–150)
ALT SERPL W P-5'-P-CCNC: 32 U/L (ref 0–70)
ANION GAP SERPL CALCULATED.3IONS-SCNC: 8 MMOL/L (ref 7–15)
AST SERPL W P-5'-P-CCNC: 32 U/L (ref 0–45)
BASOPHILS # BLD AUTO: 0.1 10E3/UL (ref 0–0.2)
BASOPHILS NFR BLD AUTO: 1 %
BILIRUB SERPL-MCNC: 0.2 MG/DL
BUN SERPL-MCNC: 29.3 MG/DL (ref 8–23)
CALCIUM SERPL-MCNC: 9.8 MG/DL (ref 8.6–10)
CHLORIDE SERPL-SCNC: 96 MMOL/L (ref 98–107)
CHOLEST SERPL-MCNC: 154 MG/DL
CREAT SERPL-MCNC: 1.29 MG/DL (ref 0.67–1.17)
DEPRECATED HCO3 PLAS-SCNC: 33 MMOL/L (ref 22–29)
EGFRCR SERPLBLD CKD-EPI 2021: 64 ML/MIN/1.73M2
EOSINOPHIL # BLD AUTO: 0.2 10E3/UL (ref 0–0.7)
EOSINOPHIL NFR BLD AUTO: 2 %
ERYTHROCYTE [DISTWIDTH] IN BLOOD BY AUTOMATED COUNT: 12.8 % (ref 10–15)
FASTING STATUS PATIENT QL REPORTED: ABNORMAL
GLUCOSE SERPL-MCNC: 249 MG/DL (ref 70–99)
HBA1C MFR BLD: 8.5 % (ref 4–6.2)
HCT VFR BLD AUTO: 44.7 % (ref 40–53)
HDLC SERPL-MCNC: 37 MG/DL
HGB BLD-MCNC: 15.4 G/DL (ref 13.3–17.7)
IMM GRANULOCYTES # BLD: 0 10E3/UL
IMM GRANULOCYTES NFR BLD: 0 %
LDLC SERPL CALC-MCNC: 73 MG/DL
LYMPHOCYTES # BLD AUTO: 1.1 10E3/UL (ref 0–5.3)
LYMPHOCYTES NFR BLD AUTO: 13 %
MCH RBC QN AUTO: 30.1 PG (ref 26.5–33)
MCHC RBC AUTO-ENTMCNC: 34.5 G/DL (ref 31.5–36.5)
MCV RBC AUTO: 88 FL (ref 78–100)
MONOCYTES # BLD AUTO: 0.7 10E3/UL (ref 0–1.3)
MONOCYTES NFR BLD AUTO: 8 %
NEUTROPHILS # BLD AUTO: 7 10E3/UL (ref 1.6–8.3)
NEUTROPHILS NFR BLD AUTO: 77 %
NONHDLC SERPL-MCNC: 117 MG/DL
NRBC # BLD AUTO: 0 10E3/UL
NRBC BLD AUTO-RTO: 0 /100
PLATELET # BLD AUTO: 235 10E3/UL (ref 150–450)
POTASSIUM SERPL-SCNC: 5.9 MMOL/L (ref 3.4–5.3)
PROT SERPL-MCNC: 7.2 G/DL (ref 6.4–8.3)
PSA SERPL DL<=0.01 NG/ML-MCNC: 0.57 NG/ML (ref 0–3.5)
RBC # BLD AUTO: 5.11 10E6/UL (ref 4.4–5.9)
SODIUM SERPL-SCNC: 137 MMOL/L (ref 135–145)
TRIGL SERPL-MCNC: 222 MG/DL
WBC # BLD AUTO: 9.1 10E3/UL (ref 4–11)

## 2024-03-12 PROCEDURE — G0010 ADMIN HEPATITIS B VACCINE: HCPCS

## 2024-03-12 PROCEDURE — 80307 DRUG TEST PRSMV CHEM ANLYZR: CPT | Mod: ZL | Performed by: FAMILY MEDICINE

## 2024-03-12 PROCEDURE — 99396 PREV VISIT EST AGE 40-64: CPT | Performed by: FAMILY MEDICINE

## 2024-03-12 PROCEDURE — 99214 OFFICE O/P EST MOD 30 MIN: CPT | Mod: 25 | Performed by: FAMILY MEDICINE

## 2024-03-12 PROCEDURE — 80061 LIPID PANEL: CPT | Mod: ZL | Performed by: FAMILY MEDICINE

## 2024-03-12 PROCEDURE — 80053 COMPREHEN METABOLIC PANEL: CPT | Mod: ZL | Performed by: FAMILY MEDICINE

## 2024-03-12 PROCEDURE — G0463 HOSPITAL OUTPT CLINIC VISIT: HCPCS | Mod: 25

## 2024-03-12 PROCEDURE — 80349 CANNABINOIDS NATURAL: CPT | Mod: ZL | Performed by: FAMILY MEDICINE

## 2024-03-12 PROCEDURE — 85048 AUTOMATED LEUKOCYTE COUNT: CPT | Mod: ZL | Performed by: FAMILY MEDICINE

## 2024-03-12 PROCEDURE — G0103 PSA SCREENING: HCPCS | Mod: ZL | Performed by: FAMILY MEDICINE

## 2024-03-12 PROCEDURE — 36415 COLL VENOUS BLD VENIPUNCTURE: CPT | Mod: ZL | Performed by: FAMILY MEDICINE

## 2024-03-12 PROCEDURE — 83036 HEMOGLOBIN GLYCOSYLATED A1C: CPT | Mod: ZL | Performed by: FAMILY MEDICINE

## 2024-03-12 RX ORDER — LISINOPRIL 30 MG/1
30 TABLET ORAL DAILY
Qty: 90 TABLET | Refills: 4 | Status: SHIPPED | OUTPATIENT
Start: 2024-03-12

## 2024-03-12 RX ORDER — HYDROCHLOROTHIAZIDE 25 MG/1
25 TABLET ORAL DAILY
Qty: 90 TABLET | Refills: 4 | Status: SHIPPED | OUTPATIENT
Start: 2024-03-12

## 2024-03-12 RX ORDER — INSULIN GLARGINE 100 [IU]/ML
INJECTION, SOLUTION SUBCUTANEOUS
Qty: 60 ML | Refills: 11 | Status: SHIPPED | OUTPATIENT
Start: 2024-03-12

## 2024-03-12 RX ORDER — INSULIN ASPART 100 [IU]/ML
INJECTION, SOLUTION INTRAVENOUS; SUBCUTANEOUS
Qty: 90 ML | Refills: 11 | Status: SHIPPED | OUTPATIENT
Start: 2024-03-12

## 2024-03-12 RX ORDER — ACYCLOVIR 400 MG/1
TABLET ORAL
Qty: 1 EACH | Refills: 0 | Status: SHIPPED | OUTPATIENT
Start: 2024-03-12

## 2024-03-12 RX ORDER — FLUOXETINE 40 MG/1
40 CAPSULE ORAL EVERY MORNING
Qty: 90 CAPSULE | Refills: 4 | Status: SHIPPED | OUTPATIENT
Start: 2024-03-12

## 2024-03-12 RX ORDER — TRAMADOL HYDROCHLORIDE 50 MG/1
TABLET ORAL
Qty: 90 TABLET | Refills: 5 | Status: SHIPPED | OUTPATIENT
Start: 2024-03-12

## 2024-03-12 RX ORDER — SIMVASTATIN 40 MG
40 TABLET ORAL AT BEDTIME
Qty: 90 TABLET | Refills: 4 | Status: SHIPPED | OUTPATIENT
Start: 2024-03-12

## 2024-03-12 RX ORDER — MELOXICAM 15 MG/1
TABLET ORAL
Qty: 90 TABLET | Refills: 4 | Status: SHIPPED | OUTPATIENT
Start: 2024-03-12

## 2024-03-12 RX ORDER — PREGABALIN 200 MG/1
CAPSULE ORAL
Qty: 90 CAPSULE | Refills: 5 | Status: SHIPPED | OUTPATIENT
Start: 2024-03-12 | End: 2024-09-19

## 2024-03-12 RX ORDER — FLUTICASONE PROPIONATE 110 UG/1
1 AEROSOL, METERED RESPIRATORY (INHALATION) 2 TIMES DAILY
Qty: 12 G | Refills: 11 | Status: SHIPPED | OUTPATIENT
Start: 2024-03-12

## 2024-03-12 SDOH — HEALTH STABILITY: PHYSICAL HEALTH: ON AVERAGE, HOW MANY DAYS PER WEEK DO YOU ENGAGE IN MODERATE TO STRENUOUS EXERCISE (LIKE A BRISK WALK)?: 2 DAYS

## 2024-03-12 ASSESSMENT — ANXIETY QUESTIONNAIRES
7. FEELING AFRAID AS IF SOMETHING AWFUL MIGHT HAPPEN: NOT AT ALL
GAD7 TOTAL SCORE: 13
7. FEELING AFRAID AS IF SOMETHING AWFUL MIGHT HAPPEN: NOT AT ALL
6. BECOMING EASILY ANNOYED OR IRRITABLE: MORE THAN HALF THE DAYS
IF YOU CHECKED OFF ANY PROBLEMS ON THIS QUESTIONNAIRE, HOW DIFFICULT HAVE THESE PROBLEMS MADE IT FOR YOU TO DO YOUR WORK, TAKE CARE OF THINGS AT HOME, OR GET ALONG WITH OTHER PEOPLE: SOMEWHAT DIFFICULT
2. NOT BEING ABLE TO STOP OR CONTROL WORRYING: MORE THAN HALF THE DAYS
GAD7 TOTAL SCORE: 13
1. FEELING NERVOUS, ANXIOUS, OR ON EDGE: MORE THAN HALF THE DAYS
GAD7 TOTAL SCORE: 13
3. WORRYING TOO MUCH ABOUT DIFFERENT THINGS: NEARLY EVERY DAY
4. TROUBLE RELAXING: NEARLY EVERY DAY
8. IF YOU CHECKED OFF ANY PROBLEMS, HOW DIFFICULT HAVE THESE MADE IT FOR YOU TO DO YOUR WORK, TAKE CARE OF THINGS AT HOME, OR GET ALONG WITH OTHER PEOPLE?: SOMEWHAT DIFFICULT
5. BEING SO RESTLESS THAT IT IS HARD TO SIT STILL: SEVERAL DAYS

## 2024-03-12 ASSESSMENT — PAIN SCALES - GENERAL: PAINLEVEL: MILD PAIN (3)

## 2024-03-12 ASSESSMENT — SOCIAL DETERMINANTS OF HEALTH (SDOH): HOW OFTEN DO YOU GET TOGETHER WITH FRIENDS OR RELATIVES?: PATIENT DECLINED

## 2024-03-12 NOTE — PATIENT INSTRUCTIONS
Cologuard Patient Instructions    You received an order for a Cologuard test. You will receive your kit in the mail. Please follow the instructions that are provided in the kit.      Reminder: Ship Cologuard test the same day or the next day to allow enough delivery time. The lab must receive your specimen within 4 days for successful testing. If not delivered in time, you may have to complete the process again.    Home Pick-up:  Once you complete the kit, call [facility] at [phone] and they will schedule a UPS pickup for you.    OR    Drop Off Locations:  Once you have completed the collection and have it ready to be shipped, bring it to one of the following sites listed below. *Note: none of the sites ship out later than mid-morning. Please do not drop off Fridays, as they will not go out until Monday which will make your specimen inacceptable.     - [facility] ([address], [place] [address])      Drop off: Monday-Thursday, 8:00am-4:30pm at the [address] check-in desk      - [facility] ([address], [place] [address])   Drop off: Monday-Thursday, 8:00am-5:45pm     - [facility] ([address], [place] [address])     Drop off: Monday-Thursday, 3:00pm-5:30pm

## 2024-03-12 NOTE — PROGRESS NOTES
ADDENDUM:  Discussed care with CDE and recommended start of GLP-1 agonist.  In agreement and aware to monitor for need in changes to Bolus or Basal insulin levels.  Rx for Ozempic sent to pharmacy.  Kira Grider DO on 3/13/2024 at 5:18 PM       Preventive Care Visit  [facility]  Kira Grider DO, Family Medicine  Mar 12, 2024      Assessment & Plan     1. Routine history and physical examination of adult  - Drug Confirmation Panel Urine with Creat  - CBC and Differential; Future  - PSA Screen [facility]; Future  - Comprehensive Metabolic Panel; Future  - Lipid Panel; Future  - Hemoglobin A1c; Future  - Albumin Random Urine Quantitative with Creat Ratio  - [facility] IMM-  HEPATITIS B VACCINE, ADULT, IM  - CT CHEST W/O CONTRAST; Future  - Hemoglobin A1c  - Lipid Panel  - Comprehensive Metabolic Panel  - PSA Screen [facility]  - CBC and Differential    2. Uncontrolled type 2 diabetes mellitus with hyperglycemia, with long-term current use of insulin (H)  3. Diabetic polyneuropathy associated with type 2 diabetes mellitus (H)  Chronic, renewed medications at this time.  Is working with Fina for a CGM.  Consideration for GLP-1 agonist in Rajan - will discuss this with Fina.  Refilled his currently Novolog/Lantus regimen for now; due for monitoring labs.  Will add on Tox screen due Tramadol Rx for Neuropathy and to some degree for his chronic back pain.  - Continuous Blood Gluc  (DEXCOM G7 ) HAMMAD; Use to read blood sugars as per 's instructions.  Dispense: 1 each; Refill: 0  - insulin pen needle (31G X 5 MM) 31G X 5 MM miscellaneous; Sig: USE 4 TIMES DAILY FOR ADMINISTERING INSULIN AT HOME.  Dispense: 400 each; Refill: 1  - LANTUS SOLOSTAR 100 UNIT/ML soln; INJECT 175 UNITS SUBCUTANEOUSLY EVERY DAY AT BEDTIME  Dispense: 60 mL; Refill: 11  - NOVOLOG FLEXPEN 100 UNIT/ML soln; INJECT 60-70 UNITS SUBCUTANEOUSLY WITH EACH MEAL BASED ON INSULIN TO CARB RATIO. MAXIMUM  UNITS PER DAY   Dispense: 90 mL; Refill: 11  - Comprehensive Metabolic Panel; Future  - Hemoglobin A1c; Future  - Albumin Random Urine Quantitative with Creat Ratio  - LYRICA 200 MG capsule; TAKE 1 CAPSULE BY MOUTH THREE TIMES DAILY  Dispense: 90 capsule; Refill: 5  - meloxicam (MOBIC) 15 MG tablet; Take 1 tablet by mouth once daily with food  Dispense: 90 tablet; Refill: 4  - traMADol (ULTRAM) 50 MG tablet; Take 1 tablet by mouth three times daily as needed  Dispense: 90 tablet; Refill: 5    4. Chronic, continuous use of opioids  On tramadol; refilled #90 with 5 refills today.  Tox screen will be added onto today's blood work.    5. Chronic midline low back pain, unspecified whether sciatica present  Chronic, unable to urinate, therefore tox screen will be changed to serum.  Tramadol refilled #90 with 5 refills.  - Drug Confirmation Panel Urine with Creat  - traMADol (ULTRAM) 50 MG tablet; Take 1 tablet by mouth three times daily as needed  Dispense: 90 tablet; Refill: 5    6. MIRANDA (generalized anxiety disorder)  Chronic, waxes and wanes.  Slightly better with increase in ability to be outside.  Rx for fluoxetine increased to 40mg for at least 4-6 weeks and monitor for improvement.  Would encourage counselling; but transportation is an issue - consideration for virtual counseling/therapy.    7. Benign essential hypertension  Chronic, slightly elevated today.  Is planning to increase activity level with spring here -- will continue to monitor.  Encouraged 5-10# weight loss before next DM check (3-4 months).  Consider GLP-1 agonist for overall health improvement.  - Comprehensive Metabolic Panel; Future  - Lipid Panel; Future  - Albumin Random Urine Quantitative with Creat Ratio  - hydrochlorothiazide (HYDRODIURIL) 25 MG tablet; Take 1 tablet (25 mg) by mouth daily  Dispense: 90 tablet; Refill: 4  - lisinopril (ZESTRIL) 30 MG tablet; Take 1 tablet (30 mg) by mouth daily  Dispense: 90 tablet; Refill: 4  - Lipid Panel  - Comprehensive  Metabolic Panel    8. Restrictive lung disease  Chronic, s/p chylothorax/PNA sepsis infection in 2012.  Renewed flonvent which has been helpful.  - fluticasone (FLOVENT HFA) 110 MCG/ACT inhaler; Inhale 1 puff into the lungs 2 times daily  Dispense: 12 g; Refill: 11    9. Gastroesophageal reflux disease without esophagitis  Chronic, waxes and wanes.  Renewed omeprazole at 20mg daily prn.  - omeprazole (PRILOSEC) 20 MG DR capsule; TAKE 1 CAPSULE BY MOUTH ONCE DAILY BEFORE A MEAL  Dispense: 90 capsule; Refill: 3    10. Depression, unspecified depression type  Chronic, worsening.  See above.  - Drug Confirmation Panel Urine with Creat  - FLUoxetine (PROZAC) 40 MG capsule; Take 1 capsule (40 mg) by mouth every morning  Dispense: 90 capsule; Refill: 4    11. Hyperlipidemia LDL goal <100  Chronic, continues to have uncontrolled triglycerides.  Start Zetia 10mg daily.  Monitor for side effects.  - Lipid Panel; Future  - simvastatin (ZOCOR) 40 MG tablet; Take 1 tablet (40 mg) by mouth at bedtime  Dispense: 90 tablet; Refill: 4  - Lipid Panel    12. Stage 3 chronic kidney disease, unspecified whether stage 3a or 3b CKD (H)  CHronic, due for monitoring labs today.  Likely unchanged.  Unable to leave urine sample today; collect MAC at another date.  - CBC and Differential; Future  - Albumin Random Urine Quantitative with Creat Ratio  - CBC and Differential    13. Lung nodule < 6cm on CT  Chronic, due for monitoring -- CT chest ordered.  - CT CHEST W/O CONTRAST; Future    14. Colon cancer screening  Declines today; but discussed options of Cologuard and Colonoscopy.  They would like to check if the current COloguard boxes they have are .  Rajan/Summer will get back to me to order screening.    15. Need for hepatitis B vaccination  Updated today.  - [facility] IMM-  HEPATITIS B VACCINE, ADULT, IM    16. Need for shingles vaccine  Declines today.      MDM:  Review of the result(s) of each unique test - see MyChart result  "notes  Ordering of each unique test  Prescription drug management    BMI  Estimated body mass index is 43.05 kg/m  as calculated from the following:    Height as of this encounter: 1.778 m (5' 10\").    Weight as of this encounter: 136.1 kg (300 lb).   Weight management plan: Discussed healthy diet and exercise guidelines  Bariatric options discussed.    Return in about 3 months (around 6/12/2024) for Diabetic Check.    Subjective   Rajan is a 59 year old, presenting for the following:  Physical        3/12/2024     2:24 PM   Additional Questions   Roomed by ANTIONETTE Rush   Accompanied by spouse        Health Care Directive  Patient does not have a Health Care Directive or Living Will: None    HPI    Blood sugars are \"better\" when he can be outside walking in the yard.  Is usually out there barefoot - because the cold ground on his bare feet takes away his neuropathy pain.    Has had some increased irritability in the recent months.        3/12/2024   General Health   How would you rate your overall physical health? (!) POOR         3/12/2024   Nutrition   Three or more servings of calcium each day? Yes   Diet: Diabetic    Carbohydrate counting   How many servings of fruit and vegetables per day? 4 or more   How many sweetened beverages each day? 0-1         3/12/2024   Exercise   Days per week of moderate/strenous exercise 2 days   (!) EXERCISE CONCERN      3/12/2024   Social Factors   Frequency of gathering with friends or relatives Patient declined          No data to display                   2/7/2023   Dental   Dentist two times every year? Yes            Today's PHQ-9 Score:       6/15/2023     9:15 AM   PHQ-9 SCORE   PHQ-9 Total Score MyChart 6 (Mild depression)   PHQ-9 Total Score 6           2/7/2023   Substance Use   Alcohol more than 3/day or more than 7/wk No     Social History     Tobacco Use    Smoking status: Former     Packs/day: 0.50     Years: 28.00     Additional pack years: 0.00     Total pack years: " 14.00     Types: Cigarettes     Quit date: 2011     Years since quittin.2    Smokeless tobacco: Former     Types: Chew     Quit date: 1988   Vaping Use    Vaping Use: Never used   Substance Use Topics    Alcohol use: No    Drug use: Yes     Types: Marijuana       Last PSA:   Prostate Specific Antigen Screen   Date Value Ref Range Status   2024 0.57 0.00 - 3.50 ng/mL Final     ASCVD Risk   The 10-year ASCVD risk score (Janes DK, et al., 2019) is: 20.2%    Values used to calculate the score:      Age: 59 years      Sex: Male      Is Non- : No      Diabetic: Yes      Tobacco smoker: No      Systolic Blood Pressure: 144 mmHg      Is BP treated: Yes      HDL Cholesterol: 37 mg/dL      Total Cholesterol: 154 mg/dL       Reviewed and updated as needed this visit by Provider   Tobacco  Allergies  Meds  Problems  Med Hx  Surg Hx  Fam Hx            Past Medical History:   Diagnosis Date    Acute respiratory failure with hypoxia (H) 2011    Community acquired bacterial pneumonia 2011    Empyema (H) 2012    Overview:  Rt lung 11    Lateral epicondylitis of left elbow     10/3/2013,Seen by Dr Bourne [place] 2013    Moderate cigarette smoker (10-19 per day) 2011    On mechanically assisted ventilation (H) 1/3/2012    Overview:  IMO Update  Overview:  IMO Update    Type 2 diabetes mellitus with proliferative retinopathy without macular edema (H) 2014    Mild proliferative changes on left side--sees Dr Ortiz exam 2014.      Umbilical hernia with obstruction, without gangrene 10/31/2018     Past Surgical History:   Procedure Laterality Date    ARTHROSCOPY SHOULDER      [id number],SHOULDER SURGERY,Bilateral    CHEST TUBE INSERTION      ,CHEST TUBE INSERTION    HERNIORRHAPHY UMBILICAL N/A 2018    Procedure: Umbilical hernia Repair with Mesh;  Surgeon: Mason Rhodes MD;  Location: [facility] OR    LUNG SURGERY       [id number],LUNG SURGERY,pneumonia    TRACHEOSTOMY      [id number],HCHG TRACH PR1,history trach with pneumonia     OB History   No obstetric history on file.     BP Readings from Last 3 Encounters:   03/12/24 (!) 144/74   06/15/23 138/76   02/07/23 138/88    Wt Readings from Last 3 Encounters:   03/12/24 136.1 kg (300 lb)   06/15/23 134.8 kg (297 lb 3.2 oz)   02/07/23 140.3 kg (309 lb 6.4 oz)                  Patient Active Problem List   Diagnosis    Personal history of other diseases of circulatory system    Chronic bilateral low back pain without sciatica    Corns and callosities    CKD (chronic kidney disease) stage 3, GFR 30-59 ml/min (H)    Diabetic polyneuropathy associated with type 2 diabetes mellitus (H)    Uncontrolled type 2 diabetes mellitus with hyperglycemia, with long-term current use of insulin (H)    Diabetic, retinopathy, proliferative (H)    Impotence of organic origin    Family history of ischemic heart disease    Other, multiple and ill-defined closed fractures of lower limb    Lung nodule < 6cm on CT    Lateral epicondylitis of left elbow    Acquired keratoderma    Enthesopathy of ankle and tarsus    Morbid obesity with BMI of 40.0-44.9, adult (H)    Other symptoms referable to upper arm joint    PTSD (post-traumatic stress disorder)    Restrictive lung disease    Atelectasis of right lung    GERD (gastroesophageal reflux disease)    Hyperlipidemia    ARMANDO (obstructive sleep apnea)    Rotator cuff syndrome of left shoulder    Spondylosis of lumbar region without myelopathy or radiculopathy    PFS (patellofemoral syndrome)    Nail dystrophy    Neuropathy    Chronic, continuous use of opioids     Past Surgical History:   Procedure Laterality Date    ARTHROSCOPY SHOULDER      [id number],SHOULDER SURGERY,Bilateral    CHEST TUBE INSERTION      [id number],CHEST TUBE INSERTION    HERNIORRHAPHY UMBILICAL N/A 11/27/2018    Procedure: Umbilical hernia Repair with Mesh;  Surgeon: Mason Rhodes MD;  Location:   OR    LUNG SURGERY      [id number],LUNG SURGERY,pneumonia    TRACHEOSTOMY      [id number],HCHG TRACH PR1,history trach with pneumonia       Social History     Tobacco Use    Smoking status: Former     Packs/day: 0.50     Years: 28.00     Additional pack years: 0.00     Total pack years: 14.00     Types: Cigarettes     Quit date: 2011     Years since quittin.2    Smokeless tobacco: Former     Types: Chew     Quit date: 1988   Substance Use Topics    Alcohol use: No     Family History   Problem Relation Age of Onset    Heart Disease Other         multiple males    Diabetes Other     Cancer Father         Stomach CA    Colon Cancer Paternal Uncle     Colon Cancer Paternal Grandfather          Current Outpatient Medications   Medication Sig Dispense Refill    albuterol (PROAIR HFA/PROVENTIL HFA/VENTOLIN HFA) 108 (90 Base) MCG/ACT inhaler Inhale 2 puffs into the lungs every 4 hours as needed for shortness of breath or wheezing 18 g 9    Alcohol Swabs (B-D SINGLE USE SWABS REGULAR) PADS USE TO  SWAB  AREA  OF  INJECTION/RAY  AS  DIRECTED 100 each 11    Aspirin (ASPIR-81 PO) Take 81 mg by mouth daily with food      BD PEN NEEDLE ANN 2ND GEN 32G X 4 MM miscellaneous USE 4 TIMES DAILY FOR ADMINISTERING INSULIN AT HOME 400 each 0    blood glucose (ACCU-CHEK DONAVON PLUS) test strip USE 1 STRIP TO CHECK GLUCOSE 4 TIMES DAILY 100 strip 11    blood glucose calibration (ACCU-CHEK DONAVON) solution Use to calibrate blood glucose monitor as needed as directed. 1 Bottle 3    blood glucose monitoring (ACCU-CHEK DONAVON PLUS) meter device kit Use to test blood sugar 4 times daily or as directed.  Dx: hyperglycemia 1 kit 0    blood glucose monitoring (ACCU-CHEK FASTCLIX) lancets USE   TO CHECK GLUCOSE 4 TIMES DAILY 102 each 11    clonazePAM (KLONOPIN) 0.5 MG tablet TAKE 1 TABLET BY MOUTH AT BEDTIME 30 tablet 0    Continuous Blood Gluc  (DEXCOM G7 ) HAMMAD Use to read blood sugars as per 's instructions. 1  each 0    Continuous Blood Gluc Sensor (DEXCOM G7 SENSOR) MISC Change every 10 days. 3 each 5    EQ PAIN RELIEVER EX  MG tablet TAKE 1 TABLET BY MOUTH EVERY 6 HOURS AS NEEDED FOR PAIN . DO NOT EXCEED 4000 MG OF ACETAMINOPHEN PER 24 HOURS 100 tablet 5    FLUoxetine (PROZAC) 40 MG capsule Take 1 capsule (40 mg) by mouth every morning 90 capsule 4    fluticasone (FLOVENT HFA) 110 MCG/ACT inhaler Inhale 1 puff into the lungs 2 times daily 12 g 11    hydrochlorothiazide (HYDRODIURIL) 25 MG tablet Take 1 tablet (25 mg) by mouth daily 90 tablet 4    insulin pen needle (31G X 5 MM) 31G X 5 MM miscellaneous Sig: USE 4 TIMES DAILY FOR ADMINISTERING INSULIN AT HOME. 400 each 1    LANTUS SOLOSTAR 100 UNIT/ML soln INJECT 175 UNITS SUBCUTANEOUSLY EVERY DAY AT BEDTIME 60 mL 11    LIDOCAINE PAIN RELIEF 4 % Patch Apply one patch topically to clean, dry skin. Leave on for 12 hours then remove. Must wait at least 12 hours before applying patch again 30 patch 11    lisinopril (ZESTRIL) 30 MG tablet Take 1 tablet (30 mg) by mouth daily 90 tablet 4    LYRICA 200 MG capsule TAKE 1 CAPSULE BY MOUTH THREE TIMES DAILY 90 capsule 5    meloxicam (MOBIC) 15 MG tablet Take 1 tablet by mouth once daily with food 90 tablet 4    Misc. Devices (BATH/SHOWER SEAT) MISC 1 each daily as needed (to assist during ADLs of bathing/grooming) 1 each 0    NARCAN 4 MG/0.1ML nasal spray       NOVOLOG FLEXPEN 100 UNIT/ML soln INJECT 60-70 UNITS SUBCUTANEOUSLY WITH EACH MEAL BASED ON INSULIN TO CARB RATIO. MAXIMUM  UNITS PER DAY 90 mL 11    omeprazole (PRILOSEC) 20 MG DR capsule TAKE 1 CAPSULE BY MOUTH ONCE DAILY BEFORE A MEAL 90 capsule 3    order for DME Oxygen for home use. LPM: 1/min via nasal cannula. Frequency of use: Continuous with portability; Length of need: 12 mos.      simvastatin (ZOCOR) 40 MG tablet Take 1 tablet (40 mg) by mouth at bedtime 90 tablet 4    traMADol (ULTRAM) 50 MG tablet Take 1 tablet by mouth three times daily as needed 90  "tablet 5     Allergies   Allergen Reactions    Penicillins      Other reaction(s): Diaphoresis    Piperacillin Sod-Tazobactam So Other (See Comments)     Fever while on zosyn, vanc. No rash, no wbc elevation  Fever while on zosyn, vanc. No rash, no wbc elevation    Piperacillin-Tazobactam In Dex Other (See Comments)     Other reaction(s): Diaphoresis, Fever  Fever while on zosyn, vanc. No rash, no wbc elevation    Vancomycin Other (See Comments)     Fever while on zosyn, vanc, no rash or WBC elevation  Other reaction(s): Diaphoresis, Fever  Fever while on zosyn, vanc, no rash or WBC elevation        Objective    Exam  BP (!) 144/74   Pulse 81   Temp 98.8  F (37.1  C) (Tympanic)   Resp 20   Ht 1.778 m (5' 10\")   Wt 136.1 kg (300 lb)   SpO2 94%   BMI 43.05 kg/m     Estimated body mass index is 43.05 kg/m  as calculated from the following:    Height as of this encounter: 1.778 m (5' 10\").    Weight as of this encounter: 136.1 kg (300 lb).    Physical Exam  GENERAL: alert and no distress  EYES: Eyes grossly normal to inspection, PERRL and conjunctivae and sclerae normal  NECK: no adenopathy, no asymmetry, masses, or scars  RESP: lungs clear to auscultation - no rales, rhonchi or wheezes  CV: regular rate and rhythm, normal S1 S2, no S3 or S4, no murmur, click or rub, no peripheral edema  ABDOMEN: soft, nontender, no hepatosplenomegaly, no masses and bowel sounds normal   (male): normal male genitalia without lesions or urethral discharge, no hernia  RECTAL (male): normal sphincter tone, no rectal masses, prostate normal size, smooth, nontender without nodules or masses  MS: no gross musculoskeletal defects noted, no edema  SKIN: venous stasis changes to distal lower leg/foot  PSYCH: mentation appears normal, affect normal/bright  Diabetic foot exam: DP 2/4; PT slightly less 1-2/4., reduced sensation at 8/10, venous stasis dermatitis noted, dependent rubor present, dry cracking heels, and " onychomycosis      Signed Electronically by: Kira Grider, DO

## 2024-03-12 NOTE — TELEPHONE ENCOUNTER
Patient unable to leave urine sample.     Lab put order back in as future.  Adri Hernandez LPN  3/12/2024  4:33 PM

## 2024-03-12 NOTE — NURSING NOTE
"Chief Complaint   Patient presents with    Physical       Initial BP (!) 144/74   Pulse 81   Temp 98.8  F (37.1  C) (Tympanic)   Resp 20   Ht 1.778 m (5' 10\")   Wt 136.1 kg (300 lb)   SpO2 94%   BMI 43.05 kg/m   Estimated body mass index is 43.05 kg/m  as calculated from the following:    Height as of this encounter: 1.778 m (5' 10\").    Weight as of this encounter: 136.1 kg (300 lb).  Medication Review: complete    The next two questions are to help us understand your food security.  If you are feeling you need any assistance in this area, we have resources available to support you today.           No data to display                  Health Care Directive:  Patient does not have a Health Care Directive or Living Will: Discussed advance care planning with patient; however, patient declined at this time.    Adri Hernandez LPN      "

## 2024-03-13 ENCOUNTER — TELEPHONE (OUTPATIENT)
Dept: FAMILY MEDICINE | Facility: OTHER | Age: 59
End: 2024-03-13
Payer: COMMERCIAL

## 2024-03-13 DIAGNOSIS — E87.5 HYPERKALEMIA: ICD-10-CM

## 2024-03-13 DIAGNOSIS — N18.30 STAGE 3 CHRONIC KIDNEY DISEASE, UNSPECIFIED WHETHER STAGE 3A OR 3B CKD (H): Primary | ICD-10-CM

## 2024-03-13 RX ORDER — EZETIMIBE 10 MG/1
10 TABLET ORAL DAILY
Qty: 90 TABLET | Refills: 1 | Status: SHIPPED | OUTPATIENT
Start: 2024-03-13 | End: 2024-06-25

## 2024-03-13 NOTE — TELEPHONE ENCOUNTER
Call and notify Rajan --    I discussed DM care with Fina as he had recently seen her and we agree that we should start a GLP-1 agonist.  I have sent Rx for Ozempic to the pharmacy.  Once weekly shots starting at 0.25mg weekly x 4 weeks; then 0.5mg weekly x 4 weeks; then will continue on 1mg weekly until his next appointment.    May have a prior authorization process with insurance before being able to obtain Ozempic, but if he hasn't received it within 2 weeks - let us know!    Kira Grider, DO

## 2024-03-13 NOTE — PROGRESS NOTES
Order for repeat BMP (within 1 month) being placed due to hyperkalemia and CKD3.  Kira Grider,  on 3/13/2024 at 6:26 AM

## 2024-03-14 NOTE — TELEPHONE ENCOUNTER
Left message to call back....................  3/14/2024   8:22 AM  Adri Hernandez LPN  3/14/2024  8:22 AM

## 2024-03-15 NOTE — TELEPHONE ENCOUNTER
Left message to call back....................  3/15/2024   9:29 AM  Adri Hernandez LPN  3/15/2024  9:29 AM

## 2024-04-02 DIAGNOSIS — F41.1 GAD (GENERALIZED ANXIETY DISORDER): ICD-10-CM

## 2024-04-02 LAB
11OH-THC SPEC-MCNC: NEGATIVE NG/ML
AMPHET BLD CFM-MCNC: NEGATIVE NG/ML
APAP BLD-MCNC: NEGATIVE UG/ML
BARBITURATES SPEC-MCNC: NEGATIVE UG/ML
BENZODIAZ SPEC-MCNC: NEGATIVE NG/ML
BUPRENORPHINE SERPL-MCNC: NEGATIVE NG/ML
BZE BLD CFM-MCNC: NEGATIVE NG/ML
CANNABIDIOL SERPLBLD CFM-MCNC: NEGATIVE NG/ML
CANNABINOIDS SERPL-MCNC: 1.4 NG/ML
CANNABINOIDS SPEC QL CFM: POSITIVE
CARBOXYTHC BLD-MCNC: ABNORMAL NG/ML
CARBOXYTHC SPEC-MCNC: 19 NG/ML
CARISOPRODOL IA: NEGATIVE UG/ML
DECLARED MEDICATIONS: ABNORMAL
DRUGS BLD SCN NOM: ABNORMAL
DRUGS FLD: ABNORMAL
ETHANOL BLD-MCNC: NEGATIVE GM/DL
FENTANYL IA: NEGATIVE NG/ML
GABAPENTIN IA: NEGATIVE UG/ML
MEPERIDINE SERPLBLD-MCNC: NEGATIVE NG/ML
METHADONE SAL CFM-MCNC: NEGATIVE NG/ML
OPIATES SPEC-MCNC: NEGATIVE NG/ML
OXYCODONE SERPLBLD SCN-MCNC: NEGATIVE NG/ML
PCP SPEC-MCNC: NEGATIVE NG/ML
PROPOXYPH SPEC-MCNC: NEGATIVE NG/ML
TRAMADOL BLD-MCNC: NEGATIVE NG/ML

## 2024-04-05 RX ORDER — CLONAZEPAM 0.5 MG/1
0.5 TABLET ORAL AT BEDTIME
Qty: 30 TABLET | Refills: 0 | OUTPATIENT
Start: 2024-04-05

## 2024-04-05 NOTE — TELEPHONE ENCOUNTER
SMS-patient states that [facility] says Tommy was denied     Please call and advise    Thank You    Christina Ferguson on 4/5/2024 at 3:45 PM

## 2024-04-05 NOTE — TELEPHONE ENCOUNTER
Requested Prescriptions   Pending Prescriptions Disp Refills    clonazePAM (KLONOPIN) 0.5 MG tablet [Pharmacy Med Name: clonazePAM 0.5 MG Oral Tablet] 30 tablet 0     Sig: TAKE 1 TABLET BY MOUTH AT BEDTIME       There is no refill protocol information for this order         LOV: 3/12/2024  Future Office visit:    Next 5 appointments (look out 90 days)      Jun 26, 2024 10:20 AM  (Arrive by 10:05 AM)  SHORT with Kira Grider [facility] ([facility] ) [address]  [place] [address]  [phone]             Routing refill request to provider for review/approval.      Irasema Flannery RN  ....................  4/5/2024   1:19 PM

## 2024-04-08 RX ORDER — CLONAZEPAM 0.5 MG/1
0.5 TABLET ORAL AT BEDTIME
Qty: 30 TABLET | Refills: 0 | Status: SHIPPED | OUTPATIENT
Start: 2024-04-08 | End: 2024-05-15

## 2024-04-08 NOTE — TELEPHONE ENCOUNTER
clonazePAM (KLONOPIN) 0.5 MG tablet 30 tablet 0 3/5/2024 -- No   Sig - Route: TAKE 1 TABLET BY MOUTH AT BEDTIME - Oral   Sent to pharmacy as: clonazePAM 0.5 MG Oral Tablet (klonoPIN)   Class: E-Prescribe   Order: [id number]   E-Prescribing Status: Receipt confirmed by pharmacy (3/5/2024 12:57 PM CST)     [facility] [address] [place] [address]     Called [facility], and spoke with pharmacy technician, after verifying Pt's last name and . They states 3/5 prescription was picked up and Pt is now out of refills. They are requesting a new prescription.    Unable to complete prescription refill per RN Medication Refill Policy.     Routing to covering provider for refill consideration, as PCP/provider is out of clinic >48 hours or Pt is completely out of medication and provider is out of the clinic today.    Ashanti Lyons RN .............. 2024  9:41 AM

## 2024-05-09 DIAGNOSIS — F41.1 GAD (GENERALIZED ANXIETY DISORDER): ICD-10-CM

## 2024-05-10 ENCOUNTER — HOSPITAL ENCOUNTER (OUTPATIENT)
Dept: CT IMAGING | Facility: OTHER | Age: 59
Discharge: HOME | End: 2024-05-10
Attending: FAMILY MEDICINE | Admitting: FAMILY MEDICINE
Payer: COMMERCIAL

## 2024-05-10 DIAGNOSIS — Z00.00 ROUTINE HISTORY AND PHYSICAL EXAMINATION OF ADULT: ICD-10-CM

## 2024-05-10 DIAGNOSIS — R91.1 SOLITARY PULMONARY NODULE: ICD-10-CM

## 2024-05-10 PROCEDURE — 71250 CT THORAX DX C-: CPT

## 2024-05-14 NOTE — TELEPHONE ENCOUNTER
clonazePAM 0.5 MG Oral Tablet        Last Written Prescription Date:  4/8/24  Last Fill Quantity: 30,   # refills: 0  Last Office Visit: 3/12/24  Future Office visit:    Next 5 appointments (look out 90 days)      Jun 26, 2024 10:20 AM  (Arrive by 10:05 AM)  SHORT with Kira Grider, [facility] ([facility] ) [address]  [place] [address]  [phone]             Routing refill request to provider for review/approval because:  Drug not on the [facility], [facility] or [facility] refill protocol or controlled substance. Unable to complete prescription refill per RNMedication Refill Policy.................... Sanjuana Trujillo RN ....................  5/14/2024   6:27 AM

## 2024-05-15 RX ORDER — CLONAZEPAM 0.5 MG/1
0.5 TABLET ORAL AT BEDTIME
Qty: 30 TABLET | Refills: 2 | Status: SHIPPED | OUTPATIENT
Start: 2024-05-15 | End: 2024-06-27

## 2024-06-20 DIAGNOSIS — E78.1 HYPERTRIGLYCERIDEMIA: ICD-10-CM

## 2024-06-20 DIAGNOSIS — E78.5 HYPERLIPIDEMIA LDL GOAL <100: ICD-10-CM

## 2024-06-21 ENCOUNTER — TRANSFERRED RECORDS (OUTPATIENT)
Dept: HEALTH INFORMATION MANAGEMENT | Facility: OTHER | Age: 59
End: 2024-06-21
Payer: COMMERCIAL

## 2024-06-21 LAB — RETINOPATHY: POSITIVE

## 2024-06-25 RX ORDER — EZETIMIBE 10 MG/1
10 TABLET ORAL DAILY
Qty: 90 TABLET | Refills: 4 | Status: SHIPPED | OUTPATIENT
Start: 2024-06-25

## 2024-06-25 NOTE — TELEPHONE ENCOUNTER
Requested Prescriptions   Pending Prescriptions Disp Refills    ezetimibe (ZETIA) 10 MG tablet [Pharmacy Med Name: Ezetimibe 10 MG Oral Tablet] 90 tablet 0     Sig: Take 1 tablet by mouth once daily       Antihyperlipidemic agents Passed - 6/20/2024  9:57 PM        Passed - LDL on file in the past 12 months        Passed - Medication is active on med list        Passed - Recent (12 mo) or future (90 days) visit within the authorizing provider's specialty     The patient must have completed an in-person or virtual visit within the past 12 months or has a future visit scheduled within the next 90 days with the authorizing provider s specialty.  Urgent care and e-visits do not quality as an office visit for this protocol.          Passed - Patient is age 18 years or older            LOV: 3/12/2024  Future Office visit:    Next 5 appointments (look out 90 days)      Jun 26, 2024 10:00 AM  (Arrive by 9:45 AM)  Provider Visit with Kira Grider DO  [facility] ([facility] ) [address]  [place] [address]  [phone]             Routing refill request to provider for review/approval.    Irasema Flannery RN  ....................  6/25/2024   5:52 PM

## 2024-06-25 NOTE — PROGRESS NOTES
ADDENDUM:  Order for [facility] and note addendum completed.  Kira Grider, DO on 7/7/2024 at 7:15 PM     Assessment & Plan     1. Uncontrolled type 2 diabetes mellitus with hyperglycemia, with long-term current use of insulin (H)  Chronic, with previous A1c over 8%.  Due for monitoring levels today.  Was not able to start GLP-1 agonist, as was never filled by his pharmacy.  Re-attempt sending ozempic to his pharmacy today for DM and morbid obesity (BMI: 43.19 today).  - Albumin Random Urine Quantitative with Creat Ratio  - Basic Metabolic Panel; Future  - Hemoglobin A1c; Future  - semaglutide (OZEMPIC) 2 MG/3ML pen; Inject 0.25 mg Subcutaneous every 7 days for 28 days, THEN 0.5 mg every 7 days for 28 days.  Dispense: 6 mL; Refill: 2  - Basic Metabolic Panel  - Hemoglobin A1c    2. Morbid obesity (H)  As above; start GLP-1 agonist for both DM control and morbid obesity (BMI 43.19)  - semaglutide (OZEMPIC) 2 MG/3ML pen; Inject 0.25 mg Subcutaneous every 7 days for 28 days, THEN 0.5 mg every 7 days for 28 days.  Dispense: 6 mL; Refill: 2    3. Diabetic polyneuropathy associated with type 2 diabetes mellitus (H)  4. Chronic, continuous use of opioids  Chronic, on meloxicam, Lyrica, Tramadol for polyneuropathy.  Last UDS was inconsistent with drug use; therefore will repeat today.  Rajan is referred to [facility] for diabetic shoes and custom inserts. Diabetic shoes are essential for protecting the patient's feet by accommodating any deformities and re-distributing pedal pressures. This plan is medically necessary for the patient's safety and ambulation.   - Urine Drug Screen    5. Stage 3 chronic kidney disease, unspecified whether stage 3a or 3b CKD (H)  Chronic, due for microalbumin.  Encouraged DM control.  - Albumin Random Urine Quantitative with Creat Ratio  - Basic Metabolic Panel; Future  - Basic Metabolic Panel    6. Hypertriglyceridemia  7. Hyperlipidemia LDL goal  <100  Started zetia at last visit; therefore due to recheck lipid panel today.  - Lipid Panel; Future  - Lipid Panel    8. Need for hepatitis B vaccination  Updated #2 today.  - GH IMM-  HEPATITIS B VACCINE, ADULT, IM      MDM:  Ordering of each unique test  Prescription drug management    Follow up: 3 months.      Subjective   Rajan is a 59 year old, presenting for the following health issues:  Diabetes and Recheck Medication        6/26/2024     9:59 AM   Additional Questions   Roomed by ANTIONETTE Rush   Accompanied by wife       History of Present Illness       Back Pain:  He presents for follow up of back pain. Patient's back pain is a chronic problem.  Location of back pain:  Right lower back, left lower back, right middle of back, left middle of back, right shoulder and left shoulder  Description of back pain: burning, cramping, sharp, shooting and stabbing  Back pain spreads: right thigh and left thigh    Since patient first noticed back pain, pain is: always present, but gets better and worse  Does back pain interfere with his job:  Not applicable       COPD:  He presents for follow up of COPD.  Overall, COPD symptoms are stable since last visit.  He has more than usual fatigue or shortness of breath with exertion and no shortness of breath at rest.  He often coughs and does not have change in sputum. No recent fever. He can walk less than 1 block without stopping to rest. He can walk 2 flights of stairs without resting. The patient has had no ED, urgent care, or hospital admissions because of COPD since the last visit.     Mental Health Follow-up:  Patient presents to follow-up on Depression & Anxiety.Patient's depression since last visit has been:  No change  The patient is not having other symptoms associated with depression.  Patient's anxiety since last visit has been:  No change  The patient is not having other symptoms associated with anxiety.  Any significant life events: grief or loss  Patient is not  feeling anxious or having panic attacks.  Patient has no concerns about alcohol or drug use.    Diabetes:   He presents for follow up of diabetes.  He is checking home blood glucose four or more times daily.   He checks blood glucose before meals and at bedtime.  Blood glucose is sometimes over 200 and sometimes under 70.  When his blood glucose is low, the patient is asymptomatic for confusion, blurred vision, lethargy and reports not feeling dizzy, shaky, or weak.   He has no concerns regarding his diabetes at this time.  He is having numbness in feet, burning in feet and redness, sores, or blisters on feet.            He eats 2-3 servings of fruits and vegetables daily.He consumes 0 sweetened beverage(s) daily.He exercises with enough effort to increase his heart rate 9 or less minutes per day.  He exercises with enough effort to increase his heart rate 3 or less days per week.   He is taking medications regularly.       UDS: lyrica, clonazepam, tramadol not in last screening.  + THC.  Taking everyday.      Diabetes Follow-up  Uses Lyrica and tramadol for DM neuropathy.  Was not in UDS with last screening; will repeat today and if not present - will consider wean off controlled medications.        Hyperlipidemia Follow-Up  Are you regularly taking any medication or supplement to lower your cholesterol?   Yes- simvastatin 40mg daily , recently started ezetimibe 10mg daily for persistently elevated triglycerides.  Due to recheck levels.  Are you having muscle aches or other side effects that you think could be caused by your cholesterol lowering medication?  No    Hypertension Follow-up  Do you check your blood pressure regularly outside of the clinic? No   Are you following a low salt diet? No  Are your blood pressures ever more than 140 on the top number (systolic) OR more   than 90 on the bottom number (diastolic), for example 140/90? No    BP Readings from Last 2 Encounters:   06/26/24 138/80   03/12/24 (!)  "144/74     Hemoglobin A1C (%)   Date Value   06/26/2024 7.2 (H)   03/12/2024 8.5 (H)   07/14/2020 7.7 (H)   09/05/2019 7.2 (H)     LDL Cholesterol Calculated (mg/dL)   Date Value   06/26/2024 42   03/12/2024 73   07/14/2020     Cannot estimate LDL when triglyceride exceeds 400 mg/dL   09/05/2019 68       Has had a blister on the back of L distal calf.  Wife noticed it yesterday and put a bandage on it.  Uncertain how he got it; might have had his leg rubbing against a step on their deck.  Has venous stasis B/L along with neuropathy.        Objective    /80   Pulse 84   Temp 97.3  F (36.3  C) (Tympanic)   Resp 24   Ht 1.778 m (5' 10\")   Wt 136.5 kg (301 lb)   SpO2 90%   BMI 43.19 kg/m    Body mass index is 43.19 kg/m .  Physical Exam   GENERAL: alert and no distress  NECK: no adenopathy, no asymmetry, masses, or scars  RESP: lungs clear to auscultation - no rales, rhonchi or wheezes  CV: regular rate and rhythm, normal S1 S2, no S3 or S4, no murmur, click or rub, no peripheral edema  SKIN: 3cm round superficial sloughing of skin consistent with a blister.  No significant erythema surrounding.  Small amount of serous fluid can be expressed with palpation.  Diabetic foot exam: reduced sensation at generalized, trophic changes at 1st MTP, great toes, venous stasis dermatitis noted, dependent rubor present, dry cracking heels, and nail exam dystrophic nails    Results for orders placed or performed in visit on 06/26/24   Albumin Random Urine Quantitative with Creat Ratio     Status: Abnormal   Result Value Ref Range    Creatinine Urine mg/dL 206.0 mg/dL    Albumin Urine mg/L 205.0 mg/L    Albumin Urine mg/g Cr 99.51 (H) 0.00 - 17.00 mg/g Cr   Urine Drug Screen Panel     Status: Abnormal   Result Value Ref Range    Amphetamines Urine Screen Negative Screen Negative    Barbituates Urine Screen Negative Screen Negative    Benzodiazepine Urine Screen Negative Screen Negative    Cannabinoids Urine Screen Positive " (A) Screen Negative    Cocaine Urine Screen Negative Screen Negative    Fentanyl Qual Urine Screen Negative Screen Negative    Opiates Urine Screen Negative Screen Negative    PCP Urine Screen Negative Screen Negative   Lipid Panel     Status: Abnormal   Result Value Ref Range    Cholesterol 127 <200 mg/dL    Triglycerides 245 (H) <150 mg/dL    Direct Measure HDL 36 (L) >=40 mg/dL    LDL Cholesterol Calculated 42 <=100 mg/dL    Non HDL Cholesterol 91 <130 mg/dL    Patient Fasting > 8hrs? Unknown     Narrative    Cholesterol  Desirable:  <200 mg/dL    Triglycerides  Normal:  Less than 150 mg/dL  Borderline High:  150-199 mg/dL  High:  200-499 mg/dL  Very High:  Greater than or equal to 500 mg/dL    Direct Measure HDL  Female:  Greater than or equal to 50 mg/dL   Male:  Greater than or equal to 40 mg/dL    LDL Cholesterol  Desirable:  <100mg/dL  Above Desirable:  100-129 mg/dL   Borderline High:  130-159 mg/dL   High:  160-189 mg/dL   Very High:  >= 190 mg/dL    Non HDL Cholesterol  Desirable:  130 mg/dL  Above Desirable:  130-159 mg/dL  Borderline High:  160-189 mg/dL  High:  190-219 mg/dL  Very High:  Greater than or equal to 220 mg/dL   Basic Metabolic Panel     Status: Abnormal   Result Value Ref Range    Sodium 140 135 - 145 mmol/L    Potassium 4.4 3.4 - 5.3 mmol/L    Chloride 97 (L) 98 - 107 mmol/L    Carbon Dioxide (CO2) 34 (H) 22 - 29 mmol/L    Anion Gap 9 7 - 15 mmol/L    Urea Nitrogen 28.2 (H) 8.0 - 23.0 mg/dL    Creatinine 1.38 (H) 0.67 - 1.17 mg/dL    GFR Estimate 59 (L) >60 mL/min/1.73m2    Calcium 9.8 8.6 - 10.0 mg/dL    Glucose 135 (H) 70 - 99 mg/dL    Patient Fasting > 8hrs? Unknown    Hemoglobin A1c     Status: Abnormal   Result Value Ref Range    Hemoglobin A1C 7.2 (H) 4.0 - 6.2 %   Urine Drug Screen     Status: Abnormal    Narrative    The following orders were created for panel order Urine Drug Screen.  Procedure                               Abnormality         Status                     ---------                                -----------         ------                     Urine Drug Screen Panel[[id number]]      Abnormal            Final result                 Please view results for these tests on the individual orders.           Signed Electronically by: Kira Grider DO

## 2024-06-26 ENCOUNTER — OFFICE VISIT (OUTPATIENT)
Dept: FAMILY MEDICINE | Facility: OTHER | Age: 59
End: 2024-06-26
Attending: FAMILY MEDICINE
Payer: COMMERCIAL

## 2024-06-26 VITALS
TEMPERATURE: 97.3 F | RESPIRATION RATE: 24 BRPM | HEIGHT: 70 IN | DIASTOLIC BLOOD PRESSURE: 80 MMHG | OXYGEN SATURATION: 90 % | SYSTOLIC BLOOD PRESSURE: 138 MMHG | WEIGHT: 301 LBS | HEART RATE: 84 BPM | BODY MASS INDEX: 43.09 KG/M2

## 2024-06-26 DIAGNOSIS — E66.01 MORBID OBESITY (H): ICD-10-CM

## 2024-06-26 DIAGNOSIS — F41.1 GAD (GENERALIZED ANXIETY DISORDER): ICD-10-CM

## 2024-06-26 DIAGNOSIS — Z79.4 UNCONTROLLED TYPE 2 DIABETES MELLITUS WITH HYPERGLYCEMIA, WITH LONG-TERM CURRENT USE OF INSULIN (H): Primary | ICD-10-CM

## 2024-06-26 DIAGNOSIS — E78.5 HYPERLIPIDEMIA LDL GOAL <100: ICD-10-CM

## 2024-06-26 DIAGNOSIS — E11.65 UNCONTROLLED TYPE 2 DIABETES MELLITUS WITH HYPERGLYCEMIA, WITH LONG-TERM CURRENT USE OF INSULIN (H): Primary | ICD-10-CM

## 2024-06-26 DIAGNOSIS — E78.1 HYPERTRIGLYCERIDEMIA: ICD-10-CM

## 2024-06-26 DIAGNOSIS — Z23 NEED FOR HEPATITIS B VACCINATION: ICD-10-CM

## 2024-06-26 DIAGNOSIS — E11.42 DIABETIC POLYNEUROPATHY ASSOCIATED WITH TYPE 2 DIABETES MELLITUS (H): ICD-10-CM

## 2024-06-26 DIAGNOSIS — N18.30 STAGE 3 CHRONIC KIDNEY DISEASE, UNSPECIFIED WHETHER STAGE 3A OR 3B CKD (H): ICD-10-CM

## 2024-06-26 DIAGNOSIS — F11.90 CHRONIC, CONTINUOUS USE OF OPIOIDS: ICD-10-CM

## 2024-06-26 LAB
AMPHETAMINES UR QL SCN: ABNORMAL
ANION GAP SERPL CALCULATED.3IONS-SCNC: 9 MMOL/L (ref 7–15)
BARBITURATES UR QL SCN: ABNORMAL
BENZODIAZ UR QL SCN: ABNORMAL
BUN SERPL-MCNC: 28.2 MG/DL (ref 8–23)
BZE UR QL SCN: ABNORMAL
CALCIUM SERPL-MCNC: 9.8 MG/DL (ref 8.6–10)
CANNABINOIDS UR QL SCN: ABNORMAL
CHLORIDE SERPL-SCNC: 97 MMOL/L (ref 98–107)
CHOLEST SERPL-MCNC: 127 MG/DL
CREAT SERPL-MCNC: 1.38 MG/DL (ref 0.67–1.17)
CREAT UR-MCNC: 206 MG/DL
DEPRECATED HCO3 PLAS-SCNC: 34 MMOL/L (ref 22–29)
EGFRCR SERPLBLD CKD-EPI 2021: 59 ML/MIN/1.73M2
FASTING STATUS PATIENT QL REPORTED: ABNORMAL
FASTING STATUS PATIENT QL REPORTED: ABNORMAL
FENTANYL UR QL: ABNORMAL
GLUCOSE SERPL-MCNC: 135 MG/DL (ref 70–99)
HBA1C MFR BLD: 7.2 % (ref 4–6.2)
HDLC SERPL-MCNC: 36 MG/DL
LDLC SERPL CALC-MCNC: 42 MG/DL
MICROALBUMIN UR-MCNC: 205 MG/L
MICROALBUMIN/CREAT UR: 99.51 MG/G CR (ref 0–17)
NONHDLC SERPL-MCNC: 91 MG/DL
OPIATES UR QL SCN: ABNORMAL
PCP QUAL URINE (ROCHE): ABNORMAL
POTASSIUM SERPL-SCNC: 4.4 MMOL/L (ref 3.4–5.3)
SODIUM SERPL-SCNC: 140 MMOL/L (ref 135–145)
TRIGL SERPL-MCNC: 245 MG/DL

## 2024-06-26 PROCEDURE — 80307 DRUG TEST PRSMV CHEM ANLYZR: CPT | Mod: ZL | Performed by: FAMILY MEDICINE

## 2024-06-26 PROCEDURE — 80360 METHYLPHENIDATE: CPT | Mod: ZL | Performed by: FAMILY MEDICINE

## 2024-06-26 PROCEDURE — G0010 ADMIN HEPATITIS B VACCINE: HCPCS

## 2024-06-26 PROCEDURE — G0463 HOSPITAL OUTPT CLINIC VISIT: HCPCS | Mod: 25

## 2024-06-26 PROCEDURE — 36415 COLL VENOUS BLD VENIPUNCTURE: CPT | Mod: ZL | Performed by: FAMILY MEDICINE

## 2024-06-26 PROCEDURE — 80048 BASIC METABOLIC PNL TOTAL CA: CPT | Mod: ZL | Performed by: FAMILY MEDICINE

## 2024-06-26 PROCEDURE — 99214 OFFICE O/P EST MOD 30 MIN: CPT | Performed by: FAMILY MEDICINE

## 2024-06-26 PROCEDURE — 83036 HEMOGLOBIN GLYCOSYLATED A1C: CPT | Mod: ZL | Performed by: FAMILY MEDICINE

## 2024-06-26 PROCEDURE — 80061 LIPID PANEL: CPT | Mod: ZL | Performed by: FAMILY MEDICINE

## 2024-06-26 PROCEDURE — 82570 ASSAY OF URINE CREATININE: CPT | Mod: ZL,XU | Performed by: FAMILY MEDICINE

## 2024-06-26 ASSESSMENT — ANXIETY QUESTIONNAIRES
7. FEELING AFRAID AS IF SOMETHING AWFUL MIGHT HAPPEN: NOT AT ALL
6. BECOMING EASILY ANNOYED OR IRRITABLE: SEVERAL DAYS
GAD7 TOTAL SCORE: 2
GAD7 TOTAL SCORE: 2
1. FEELING NERVOUS, ANXIOUS, OR ON EDGE: SEVERAL DAYS
8. IF YOU CHECKED OFF ANY PROBLEMS, HOW DIFFICULT HAVE THESE MADE IT FOR YOU TO DO YOUR WORK, TAKE CARE OF THINGS AT HOME, OR GET ALONG WITH OTHER PEOPLE?: SOMEWHAT DIFFICULT
7. FEELING AFRAID AS IF SOMETHING AWFUL MIGHT HAPPEN: NOT AT ALL
IF YOU CHECKED OFF ANY PROBLEMS ON THIS QUESTIONNAIRE, HOW DIFFICULT HAVE THESE PROBLEMS MADE IT FOR YOU TO DO YOUR WORK, TAKE CARE OF THINGS AT HOME, OR GET ALONG WITH OTHER PEOPLE: SOMEWHAT DIFFICULT
4. TROUBLE RELAXING: NOT AT ALL
2. NOT BEING ABLE TO STOP OR CONTROL WORRYING: NOT AT ALL
GAD7 TOTAL SCORE: 2
3. WORRYING TOO MUCH ABOUT DIFFERENT THINGS: NOT AT ALL
5. BEING SO RESTLESS THAT IT IS HARD TO SIT STILL: NOT AT ALL

## 2024-06-26 ASSESSMENT — PATIENT HEALTH QUESTIONNAIRE - PHQ9
10. IF YOU CHECKED OFF ANY PROBLEMS, HOW DIFFICULT HAVE THESE PROBLEMS MADE IT FOR YOU TO DO YOUR WORK, TAKE CARE OF THINGS AT HOME, OR GET ALONG WITH OTHER PEOPLE: SOMEWHAT DIFFICULT
SUM OF ALL RESPONSES TO PHQ QUESTIONS 1-9: 15
SUM OF ALL RESPONSES TO PHQ QUESTIONS 1-9: 15

## 2024-06-26 ASSESSMENT — PAIN SCALES - GENERAL: PAINLEVEL: MILD PAIN (3)

## 2024-06-26 NOTE — NURSING NOTE
"Chief Complaint   Patient presents with    Diabetes    Recheck Medication       Initial /80   Pulse 84   Temp 97.3  F (36.3  C) (Tympanic)   Resp 24   Ht 1.778 m (5' 10\")   Wt 136.5 kg (301 lb)   SpO2 90%   BMI 43.19 kg/m   Estimated body mass index is 43.19 kg/m  as calculated from the following:    Height as of this encounter: 1.778 m (5' 10\").    Weight as of this encounter: 136.5 kg (301 lb).  Medication Review: complete    The next two questions are to help us understand your food security.  If you are feeling you need any assistance in this area, we have resources available to support you today.          3/12/2024   SDOH- Food Insecurity   Within the past 12 months, did you worry that your food would run out before you got money to buy more? Y   Within the past 12 months, did the food you bought just not last and you didn t have money to get more? Y            Health Care Directive:  Patient does not have a Health Care Directive or Living Will: Discussed advance care planning with patient; information given to patient to review.    Adri Hernandez LPN    Previous A1C is not at goal of <8  Lab Results   Component Value Date    A1C 8.5 03/12/2024    A1C 7.8 06/15/2023    A1C 9.3 02/07/2023    A1C 10.0 05/20/2022    A1C 7.7 08/06/2021    A1C 7.7 07/14/2020    A1C 7.2 09/05/2019    A1C 7.2 03/21/2019    A1C 8.0 09/26/2018    A1C 9.1 06/27/2018     Urine microalbumin:creatine: due today  Foot exam 3/12/2024  Eye exam 8/10/23    Tobacco User no  Patient is on a daily aspirin  Patient is on a Statin.  Blood pressure today of:     BP Readings from Last 1 Encounters:   06/26/24 138/80      is at the goal of <139/89 for diabetics.    Adri Hernandez LPN on 6/26/2024 at 10:03 AM          "

## 2024-06-27 LAB — CREAT UR-MCNC: 203 MG/DL

## 2024-06-27 RX ORDER — CLONAZEPAM 0.5 MG/1
0.5 TABLET ORAL AT BEDTIME
Qty: 30 TABLET | Refills: 5 | Status: SHIPPED | OUTPATIENT
Start: 2024-06-27

## 2024-07-01 LAB
7AMINOCLONAZEPAM UR QL CFM: PRESENT
N-NORTRAMADOL/CREAT UR CFM: ABNORMAL NG/MG{CREAT}
O-NORTRAMADOL UR CFM-MCNC: ABNORMAL NG/ML
PREGABALIN UR QL CFM: PRESENT
TRAMADOL CTO UR CFM-MCNC: ABNORMAL NG/ML
TRAMADOL/CREAT UR: ABNORMAL

## 2024-07-16 ENCOUNTER — TELEPHONE (OUTPATIENT)
Dept: FAMILY MEDICINE | Facility: OTHER | Age: 59
End: 2024-07-16
Payer: COMMERCIAL

## 2024-07-16 NOTE — TELEPHONE ENCOUNTER
Called and spoke with [facility]  LYRICA 200 MG capsule 90 capsule 5 3/12/2024 -- Yes   Sig: TAKE 1 CAPSULE BY MOUTH THREE TIMES DAILY     Patient has refills remaining but PA needs to be updated.  [facility] did send PA today.    Called PA department and they will work on it.  Called and informed patients wife of above and wife verbalized understanding.    Wife states that there was a issue with the clonazepam also.  Called and spoke with Ellie at [facility] and they have the Clonazepam and tramadol ready for patient.  Informed wife and wife verbalized understanding.      Maria G Maza RN on 7/16/2024 at 1:19 PM

## 2024-07-16 NOTE — TELEPHONE ENCOUNTER
Reason for call: Medication or medication refill    Have you contacted your pharmacy regarding this refill? yes    If No, direct the patient to contact the Pharmacy and discontinue telephone note using Erroneous Encounter.  If Yes, continue.    Name of medication requested: Lyrica    How many days of medication do you have left? 2    What [facility] do you use? [facility]    Preferred method for responding to this message: Telephone Call    Phone number patient can be reached at: Home number on file [phone] (home)    If we cannot reach you directly, may we leave a detailed response at the number you provided? Yes    Please call when finished    Abi Nelson on 7/16/2024 at 12:04 PM

## 2024-08-14 DIAGNOSIS — M54.50 CHRONIC MIDLINE LOW BACK PAIN, UNSPECIFIED WHETHER SCIATICA PRESENT: ICD-10-CM

## 2024-08-14 DIAGNOSIS — Z79.4 UNCONTROLLED TYPE 2 DIABETES MELLITUS WITH HYPERGLYCEMIA, WITH LONG-TERM CURRENT USE OF INSULIN (H): ICD-10-CM

## 2024-08-14 DIAGNOSIS — G89.29 CHRONIC MIDLINE LOW BACK PAIN, UNSPECIFIED WHETHER SCIATICA PRESENT: ICD-10-CM

## 2024-08-14 DIAGNOSIS — E11.65 UNCONTROLLED TYPE 2 DIABETES MELLITUS WITH HYPERGLYCEMIA, WITH LONG-TERM CURRENT USE OF INSULIN (H): ICD-10-CM

## 2024-08-16 RX ORDER — ACETAMINOPHEN 500 MG/1
TABLET, COATED ORAL
Qty: 100 TABLET | Refills: 0 | Status: SHIPPED | OUTPATIENT
Start: 2024-08-16 | End: 2024-09-19

## 2024-08-16 RX ORDER — PEN NEEDLE, DIABETIC 32GX 5/32"
NEEDLE, DISPOSABLE MISCELLANEOUS
Qty: 400 EACH | Refills: 0 | Status: SHIPPED | OUTPATIENT
Start: 2024-08-16

## 2024-08-16 NOTE — TELEPHONE ENCOUNTER
[facility] sent Rx request for the following:      Requested Prescriptions   Pending Prescriptions Disp Refills    EQ PAIN RELIEVER EX  MG tablet [Pharmacy Med Name: EQ Pain Reliever Ex St 500 MG Oral Tablet] 100 tablet 0     Sig: TAKE 1 TABLET BY MOUTH EVERY 6 HOURS AS NEEDED FOR PAIN . DO NOT EXCEED 4000 MG OF ACETAMINOPHEN PER 24 HOURS       Analgesics (Non-Narcotic Tylenol and ASA Only) Passed - 8/16/2024  9:22 AM    BD PEN NEEDLE ANN 2ND GEN 32G X 4 MM miscellaneous [Pharmacy Med Name: BD PEN NEEDLE/ANN [id number] MIS]  0     Sig: USE 4 TIMES DAILY FOR ADMINISTERING INSULIN AT HOME       Diabetic Supplies Protocol Passed - 8/16/2024  9:22 AM   Last Prescription Date:   2/16/24, 11/30/23  Last Fill Qty/Refills:         100, 400, R-5, 0    Last Office Visit:              6/26/24   Future Office visit:             Next 5 appointments (look out 90 days)      Sep 27, 2024 10:20 AM  (Arrive by 10:05 AM)  Provider Visit with Kira Grider,   [facility] ([facility] ) [address]  [place] [address]  [phone]           Tere Baez RN on 8/16/2024 at 9:48 AM

## 2024-09-10 DIAGNOSIS — Z79.4 UNCONTROLLED TYPE 2 DIABETES MELLITUS WITH HYPERGLYCEMIA, WITH LONG-TERM CURRENT USE OF INSULIN (H): ICD-10-CM

## 2024-09-10 DIAGNOSIS — E11.65 UNCONTROLLED TYPE 2 DIABETES MELLITUS WITH HYPERGLYCEMIA, WITH LONG-TERM CURRENT USE OF INSULIN (H): ICD-10-CM

## 2024-09-16 DIAGNOSIS — E11.42 DIABETIC POLYNEUROPATHY ASSOCIATED WITH TYPE 2 DIABETES MELLITUS (H): ICD-10-CM

## 2024-09-16 DIAGNOSIS — M54.50 CHRONIC MIDLINE LOW BACK PAIN, UNSPECIFIED WHETHER SCIATICA PRESENT: ICD-10-CM

## 2024-09-16 DIAGNOSIS — G89.29 CHRONIC MIDLINE LOW BACK PAIN, UNSPECIFIED WHETHER SCIATICA PRESENT: ICD-10-CM

## 2024-09-16 RX ORDER — BLOOD SUGAR DIAGNOSTIC
STRIP MISCELLANEOUS
Qty: 100 STRIP | Refills: 11 | Status: SHIPPED | OUTPATIENT
Start: 2024-09-16

## 2024-09-16 NOTE — TELEPHONE ENCOUNTER
Requested Prescriptions   Pending Prescriptions Disp Refills    ACCU-CHEK DONAVON PLUS test strip [Pharmacy Med Name: Accu-Chek Donavon Plus In Vitro Strip]  0     Sig: USE 1 STRIP TO CHECK GLUCOSE 4 TIMES DAILY       Diabetic Supplies Protocol Passed - 9/10/2024 11:38 AM        Passed - Medication is active on med list        Passed - Recent (12 month) or future (90 days) visit with authorizing provider s specialty     The patient must have completed an in-person or virtual visit within the past 12 months or has a future visit scheduled within the next 90 days with the authorizing provider s specialty.  Urgent care and e-visits do not quality as an office visit for this protocol.          Passed - Medication indicated for associated diagnosis        Passed - Patient is 18 years of age or older              LOV: 6/26/2024  Future Office visit:    Next 5 appointments (look out 90 days)      Sep 27, 2024 10:20 AM  (Arrive by 10:05 AM)  Provider Visit with Kira Grider, [facility] ([facility] ) [address]  [place] [address]  [phone]           Last Prescription Date:   9/30/2023  Last Fill Qty/Refills:         100, R-11         Irasema Flannery RN  ....................  9/16/2024   5:44 PM  
.

## 2024-09-19 RX ORDER — ACETAMINOPHEN 500 MG/1
TABLET, COATED ORAL
Qty: 100 TABLET | Refills: 2 | Status: SHIPPED | OUTPATIENT
Start: 2024-09-19

## 2024-09-19 RX ORDER — PREGABALIN 200 MG/1
CAPSULE ORAL
Qty: 90 CAPSULE | Refills: 2 | Status: SHIPPED | OUTPATIENT
Start: 2024-09-19

## 2024-09-26 ENCOUNTER — MEDICAL CORRESPONDENCE (OUTPATIENT)
Dept: HEALTH INFORMATION MANAGEMENT | Facility: OTHER | Age: 59
End: 2024-09-26
Payer: COMMERCIAL

## 2024-10-08 NOTE — PROGRESS NOTES
Assessment & Plan     1. Type 2 diabetes mellitus with other specified complication, with long-term current use of insulin (H)  Chronic, A1c increased again to 7.8.  Recommend increasing Ozempic dose to 0.5 mg consistently with consideration of increasing dose to 1 mg once daily as well.  May need to further titrate going forward for better control of diabetes as well as chronic obesity.  Continue to focus on lifestyle.  Follow-up in 3 months for recheck or sooner if needed.  - Hemoglobin A1c; Future  - Basic Metabolic Panel; Future  - semaglutide (OZEMPIC) 2 MG/3ML pen; Inject 0.5 mg subcutaneously every 7 days.  Dispense: 9 mL; Refill: 1  - Hemoglobin A1c  - Basic Metabolic Panel    2. Diabetic polyneuropathy associated with type 2 diabetes mellitus (H)  Chronic, stable. Continues with diabetic shoes. Managing with meloxicam, Lyrica, and tramadol.   - traMADol (ULTRAM) 50 MG tablet; Take 1 tablet (50 mg) by mouth 3 times daily as needed for severe pain.  Dispense: 90 tablet; Refill: 5    3. Morbid obesity (H)  Chronic, 9 pound weight loss with addition of Ozempic. However, did not ever increase consistently above 0.25 mg once weekly. Increase to 0.5 mg once weekly going forward. Consider increasing further for better management at follow up in three months. Continue to focus on healthy lifestyle.   - semaglutide (OZEMPIC) 2 MG/3ML pen; Inject 0.5 mg subcutaneously every 7 days.  Dispense: 9 mL; Refill: 1    4. Stage 3 chronic kidney disease, unspecified whether stage 3a or 3b CKD (H)  Chronic, stable compared to baseline.  Continue to focus on diabetic control.    5. Hypertriglyceridemia  Chronic, stable with lipids in June.     6. Hyperlipidemia LDL goal <100  Chronic, stable with lipids in June.     7. Chronic midline low back pain, unspecified whether sciatica present  8. Chronic, continuous use of opioids  Chronic, stable. Refilled as below. PDMP reviewed and appears appropriate. Due for updated CSA,  complete at next appointment with PCP.  - traMADol (ULTRAM) 50 MG tablet; Take 1 tablet (50 mg) by mouth 3 times daily as needed for severe pain.  Dispense: 90 tablet; Refill: 5      No follow-ups on file.    Subjective   Rajan is a 59 year old, presenting for the following health issues:  Diabetes and Follow Up (Pain/Weight )    History of Present Illness       Back Pain:  He presents for follow up of back pain. Patient's back pain is a chronic problem.  Location of back pain:  Right lower back, right middle of back, left middle of back, right side of neck, left side of neck, right shoulder, left shoulder and right side of waist  Description of back pain: burning, cramping, shooting and stabbing  Back pain spreads: nowhere    Since patient first noticed back pain, pain is: gradually worsening  Does back pain interfere with his job:  Not applicable       COPD:  He presents for follow up of COPD.   Overall, COPD symptoms are slightly worse since last visit. He has more than usual fatigue or shortness of breath with exertion and no shortness of breath at rest.  He often coughs and does not have change in sputum. No recent fever. He can walk less than 1 block without stopping to rest. He can not walk a flight of stairs without resting. The patient has had no ED, urgent care, or hospital admissions because of COPD since the last visit.     Mental Health Follow-up:  Patient presents to follow-up on Depression.Patient's depression since last visit has been:  No change  The patient is not having other symptoms associated with depression.      Any significant life events: other  Patient is not feeling anxious or having panic attacks.  Patient has no concerns about alcohol or drug use.    Diabetes:   He presents for follow up of diabetes.  He is checking home blood glucose four or more times daily.   He checks blood glucose before meals and at bedtime.  Blood glucose is sometimes over 200 and never under 70. He is aware of  hypoglycemia symptoms including other.   He is concerned about other.   He is having burning in feet, redness, sores, or blisters on feet, excessive thirst and blurry vision.            He eats 2-3 servings of fruits and vegetables daily.He consumes 0 sweetened beverage(s) daily.He exercises with enough effort to increase his heart rate 10 to 19 minutes per day.  He exercises with enough effort to increase his heart rate 4 days per week. He is missing 1 dose(s) of medications per week.     Here for follow up on diabetes. Ozempic recently added at visit in June. Patient reports he received refills with instructions of restarting dose at 0.25 mg so has not consistently been at the 0.5 mg once weekly dose as of yet. Does not feel any different. Not monitoring BS or BP at home. Struggles chronically with diabetic polyneuropathy. He also has chronic low back pain for years. Continues on Lyrica, meloxicam, and Tramadol. PDMP reviewed and appears appropriate. Due for controlled substance agreement.       PAST MEDICAL HISTORY:   Past Medical History:   Diagnosis Date    Acute respiratory failure with hypoxia (H) 12/24/2011    Community acquired bacterial pneumonia 12/24/2011    Empyema (H) 1/26/2012    Overview:  Rt lung 12/24/11    Lateral epicondylitis of left elbow     10/3/2013,Seen by Dr Tayla Reyes [facility] 8/2013    Moderate cigarette smoker (10-19 per day) 12/24/2011    On mechanically assisted ventilation (H) 1/3/2012    Overview:  IMO Update  Overview:  IMO Update    Type 2 diabetes mellitus with proliferative retinopathy without macular edema (H) 07/01/2014    Mild proliferative changes on left side--sees Dr Ortiz exam 6/2014.      Umbilical hernia with obstruction, without gangrene 10/31/2018       PAST SURGICAL HISTORY:   Past Surgical History:   Procedure Laterality Date    ARTHROSCOPY SHOULDER      [id number],SHOULDER SURGERY,Bilateral    CHEST TUBE INSERTION      [id number],CHEST TUBE INSERTION    HERNIORRHAPHY  UMBILICAL N/A 2018    Procedure: Umbilical hernia Repair with Mesh;  Surgeon: Mason Rhodes MD;  Location: [facility] OR    LUNG SURGERY      [id number],LUNG SURGERY,pneumonia    TRACHEOSTOMY      [id number],[facility] TRACH PR1,history trach with pneumonia       FAMILY HISTORY:   Family History   Problem Relation Age of Onset    Heart Disease Other         multiple males    Diabetes Other     Cancer Father         Stomach CA    Colon Cancer Paternal Uncle     Colon Cancer Paternal Grandfather        SOCIAL HISTORY:   Social History     Tobacco Use    Smoking status: Former     Current packs/day: 0.00     Average packs/day: 0.5 packs/day for 28.0 years (14.0 ttl pk-yrs)     Types: Cigarettes     Start date: 1983     Quit date: 2011     Years since quittin.8    Smokeless tobacco: Former     Types: Chew     Quit date: 1988   Substance Use Topics    Alcohol use: No        Allergies   Allergen Reactions    Penicillins      Other reaction(s): Diaphoresis    Piperacillin Sod-Tazobactam So Other (See Comments)     Fever while on zosyn, vanc. No rash, no wbc elevation  Fever while on zosyn, vanc. No rash, no wbc elevation    Piperacillin-Tazobactam In Dex Other (See Comments)     Other reaction(s): Diaphoresis, Fever  Fever while on zosyn, vanc. No rash, no wbc elevation    Vancomycin Other (See Comments)     Fever while on zosyn, vanc, no rash or WBC elevation  Other reaction(s): Diaphoresis, Fever  Fever while on zosyn, vanc, no rash or WBC elevation     Current Outpatient Medications   Medication Sig Dispense Refill    albuterol (PROAIR HFA/PROVENTIL HFA/VENTOLIN HFA) 108 (90 Base) MCG/ACT inhaler Inhale 2 puffs into the lungs every 4 hours as needed for shortness of breath or wheezing 18 g 9    Alcohol Swabs (B-D SINGLE USE SWABS REGULAR) PADS USE TO  SWAB  AREA  OF  INJECTION/RAY  AS  DIRECTED 100 each 11    Aspirin (ASPIR-81 PO) Take 81 mg by mouth daily with food      blood glucose (ACCU-CHEK DONAVON  PLUS) test strip USE 1 STRIP TO CHECK GLUCOSE 4 TIMES DAILY 100 strip 11    blood glucose calibration (ACCU-CHEK DONAVON) solution Use to calibrate blood glucose monitor as needed as directed. 1 Bottle 3    blood glucose monitoring (ACCU-CHEK DONAVON PLUS) meter device kit Use to test blood sugar 4 times daily or as directed.  Dx: hyperglycemia 1 kit 0    blood glucose monitoring (ACCU-CHEK FASTCLIX) lancets USE   TO CHECK GLUCOSE 4 TIMES DAILY 102 each 11    clonazePAM (KLONOPIN) 0.5 MG tablet Take 1 tablet (0.5 mg) by mouth at bedtime 30 tablet 5    Continuous Blood Gluc  (DEXCOM G7 ) HAMMAD Use to read blood sugars as per 's instructions. 1 each 0    Continuous Blood Gluc Sensor (DEXCOM G7 SENSOR) MISC Change every 10 days. 3 each 5    EQ PAIN RELIEVER EX  MG tablet TAKE 1 TABLET BY MOUTH EVERY 6 HOURS AS NEEDED FOR PAIN . DO NOT EXCEED 4000 MG OF ACETAMINOPHEN PER 24 HOURS 100 tablet 2    ezetimibe (ZETIA) 10 MG tablet Take 1 tablet by mouth once daily 90 tablet 4    FLUoxetine (PROZAC) 40 MG capsule Take 1 capsule (40 mg) by mouth every morning 90 capsule 4    fluticasone (FLOVENT HFA) 110 MCG/ACT inhaler Inhale 1 puff into the lungs 2 times daily 12 g 11    hydrochlorothiazide (HYDRODIURIL) 25 MG tablet Take 1 tablet (25 mg) by mouth daily 90 tablet 4    insulin pen needle (31G X 5 MM) 31G X 5 MM miscellaneous Sig: USE 4 TIMES DAILY FOR ADMINISTERING INSULIN AT HOME. 400 each 1    insulin pen needle (BD PEN NEEDLE NAN 2ND GEN) 32G X 4 MM miscellaneous USE 4 TIMES DAILY FOR ADMINISTERING INSULIN AT HOME 400 each 0    LANTUS SOLOSTAR 100 UNIT/ML soln INJECT 175 UNITS SUBCUTANEOUSLY EVERY DAY AT BEDTIME 60 mL 11    LIDOCAINE PAIN RELIEF 4 % Patch Apply one patch topically to clean, dry skin. Leave on for 12 hours then remove. Must wait at least 12 hours before applying patch again 30 patch 11    lisinopril (ZESTRIL) 30 MG tablet Take 1 tablet (30 mg) by mouth daily 90 tablet 4     "LYRICA 200 MG capsule TAKE 1 CAPSULE BY MOUTH THREE TIMES DAILY 90 capsule 2    meloxicam (MOBIC) 15 MG tablet Take 1 tablet by mouth once daily with food 90 tablet 4    Misc. Devices (BATH/SHOWER SEAT) MISC 1 each daily as needed (to assist during ADLs of bathing/grooming) 1 each 0    NARCAN 4 MG/0.1ML nasal spray       NOVOLOG FLEXPEN 100 UNIT/ML soln INJECT 60-70 UNITS SUBCUTANEOUSLY WITH EACH MEAL BASED ON INSULIN TO CARB RATIO. MAXIMUM  UNITS PER DAY 90 mL 11    omeprazole (PRILOSEC) 20 MG DR capsule TAKE 1 CAPSULE BY MOUTH ONCE DAILY BEFORE A MEAL 90 capsule 3    order for DME Oxygen for home use. LPM: 1/min via nasal cannula. Frequency of use: Continuous with portability; Length of need: 12 mos.      semaglutide (OZEMPIC) 2 MG/3ML pen Inject 0.5 mg subcutaneously every 7 days. 9 mL 1    simvastatin (ZOCOR) 40 MG tablet Take 1 tablet (40 mg) by mouth at bedtime 90 tablet 4    traMADol (ULTRAM) 50 MG tablet Take 1 tablet (50 mg) by mouth 3 times daily as needed for severe pain. 90 tablet 5     No current facility-administered medications for this visit.           Objective    /82   Pulse 81   Temp 98.3  F (36.8  C) (Tympanic)   Resp 22   Ht 1.778 m (5' 10\")   Wt 132.6 kg (292 lb 6.4 oz)   SpO2 92%   BMI 41.96 kg/m    Body mass index is 41.96 kg/m .  Physical Exam   General: Pleasant, in no apparent distress.  Cardiovascular: Regular rate and rhythm with S1 equal to S2. No murmurs, friction rubs, or gallops.   Respiratory: Lungs are resonant and clear to auscultation bilaterally. No wheezes, crackles, or rhonchi.  Skin: No jaundice, pallor, rashes, or lesions.  Psych: Appropriate mood and affect.      Signed Electronically by: Dafne Ivory PA-C    "

## 2024-10-10 ENCOUNTER — OFFICE VISIT (OUTPATIENT)
Dept: FAMILY MEDICINE | Facility: OTHER | Age: 59
End: 2024-10-10
Attending: PHYSICIAN ASSISTANT
Payer: COMMERCIAL

## 2024-10-10 VITALS
HEIGHT: 70 IN | SYSTOLIC BLOOD PRESSURE: 138 MMHG | HEART RATE: 81 BPM | DIASTOLIC BLOOD PRESSURE: 82 MMHG | OXYGEN SATURATION: 92 % | RESPIRATION RATE: 22 BRPM | BODY MASS INDEX: 41.86 KG/M2 | WEIGHT: 292.4 LBS | TEMPERATURE: 98.3 F

## 2024-10-10 DIAGNOSIS — M54.50 CHRONIC MIDLINE LOW BACK PAIN, UNSPECIFIED WHETHER SCIATICA PRESENT: ICD-10-CM

## 2024-10-10 DIAGNOSIS — E11.42 DIABETIC POLYNEUROPATHY ASSOCIATED WITH TYPE 2 DIABETES MELLITUS (H): ICD-10-CM

## 2024-10-10 DIAGNOSIS — E11.69 TYPE 2 DIABETES MELLITUS WITH OTHER SPECIFIED COMPLICATION, WITH LONG-TERM CURRENT USE OF INSULIN (H): Primary | ICD-10-CM

## 2024-10-10 DIAGNOSIS — E66.01 MORBID OBESITY (H): ICD-10-CM

## 2024-10-10 DIAGNOSIS — E78.1 HYPERTRIGLYCERIDEMIA: ICD-10-CM

## 2024-10-10 DIAGNOSIS — F11.90 CHRONIC, CONTINUOUS USE OF OPIOIDS: ICD-10-CM

## 2024-10-10 DIAGNOSIS — Z79.4 TYPE 2 DIABETES MELLITUS WITH OTHER SPECIFIED COMPLICATION, WITH LONG-TERM CURRENT USE OF INSULIN (H): Primary | ICD-10-CM

## 2024-10-10 DIAGNOSIS — E78.5 HYPERLIPIDEMIA LDL GOAL <100: ICD-10-CM

## 2024-10-10 DIAGNOSIS — G89.29 CHRONIC MIDLINE LOW BACK PAIN, UNSPECIFIED WHETHER SCIATICA PRESENT: ICD-10-CM

## 2024-10-10 DIAGNOSIS — N18.30 STAGE 3 CHRONIC KIDNEY DISEASE, UNSPECIFIED WHETHER STAGE 3A OR 3B CKD (H): ICD-10-CM

## 2024-10-10 LAB
ANION GAP SERPL CALCULATED.3IONS-SCNC: 9 MMOL/L (ref 7–15)
BUN SERPL-MCNC: 19.3 MG/DL (ref 8–23)
CALCIUM SERPL-MCNC: 9.4 MG/DL (ref 8.8–10.4)
CHLORIDE SERPL-SCNC: 94 MMOL/L (ref 98–107)
CREAT SERPL-MCNC: 1.34 MG/DL (ref 0.67–1.17)
EGFRCR SERPLBLD CKD-EPI 2021: 61 ML/MIN/1.73M2
EST. AVERAGE GLUCOSE BLD GHB EST-MCNC: 177 MG/DL
GLUCOSE SERPL-MCNC: 294 MG/DL (ref 70–99)
HBA1C MFR BLD: 7.8 %
HCO3 SERPL-SCNC: 32 MMOL/L (ref 22–29)
POTASSIUM SERPL-SCNC: 4.4 MMOL/L (ref 3.4–5.3)
SODIUM SERPL-SCNC: 135 MMOL/L (ref 135–145)

## 2024-10-10 PROCEDURE — 36415 COLL VENOUS BLD VENIPUNCTURE: CPT | Mod: ZL | Performed by: PHYSICIAN ASSISTANT

## 2024-10-10 PROCEDURE — 80048 BASIC METABOLIC PNL TOTAL CA: CPT | Mod: ZL | Performed by: PHYSICIAN ASSISTANT

## 2024-10-10 PROCEDURE — G0463 HOSPITAL OUTPT CLINIC VISIT: HCPCS

## 2024-10-10 PROCEDURE — 99214 OFFICE O/P EST MOD 30 MIN: CPT | Performed by: PHYSICIAN ASSISTANT

## 2024-10-10 PROCEDURE — 83036 HEMOGLOBIN GLYCOSYLATED A1C: CPT | Mod: ZL | Performed by: PHYSICIAN ASSISTANT

## 2024-10-10 RX ORDER — TRAMADOL HYDROCHLORIDE 50 MG/1
50 TABLET ORAL 3 TIMES DAILY PRN
Qty: 90 TABLET | Refills: 5 | Status: SHIPPED | OUTPATIENT
Start: 2024-10-10

## 2024-10-10 ASSESSMENT — PAIN SCALES - PAIN ENJOYMENT GENERAL ACTIVITY SCALE (PEG)
PEG_TOTALSCORE: 6.67
INTERFERED_ENJOYMENT_LIFE: 7
AVG_PAIN_PASTWEEK: 7
INTERFERED_GENERAL_ACTIVITY: 6
PEG_TOTALSCORE: 6.67
INTERFERED_ENJOYMENT_LIFE: 7
AVG_PAIN_PASTWEEK: 7
INTERFERED_GENERAL_ACTIVITY: 6

## 2024-10-10 ASSESSMENT — ANXIETY QUESTIONNAIRES
7. FEELING AFRAID AS IF SOMETHING AWFUL MIGHT HAPPEN: NOT AT ALL
7. FEELING AFRAID AS IF SOMETHING AWFUL MIGHT HAPPEN: NOT AT ALL
6. BECOMING EASILY ANNOYED OR IRRITABLE: MORE THAN HALF THE DAYS
GAD7 TOTAL SCORE: 3
5. BEING SO RESTLESS THAT IT IS HARD TO SIT STILL: NOT AT ALL
GAD7 TOTAL SCORE: 3
2. NOT BEING ABLE TO STOP OR CONTROL WORRYING: NOT AT ALL
3. WORRYING TOO MUCH ABOUT DIFFERENT THINGS: NOT AT ALL
IF YOU CHECKED OFF ANY PROBLEMS ON THIS QUESTIONNAIRE, HOW DIFFICULT HAVE THESE PROBLEMS MADE IT FOR YOU TO DO YOUR WORK, TAKE CARE OF THINGS AT HOME, OR GET ALONG WITH OTHER PEOPLE: SOMEWHAT DIFFICULT
8. IF YOU CHECKED OFF ANY PROBLEMS, HOW DIFFICULT HAVE THESE MADE IT FOR YOU TO DO YOUR WORK, TAKE CARE OF THINGS AT HOME, OR GET ALONG WITH OTHER PEOPLE?: SOMEWHAT DIFFICULT
4. TROUBLE RELAXING: NOT AT ALL
1. FEELING NERVOUS, ANXIOUS, OR ON EDGE: SEVERAL DAYS
GAD7 TOTAL SCORE: 3

## 2024-10-10 ASSESSMENT — PATIENT HEALTH QUESTIONNAIRE - PHQ9
10. IF YOU CHECKED OFF ANY PROBLEMS, HOW DIFFICULT HAVE THESE PROBLEMS MADE IT FOR YOU TO DO YOUR WORK, TAKE CARE OF THINGS AT HOME, OR GET ALONG WITH OTHER PEOPLE: SOMEWHAT DIFFICULT
SUM OF ALL RESPONSES TO PHQ QUESTIONS 1-9: 14
SUM OF ALL RESPONSES TO PHQ QUESTIONS 1-9: 14

## 2024-10-10 NOTE — NURSING NOTE
"Chief Complaint   Patient presents with    Diabetes    Follow Up     Pain/Weight      Patient presents to the clinic today for a diabetic check and to follow up on pain and weight.   Initial BP (!) 183/82 (BP Location: Right arm, Patient Position: Sitting, Cuff Size: Adult Regular)   Pulse 81   Temp 98.3  F (36.8  C) (Tympanic)   Resp 22   Ht 1.778 m (5' 10\")   Wt 132.6 kg (292 lb 6.4 oz)   SpO2 92%   BMI 41.96 kg/m   Estimated body mass index is 41.96 kg/m  as calculated from the following:    Height as of this encounter: 1.778 m (5' 10\").    Weight as of this encounter: 132.6 kg (292 lb 6.4 oz).  Medication Review: complete    The next two questions are to help us understand your food security.  If you are feeling you need any assistance in this area, we have resources available to support you today.          6/26/2024   SDOH- Food Insecurity   Within the past 12 months, did you worry that your food would run out before you got money to buy more? Y   Within the past 12 months, did the food you bought just not last and you didn t have money to get more? Y            Health Care Directive:  Patient does not have a Health Care Directive or Living Will: Discussed advance care planning with patient; however, patient declined at this time.    Cynthia Browne      "

## 2024-10-10 NOTE — LETTER
October 10, 2024      Rajan Bermudez  [address] [place] [address]        Dear ,    We are writing to inform you of your test results.  A1c increased again to 7.8.  Hopefully the increased dose of the Ozempic will help to lower this going forward.  If you are tolerating the consistent increase dose of 0.5 mg once daily for the next month or two please call the clinic and we can consider increasing the dose to 1 mg for better control of your diabetes and weight.      Resulted Orders   Hemoglobin A1c   Result Value Ref Range    Estimated Average Glucose 177 (H) <117 mg/dL    Hemoglobin A1C 7.8 (H) <5.7 %      Comment:      Normal <5.7%   Prediabetes 5.7-6.4%    Diabetes 6.5% or higher     Note: Adopted from ADA consensus guidelines.   Basic Metabolic Panel   Result Value Ref Range    Sodium 135 135 - 145 mmol/L    Potassium 4.4 3.4 - 5.3 mmol/L    Chloride 94 (L) 98 - 107 mmol/L    Carbon Dioxide (CO2) 32 (H) 22 - 29 mmol/L    Anion Gap 9 7 - 15 mmol/L    Urea Nitrogen 19.3 8.0 - 23.0 mg/dL    Creatinine 1.34 (H) 0.67 - 1.17 mg/dL    GFR Estimate 61 >60 mL/min/1.73m2      Comment:      eGFR calculated using 2021 CKD-EPI equation.    Calcium 9.4 8.8 - 10.4 mg/dL      Comment:      Reference intervals for this test were updated on 7/16/2024 to reflect our healthy population more accurately. There may be differences in the flagging of prior results with similar values performed with this method. Those prior results can be interpreted in the context of the updated reference intervals.    Glucose 294 (H) 70 - 99 mg/dL       If you have any questions or concerns, please call the clinic at the number listed above.       Sincerely,      Dafne Ivory PA-C

## 2024-10-15 ENCOUNTER — TELEPHONE (OUTPATIENT)
Dept: FAMILY MEDICINE | Facility: OTHER | Age: 59
End: 2024-10-15
Payer: COMMERCIAL

## 2024-10-15 NOTE — TELEPHONE ENCOUNTER
The patients spouse, Summer, called on behalf of the patient and  requests a call back regarding if insurance would cover either one or both [employer] products, terry freestep and terry fitspine. Summer stated both herself and the patient would like to use them for back and neck problems and stated the products are FDA approved. Summer also believes the exercise would be good for them as well.    A separate note has been sent for Summer.    Okay to leave detailed message.      Audelia Calvillo on 10/15/2024 at 11:31 AM

## 2024-10-15 NOTE — TELEPHONE ENCOUNTER
Talked with patient's wife and did tell her that this will require a face to face office visit.  Transferred her to the appointment.   Adri Hernandze LPN 10/15/2024  11:41 AM

## 2024-11-05 ENCOUNTER — MEDICAL CORRESPONDENCE (OUTPATIENT)
Dept: HEALTH INFORMATION MANAGEMENT | Facility: OTHER | Age: 59
End: 2024-11-05
Payer: COMMERCIAL

## 2024-11-21 NOTE — TELEPHONE ENCOUNTER
Prescription refilled per RN Medication Refill Policy..................Madie Oleary 3/14/2019 12:59 PM    
Chest pain

## 2024-11-22 DIAGNOSIS — Z79.4 UNCONTROLLED TYPE 2 DIABETES MELLITUS WITH HYPERGLYCEMIA, WITH LONG-TERM CURRENT USE OF INSULIN (H): ICD-10-CM

## 2024-11-22 DIAGNOSIS — E11.65 UNCONTROLLED TYPE 2 DIABETES MELLITUS WITH HYPERGLYCEMIA, WITH LONG-TERM CURRENT USE OF INSULIN (H): ICD-10-CM

## 2024-11-25 RX ORDER — ISOPROPYL ALCOHOL 0.75 G/1
SWAB TOPICAL
Qty: 100 EACH | Refills: 0 | Status: SHIPPED | OUTPATIENT
Start: 2024-11-25

## 2024-11-25 NOTE — TELEPHONE ENCOUNTER
[id number] sent Rx request for the following:      Requested Prescriptions   Pending Prescriptions Disp Refills    Alcohol Swabs (B-D SINGLE USE SWABS REGULAR) PADS [Pharmacy Med Name: BD SWABS REG PAD]  0     Sig: USE AS DIRECTED TO  SWAB  AREA  OF  INJECTION  /RAY  AS  DIRECTED       There is no refill protocol information for this order        Last Prescription Date:   7/13/23  Last Fill Qty/Refills:         100, R-1    Last Office Visit:              10/10/24 (DM discussed)   Future Office visit:           1/10/25    Unable to complete prescription refill per RN Medication Refill Policy. Ashanti Lyons RN .............. 11/25/2024  11:42 AM

## 2024-12-26 ENCOUNTER — TRANSFERRED RECORDS (OUTPATIENT)
Dept: HEALTH INFORMATION MANAGEMENT | Facility: OTHER | Age: 59
End: 2024-12-26
Payer: COMMERCIAL

## 2025-01-14 ENCOUNTER — TELEPHONE (OUTPATIENT)
Dept: EDUCATION SERVICES | Facility: OTHER | Age: 60
End: 2025-01-14
Payer: COMMERCIAL

## 2025-01-15 NOTE — TELEPHONE ENCOUNTER
"Patient wondering how to dose his insulin without the AccuChek Concepcion Expert.  \"It is working fairly well with the Dexcom, I like NOT poking my fingers all the time, I just wish it would tell me how much insulin to take like the AccuChek Expert did ~ it fine-tuned the dose.  I am taking 60 - 70 units Novolog before every meal and Lantus 175 units every evening.\"    Encouraged patient to visit [facility] to learn new insulin devices that work with the Dexcom such as InPen.  Also discussed need to switch from U100 insulin to concentrated insulin as his TDD is greater than 200 units, currently 355 - 385 units daily.    Patient shares he will set up a visit soon.    Fina Hale RN, BSN, [facility]  1/15/2025 5:36 PM   "

## 2025-02-06 ENCOUNTER — TELEPHONE (OUTPATIENT)
Dept: FAMILY MEDICINE | Facility: OTHER | Age: 60
End: 2025-02-06
Payer: COMMERCIAL

## 2025-02-06 NOTE — TELEPHONE ENCOUNTER
Spoke with patient. Patient was given information. No further questions or concerns.     Bisi Bourne LPN on 2/6/2025 at 3:17 PM

## 2025-02-06 NOTE — TELEPHONE ENCOUNTER
Talked to patient and he states that his feet are really hurting still at nighttime. He is wondering if he could take 4 lyrica instead of 3?  Adri Hernandez LPN   2/6/2025  2:20 PM

## 2025-02-06 NOTE — TELEPHONE ENCOUNTER
Maximum dosing of Lyrica is 600mg; I would not be willing to increase past this as there is no proven benefit (but increase risk with higher doses of side effects, somnolence, etc).  If he desires - can visit with Neurology again to discuss in more detail.    Kira Grider, DO

## 2025-02-06 NOTE — TELEPHONE ENCOUNTER
Patient would like a call back to discuss medications. Please call.    Abi Nelson on 2/6/2025 at 1:17 PM

## 2025-03-03 ENCOUNTER — TELEPHONE (OUTPATIENT)
Dept: FAMILY MEDICINE | Facility: OTHER | Age: 60
End: 2025-03-03
Payer: COMMERCIAL

## 2025-03-03 DIAGNOSIS — Z79.4 UNCONTROLLED TYPE 2 DIABETES MELLITUS WITH HYPERGLYCEMIA, WITH LONG-TERM CURRENT USE OF INSULIN (H): ICD-10-CM

## 2025-03-03 DIAGNOSIS — E11.65 UNCONTROLLED TYPE 2 DIABETES MELLITUS WITH HYPERGLYCEMIA, WITH LONG-TERM CURRENT USE OF INSULIN (H): ICD-10-CM

## 2025-03-03 RX ORDER — PEN NEEDLE, DIABETIC 32GX 5/32"
NEEDLE, DISPOSABLE MISCELLANEOUS
Qty: 400 EACH | Refills: 0 | Status: SHIPPED | OUTPATIENT
Start: 2025-03-03

## 2025-03-03 NOTE — TELEPHONE ENCOUNTER
"Patient states the needle script currently states \"use 4 times daily\", he needs it changed to x5-6 daily.    Ashanti Ireland on 3/3/2025 at 1:25 PM  "

## 2025-03-21 DIAGNOSIS — E11.65 UNCONTROLLED TYPE 2 DIABETES MELLITUS WITH HYPERGLYCEMIA, WITH LONG-TERM CURRENT USE OF INSULIN (H): ICD-10-CM

## 2025-03-21 DIAGNOSIS — Z79.4 UNCONTROLLED TYPE 2 DIABETES MELLITUS WITH HYPERGLYCEMIA, WITH LONG-TERM CURRENT USE OF INSULIN (H): ICD-10-CM

## 2025-03-23 DIAGNOSIS — Z79.4 UNCONTROLLED TYPE 2 DIABETES MELLITUS WITH HYPERGLYCEMIA, WITH LONG-TERM CURRENT USE OF INSULIN (H): ICD-10-CM

## 2025-03-23 DIAGNOSIS — E11.65 UNCONTROLLED TYPE 2 DIABETES MELLITUS WITH HYPERGLYCEMIA, WITH LONG-TERM CURRENT USE OF INSULIN (H): ICD-10-CM

## 2025-03-24 RX ORDER — ACYCLOVIR 400 MG/1
TABLET ORAL
Qty: 3 EACH | Refills: 0 | Status: SHIPPED | OUTPATIENT
Start: 2025-03-24

## 2025-03-24 NOTE — TELEPHONE ENCOUNTER
[id number] sent Rx request for the following:      Requested Prescriptions   Pending Prescriptions Disp Refills    Continuous Glucose Sensor (DEXCOM G7 SENSOR) MISC [Pharmacy Med Name: DEXCOM G7 SENSOR    MIS]  0     Sig: CHANGE EVERY 10 DAYS.       There is no refill protocol information for this order          Last Prescription Date:   3/7/24  Last Fill Qty/Refills:         3, R-5    Last Office Visit:              1/10/25   Future Office visit:           none    Routing refill request to provider for review/approval because:  Drug not on the [facility] refill protocol     Cara Crowell RN on 3/24/2025 at 11:49 AM

## 2025-03-25 RX ORDER — INSULIN GLARGINE 100 [IU]/ML
INJECTION, SOLUTION SUBCUTANEOUS
Qty: 45 ML | Refills: 0 | Status: SHIPPED | OUTPATIENT
Start: 2025-03-25

## 2025-03-25 NOTE — TELEPHONE ENCOUNTER
[id number] [place] sent Rx request for the following:      Requested Prescriptions   Pending Prescriptions Disp Refills    LANTUS SOLOSTAR 100 UNIT/ML soln [Pharmacy Med Name: Lantus SoloStar 100 UNIT/ML Subcutaneous Solution Pen-injector] 45 mL 0     Sig: INJECT 175 UNITS SUBCUTANEOUSLY EVERY DAY AT BEDTIME.       Insulin Protocol Failed - 3/25/2025  8:14 AM        Failed - Chart review required     Review Chart.    Do not approve if insulin is used in a pump.  Instead, direct refill request to the patient's endocrinologist.  If the patient doesn't have an endocrinologist, then send the refill to the patient's PCP for review           Last Prescription Date:   3/12/24  Last Fill Qty/Refills:         60 ml, R-11    Last Office Visit:              1/10/25   Future Office visit:           none    Per LOV: Follow-up in 3 months for recheck or sooner if needed.   Will route to unit schedulers for follow up    Routing refill request to provider for review/approval because:  Drug not on the [facility] refill protocol     Cara Crowell RN on 3/25/2025 at 8:16 AM

## 2025-04-09 DIAGNOSIS — M54.50 CHRONIC MIDLINE LOW BACK PAIN, UNSPECIFIED WHETHER SCIATICA PRESENT: ICD-10-CM

## 2025-04-09 DIAGNOSIS — F11.90 CHRONIC, CONTINUOUS USE OF OPIOIDS: ICD-10-CM

## 2025-04-09 DIAGNOSIS — G89.29 CHRONIC MIDLINE LOW BACK PAIN, UNSPECIFIED WHETHER SCIATICA PRESENT: ICD-10-CM

## 2025-04-09 DIAGNOSIS — E11.42 DIABETIC POLYNEUROPATHY ASSOCIATED WITH TYPE 2 DIABETES MELLITUS (H): ICD-10-CM

## 2025-04-10 DIAGNOSIS — E11.65 UNCONTROLLED TYPE 2 DIABETES MELLITUS WITH HYPERGLYCEMIA, WITH LONG-TERM CURRENT USE OF INSULIN (H): ICD-10-CM

## 2025-04-10 DIAGNOSIS — Z79.4 UNCONTROLLED TYPE 2 DIABETES MELLITUS WITH HYPERGLYCEMIA, WITH LONG-TERM CURRENT USE OF INSULIN (H): ICD-10-CM

## 2025-04-10 RX ORDER — INSULIN ASPART 100 [IU]/ML
INJECTION, SOLUTION INTRAVENOUS; SUBCUTANEOUS
Qty: 75 ML | Refills: 0 | Status: SHIPPED | OUTPATIENT
Start: 2025-04-10

## 2025-04-10 NOTE — TELEPHONE ENCOUNTER
Requested Prescriptions   Pending Prescriptions Disp Refills    NOVOLOG FLEXPEN 100 UNIT/ML soln [Pharmacy Med Name: NovoLOG FlexPen 100 UNIT/ML Subcutaneous Solution Pen-injector] 60 mL 0     Sig: INJECT 60-70 UNITS SUBCUTANEOUSLY WITH EACH MEAL BASED ON INSULIN TO CARB RATIO. MAXIMUM  UNITS PER DAY       Insulin Protocol Failed - 4/10/2025 10:10 AM        Failed - Chart review required     Review Chart.    Do not approve if insulin is used in a pump.  Instead, direct refill request to the patient's endocrinologist.  If the patient doesn't have an endocrinologist, then send the refill to the patient's PCP for review     Last Prescription Date:   3/12/24  Last Fill Qty/Refills:         90 ml, R-11    Last Office Visit:              1/10/25 (DM discussed)   Future Office visit:             Next 5 appointments (look out 90 days)      May 02, 2025 10:20 AM  (Arrive by 10:05 AM)  Provider Visit with Kira Grider, [facility] ([facility] [address]  [place] [address]  [phone]     Unable to complete prescription refill per RN Medication Refill Policy. Ashanti Lyons RN .............. 4/10/2025  10:11 AM

## 2025-04-10 NOTE — TELEPHONE ENCOUNTER
Reason for call: Medication or medication refill    Have you contacted your pharmacy regarding this refill? yes    If No, direct the patient to contact the Pharmacy and discontinue telephone note using Erroneous Encounter.  If Yes, continue.    Name of medication requested: Novolog    How many days of medication do you have left? 2    What pharmacy do you use? [facility]    Preferred method for responding to this message: Telephone Call    Phone number patient can be reached at: Cell number on file:    No relevant phone numbers on file.       If we cannot reach you directly, may we leave a detailed response at the number you provided? Yes    Abi Nelson on 4/10/2025 at 10:07 AM

## 2025-04-14 RX ORDER — TRAMADOL HYDROCHLORIDE 50 MG/1
50 TABLET ORAL 3 TIMES DAILY PRN
Qty: 90 TABLET | Refills: 0 | Status: SHIPPED | OUTPATIENT
Start: 2025-04-14

## 2025-04-14 NOTE — TELEPHONE ENCOUNTER
Requested Prescriptions   Pending Prescriptions Disp Refills    traMADol (ULTRAM) 50 MG tablet [Pharmacy Med Name: traMADol HCl 50 MG Oral Tablet] 90 tablet 0     Sig: TAKE 1 TABLET BY MOUTH THREE TIMES DAILY AS NEEDED FOR SEVERE PAIN       Rx Protocol Controlled Substance Failed - 4/14/2025 10:40 AM        Failed - Controlled Substance Agreement on file in last 12 months     Please review last Controlled Substance Pain agreement document.   CSA -- Encounter Level:    CSA: None found at the encounter level.       CSA -- Patient Level:    CSA: None found at the patient level.               Failed - No Benzodiazepines on acive med list        Failed - Auto Fail - Please forward to Provider        Failed - MIRANDA-7 done in past year     Please review last MIRANDA-7 score.       6/26/2024     9:53 AM 10/10/2024     8:55 AM 1/10/2025    10:17 AM   MIRANDA-7 SCORE   Total Score 2 (minimal anxiety) 3 (minimal anxiety) 5 (mild anxiety)   Total Score 2 3 5        Patient-reported             Passed - Visit with relevant provider in past 3 months or upcoming 3 months        Passed - Medication is active on med list and the sig matches        Passed - Urine drug screeen results on file in past 12 months     [unfilled]           Passed - Medication not refilled in past 28 days     Invalid Medication Grouper          Passed - PHQ9 done in past year     Please review last PHQ-9 score.       6/15/2023     9:15 AM 6/26/2024     9:38 AM 10/10/2024     8:53 AM   PHQ-9 SCORE   PHQ-9 Total Score MyChart 6 (Mild depression) 15 (Moderately severe depression) 14 (Moderate depression)   PHQ-9 Total Score 6 15 14             Passed - Naloxone on active med list           Last Prescription Date:   10/10/2024  Last Fill Qty/Refills:         90, R-5     LOV: 1/10/2025  Future Office visit:    Next 5 appointments (look out 90 days)      May 02, 2025 10:20 AM  (Arrive by 10:05 AM)  Provider Visit with DO [place] and  [facility] ([facility]) [address]  [place] [id number] [phone]           Overdue          Never done CONTROLLED SUBSTANCE AGREEMENT FOR CHRONIC PAIN MANAGEMENT (Yearly)     Never done COLORECTAL CANCER SCREENING (View topic details)     DEC 26  2014 Pneumococcal Vaccine: 50+ Years (2 of 2 - PCV)  Last completed: Dec 26, 2013     Never done ZOSTER IMMUNIZATION (1 of 2)     SEP 1  2024 INFLUENZA VACCINE (1)  Last completed: Oct 15, 2018     Never done RSV VACCINE (1 - Risk 60-74 years 1-dose series)     MAR 12  2025 YEARLY PREVENTIVE VISIT (Yearly)  Last completed: Mar 12, 2024         Routing refill request to provider for review/approval.    Irasema Flannery RN  ....................  4/14/2025   10:40 AM

## 2025-04-14 NOTE — TELEPHONE ENCOUNTER
PDMP Review         Value Time User    State PDMP site checked  Yes 4/14/2025 12:07 PM Kira Grider,           Rx renewed x 1 month; has upcoming appointment where further review will be discussed.  Kira Grider,  on 4/14/2025 at 12:07 PM

## 2025-04-20 DIAGNOSIS — Z79.4 UNCONTROLLED TYPE 2 DIABETES MELLITUS WITH HYPERGLYCEMIA, WITH LONG-TERM CURRENT USE OF INSULIN (H): ICD-10-CM

## 2025-04-20 DIAGNOSIS — I10 BENIGN ESSENTIAL HYPERTENSION: ICD-10-CM

## 2025-04-20 DIAGNOSIS — E11.65 UNCONTROLLED TYPE 2 DIABETES MELLITUS WITH HYPERGLYCEMIA, WITH LONG-TERM CURRENT USE OF INSULIN (H): ICD-10-CM

## 2025-04-21 DIAGNOSIS — Z79.4 UNCONTROLLED TYPE 2 DIABETES MELLITUS WITH HYPERGLYCEMIA, WITH LONG-TERM CURRENT USE OF INSULIN (H): ICD-10-CM

## 2025-04-21 DIAGNOSIS — E11.65 UNCONTROLLED TYPE 2 DIABETES MELLITUS WITH HYPERGLYCEMIA, WITH LONG-TERM CURRENT USE OF INSULIN (H): ICD-10-CM

## 2025-04-21 RX ORDER — ACYCLOVIR 400 MG/1
TABLET ORAL
Qty: 1 EACH | Refills: 0 | Status: SHIPPED | OUTPATIENT
Start: 2025-04-21

## 2025-04-21 RX ORDER — LISINOPRIL 30 MG/1
30 TABLET ORAL DAILY
Qty: 90 TABLET | Refills: 0 | Status: SHIPPED | OUTPATIENT
Start: 2025-04-21

## 2025-04-21 NOTE — TELEPHONE ENCOUNTER
[id number] sent Rx request for the following:      Requested Prescriptions   Pending Prescriptions Disp Refills    lisinopril (ZESTRIL) 30 MG tablet [Pharmacy Med Name: Lisinopril 30 MG Oral Tablet] 90 tablet 0     Sig: Take 1 tablet by mouth once daily       ACE Inhibitors (Including Combos) Protocol Passed - 4/21/2025  7:56 AM      Last Prescription Date:   3/12/2024  Last Fill Qty/Refills:         90, R-4    Last Office Visit:              3/12/2024   Future Office visit:           5/2/2025    Unable to complete prescription refill per RN Medication Refill Policy.   Will route to pcp to review and approve.  Will route to schedule to setup a annual appt.    Jake Royal RN on 4/21/2025 at 7:59 AM

## 2025-04-22 RX ORDER — LANCETS
EACH MISCELLANEOUS
Qty: 404 EACH | Refills: 1 | Status: SHIPPED | OUTPATIENT
Start: 2025-04-22

## 2025-04-22 NOTE — TELEPHONE ENCOUNTER
[facility][id number] [facility] sent Rx request for the following:      Requested Prescriptions   Pending Prescriptions Disp Refills    blood glucose monitoring (ACCU-CHEK FASTCLIX) lancets [Pharmacy Med Name: FASTCLIX LANCETS    MIS]  0     Sig: USE 1  TO CHECK GLUCOSE 4 TIMES DAILY       Diabetic Supplies Protocol Failed - 4/22/2025 11:19 AM        Failed - Medication is active on med list and the sig matches. RN to manually verify dose and sig if red X/fail.     If the protocol passes (green check), you do not need to verify med dose and sig.    A prescription matches if they are the same clinical intention.    For Example: once daily and every morning are the same.    The protocol can not identify upper and lower case letters as matching and will fail.     For Example: Take 1 tablet (50 mg) by mouth daily     TAKE 1 TABLET (50 MG) BY MOUTH DAILY    For all fails (red x), verify dose and sig.    If the refill does match what is on file, the RN can still proceed to approve the refill request.       If they do not match, route to the appropriate provider.     Last Prescription Date:   12/30/23  Last Fill Qty/Refills:         102, R-11    Last Office Visit:              1/10/25   Future Office visit:           5/2/25    Unable to complete prescription refill per RN Medication Refill Policy. Ashanti Lyons, Refill RN .............. 4/22/2025  11:20 AM

## 2025-04-26 DIAGNOSIS — E78.5 HYPERLIPIDEMIA LDL GOAL <100: ICD-10-CM

## 2025-04-29 RX ORDER — SIMVASTATIN 40 MG
40 TABLET ORAL AT BEDTIME
Qty: 90 TABLET | Refills: 0 | Status: SHIPPED | OUTPATIENT
Start: 2025-04-29

## 2025-04-29 NOTE — TELEPHONE ENCOUNTER
[facility] sent Rx request for the following:      Requested Prescriptions   Pending Prescriptions Disp Refills    simvastatin (ZOCOR) 40 MG tablet [Pharmacy Med Name: Simvastatin 40 MG Oral Tablet] 90 tablet 0     Sig: TAKE 1 TABLET BY MOUTH AT BEDTIME     Hyperlipidemia LDL goal <100 [E78.5]        Last Prescription Date:   3/12/24  Last Fill Qty/Refills:         90, R-4    Last Office Visit:              1/10/25 (dx discussed)    Future Office visit:             Next 5 appointments (look out 90 days)      May 02, 2025 10:20 AM  (Arrive by 10:05 AM)  Provider Visit with Kira Grider, [facility] ([facility]) [address]  [place] [address]  [phone]     Jul 23, 2025 1:40 PM  (Arrive by 1:25 PM)  Adult Preventative Visit with Kira Grider, [facility] ([facility]) [address]   [place] [address]  [phone]           Unable to complete prescription refill per RN Medication Refill Policy.     Roberth Tomas RN on 4/29/2025 at 10:41 AM

## 2025-05-06 DIAGNOSIS — Z79.4 UNCONTROLLED TYPE 2 DIABETES MELLITUS WITH HYPERGLYCEMIA, WITH LONG-TERM CURRENT USE OF INSULIN (H): ICD-10-CM

## 2025-05-06 DIAGNOSIS — E11.65 UNCONTROLLED TYPE 2 DIABETES MELLITUS WITH HYPERGLYCEMIA, WITH LONG-TERM CURRENT USE OF INSULIN (H): ICD-10-CM

## 2025-05-08 RX ORDER — ACYCLOVIR 400 MG/1
TABLET ORAL
Qty: 3 EACH | Refills: 0 | Status: SHIPPED | OUTPATIENT
Start: 2025-05-08

## 2025-05-08 NOTE — TELEPHONE ENCOUNTER
Continuous Glucose Sensor (DEXCOM G7 SENSOR) MISC       Last Written Prescription Date:  4/21/25  Last Fill Quantity: 1,   # refills: 0  Last Office Visit: 1/10/25  Future Office visit:    Next 5 appointments (look out 90 days)      Jul 23, 2025 1:40 PM  (Arrive by 1:25 PM)  Adult Preventative Visit with Kira Grider, [facility] ([facility]) [address]  [place] [address]  [phone]           Patient requesting 3 sensors please  Routing refill request to provider for review/approval because:  Drug not on the [facility], [facility] or [facility] refill protocol or controlled substance Tamie Christianson RN on 5/8/2025 at 8:50 AM

## 2025-05-18 DIAGNOSIS — E11.65 UNCONTROLLED TYPE 2 DIABETES MELLITUS WITH HYPERGLYCEMIA, WITH LONG-TERM CURRENT USE OF INSULIN (H): ICD-10-CM

## 2025-05-18 DIAGNOSIS — Z79.4 UNCONTROLLED TYPE 2 DIABETES MELLITUS WITH HYPERGLYCEMIA, WITH LONG-TERM CURRENT USE OF INSULIN (H): ICD-10-CM

## 2025-05-19 DIAGNOSIS — E11.42 DIABETIC POLYNEUROPATHY ASSOCIATED WITH TYPE 2 DIABETES MELLITUS (H): ICD-10-CM

## 2025-05-21 RX ORDER — PEN NEEDLE, DIABETIC 31 GX5/16"
NEEDLE, DISPOSABLE MISCELLANEOUS
Qty: 400 EACH | Refills: 2 | Status: SHIPPED | OUTPATIENT
Start: 2025-05-21

## 2025-05-21 NOTE — TELEPHONE ENCOUNTER
[facility] sent Rx request for the following:      Requested Prescriptions   Pending Prescriptions Disp Refills    BD PEN NEEDLE MINI ULTRAFINE 31G X 5 MM miscellaneous [Pharmacy Med Name: BD UF MINI PEN NDL [id number] MIS]  0     Sig: USE 4 TIMES A DAY FOR ADMINISTERING INSULIN AT HOME          Last Prescription Date:   1/24/25  Last Fill Qty/Refills:         400, R-0    Last Office Visit:              1/10/25   Future Office visit:             Next 5 appointments (look out 90 days)      Jul 23, 2025 1:40 PM  (Arrive by 1:25 PM)  Adult Preventative Visit with Kira Grider, [facility] ([facility]) [address]  [place] [address] [phone]           Prescription approved per [facility] Refill Protocol.  Hannah Connell RN on 5/21/2025 at 2:29 PM

## 2025-05-22 RX ORDER — MELOXICAM 15 MG/1
15 TABLET ORAL
Qty: 90 TABLET | Refills: 1 | Status: SHIPPED | OUTPATIENT
Start: 2025-05-22

## 2025-05-22 NOTE — TELEPHONE ENCOUNTER
[facility][id number] sent Rx request for the following:      Requested Prescriptions   Pending Prescriptions Disp Refills    meloxicam (MOBIC) 15 MG tablet [Pharmacy Med Name: Meloxicam 15 MG Oral Tablet] 90 tablet 0     Sig: Take 1 tablet by mouth once daily with food       NSAID Medications Failed - 5/22/2025 10:06 AM        Failed - Always Fail Criteria - Chart Review Required     Validate Diagnosis. If the medication is requested for an acute flare of a chronic pain associated with a musculoskeletal or rheumatologic condition; okay to authorize if all other criteria are met. If not, then forward to provider for review.          Failed - Normal GFR on file in past 12 months     Recent Labs   Lab Test 01/10/25  1057 08/06/21  1035 07/14/20  1536   GFRESTIMATED 58*   < > 55*   GFRESTBLACK  --   --  66    < > = values in this interval not displayed.            Last Prescription Date:   3/12/24  Last Fill Qty/Refills:         90, R-4    Last Office Visit:              6/26/24   Future Office visit:           7/23/25    Routing refill request to provider for review/approval because:  Labs out of range:  GFR    Cara Crowell RN on 5/22/2025 at 10:09 AM

## 2025-05-25 DIAGNOSIS — M54.50 CHRONIC MIDLINE LOW BACK PAIN, UNSPECIFIED WHETHER SCIATICA PRESENT: ICD-10-CM

## 2025-05-25 DIAGNOSIS — G89.29 CHRONIC MIDLINE LOW BACK PAIN, UNSPECIFIED WHETHER SCIATICA PRESENT: ICD-10-CM

## 2025-05-25 DIAGNOSIS — E11.42 DIABETIC POLYNEUROPATHY ASSOCIATED WITH TYPE 2 DIABETES MELLITUS (H): ICD-10-CM

## 2025-05-25 DIAGNOSIS — F11.90 CHRONIC, CONTINUOUS USE OF OPIOIDS: ICD-10-CM

## 2025-05-29 RX ORDER — TRAMADOL HYDROCHLORIDE 50 MG/1
50 TABLET ORAL 3 TIMES DAILY PRN
Qty: 90 TABLET | Refills: 0 | Status: SHIPPED | OUTPATIENT
Start: 2025-05-29

## 2025-05-29 NOTE — TELEPHONE ENCOUNTER
PDMP Review         Value Time User    State PDMP site checked  Yes 5/29/2025  2:25 PM Kira Grider,           Rx renewed; has upcoming appointment.  Kira Grider, DO

## 2025-05-29 NOTE — TELEPHONE ENCOUNTER
[facility] sent Rx request for the following:      Requested Prescriptions   Pending Prescriptions Disp Refills    traMADol (ULTRAM) 50 MG tablet [Pharmacy Med Name: traMADol HCl 50 MG Oral Tablet] 90 tablet 0     Sig: TAKE 1 TABLET BY MOUTH THREE TIMES DAILY AS NEEDED FOR SEVERE PAIN       Rx Protocol Controlled Substance Failed - 5/29/2025  9:44 AM        Failed - Controlled Substance Agreement on file in last 12 months     Please review last Controlled Substance Pain agreement document.   CSA -- Encounter Level:    CSA: None found at the encounter level.       CSA -- Patient Level:    CSA: None found at the patient level.               Failed - No Benzodiazepines on acive med list        Failed - Auto Fail - Please forward to Provider        Failed - MIRANDA-7 done in past year     Please review last MIRANDA-7 score.       6/26/2024     9:53 AM 10/10/2024     8:55 AM 1/10/2025    10:17 AM   MIRANDA-7 SCORE   Total Score 2 (minimal anxiety) 3 (minimal anxiety) 5 (mild anxiety)   Total Score 2 3 5        Patient-reported               Last Prescription Date:   4/14/25  Last Fill Qty/Refills:         90, R-0    Last Office Visit:              1/10/25 (JB Ivory)  Future Office visit:             Next 5 appointments (look out 90 days)      Jul 23, 2025 1:40 PM  (Arrive by 1:25 PM)  Adult Preventative Visit with Kira Grider,   [facility] ([facility]) [address]  [place] [id number] [phone]         Unable to complete prescription refill per RN Medication Refill Policy. Routing to provider for refill consideration  Hannah Connell RN on 5/29/2025 at 9:50 AM

## 2025-06-10 DIAGNOSIS — E11.65 UNCONTROLLED TYPE 2 DIABETES MELLITUS WITH HYPERGLYCEMIA, WITH LONG-TERM CURRENT USE OF INSULIN (H): ICD-10-CM

## 2025-06-10 DIAGNOSIS — Z79.4 UNCONTROLLED TYPE 2 DIABETES MELLITUS WITH HYPERGLYCEMIA, WITH LONG-TERM CURRENT USE OF INSULIN (H): ICD-10-CM

## 2025-06-10 DIAGNOSIS — K21.9 GASTROESOPHAGEAL REFLUX DISEASE WITHOUT ESOPHAGITIS: ICD-10-CM

## 2025-06-12 RX ORDER — OMEPRAZOLE 20 MG/1
CAPSULE, DELAYED RELEASE ORAL
Qty: 90 CAPSULE | Refills: 0 | Status: SHIPPED | OUTPATIENT
Start: 2025-06-12

## 2025-06-12 RX ORDER — ACYCLOVIR 400 MG/1
TABLET ORAL
Qty: 9 EACH | Refills: 0 | Status: SHIPPED | OUTPATIENT
Start: 2025-06-12

## 2025-06-12 NOTE — TELEPHONE ENCOUNTER
[facility][id number] [facility] sent Rx request for the following:      Requested Prescriptions   Pending Prescriptions Disp Refills    omeprazole (PRILOSEC) 20 MG DR capsule [Pharmacy Med Name: OMEPRAZOLE 20MG CAP] 90 capsule 0     Sig: TAKE 1 CAPSULE BY MOUTH ONCE DAILY BEFORE A MEAL   Last Prescription Date:   3/12/24  Last Fill Qty/Refills:         90, R-3        Continuous Glucose Sensor (DEXCOM G7 SENSOR) MISC [Pharmacy Med Name: DEXCOM G7 SENSOR    MIS]  0     Sig: CHANGE SENSOR EVERY 10 DAYS   Last Prescription Date:   5/8/25  Last Fill Qty/Refills:         3, R-0      Last Office Visit:              1/10/25 (DM discussed)   Future Office visit:           7/23/25    Per LOV note:  Follow-up in 3 months for recheck or sooner if needed.     Unable to complete prescription refill per RN Medication Refill Policy. Ashanti Lyons, Refill RN .............. 6/12/2025  2:18 PM

## 2025-06-13 DIAGNOSIS — K21.9 GASTROESOPHAGEAL REFLUX DISEASE WITHOUT ESOPHAGITIS: ICD-10-CM

## 2025-06-17 RX ORDER — OMEPRAZOLE 20 MG/1
CAPSULE, DELAYED RELEASE ORAL
Qty: 90 CAPSULE | Refills: 0 | OUTPATIENT
Start: 2025-06-17

## 2025-06-17 NOTE — TELEPHONE ENCOUNTER
[id number] sent Rx request for the following:      Requested Prescriptions   Pending Prescriptions Disp Refills    omeprazole (PRILOSEC) 20 MG DR capsule [Pharmacy Med Name: OMEPRAZOLE 20MG CAP] 90 capsule 0     Sig: TAKE 1 CAPSULE BY MOUTH ONCE DAILY BEFORE A MEAL       PPI Protocol Passed - 6/17/2025  1:16 PM          Last Prescription Date:   6/12/25  Last Fill Qty/Refills:         90, R-0        Duplicate.     ROLANDA GOMEZ RN on 6/17/2025 at 1:16 PM

## 2025-06-21 DIAGNOSIS — F32.A DEPRESSION, UNSPECIFIED DEPRESSION TYPE: ICD-10-CM

## 2025-06-24 RX ORDER — FLUOXETINE HYDROCHLORIDE 40 MG/1
40 CAPSULE ORAL EVERY MORNING
Qty: 90 CAPSULE | Refills: 0 | Status: SHIPPED | OUTPATIENT
Start: 2025-06-24

## 2025-06-24 NOTE — TELEPHONE ENCOUNTER
[facility] sent Rx request for the following:      Requested Prescriptions   Pending Prescriptions Disp Refills    FLUoxetine (PROZAC) 40 MG capsule [Pharmacy Med Name: FLUoxetine HCl 40 MG Oral Capsule] 90 capsule 0     Sig: Take 1 capsule by mouth in the morning       SSRIs Protocol Failed - 6/24/2025  4:12 PM        Failed - PHQ-9 score less than 5 in past 6 months     Please review last PHQ-9 score.       6/15/2023     9:15 AM 6/26/2024     9:38 AM 10/10/2024     8:53 AM   PHQ-9 SCORE   PHQ-9 Total Score MyChart 6 (Mild depression) 15 (Moderately severe depression) 14 (Moderate depression)   PHQ-9 Total Score 6 15 14             Passed - Medication is active on med list and the sig matches. RN to manually verify dose and sig if red X/fail.     If the protocol passes (green check), you do not need to verify med dose and sig.    A prescription matches if they are the same clinical intention.    For Example: once daily and every morning are the same.    The protocol can not identify upper and lower case letters as matching and will fail.     For Example: Take 1 tablet (50 mg) by mouth daily     TAKE 1 TABLET (50 MG) BY MOUTH DAILY    For all fails (red x), verify dose and sig.    If the refill does match what is on file, the RN can still proceed to approve the refill request.       If they do not match, route to the appropriate provider.             Passed - Recent (12 month) or future (90 days) visit with authorizing provider's specialty (provided they have been seen in the past 15 months)     The patient must have completed an in-person or virtual visit within the past 12 months or has a future visit scheduled within the next 90 days with the authorizing provider s specialty.  Urgent care and e-visits do not qualify as an office visit for this protocol.          Passed - Medication indicated for associated diagnosis     Medication is associated with one or more of the following diagnoses:              Anxiety              Bipolar  Depression  Obsessive-compulsive disorder             Panic disorder  Postmenopausal flushing             Premenstrual dysphoric disorder             Social phobia   Adjustment disorder with depressed mood   Mood disorder   Adjustment disorder with anxious mood          Passed - Patient is age 18 or older             Last Prescription Date:   3/21/25  Last Fill Qty/Refills:         90, R-0    Last Office Visit:              1/10/25   Future Office visit:             Next 5 appointments (look out 90 days)      Jul 23, 2025 1:40 PM  (Arrive by 1:25 PM)  Adult Preventative Visit with Kira Grider DO  [facility] ([facility]) [address]  [place] [id number] [phone]         Unable to complete prescription refill per RN Medication Refill Policy. Routing to provider for refill consideration  Hannah Connell RN on 6/24/2025 at 4:18 PM

## 2025-06-27 DIAGNOSIS — E11.65 UNCONTROLLED TYPE 2 DIABETES MELLITUS WITH HYPERGLYCEMIA, WITH LONG-TERM CURRENT USE OF INSULIN (H): ICD-10-CM

## 2025-06-27 DIAGNOSIS — J98.4 RESTRICTIVE LUNG DISEASE: ICD-10-CM

## 2025-06-27 DIAGNOSIS — Z79.4 UNCONTROLLED TYPE 2 DIABETES MELLITUS WITH HYPERGLYCEMIA, WITH LONG-TERM CURRENT USE OF INSULIN (H): ICD-10-CM

## 2025-06-27 DIAGNOSIS — E78.5 HYPERLIPIDEMIA LDL GOAL <100: ICD-10-CM

## 2025-06-27 DIAGNOSIS — E78.1 HYPERTRIGLYCERIDEMIA: ICD-10-CM

## 2025-06-27 DIAGNOSIS — I10 BENIGN ESSENTIAL HYPERTENSION: ICD-10-CM

## 2025-07-01 RX ORDER — EZETIMIBE 10 MG/1
10 TABLET ORAL DAILY
Qty: 90 TABLET | Refills: 0 | Status: SHIPPED | OUTPATIENT
Start: 2025-07-01

## 2025-07-01 RX ORDER — HYDROCHLOROTHIAZIDE 25 MG/1
25 TABLET ORAL DAILY
Qty: 90 TABLET | Refills: 0 | Status: SHIPPED | OUTPATIENT
Start: 2025-07-01

## 2025-07-01 RX ORDER — FLUTICASONE PROPIONATE 110 UG/1
AEROSOL, METERED RESPIRATORY (INHALATION)
Qty: 12 G | Refills: 0 | Status: SHIPPED | OUTPATIENT
Start: 2025-07-01

## 2025-07-01 RX ORDER — ISOPROPYL ALCOHOL 0.75 G/1
SWAB TOPICAL
Qty: 100 EACH | Refills: 0 | Status: SHIPPED | OUTPATIENT
Start: 2025-07-01

## 2025-07-01 RX ORDER — INSULIN GLARGINE 100 [IU]/ML
INJECTION, SOLUTION SUBCUTANEOUS
Qty: 45 ML | Refills: 0 | Status: SHIPPED | OUTPATIENT
Start: 2025-07-01

## 2025-07-01 RX ORDER — ALBUTEROL SULFATE 90 UG/1
2 AEROSOL, METERED RESPIRATORY (INHALATION) EVERY 4 HOURS PRN
Qty: 18 G | Refills: 5 | Status: SHIPPED | OUTPATIENT
Start: 2025-07-01

## 2025-07-01 NOTE — TELEPHONE ENCOUNTER
[facility] sent Rx request for the following:      Requested Prescriptions   Pending Prescriptions Disp Refills    hydrochlorothiazide (HYDRODIURIL) 25 MG tablet [Pharmacy Med Name: hydroCHLOROthiazide 25 MG Oral Tablet] 90 tablet 0     Sig: Take 1 tablet by mouth once daily       Diuretics (Including Combos) Protocol Failed - 7/1/2025  3:03 PM        Failed - Has GFR on file in past 12 months and most recent value is normal      Last Prescription Date:   3/12/24  Last Fill Qty/Refills:         90, R-4             ezetimibe (ZETIA) 10 MG tablet [Pharmacy Med Name: EZETIMIBE 10MG TAB] 90 tablet 0     Sig: Take 1 tablet by mouth once daily       Antihyperlipidemic agents Passed - 7/1/2025  3:03 PM      Last Prescription Date:   6/25/24  Last Fill Qty/Refills:         90, R-4          fluticasone (FLOVENT HFA) 110 MCG/ACT inhaler [Pharmacy Med Name: Fluticasone Propionate  MCG/ACT Inhalation Aerosol] 12 g 0     Sig: INHALE 1 PUFF INTO LUNGS TWICE DAILY       Inhaled Steroids Protocol Failed - 7/1/2025  3:03 PM        Failed - Medication is active on med list and the sig matches. RN to manually verify dose and sig if red X/fail.     If the protocol passes (green check), you do not need to verify med dose and sig.    A prescription matches if they are the same clinical intention.    For Example: once daily and every morning are the same.    The protocol can not identify upper and lower case letters as matching and will fail.     For Example: Take 1 tablet (50 mg) by mouth daily     TAKE 1 TABLET (50 MG) BY MOUTH DAILY    For all fails (red x), verify dose and sig.    If the refill does match what is on file, the RN can still proceed to approve the refill request.       If they do not match, route to the appropriate provider.             Failed - Medication indicated for associated diagnosis     Medication is associated with one or more of the following diagnoses:    Allergies   Asthma   COPD   Nasal  Congestion   Nasal Polyps   Rhinitis   Cystic Fibrosis   Bronchiectasis     Last Prescription Date:   3/12/24  Last Fill Qty/Refills:         12 g, R-11            LANTUS SOLOSTAR 100 UNIT/ML soln [Pharmacy Med Name: Lantus SoloStar 100 UNIT/ML Subcutaneous Solution Pen-injector] 45 mL 0     Sig: INJECT 175 UNITS SUBCUTANEOUSLY AT BEDTIME       Insulin Protocol Failed - 7/1/2025  3:03 PM        Failed - Medication is active on med list and the sig matches. RN to manually verify dose and sig if red X/fail.     If the protocol passes (green check), you do not need to verify med dose and sig.    A prescription matches if they are the same clinical intention.    For Example: once daily and every morning are the same.    The protocol can not identify upper and lower case letters as matching and will fail.     For Example: Take 1 tablet (50 mg) by mouth daily     TAKE 1 TABLET (50 MG) BY MOUTH DAILY    For all fails (red x), verify dose and sig.    If the refill does match what is on file, the RN can still proceed to approve the refill request.       If they do not match, route to the appropriate provider.             Failed - Chart review required     Review Chart.    Do not approve if insulin is used in a pump.  Instead, direct refill request to the patient's endocrinologist.  If the patient doesn't have an endocrinologist, then send the refill to the patient's PCP for review           Last Prescription Date:   5/9/25  Last Fill Qty/Refills:         45 ml, R-1    Last Office Visit:              1/10/25   Future Office visit:             Next 5 appointments (look out 90 days)      Jul 23, 2025 1:40 PM  (Arrive by 1:25 PM)  Adult Preventative Visit with Kira Grider DO  [facility] ([facility]) [address]  [place] [id number] [phone]           Routing refill request to provider for review/approval     Maria G Maza RN on 7/1/2025 at  3:10 PM

## 2025-07-01 NOTE — TELEPHONE ENCOUNTER
[facility] sent Rx request for the following:      Requested Prescriptions   Pending Prescriptions Disp Refills    VENTOLIN  (90 Base) MCG/ACT inhaler [Pharmacy Med Name: Ventolin  (90 Base) MCG/ACT Inhalation Aerosol Solution] 18 g 0     Sig: INHALE 2 PUFFS BY MOUTH EVERY 4 HOURS AS NEEDED FOR SHORTNESS OF BREATH OR WHEEZING          Last Prescription Date:   6/15/23  Last Fill Qty/Refills:         18 g, R-9    Last Office Visit:              3/12/24   Future Office visit:             Next 5 appointments (look out 90 days)      Jul 23, 2025 1:40 PM  (Arrive by 1:25 PM)  Adult Preventative Visit with Kira Grider,   [facility] ([facility]) [address]  [place] [id number] [phone]         Unable to complete prescription refill per RN Medication Refill Policy. Routing to provider for refill consideration  Hannah Connell RN on 7/1/2025 at 3:09 PM

## 2025-07-17 DIAGNOSIS — F41.1 GAD (GENERALIZED ANXIETY DISORDER): ICD-10-CM

## 2025-07-20 DIAGNOSIS — I10 BENIGN ESSENTIAL HYPERTENSION: ICD-10-CM

## 2025-07-21 DIAGNOSIS — Z59.6 LOW HOUSEHOLD INCOME: Primary | ICD-10-CM

## 2025-07-21 DIAGNOSIS — Z59.82 TRANSPORTATION INSECURITY: ICD-10-CM

## 2025-07-21 DIAGNOSIS — Z91.198 FAILURE TO ATTEND APPOINTMENT WITH REASON GIVEN: ICD-10-CM

## 2025-07-21 DIAGNOSIS — F41.1 GAD (GENERALIZED ANXIETY DISORDER): ICD-10-CM

## 2025-07-21 RX ORDER — CLONAZEPAM 0.5 MG/1
0.5 TABLET ORAL AT BEDTIME
Qty: 30 TABLET | Refills: 0 | OUTPATIENT
Start: 2025-07-21

## 2025-07-21 SDOH — ECONOMIC STABILITY - INCOME SECURITY: LOW INCOME: Z59.6

## 2025-07-21 SDOH — ECONOMIC STABILITY - TRANSPORTATION SECURITY: TRANSPORTATION INSECURITY: Z59.82
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
## 2025-07-21 NOTE — TELEPHONE ENCOUNTER
[facility][id number] [facility] sent Rx request for the following:      Requested Prescriptions   Pending Prescriptions Disp Refills    clonazePAM (KLONOPIN) 0.5 MG tablet [Pharmacy Med Name: clonazePAM 0.5 MG Oral Tablet] 30 tablet 0     Sig: TAKE 1 TABLET BY MOUTH AT BEDTIME       Rx Protocol Controlled Substance Failed - 7/21/2025  3:00 PM        Failed - Visit with relevant provider in past 3 months or upcoming 3 months (provided they have been seen in the last 6 months)        Failed - Urine drug screeen results on file in past 12 months     [unfilled]           Failed - Controlled Substance Agreement on file in last 12 months     Please review last Controlled Substance Pain agreement document.   CSA -- Encounter Level:    CSA: None found at the encounter level.       CSA -- Patient Level:    CSA: None found at the patient level.               Failed - Auto Fail - Please forward to Provider        Failed - No Opioids on active med list          Last Prescription Date:   1/10/25  Last Fill Qty/Refills:         30, R-5    Last Office Visit:              1/10/25   Future Office visit:           7/23/25    Patient has appointment for annual wellness visit on 7/23/25 ( 2 days out) will review at this time.    Cara Crowell RN on 7/21/2025 at 3:10 PM

## 2025-07-22 RX ORDER — LISINOPRIL 30 MG/1
30 TABLET ORAL DAILY
Qty: 90 TABLET | Refills: 0 | OUTPATIENT
Start: 2025-07-22

## 2025-07-22 NOTE — TELEPHONE ENCOUNTER
[facility]1606 [facility] [place] sent Rx request for the following:      Requested Prescriptions   Pending Prescriptions Disp Refills    lisinopril (ZESTRIL) 30 MG tablet [Pharmacy Med Name: Lisinopril 30 MG Oral Tablet] 90 tablet 0     Sig: Take 1 tablet by mouth once daily       ACE Inhibitors (Including Combos) Protocol Passed - 7/22/2025  9:25 AM          Last Prescription Date:   4/21/25  Last Fill Qty/Refills:         90, R-0    Last Office Visit:              1/10/25   Future Office visit:           7/23/25    Patient has appt tomorrow and can discuss refills at that time.     ROLANDA GOMEZ RN on 7/22/2025 at 9:27 AM

## 2025-07-23 DIAGNOSIS — I10 BENIGN ESSENTIAL HYPERTENSION: ICD-10-CM

## 2025-07-23 RX ORDER — CLONAZEPAM 0.5 MG/1
0.5 TABLET ORAL AT BEDTIME
Qty: 30 TABLET | Refills: 1 | Status: SHIPPED | OUTPATIENT
Start: 2025-07-23

## 2025-07-24 DIAGNOSIS — E11.65 UNCONTROLLED TYPE 2 DIABETES MELLITUS WITH HYPERGLYCEMIA, WITH LONG-TERM CURRENT USE OF INSULIN (H): ICD-10-CM

## 2025-07-24 DIAGNOSIS — Z79.4 UNCONTROLLED TYPE 2 DIABETES MELLITUS WITH HYPERGLYCEMIA, WITH LONG-TERM CURRENT USE OF INSULIN (H): ICD-10-CM

## 2025-07-24 RX ORDER — LISINOPRIL 30 MG/1
30 TABLET ORAL DAILY
Qty: 90 TABLET | Refills: 0 | Status: SHIPPED | OUTPATIENT
Start: 2025-07-24

## 2025-07-24 NOTE — TELEPHONE ENCOUNTER
[id number] sent Rx request for the following:      Requested Prescriptions   Pending Prescriptions Disp Refills    lisinopril (ZESTRIL) 30 MG tablet [Pharmacy Med Name: Lisinopril 30 MG Oral Tablet] 90 tablet 0     Sig: Take 1 tablet by mouth once daily       ACE Inhibitors (Including Combos) Protocol Passed - 7/24/2025  3:45 PM        Last Prescription Date:   4/21/25  Last Fill Qty/Refills:         90, R-0    Last Office Visit:              1/10/25   Future Office visit:           none    Routing refill request to provider for review/approval because:  Patient due for AWV.     ROLANDA GOMEZ RN on 7/24/2025 at 3:48 PM

## 2025-07-28 ENCOUNTER — PATIENT OUTREACH (OUTPATIENT)
Dept: CARE COORDINATION | Facility: CLINIC | Age: 60
End: 2025-07-28
Payer: COMMERCIAL

## 2025-07-29 DIAGNOSIS — Z79.4 UNCONTROLLED TYPE 2 DIABETES MELLITUS WITH HYPERGLYCEMIA, WITH LONG-TERM CURRENT USE OF INSULIN (H): ICD-10-CM

## 2025-07-29 DIAGNOSIS — E11.65 UNCONTROLLED TYPE 2 DIABETES MELLITUS WITH HYPERGLYCEMIA, WITH LONG-TERM CURRENT USE OF INSULIN (H): ICD-10-CM

## 2025-07-29 DIAGNOSIS — E66.01 MORBID OBESITY (H): ICD-10-CM
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
## 2025-07-31 RX ORDER — INSULIN GLARGINE 100 [IU]/ML
INJECTION, SOLUTION SUBCUTANEOUS
Qty: 45 ML | Refills: 0 | Status: SHIPPED | OUTPATIENT
Start: 2025-07-31

## 2025-07-31 NOTE — TELEPHONE ENCOUNTER
[facility] sent Rx request for the following:      Requested Prescriptions   Pending Prescriptions Disp Refills    LANTUS SOLOSTAR 100 UNIT/ML soln [Pharmacy Med Name: Lantus SoloStar 100 UNIT/ML Subcutaneous Solution Pen-injector] 45 mL 0     Sig: INJECT 175 UNITS SUBCUTANEOUSLY AT BEDTIME       Insulin Protocol Failed - 7/31/2025  7:44 AM        Failed - HgbA1C in past 3 or 6 months     If HgbA1C is 8 or greater, it needs to be on file within the past 3 months.  If less than 8, must be on file within the past 6 months.     Recent Labs   Lab Test 01/10/25  1057 06/27/18  1050 01/17/18  1150   A1C 6.9*   < >  --    [id number]  --   --  10.9*    < > = values in this interval not displayed.             Failed - Chart review required     Review Chart.    Do not approve if insulin is used in a pump.  Instead, direct refill request to the patient's endocrinologist.  If the patient doesn't have an endocrinologist, then send the refill to the patient's PCP for review           Last Prescription Date:   7/1/25  Last Fill Qty/Refills:         45 ml, R-0    Last Office Visit:              1/10/25   Future Office visit:             Next 5 appointments (look out 90 days)      Aug 27, 2025 11:20 AM  (Arrive by 11:05 AM)  Adult Preventative Visit with Dafne Ivory PA-C  [facility] [address]  [place] [id number] [phone]             Routing refill request to provider for review/approval     Maria G Maza RN on 7/31/2025 at 7:45 AM

## 2025-08-05 ENCOUNTER — PATIENT OUTREACH (OUTPATIENT)
Dept: CARE COORDINATION | Facility: CLINIC | Age: 60
End: 2025-08-05
Payer: COMMERCIAL

## 2025-08-05 DIAGNOSIS — E78.5 HYPERLIPIDEMIA LDL GOAL <100: ICD-10-CM

## 2025-08-05 RX ORDER — SEMAGLUTIDE 1.34 MG/ML
INJECTION, SOLUTION SUBCUTANEOUS
Qty: 9 ML | Refills: 0 | Status: SHIPPED | OUTPATIENT
Start: 2025-08-05

## 2025-08-07 RX ORDER — SIMVASTATIN 40 MG
40 TABLET ORAL AT BEDTIME
Qty: 30 TABLET | Refills: 0 | Status: SHIPPED | OUTPATIENT
Start: 2025-08-07

## 2025-08-08 DIAGNOSIS — Z79.4 UNCONTROLLED TYPE 2 DIABETES MELLITUS WITH HYPERGLYCEMIA, WITH LONG-TERM CURRENT USE OF INSULIN (H): ICD-10-CM

## 2025-08-08 DIAGNOSIS — E11.65 UNCONTROLLED TYPE 2 DIABETES MELLITUS WITH HYPERGLYCEMIA, WITH LONG-TERM CURRENT USE OF INSULIN (H): ICD-10-CM

## 2025-08-10 DIAGNOSIS — Z79.4 UNCONTROLLED TYPE 2 DIABETES MELLITUS WITH HYPERGLYCEMIA, WITH LONG-TERM CURRENT USE OF INSULIN (H): ICD-10-CM

## 2025-08-10 DIAGNOSIS — E11.42 DIABETIC POLYNEUROPATHY ASSOCIATED WITH TYPE 2 DIABETES MELLITUS (H): ICD-10-CM

## 2025-08-10 DIAGNOSIS — E11.65 UNCONTROLLED TYPE 2 DIABETES MELLITUS WITH HYPERGLYCEMIA, WITH LONG-TERM CURRENT USE OF INSULIN (H): ICD-10-CM

## 2025-08-10 RX ORDER — INSULIN GLARGINE 100 [IU]/ML
INJECTION, SOLUTION SUBCUTANEOUS
Qty: 165 ML | Refills: 0 | OUTPATIENT
Start: 2025-08-10

## 2025-08-11 ENCOUNTER — PATIENT OUTREACH (OUTPATIENT)
Dept: CARE COORDINATION | Facility: CLINIC | Age: 60
End: 2025-08-11
Payer: COMMERCIAL

## 2025-08-12 RX ORDER — PREGABALIN 200 MG/1
200 CAPSULE ORAL 3 TIMES DAILY
Qty: 90 CAPSULE | Refills: 0 | OUTPATIENT
Start: 2025-08-12

## 2025-08-12 RX ORDER — ISOPROPYL ALCOHOL 0.75 G/1
SWAB TOPICAL
Qty: 800 EACH | Refills: 0 | OUTPATIENT
Start: 2025-08-12

## 2025-08-16 DIAGNOSIS — E11.42 DIABETIC POLYNEUROPATHY ASSOCIATED WITH TYPE 2 DIABETES MELLITUS (H): ICD-10-CM

## 2025-08-18 ENCOUNTER — PATIENT OUTREACH (OUTPATIENT)
Dept: CARE COORDINATION | Facility: CLINIC | Age: 60
End: 2025-08-18
Payer: COMMERCIAL

## 2025-08-18 DIAGNOSIS — E11.42 DIABETIC POLYNEUROPATHY ASSOCIATED WITH TYPE 2 DIABETES MELLITUS (H): ICD-10-CM

## 2025-08-18 RX ORDER — PREGABALIN 200 MG/1
200 CAPSULE ORAL 3 TIMES DAILY
Qty: 90 CAPSULE | Refills: 0 | OUTPATIENT
Start: 2025-08-18

## 2025-08-19 ENCOUNTER — TELEPHONE (OUTPATIENT)
Dept: FAMILY MEDICINE | Facility: OTHER | Age: 60
End: 2025-08-19
Payer: COMMERCIAL

## 2025-08-19 RX ORDER — PREGABALIN 200 MG/1
200 CAPSULE ORAL 3 TIMES DAILY
Qty: 90 CAPSULE | Refills: 0 | OUTPATIENT
Start: 2025-08-19

## 2025-08-20 ENCOUNTER — PATIENT OUTREACH (OUTPATIENT)
Dept: CARE COORDINATION | Facility: CLINIC | Age: 60
End: 2025-08-20
Payer: COMMERCIAL

## 2025-08-27 ENCOUNTER — OFFICE VISIT (OUTPATIENT)
Dept: FAMILY MEDICINE | Facility: OTHER | Age: 60
End: 2025-08-27
Attending: PHYSICIAN ASSISTANT
Payer: COMMERCIAL

## 2025-08-27 VITALS
WEIGHT: 277 LBS | TEMPERATURE: 97.8 F | DIASTOLIC BLOOD PRESSURE: 74 MMHG | RESPIRATION RATE: 18 BRPM | BODY MASS INDEX: 39.75 KG/M2 | HEART RATE: 64 BPM | OXYGEN SATURATION: 92 % | SYSTOLIC BLOOD PRESSURE: 136 MMHG

## 2025-08-27 DIAGNOSIS — Z79.4 UNCONTROLLED TYPE 2 DIABETES MELLITUS WITH HYPERGLYCEMIA, WITH LONG-TERM CURRENT USE OF INSULIN (H): ICD-10-CM

## 2025-08-27 DIAGNOSIS — F11.90 CHRONIC, CONTINUOUS USE OF OPIOIDS: ICD-10-CM

## 2025-08-27 DIAGNOSIS — M54.50 CHRONIC MIDLINE LOW BACK PAIN, UNSPECIFIED WHETHER SCIATICA PRESENT: ICD-10-CM

## 2025-08-27 DIAGNOSIS — G89.29 CHRONIC MIDLINE LOW BACK PAIN, UNSPECIFIED WHETHER SCIATICA PRESENT: ICD-10-CM

## 2025-08-27 DIAGNOSIS — J98.4 RESTRICTIVE LUNG DISEASE: ICD-10-CM

## 2025-08-27 DIAGNOSIS — Z00.00 ROUTINE HISTORY AND PHYSICAL EXAMINATION OF ADULT: Primary | ICD-10-CM

## 2025-08-27 DIAGNOSIS — E78.5 HYPERLIPIDEMIA LDL GOAL <100: ICD-10-CM

## 2025-08-27 DIAGNOSIS — I10 BENIGN ESSENTIAL HYPERTENSION: ICD-10-CM

## 2025-08-27 DIAGNOSIS — E66.01 MORBID OBESITY (H): ICD-10-CM

## 2025-08-27 DIAGNOSIS — E11.65 UNCONTROLLED TYPE 2 DIABETES MELLITUS WITH HYPERGLYCEMIA, WITH LONG-TERM CURRENT USE OF INSULIN (H): Primary | ICD-10-CM

## 2025-08-27 DIAGNOSIS — Z79.4 UNCONTROLLED TYPE 2 DIABETES MELLITUS WITH HYPERGLYCEMIA, WITH LONG-TERM CURRENT USE OF INSULIN (H): Primary | ICD-10-CM

## 2025-08-27 DIAGNOSIS — E11.65 UNCONTROLLED TYPE 2 DIABETES MELLITUS WITH HYPERGLYCEMIA, WITH LONG-TERM CURRENT USE OF INSULIN (H): ICD-10-CM

## 2025-08-27 DIAGNOSIS — F41.1 GAD (GENERALIZED ANXIETY DISORDER): ICD-10-CM

## 2025-08-27 DIAGNOSIS — F32.A DEPRESSION, UNSPECIFIED DEPRESSION TYPE: ICD-10-CM

## 2025-08-27 DIAGNOSIS — K21.9 GASTROESOPHAGEAL REFLUX DISEASE WITHOUT ESOPHAGITIS: ICD-10-CM

## 2025-08-27 DIAGNOSIS — E78.1 HYPERTRIGLYCERIDEMIA: ICD-10-CM

## 2025-08-27 DIAGNOSIS — E11.42 DIABETIC POLYNEUROPATHY ASSOCIATED WITH TYPE 2 DIABETES MELLITUS (H): ICD-10-CM

## 2025-08-27 LAB
ANION GAP SERPL CALCULATED.3IONS-SCNC: 12 MMOL/L (ref 7–15)
BUN SERPL-MCNC: 32.8 MG/DL (ref 8–23)
CALCIUM SERPL-MCNC: 9.6 MG/DL (ref 8.8–10.4)
CHLORIDE SERPL-SCNC: 98 MMOL/L (ref 98–107)
CHOLEST SERPL-MCNC: 110 MG/DL
CREAT SERPL-MCNC: 1.32 MG/DL (ref 0.67–1.17)
EGFRCR SERPLBLD CKD-EPI 2021: 62 ML/MIN/1.73M2
EST. AVERAGE GLUCOSE BLD GHB EST-MCNC: 163 MG/DL
FASTING STATUS PATIENT QL REPORTED: ABNORMAL
FASTING STATUS PATIENT QL REPORTED: ABNORMAL
GLUCOSE SERPL-MCNC: 110 MG/DL (ref 70–99)
HBA1C MFR BLD: 7.3 %
HCO3 SERPL-SCNC: 25 MMOL/L (ref 22–29)
HDLC SERPL-MCNC: 37 MG/DL
LDLC SERPL CALC-MCNC: 50 MG/DL
NONHDLC SERPL-MCNC: 73 MG/DL
POTASSIUM SERPL-SCNC: 4.8 MMOL/L (ref 3.4–5.3)
SODIUM SERPL-SCNC: 135 MMOL/L (ref 135–145)
TRIGL SERPL-MCNC: 117 MG/DL

## 2025-08-27 PROCEDURE — 80061 LIPID PANEL: CPT | Mod: ZL | Performed by: PHYSICIAN ASSISTANT

## 2025-08-27 PROCEDURE — 80048 BASIC METABOLIC PNL TOTAL CA: CPT | Mod: ZL | Performed by: PHYSICIAN ASSISTANT

## 2025-08-27 PROCEDURE — 83036 HEMOGLOBIN GLYCOSYLATED A1C: CPT | Mod: ZL | Performed by: PHYSICIAN ASSISTANT

## 2025-08-27 PROCEDURE — 36415 COLL VENOUS BLD VENIPUNCTURE: CPT | Mod: ZL | Performed by: PHYSICIAN ASSISTANT

## 2025-08-27 RX ORDER — TRAMADOL HYDROCHLORIDE 50 MG/1
50 TABLET ORAL 3 TIMES DAILY PRN
Qty: 90 TABLET | Refills: 5 | Status: SHIPPED | OUTPATIENT
Start: 2025-08-27

## 2025-08-27 RX ORDER — INSULIN GLARGINE 100 [IU]/ML
INJECTION, SOLUTION SUBCUTANEOUS
Qty: 45 ML | Refills: 4 | Status: SHIPPED | OUTPATIENT
Start: 2025-08-27

## 2025-08-27 RX ORDER — PREGABALIN 200 MG/1
200 CAPSULE ORAL 3 TIMES DAILY
Qty: 90 CAPSULE | Refills: 5 | Status: SHIPPED | OUTPATIENT
Start: 2025-08-27

## 2025-08-27 RX ORDER — FLUOXETINE HYDROCHLORIDE 40 MG/1
40 CAPSULE ORAL EVERY MORNING
Qty: 90 CAPSULE | Refills: 4 | Status: SHIPPED | OUTPATIENT
Start: 2025-08-27

## 2025-08-27 RX ORDER — BLOOD SUGAR DIAGNOSTIC
STRIP MISCELLANEOUS
Qty: 100 STRIP | Refills: 11 | Status: SHIPPED | OUTPATIENT
Start: 2025-08-27

## 2025-08-27 RX ORDER — OMEPRAZOLE 20 MG/1
CAPSULE, DELAYED RELEASE ORAL
Qty: 90 CAPSULE | Refills: 3 | Status: SHIPPED | OUTPATIENT
Start: 2025-08-27

## 2025-08-27 RX ORDER — INSULIN ASPART 100 [IU]/ML
INJECTION, SOLUTION INTRAVENOUS; SUBCUTANEOUS
Qty: 75 ML | Refills: 4 | Status: SHIPPED | OUTPATIENT
Start: 2025-08-27

## 2025-08-27 RX ORDER — ACETAMINOPHEN 500 MG/1
TABLET ORAL
Qty: 100 TABLET | Refills: 2 | Status: SHIPPED | OUTPATIENT
Start: 2025-08-27

## 2025-08-27 RX ORDER — ALBUTEROL SULFATE 90 UG/1
2 AEROSOL, METERED RESPIRATORY (INHALATION) EVERY 4 HOURS PRN
Qty: 18 G | Refills: 5 | Status: SHIPPED | OUTPATIENT
Start: 2025-08-27

## 2025-08-27 RX ORDER — ISOPROPYL ALCOHOL 0.75 G/1
SWAB TOPICAL
Qty: 100 EACH | Refills: 11 | Status: SHIPPED | OUTPATIENT
Start: 2025-08-27

## 2025-08-27 RX ORDER — CLONAZEPAM 0.5 MG/1
0.5 TABLET ORAL AT BEDTIME
Qty: 30 TABLET | Refills: 5 | Status: SHIPPED | OUTPATIENT
Start: 2025-08-27

## 2025-08-27 RX ORDER — SIMVASTATIN 40 MG
40 TABLET ORAL AT BEDTIME
Qty: 90 TABLET | Refills: 4 | Status: SHIPPED | OUTPATIENT
Start: 2025-08-27

## 2025-08-27 RX ORDER — FLUTICASONE PROPIONATE 110 UG/1
AEROSOL, METERED RESPIRATORY (INHALATION)
Qty: 12 G | Refills: 4 | Status: SHIPPED | OUTPATIENT
Start: 2025-08-27

## 2025-08-27 RX ORDER — LISINOPRIL 30 MG/1
30 TABLET ORAL DAILY
Qty: 90 TABLET | Refills: 0 | Status: SHIPPED | OUTPATIENT
Start: 2025-08-27

## 2025-08-27 RX ORDER — BLOOD-GLUCOSE METER
EACH MISCELLANEOUS
Qty: 1 KIT | Refills: 4 | Status: SHIPPED | OUTPATIENT
Start: 2025-08-27

## 2025-08-27 RX ORDER — HYDROCHLOROTHIAZIDE 25 MG/1
25 TABLET ORAL DAILY
Qty: 90 TABLET | Refills: 4 | Status: SHIPPED | OUTPATIENT
Start: 2025-08-27

## 2025-08-27 RX ORDER — EZETIMIBE 10 MG/1
10 TABLET ORAL DAILY
Qty: 90 TABLET | Refills: 4 | Status: SHIPPED | OUTPATIENT
Start: 2025-08-27

## 2025-08-27 RX ORDER — PEN NEEDLE, DIABETIC 31 GX5/16"
NEEDLE, DISPOSABLE MISCELLANEOUS
Qty: 400 EACH | Refills: 2 | Status: SHIPPED | OUTPATIENT
Start: 2025-08-27

## 2025-08-27 RX ORDER — LANCETS
EACH MISCELLANEOUS
Qty: 404 EACH | Refills: 5 | Status: SHIPPED | OUTPATIENT
Start: 2025-08-27

## 2025-08-27 RX ORDER — MELOXICAM 15 MG/1
15 TABLET ORAL
Qty: 90 TABLET | Refills: 4 | Status: SHIPPED | OUTPATIENT
Start: 2025-08-27

## 2025-08-27 RX ORDER — SEMAGLUTIDE 1.34 MG/ML
1 INJECTION, SOLUTION SUBCUTANEOUS
Qty: 9 ML | Refills: 3 | Status: SHIPPED | OUTPATIENT
Start: 2025-08-27

## 2025-08-27 SDOH — HEALTH STABILITY: PHYSICAL HEALTH: ON AVERAGE, HOW MANY MINUTES DO YOU ENGAGE IN EXERCISE AT THIS LEVEL?: 10 MIN

## 2025-08-27 SDOH — HEALTH STABILITY: PHYSICAL HEALTH: ON AVERAGE, HOW MANY DAYS PER WEEK DO YOU ENGAGE IN MODERATE TO STRENUOUS EXERCISE (LIKE A BRISK WALK)?: 3 DAYS

## 2025-08-27 ASSESSMENT — PAIN SCALES - PAIN ENJOYMENT GENERAL ACTIVITY SCALE (PEG)
INTERFERED_GENERAL_ACTIVITY: 4
INTERFERED_ENJOYMENT_LIFE: 3
PEG_TOTALSCORE: 3.67
INTERFERED_ENJOYMENT_LIFE: 3
INTERFERED_GENERAL_ACTIVITY: 4
AVG_PAIN_PASTWEEK: 4
AVG_PAIN_PASTWEEK: 4
PEG_TOTALSCORE: 3.67

## 2025-08-27 ASSESSMENT — PATIENT HEALTH QUESTIONNAIRE - PHQ9
10. IF YOU CHECKED OFF ANY PROBLEMS, HOW DIFFICULT HAVE THESE PROBLEMS MADE IT FOR YOU TO DO YOUR WORK, TAKE CARE OF THINGS AT HOME, OR GET ALONG WITH OTHER PEOPLE: SOMEWHAT DIFFICULT
SUM OF ALL RESPONSES TO PHQ QUESTIONS 1-9: 6
SUM OF ALL RESPONSES TO PHQ QUESTIONS 1-9: 6

## 2025-08-27 ASSESSMENT — ANXIETY QUESTIONNAIRES
7. FEELING AFRAID AS IF SOMETHING AWFUL MIGHT HAPPEN: NOT AT ALL
6. BECOMING EASILY ANNOYED OR IRRITABLE: MORE THAN HALF THE DAYS
GAD7 TOTAL SCORE: 5
GAD7 TOTAL SCORE: 5
4. TROUBLE RELAXING: SEVERAL DAYS
1. FEELING NERVOUS, ANXIOUS, OR ON EDGE: SEVERAL DAYS
8. IF YOU CHECKED OFF ANY PROBLEMS, HOW DIFFICULT HAVE THESE MADE IT FOR YOU TO DO YOUR WORK, TAKE CARE OF THINGS AT HOME, OR GET ALONG WITH OTHER PEOPLE?: SOMEWHAT DIFFICULT
3. WORRYING TOO MUCH ABOUT DIFFERENT THINGS: NOT AT ALL
IF YOU CHECKED OFF ANY PROBLEMS ON THIS QUESTIONNAIRE, HOW DIFFICULT HAVE THESE PROBLEMS MADE IT FOR YOU TO DO YOUR WORK, TAKE CARE OF THINGS AT HOME, OR GET ALONG WITH OTHER PEOPLE: SOMEWHAT DIFFICULT
5. BEING SO RESTLESS THAT IT IS HARD TO SIT STILL: SEVERAL DAYS
7. FEELING AFRAID AS IF SOMETHING AWFUL MIGHT HAPPEN: NOT AT ALL
2. NOT BEING ABLE TO STOP OR CONTROL WORRYING: NOT AT ALL
GAD7 TOTAL SCORE: 5

## 2025-08-27 ASSESSMENT — PAIN SCALES - GENERAL: PAINLEVEL_OUTOF10: MODERATE PAIN (4)

## 2025-08-28 ENCOUNTER — RESULTS FOLLOW-UP (OUTPATIENT)
Dept: FAMILY MEDICINE | Facility: OTHER | Age: 60
End: 2025-08-28
Payer: COMMERCIAL

## 2025-08-28 ENCOUNTER — PATIENT OUTREACH (OUTPATIENT)
Dept: CARE COORDINATION | Facility: CLINIC | Age: 60
End: 2025-08-28
Payer: COMMERCIAL

## (undated) DEVICE — GLOVE PROTEXIS POWDER FREE SMT 7.5  2D72PT75X

## (undated) DEVICE — SU PDS II 0 CTX 60" Z990G

## (undated) DEVICE — SUTURE 2-0 VICRYL TIES J911T

## (undated) DEVICE — LIGHT HANDLES PLASTIC

## (undated) DEVICE — PACK MAJOR LAPAROTOMY LF SBA15MLFCA

## (undated) DEVICE — SU MONOCRYL 4-0 PS-2 27" UND Y426H

## (undated) DEVICE — ESU GROUND PAD ADULT W/CORD E7507

## (undated) DEVICE — ADH LIQUID MASTISOL TOPICAL VIAL 2-3ML 0523-48

## (undated) DEVICE — PREP CHLORAPREP 26ML TINTED ORANGE  260815

## (undated) DEVICE — SLEEVE COMPRESSION SCD KNEE MED 74022

## (undated) DEVICE — SU VICRYL 3-0 CT-3 27" J327H

## (undated) DEVICE — DRSG STERI STRIP 1/2X4" R1547

## (undated) DEVICE — GLOVE BIOGEL PI INDICATOR 8.0 LF 41680

## (undated) DEVICE — SU VICRYL 0 CT-1 CR 8X27" UND JJ41G

## (undated) RX ORDER — ACETAMINOPHEN 325 MG/1
TABLET ORAL
Status: DISPENSED
Start: 2018-11-27

## (undated) RX ORDER — KETOROLAC TROMETHAMINE 30 MG/ML
INJECTION, SOLUTION INTRAMUSCULAR; INTRAVENOUS
Status: DISPENSED
Start: 2018-11-27

## (undated) RX ORDER — FENTANYL CITRATE 50 UG/ML
INJECTION, SOLUTION INTRAMUSCULAR; INTRAVENOUS
Status: DISPENSED
Start: 2018-11-27

## (undated) RX ORDER — CLINDAMYCIN PHOSPHATE 900 MG/50ML
INJECTION, SOLUTION INTRAVENOUS
Status: DISPENSED
Start: 2018-11-27

## (undated) RX ORDER — PROPOFOL 10 MG/ML
INJECTION, EMULSION INTRAVENOUS
Status: DISPENSED
Start: 2018-11-27

## (undated) RX ORDER — BUPIVACAINE HYDROCHLORIDE AND EPINEPHRINE 5; 5 MG/ML; UG/ML
INJECTION, SOLUTION EPIDURAL; INTRACAUDAL; PERINEURAL
Status: DISPENSED
Start: 2018-11-27